# Patient Record
Sex: MALE | Race: WHITE | Employment: OTHER | URBAN - NONMETROPOLITAN AREA
[De-identification: names, ages, dates, MRNs, and addresses within clinical notes are randomized per-mention and may not be internally consistent; named-entity substitution may affect disease eponyms.]

---

## 2017-01-01 ENCOUNTER — CARE COORDINATION (OUTPATIENT)
Dept: CARE COORDINATION | Age: 82
End: 2017-01-01

## 2017-01-01 ENCOUNTER — HOSPITAL ENCOUNTER (OUTPATIENT)
Dept: INFUSION THERAPY | Age: 82
Discharge: HOME | End: 2017-09-14
Payer: MEDICARE

## 2017-01-01 ENCOUNTER — HOSPITAL ENCOUNTER (OUTPATIENT)
Dept: INFUSION THERAPY | Age: 82
Discharge: HOME | End: 2017-10-26
Payer: MEDICARE

## 2017-01-01 ENCOUNTER — CARE COORDINATOR VISIT (OUTPATIENT)
Dept: CARE COORDINATION | Age: 82
End: 2017-01-01

## 2017-01-01 ENCOUNTER — HOSPITAL ENCOUNTER (OUTPATIENT)
Dept: INFUSION THERAPY | Age: 82
Discharge: HOME | End: 2017-08-17
Payer: MEDICARE

## 2017-01-01 ENCOUNTER — OFFICE VISIT (OUTPATIENT)
Dept: ONCOLOGY | Age: 82
End: 2017-01-01
Payer: MEDICARE

## 2017-01-01 ENCOUNTER — HOSPITAL ENCOUNTER (OUTPATIENT)
Dept: PULMONOLOGY | Age: 82
Discharge: HOME | End: 2017-12-05
Payer: MEDICARE

## 2017-01-01 ENCOUNTER — OFFICE VISIT (OUTPATIENT)
Dept: UROLOGY | Age: 82
End: 2017-01-01
Payer: MEDICARE

## 2017-01-01 ENCOUNTER — HOSPITAL ENCOUNTER (OUTPATIENT)
Dept: INFUSION THERAPY | Age: 82
Discharge: HOME | End: 2017-10-05
Payer: MEDICARE

## 2017-01-01 ENCOUNTER — HOSPITAL ENCOUNTER (OUTPATIENT)
Dept: INFUSION THERAPY | Age: 82
Discharge: HOME | End: 2017-09-21
Payer: MEDICARE

## 2017-01-01 ENCOUNTER — HOSPITAL ENCOUNTER (OUTPATIENT)
Dept: INFUSION THERAPY | Age: 82
Discharge: HOME | End: 2017-09-28
Payer: MEDICARE

## 2017-01-01 ENCOUNTER — TELEPHONE (OUTPATIENT)
Dept: PULMONOLOGY | Age: 82
End: 2017-01-01

## 2017-01-01 ENCOUNTER — OFFICE VISIT (OUTPATIENT)
Dept: PULMONOLOGY | Age: 82
End: 2017-01-01
Payer: MEDICARE

## 2017-01-01 ENCOUNTER — HOSPITAL ENCOUNTER (OUTPATIENT)
Dept: INFUSION THERAPY | Age: 82
Discharge: HOME | End: 2017-11-02
Payer: MEDICARE

## 2017-01-01 ENCOUNTER — HOSPITAL ENCOUNTER (OUTPATIENT)
Dept: INFUSION THERAPY | Age: 82
Discharge: HOME | End: 2017-11-30
Payer: MEDICARE

## 2017-01-01 ENCOUNTER — HOSPITAL ENCOUNTER (OUTPATIENT)
Dept: INFUSION THERAPY | Age: 82
Discharge: HOME | End: 2017-08-31
Payer: MEDICARE

## 2017-01-01 ENCOUNTER — OFFICE VISIT (OUTPATIENT)
Dept: FAMILY MEDICINE CLINIC | Age: 82
End: 2017-01-01
Payer: MEDICARE

## 2017-01-01 ENCOUNTER — HOSPITAL ENCOUNTER (OUTPATIENT)
Dept: INFUSION THERAPY | Age: 82
Discharge: HOME | End: 2017-11-09
Payer: MEDICARE

## 2017-01-01 ENCOUNTER — HOSPITAL ENCOUNTER (OUTPATIENT)
Dept: INFUSION THERAPY | Age: 82
Discharge: HOME | End: 2017-12-07
Payer: MEDICARE

## 2017-01-01 ENCOUNTER — HOSPITAL ENCOUNTER (OUTPATIENT)
Dept: INFUSION THERAPY | Age: 82
Discharge: HOME | End: 2017-10-12
Payer: MEDICARE

## 2017-01-01 ENCOUNTER — HOSPITAL ENCOUNTER (OUTPATIENT)
Dept: INFUSION THERAPY | Age: 82
Discharge: HOME | End: 2017-11-24
Payer: MEDICARE

## 2017-01-01 ENCOUNTER — HOSPITAL ENCOUNTER (OUTPATIENT)
Dept: INFUSION THERAPY | Age: 82
Discharge: HOME | End: 2017-12-21
Payer: MEDICARE

## 2017-01-01 ENCOUNTER — HOSPITAL ENCOUNTER (OUTPATIENT)
Dept: INFUSION THERAPY | Age: 82
Discharge: HOME | End: 2017-10-19
Payer: MEDICARE

## 2017-01-01 ENCOUNTER — HOSPITAL ENCOUNTER (OUTPATIENT)
Dept: INFUSION THERAPY | Age: 82
Discharge: HOME | End: 2017-08-24
Payer: MEDICARE

## 2017-01-01 ENCOUNTER — HOSPITAL ENCOUNTER (OUTPATIENT)
Dept: INFUSION THERAPY | Age: 82
Discharge: HOME | End: 2017-09-07
Payer: MEDICARE

## 2017-01-01 ENCOUNTER — HOSPITAL ENCOUNTER (OUTPATIENT)
Dept: INFUSION THERAPY | Age: 82
Discharge: HOME | End: 2017-12-28
Payer: MEDICARE

## 2017-01-01 ENCOUNTER — HOSPITAL ENCOUNTER (OUTPATIENT)
Dept: INFUSION THERAPY | Age: 82
Discharge: HOME | End: 2017-12-14
Payer: MEDICARE

## 2017-01-01 ENCOUNTER — HOSPITAL ENCOUNTER (OUTPATIENT)
Dept: INFUSION THERAPY | Age: 82
Discharge: HOME | End: 2017-11-16
Payer: MEDICARE

## 2017-01-01 VITALS
SYSTOLIC BLOOD PRESSURE: 144 MMHG | OXYGEN SATURATION: 97 % | HEART RATE: 77 BPM | DIASTOLIC BLOOD PRESSURE: 69 MMHG | BODY MASS INDEX: 33.43 KG/M2 | TEMPERATURE: 97.7 F | HEIGHT: 67 IN | WEIGHT: 213 LBS | RESPIRATION RATE: 18 BRPM

## 2017-01-01 VITALS
DIASTOLIC BLOOD PRESSURE: 72 MMHG | WEIGHT: 208.6 LBS | OXYGEN SATURATION: 98 % | BODY MASS INDEX: 33.52 KG/M2 | HEIGHT: 66 IN | SYSTOLIC BLOOD PRESSURE: 129 MMHG | RESPIRATION RATE: 18 BRPM | TEMPERATURE: 98.4 F | HEART RATE: 79 BPM

## 2017-01-01 VITALS
DIASTOLIC BLOOD PRESSURE: 78 MMHG | TEMPERATURE: 97.7 F | SYSTOLIC BLOOD PRESSURE: 121 MMHG | BODY MASS INDEX: 32.49 KG/M2 | RESPIRATION RATE: 18 BRPM | HEIGHT: 67 IN | HEART RATE: 98 BPM | OXYGEN SATURATION: 97 % | WEIGHT: 207 LBS

## 2017-01-01 VITALS
RESPIRATION RATE: 16 BRPM | BODY MASS INDEX: 33.27 KG/M2 | OXYGEN SATURATION: 97 % | WEIGHT: 207 LBS | DIASTOLIC BLOOD PRESSURE: 67 MMHG | HEART RATE: 73 BPM | TEMPERATURE: 97.7 F | SYSTOLIC BLOOD PRESSURE: 139 MMHG | HEIGHT: 66 IN

## 2017-01-01 VITALS
SYSTOLIC BLOOD PRESSURE: 152 MMHG | BODY MASS INDEX: 33.34 KG/M2 | DIASTOLIC BLOOD PRESSURE: 72 MMHG | WEIGHT: 212.4 LBS | HEIGHT: 67 IN

## 2017-01-01 VITALS
HEART RATE: 77 BPM | SYSTOLIC BLOOD PRESSURE: 142 MMHG | TEMPERATURE: 97.8 F | WEIGHT: 207.8 LBS | RESPIRATION RATE: 18 BRPM | BODY MASS INDEX: 33.4 KG/M2 | HEIGHT: 66 IN | DIASTOLIC BLOOD PRESSURE: 66 MMHG | OXYGEN SATURATION: 96 %

## 2017-01-01 VITALS
SYSTOLIC BLOOD PRESSURE: 141 MMHG | OXYGEN SATURATION: 97 % | HEART RATE: 88 BPM | TEMPERATURE: 97.7 F | RESPIRATION RATE: 18 BRPM | DIASTOLIC BLOOD PRESSURE: 77 MMHG

## 2017-01-01 VITALS
TEMPERATURE: 97.9 F | HEIGHT: 67 IN | SYSTOLIC BLOOD PRESSURE: 148 MMHG | WEIGHT: 211 LBS | HEART RATE: 82 BPM | DIASTOLIC BLOOD PRESSURE: 73 MMHG | BODY MASS INDEX: 33.12 KG/M2 | OXYGEN SATURATION: 95 % | RESPIRATION RATE: 18 BRPM

## 2017-01-01 VITALS
RESPIRATION RATE: 16 BRPM | WEIGHT: 212 LBS | OXYGEN SATURATION: 98 % | TEMPERATURE: 98.1 F | DIASTOLIC BLOOD PRESSURE: 79 MMHG | SYSTOLIC BLOOD PRESSURE: 140 MMHG | HEIGHT: 67 IN | BODY MASS INDEX: 33.27 KG/M2 | HEART RATE: 80 BPM

## 2017-01-01 VITALS
HEART RATE: 75 BPM | TEMPERATURE: 97.6 F | SYSTOLIC BLOOD PRESSURE: 125 MMHG | BODY MASS INDEX: 33.41 KG/M2 | RESPIRATION RATE: 18 BRPM | WEIGHT: 207 LBS | DIASTOLIC BLOOD PRESSURE: 60 MMHG | OXYGEN SATURATION: 97 %

## 2017-01-01 VITALS
SYSTOLIC BLOOD PRESSURE: 131 MMHG | BODY MASS INDEX: 33.75 KG/M2 | TEMPERATURE: 97.8 F | RESPIRATION RATE: 18 BRPM | OXYGEN SATURATION: 98 % | DIASTOLIC BLOOD PRESSURE: 69 MMHG | HEART RATE: 77 BPM | HEIGHT: 66 IN | WEIGHT: 210 LBS

## 2017-01-01 VITALS
RESPIRATION RATE: 18 BRPM | BODY MASS INDEX: 33.27 KG/M2 | SYSTOLIC BLOOD PRESSURE: 128 MMHG | OXYGEN SATURATION: 96 % | TEMPERATURE: 97.9 F | WEIGHT: 212.4 LBS | DIASTOLIC BLOOD PRESSURE: 66 MMHG | HEART RATE: 78 BPM

## 2017-01-01 VITALS
HEIGHT: 66 IN | HEART RATE: 92 BPM | OXYGEN SATURATION: 97 % | DIASTOLIC BLOOD PRESSURE: 62 MMHG | SYSTOLIC BLOOD PRESSURE: 118 MMHG | TEMPERATURE: 97.9 F | BODY MASS INDEX: 34.27 KG/M2 | WEIGHT: 213.2 LBS | RESPIRATION RATE: 16 BRPM

## 2017-01-01 VITALS
SYSTOLIC BLOOD PRESSURE: 132 MMHG | BODY MASS INDEX: 33.4 KG/M2 | HEART RATE: 73 BPM | TEMPERATURE: 97.7 F | WEIGHT: 207.8 LBS | DIASTOLIC BLOOD PRESSURE: 69 MMHG | HEIGHT: 66 IN | OXYGEN SATURATION: 98 % | RESPIRATION RATE: 16 BRPM

## 2017-01-01 VITALS
TEMPERATURE: 97.6 F | DIASTOLIC BLOOD PRESSURE: 63 MMHG | BODY MASS INDEX: 33.12 KG/M2 | HEIGHT: 67 IN | SYSTOLIC BLOOD PRESSURE: 114 MMHG | TEMPERATURE: 98 F | BODY MASS INDEX: 33.27 KG/M2 | RESPIRATION RATE: 18 BRPM | HEART RATE: 81 BPM | DIASTOLIC BLOOD PRESSURE: 74 MMHG | HEART RATE: 66 BPM | OXYGEN SATURATION: 98 % | OXYGEN SATURATION: 98 % | SYSTOLIC BLOOD PRESSURE: 133 MMHG | HEART RATE: 82 BPM | SYSTOLIC BLOOD PRESSURE: 162 MMHG | DIASTOLIC BLOOD PRESSURE: 59 MMHG | TEMPERATURE: 97.8 F | RESPIRATION RATE: 18 BRPM | WEIGHT: 207 LBS | OXYGEN SATURATION: 98 % | WEIGHT: 211 LBS | HEIGHT: 66 IN | RESPIRATION RATE: 18 BRPM

## 2017-01-01 VITALS
WEIGHT: 206 LBS | HEIGHT: 66 IN | TEMPERATURE: 97.7 F | OXYGEN SATURATION: 96 % | HEART RATE: 75 BPM | SYSTOLIC BLOOD PRESSURE: 130 MMHG | DIASTOLIC BLOOD PRESSURE: 64 MMHG | RESPIRATION RATE: 18 BRPM | BODY MASS INDEX: 33.11 KG/M2

## 2017-01-01 VITALS
TEMPERATURE: 97.6 F | DIASTOLIC BLOOD PRESSURE: 70 MMHG | HEIGHT: 67 IN | OXYGEN SATURATION: 97 % | RESPIRATION RATE: 18 BRPM | HEART RATE: 74 BPM | SYSTOLIC BLOOD PRESSURE: 147 MMHG

## 2017-01-01 VITALS
BODY MASS INDEX: 32.52 KG/M2 | HEIGHT: 67 IN | HEART RATE: 101 BPM | DIASTOLIC BLOOD PRESSURE: 77 MMHG | OXYGEN SATURATION: 98 % | WEIGHT: 207.2 LBS | SYSTOLIC BLOOD PRESSURE: 135 MMHG | TEMPERATURE: 97 F | RESPIRATION RATE: 18 BRPM

## 2017-01-01 VITALS
HEIGHT: 66 IN | RESPIRATION RATE: 18 BRPM | OXYGEN SATURATION: 98 % | BODY MASS INDEX: 33.75 KG/M2 | TEMPERATURE: 97.2 F | SYSTOLIC BLOOD PRESSURE: 138 MMHG | WEIGHT: 210 LBS | HEART RATE: 83 BPM | DIASTOLIC BLOOD PRESSURE: 78 MMHG

## 2017-01-01 VITALS
HEART RATE: 84 BPM | SYSTOLIC BLOOD PRESSURE: 134 MMHG | BODY MASS INDEX: 33.09 KG/M2 | DIASTOLIC BLOOD PRESSURE: 75 MMHG | HEIGHT: 67 IN | OXYGEN SATURATION: 95 % | TEMPERATURE: 97.1 F | WEIGHT: 210.8 LBS

## 2017-01-01 VITALS
BODY MASS INDEX: 32.33 KG/M2 | DIASTOLIC BLOOD PRESSURE: 66 MMHG | WEIGHT: 206 LBS | RESPIRATION RATE: 20 BRPM | TEMPERATURE: 97.7 F | SYSTOLIC BLOOD PRESSURE: 114 MMHG | HEART RATE: 92 BPM | HEIGHT: 67 IN

## 2017-01-01 VITALS
HEIGHT: 66 IN | TEMPERATURE: 97.6 F | BODY MASS INDEX: 33.75 KG/M2 | DIASTOLIC BLOOD PRESSURE: 60 MMHG | SYSTOLIC BLOOD PRESSURE: 134 MMHG | OXYGEN SATURATION: 97 % | WEIGHT: 210 LBS | HEART RATE: 89 BPM | RESPIRATION RATE: 18 BRPM

## 2017-01-01 VITALS
BODY MASS INDEX: 34.49 KG/M2 | HEART RATE: 78 BPM | RESPIRATION RATE: 18 BRPM | WEIGHT: 214.6 LBS | OXYGEN SATURATION: 97 % | HEIGHT: 66 IN | TEMPERATURE: 97.7 F | SYSTOLIC BLOOD PRESSURE: 145 MMHG | DIASTOLIC BLOOD PRESSURE: 67 MMHG

## 2017-01-01 VITALS
SYSTOLIC BLOOD PRESSURE: 143 MMHG | DIASTOLIC BLOOD PRESSURE: 67 MMHG | WEIGHT: 207 LBS | HEIGHT: 67 IN | OXYGEN SATURATION: 94 % | BODY MASS INDEX: 32.49 KG/M2 | TEMPERATURE: 97.6 F | RESPIRATION RATE: 16 BRPM | HEART RATE: 81 BPM

## 2017-01-01 VITALS
BODY MASS INDEX: 33.18 KG/M2 | WEIGHT: 211.4 LBS | SYSTOLIC BLOOD PRESSURE: 155 MMHG | RESPIRATION RATE: 18 BRPM | TEMPERATURE: 97.7 F | OXYGEN SATURATION: 97 % | HEIGHT: 67 IN | HEART RATE: 70 BPM | DIASTOLIC BLOOD PRESSURE: 69 MMHG

## 2017-01-01 DIAGNOSIS — Z51.11 CHEMOTHERAPY MANAGEMENT, ENCOUNTER FOR: ICD-10-CM

## 2017-01-01 DIAGNOSIS — D47.2 SMOLDERING MYELOMA: ICD-10-CM

## 2017-01-01 DIAGNOSIS — I35.1 MODERATE AORTIC REGURGITATION: ICD-10-CM

## 2017-01-01 DIAGNOSIS — C90.00 MULTIPLE MYELOMA NOT HAVING ACHIEVED REMISSION (HCC): Primary | ICD-10-CM

## 2017-01-01 DIAGNOSIS — J44.9 MODERATE COPD (CHRONIC OBSTRUCTIVE PULMONARY DISEASE) (HCC): ICD-10-CM

## 2017-01-01 DIAGNOSIS — J44.1 COPD EXACERBATION (HCC): Primary | ICD-10-CM

## 2017-01-01 DIAGNOSIS — M79.604 RIGHT LEG PAIN: ICD-10-CM

## 2017-01-01 DIAGNOSIS — R33.8 ACUTE URINARY RETENTION: ICD-10-CM

## 2017-01-01 DIAGNOSIS — R97.20 ELEVATED PSA: ICD-10-CM

## 2017-01-01 DIAGNOSIS — Z51.11 ENCOUNTER FOR ANTINEOPLASTIC CHEMOTHERAPY: ICD-10-CM

## 2017-01-01 DIAGNOSIS — J44.9 MODERATE COPD (CHRONIC OBSTRUCTIVE PULMONARY DISEASE) (HCC): Primary | ICD-10-CM

## 2017-01-01 DIAGNOSIS — Z23 NEED FOR IMMUNIZATION AGAINST INFLUENZA: ICD-10-CM

## 2017-01-01 DIAGNOSIS — F17.200 TOBACCO DEPENDENCE: ICD-10-CM

## 2017-01-01 DIAGNOSIS — I10 ESSENTIAL HYPERTENSION: ICD-10-CM

## 2017-01-01 DIAGNOSIS — M25.562 LEFT KNEE PAIN, UNSPECIFIED CHRONICITY: ICD-10-CM

## 2017-01-01 DIAGNOSIS — R93.89 ABNORMAL CHEST CT: ICD-10-CM

## 2017-01-01 DIAGNOSIS — R39.12 BENIGN PROSTATIC HYPERPLASIA WITH WEAK URINARY STREAM: ICD-10-CM

## 2017-01-01 DIAGNOSIS — D84.9 IMMUNOCOMPROMISED STATE (HCC): ICD-10-CM

## 2017-01-01 DIAGNOSIS — R00.0 SINUS TACHYCARDIA: ICD-10-CM

## 2017-01-01 DIAGNOSIS — R60.0 BILATERAL EDEMA OF LOWER EXTREMITY: ICD-10-CM

## 2017-01-01 DIAGNOSIS — C61 PROSTATE CA (HCC): ICD-10-CM

## 2017-01-01 DIAGNOSIS — R06.02 SHORTNESS OF BREATH: ICD-10-CM

## 2017-01-01 DIAGNOSIS — N40.1 BENIGN PROSTATIC HYPERPLASIA WITH WEAK URINARY STREAM: ICD-10-CM

## 2017-01-01 DIAGNOSIS — C90.00 MULTIPLE MYELOMA NOT HAVING ACHIEVED REMISSION (HCC): ICD-10-CM

## 2017-01-01 DIAGNOSIS — L03.115 CELLULITIS OF RIGHT LOWER EXTREMITY: ICD-10-CM

## 2017-01-01 DIAGNOSIS — Z85.828 HX OF SKIN CANCER, BASAL CELL: ICD-10-CM

## 2017-01-01 DIAGNOSIS — E83.42 HYPOMAGNESEMIA: ICD-10-CM

## 2017-01-01 DIAGNOSIS — J43.9 PULMONARY EMPHYSEMA, UNSPECIFIED EMPHYSEMA TYPE (HCC): ICD-10-CM

## 2017-01-01 DIAGNOSIS — C61 PROSTATE CA (HCC): Primary | ICD-10-CM

## 2017-01-01 DIAGNOSIS — R35.1 NOCTURIA MORE THAN TWICE PER NIGHT: ICD-10-CM

## 2017-01-01 DIAGNOSIS — J44.1 COPD EXACERBATION (HCC): ICD-10-CM

## 2017-01-01 DIAGNOSIS — J30.1 NON-SEASONAL ALLERGIC RHINITIS DUE TO POLLEN, UNSPECIFIED CHRONICITY: ICD-10-CM

## 2017-01-01 DIAGNOSIS — A49.1 PNEUMOCOCCAL INFECTION: ICD-10-CM

## 2017-01-01 DIAGNOSIS — C79.51 BONE METASTASIS (HCC): ICD-10-CM

## 2017-01-01 DIAGNOSIS — Z85.46 H/O PROSTATE CANCER: ICD-10-CM

## 2017-01-01 DIAGNOSIS — R91.1 PULMONARY NODULE, LEFT: ICD-10-CM

## 2017-01-01 DIAGNOSIS — E11.9 CONTROLLED TYPE 2 DIABETES MELLITUS WITHOUT COMPLICATION, WITHOUT LONG-TERM CURRENT USE OF INSULIN (HCC): Primary | ICD-10-CM

## 2017-01-01 DIAGNOSIS — N40.0 BPH WITHOUT URINARY OBSTRUCTION: ICD-10-CM

## 2017-01-01 LAB
ALBUMIN SERPL-MCNC: 3.6 G/DL (ref 3.5–5.1)
ALP BLD-CCNC: 114 U/L (ref 38–126)
ALP BLD-CCNC: 117 U/L (ref 38–126)
ALP BLD-CCNC: 127 U/L (ref 38–126)
ALT SERPL-CCNC: 10 U/L (ref 11–66)
ALT SERPL-CCNC: 10 U/L (ref 11–66)
ALT SERPL-CCNC: 13 U/L (ref 11–66)
ANISOCYTOSIS: ABNORMAL
AST SERPL-CCNC: 11 U/L (ref 5–40)
AST SERPL-CCNC: 13 U/L (ref 5–40)
AST SERPL-CCNC: 14 U/L (ref 5–40)
BASINOPHIL, AUTOMATED: 0 % (ref 0–12)
BASINOPHIL, AUTOMATED: 0 % (ref 0–12)
BASOPHILS # BLD: 0.7 %
BASOPHILS ABSOLUTE: 0 THOU/MM3 (ref 0–0.1)
BILIRUB SERPL-MCNC: 0.2 MG/DL (ref 0.3–1.2)
BILIRUB SERPL-MCNC: 0.2 MG/DL (ref 0.3–1.2)
BILIRUB SERPL-MCNC: 0.3 MG/DL (ref 0.3–1.2)
BILIRUBIN DIRECT: < 0.2 MG/DL (ref 0–0.3)
BILIRUBIN URINE: NEGATIVE
BLOOD URINE, POC: ABNORMAL
BUN, WHOLE BLOOD: 13 MG/DL (ref 8–26)
BUN, WHOLE BLOOD: 15 MG/DL (ref 8–26)
BUN, WHOLE BLOOD: 16 MG/DL (ref 8–26)
CHARACTER, URINE: CLEAR
CHLORIDE, WHOLE BLOOD: 100 MEQ/L (ref 98–109)
COLOR, URINE: YELLOW
CREATININE, WHOLE BLOOD: 0.9 MG/DL (ref 0.5–1.2)
CREATININE, WHOLE BLOOD: 1 MG/DL (ref 0.5–1.2)
CREATININE, WHOLE BLOOD: 1.1 MG/DL (ref 0.5–1.2)
EOSINOPHIL # BLD: 2.4 %
EOSINOPHILS ABSOLUTE: 0 THOU/MM3 (ref 0–0.4)
EOSINOPHILS RELATIVE PERCENT: 2 % (ref 0–12)
EOSINOPHILS RELATIVE PERCENT: 3 % (ref 0–12)
GFR, ESTIMATED: 68 ML/MIN/1.73M2
GFR, ESTIMATED: 76 ML/MIN/1.73M2
GFR, ESTIMATED: 85 ML/MIN/1.73M2
GLUCOSE URINE: 100 MG/DL
GLUCOSE, WHOLE BLOOD: 107 MG/DL (ref 70–108)
GLUCOSE, WHOLE BLOOD: 129 MG/DL (ref 70–108)
GLUCOSE, WHOLE BLOOD: 142 MG/DL (ref 70–108)
HBA1C MFR BLD: 7.6 %
HCT VFR BLD CALC: 35 % (ref 42–52)
HCT VFR BLD CALC: 36.7 % (ref 42–52)
HCT VFR BLD CALC: 36.9 % (ref 42–52)
HEMOGLOBIN: 11.8 GM/DL (ref 14–18)
HEMOGLOBIN: 12.4 GM/DL (ref 14–18)
HEMOGLOBIN: 12.5 GM/DL (ref 14–18)
IMMUNOFIXATION WITH QUANT: NORMAL
IMMUNOFIXATION WITH QUANT: NORMAL
IONIZED CALCIUM, WHOLE BLOOD: 1.16 MMOL/L (ref 1.12–1.32)
IONIZED CALCIUM, WHOLE BLOOD: 1.17 MMOL/L (ref 1.12–1.32)
IONIZED CALCIUM, WHOLE BLOOD: 1.19 MMOL/L (ref 1.12–1.32)
KAPPA/LAMBDA FREE LIGHT CHAINS: NORMAL
KAPPA/LAMBDA FREE LIGHT CHAINS: NORMAL
KETONES, URINE: NEGATIVE
LEUK/LYMPH PHENOTYPING WB: NORMAL
LEUKOCYTE CLUMPS, URINE: NEGATIVE
LYMPHOCYTES # BLD: 15 % (ref 15–47)
LYMPHOCYTES # BLD: 26 % (ref 15–47)
LYMPHOCYTES # BLD: 28.5 %
LYMPHOCYTES ABSOLUTE: 0.5 THOU/MM3 (ref 1–4.8)
MCH RBC QN AUTO: 32 PG (ref 27–31)
MCH RBC QN AUTO: 32.1 PG (ref 27–31)
MCH RBC QN AUTO: 32.4 PG (ref 27–31)
MCHC RBC AUTO-ENTMCNC: 33.6 GM/DL (ref 33–37)
MCHC RBC AUTO-ENTMCNC: 33.7 GM/DL (ref 33–37)
MCHC RBC AUTO-ENTMCNC: 33.8 GM/DL (ref 33–37)
MCV RBC AUTO: 95 FL (ref 80–94)
MCV RBC AUTO: 95 FL (ref 80–94)
MCV RBC AUTO: 96 FL (ref 80–94)
MONOCYTES # BLD: 3.7 %
MONOCYTES ABSOLUTE: 0.1 THOU/MM3 (ref 0.4–1.3)
MONOCYTES: 2 % (ref 0–12)
MONOCYTES: 3 % (ref 0–12)
NITRITE, URINE: NEGATIVE
NUCLEATED RED BLOOD CELLS: 0 /100 WBC
PDW BLD-RTO: 12.6 % (ref 11.5–14.5)
PDW BLD-RTO: 13.1 % (ref 11.5–14.5)
PDW BLD-RTO: 13.1 % (ref 11.5–14.5)
PH, URINE: 6
PLATELET # BLD: 189 THOU/MM3 (ref 130–400)
PLATELET # BLD: 190 THOU/MM3 (ref 130–400)
PLATELET # BLD: 215 THOU/MM3 (ref 130–400)
PMV BLD AUTO: 7.2 MCM (ref 7.4–10.4)
PMV BLD AUTO: 7.3 MCM (ref 7.4–10.4)
PMV BLD AUTO: 7.4 MCM (ref 7.4–10.4)
POST VOID RESIDUAL (PVR): NORMAL ML
POTASSIUM, WHOLE BLOOD: 3.9 MEQ/L (ref 3.5–4.9)
POTASSIUM, WHOLE BLOOD: 4.4 MEQ/L (ref 3.5–4.9)
POTASSIUM, WHOLE BLOOD: 4.4 MEQ/L (ref 3.5–4.9)
PROSTATE SPECIFIC ANTIGEN: 0.9 NG/ML (ref 0–1)
PROTEIN, URINE: NEGATIVE MG/DL
RBC # BLD: 3.67 MILL/MM3 (ref 4.7–6.1)
RBC # BLD: 3.85 MILL/MM3 (ref 4.7–6.1)
RBC # BLD: 3.88 MILL/MM3 (ref 4.7–6.1)
SEG NEUTROPHILS: 64.7 %
SEG NEUTROPHILS: 70 % (ref 43–75)
SEG NEUTROPHILS: 78 % (ref 43–75)
SEGMENTED NEUTROPHILS ABSOLUTE COUNT: 1.2 THOU/MM3 (ref 1.8–7.7)
SODIUM, WHOLE BLOOD: 139 MEQ/L (ref 138–146)
SODIUM, WHOLE BLOOD: 140 MEQ/L (ref 138–146)
SODIUM, WHOLE BLOOD: 141 MEQ/L (ref 138–146)
SPECIFIC GRAVITY, URINE: 1.02 (ref 1–1.03)
TOTAL CO2, WHOLE BLOOD: 28 MEQ/L (ref 23–33)
TOTAL CO2, WHOLE BLOOD: 29 MEQ/L (ref 23–33)
TOTAL CO2, WHOLE BLOOD: 30 MEQ/L (ref 23–33)
TOTAL PROTEIN: 8.5 G/DL (ref 6.1–8)
TOTAL PROTEIN: 8.9 G/DL (ref 6.1–8)
TOTAL PROTEIN: 9.3 G/DL (ref 6.1–8)
UROBILINOGEN, URINE: 0.2 EU/DL
WBC # BLD: 1.9 THOU/MM3 (ref 4.8–10.8)
WBC # BLD: 2.2 THOU/MM3 (ref 4.8–10.8)
WBC # BLD: 2.4 THOU/MM3 (ref 4.8–10.8)

## 2017-01-01 PROCEDURE — 6360000002 HC RX W HCPCS: Performed by: INTERNAL MEDICINE

## 2017-01-01 PROCEDURE — 96374 THER/PROPH/DIAG INJ IV PUSH: CPT

## 2017-01-01 PROCEDURE — 1123F ACP DISCUSS/DSCN MKR DOCD: CPT | Performed by: INTERNAL MEDICINE

## 2017-01-01 PROCEDURE — 2580000003 HC RX 258: Performed by: INTERNAL MEDICINE

## 2017-01-01 PROCEDURE — 96401 CHEMO ANTI-NEOPL SQ/IM: CPT

## 2017-01-01 PROCEDURE — 86334 IMMUNOFIX E-PHORESIS SERUM: CPT

## 2017-01-01 PROCEDURE — 4040F PNEUMOC VAC/ADMIN/RCVD: CPT | Performed by: UROLOGY

## 2017-01-01 PROCEDURE — G8484 FLU IMMUNIZE NO ADMIN: HCPCS | Performed by: INTERNAL MEDICINE

## 2017-01-01 PROCEDURE — 84165 PROTEIN E-PHORESIS SERUM: CPT

## 2017-01-01 PROCEDURE — 80076 HEPATIC FUNCTION PANEL: CPT

## 2017-01-01 PROCEDURE — G0463 HOSPITAL OUTPT CLINIC VISIT: HCPCS

## 2017-01-01 PROCEDURE — 4040F PNEUMOC VAC/ADMIN/RCVD: CPT | Performed by: INTERNAL MEDICINE

## 2017-01-01 PROCEDURE — 1036F TOBACCO NON-USER: CPT | Performed by: UROLOGY

## 2017-01-01 PROCEDURE — 96365 THER/PROPH/DIAG IV INF INIT: CPT

## 2017-01-01 PROCEDURE — 99213 OFFICE O/P EST LOW 20 MIN: CPT | Performed by: FAMILY MEDICINE

## 2017-01-01 PROCEDURE — G8417 CALC BMI ABV UP PARAM F/U: HCPCS | Performed by: UROLOGY

## 2017-01-01 PROCEDURE — 36415 COLL VENOUS BLD VENIPUNCTURE: CPT

## 2017-01-01 PROCEDURE — 90688 IIV4 VACCINE SPLT 0.5 ML IM: CPT | Performed by: FAMILY MEDICINE

## 2017-01-01 PROCEDURE — 1036F TOBACCO NON-USER: CPT | Performed by: FAMILY MEDICINE

## 2017-01-01 PROCEDURE — G8926 SPIRO NO PERF OR DOC: HCPCS | Performed by: FAMILY MEDICINE

## 2017-01-01 PROCEDURE — 82784 ASSAY IGA/IGD/IGG/IGM EACH: CPT

## 2017-01-01 PROCEDURE — G8417 CALC BMI ABV UP PARAM F/U: HCPCS | Performed by: INTERNAL MEDICINE

## 2017-01-01 PROCEDURE — 36591 DRAW BLOOD OFF VENOUS DEVICE: CPT

## 2017-01-01 PROCEDURE — G8427 DOCREV CUR MEDS BY ELIG CLIN: HCPCS | Performed by: FAMILY MEDICINE

## 2017-01-01 PROCEDURE — 99213 OFFICE O/P EST LOW 20 MIN: CPT | Performed by: UROLOGY

## 2017-01-01 PROCEDURE — 88184 FLOWCYTOMETRY/ TC 1 MARKER: CPT

## 2017-01-01 PROCEDURE — 99215 OFFICE O/P EST HI 40 MIN: CPT | Performed by: INTERNAL MEDICINE

## 2017-01-01 PROCEDURE — 84160 ASSAY OF PROTEIN ANY SOURCE: CPT

## 2017-01-01 PROCEDURE — G8427 DOCREV CUR MEDS BY ELIG CLIN: HCPCS | Performed by: INTERNAL MEDICINE

## 2017-01-01 PROCEDURE — 1036F TOBACCO NON-USER: CPT | Performed by: INTERNAL MEDICINE

## 2017-01-01 PROCEDURE — 83883 ASSAY NEPHELOMETRY NOT SPEC: CPT

## 2017-01-01 PROCEDURE — 4040F PNEUMOC VAC/ADMIN/RCVD: CPT | Performed by: FAMILY MEDICINE

## 2017-01-01 PROCEDURE — 94060 EVALUATION OF WHEEZING: CPT

## 2017-01-01 PROCEDURE — 1123F ACP DISCUSS/DSCN MKR DOCD: CPT | Performed by: UROLOGY

## 2017-01-01 PROCEDURE — 80047 BASIC METABLC PNL IONIZED CA: CPT

## 2017-01-01 PROCEDURE — 3023F SPIROM DOC REV: CPT | Performed by: FAMILY MEDICINE

## 2017-01-01 PROCEDURE — G8484 FLU IMMUNIZE NO ADMIN: HCPCS | Performed by: FAMILY MEDICINE

## 2017-01-01 PROCEDURE — G8926 SPIRO NO PERF OR DOC: HCPCS | Performed by: INTERNAL MEDICINE

## 2017-01-01 PROCEDURE — G8484 FLU IMMUNIZE NO ADMIN: HCPCS | Performed by: UROLOGY

## 2017-01-01 PROCEDURE — 84153 ASSAY OF PSA TOTAL: CPT

## 2017-01-01 PROCEDURE — 85025 COMPLETE CBC W/AUTO DIFF WBC: CPT

## 2017-01-01 PROCEDURE — 1123F ACP DISCUSS/DSCN MKR DOCD: CPT | Performed by: FAMILY MEDICINE

## 2017-01-01 PROCEDURE — 3023F SPIROM DOC REV: CPT | Performed by: INTERNAL MEDICINE

## 2017-01-01 PROCEDURE — G8417 CALC BMI ABV UP PARAM F/U: HCPCS | Performed by: FAMILY MEDICINE

## 2017-01-01 PROCEDURE — 88185 FLOWCYTOMETRY/TC ADD-ON: CPT

## 2017-01-01 PROCEDURE — 83036 HEMOGLOBIN GLYCOSYLATED A1C: CPT | Performed by: FAMILY MEDICINE

## 2017-01-01 PROCEDURE — 99214 OFFICE O/P EST MOD 30 MIN: CPT | Performed by: FAMILY MEDICINE

## 2017-01-01 PROCEDURE — 51798 US URINE CAPACITY MEASURE: CPT | Performed by: UROLOGY

## 2017-01-01 PROCEDURE — G8427 DOCREV CUR MEDS BY ELIG CLIN: HCPCS | Performed by: UROLOGY

## 2017-01-01 PROCEDURE — 96367 TX/PROPH/DG ADDL SEQ IV INF: CPT

## 2017-01-01 PROCEDURE — 81003 URINALYSIS AUTO W/O SCOPE: CPT | Performed by: UROLOGY

## 2017-01-01 PROCEDURE — G0008 ADMIN INFLUENZA VIRUS VAC: HCPCS | Performed by: FAMILY MEDICINE

## 2017-01-01 RX ORDER — SODIUM CHLORIDE 9 MG/ML
INJECTION, SOLUTION INTRAVENOUS ONCE
Status: CANCELLED | OUTPATIENT
Start: 2017-01-01

## 2017-01-01 RX ORDER — SODIUM CHLORIDE 0.9 % (FLUSH) 0.9 %
5 SYRINGE (ML) INJECTION PRN
Status: CANCELLED | OUTPATIENT
Start: 2017-01-01

## 2017-01-01 RX ORDER — HEPARIN SODIUM (PORCINE) LOCK FLUSH IV SOLN 100 UNIT/ML 100 UNIT/ML
500 SOLUTION INTRAVENOUS PRN
Status: CANCELLED | OUTPATIENT
Start: 2017-01-01

## 2017-01-01 RX ORDER — SODIUM CHLORIDE 9 MG/ML
INJECTION, SOLUTION INTRAVENOUS ONCE
Status: COMPLETED | OUTPATIENT
Start: 2017-01-01 | End: 2017-01-01

## 2017-01-01 RX ORDER — METHYLPREDNISOLONE SODIUM SUCCINATE 125 MG/2ML
125 INJECTION, POWDER, LYOPHILIZED, FOR SOLUTION INTRAMUSCULAR; INTRAVENOUS PRN
Status: CANCELLED | OUTPATIENT
Start: 2017-01-01

## 2017-01-01 RX ORDER — SODIUM CHLORIDE 0.9 % (FLUSH) 0.9 %
10 SYRINGE (ML) INJECTION PRN
Status: CANCELLED | OUTPATIENT
Start: 2017-01-01

## 2017-01-01 RX ORDER — SODIUM CHLORIDE 0.9 % (FLUSH) 0.9 %
5 SYRINGE (ML) INJECTION PRN
Status: CANCELLED | OUTPATIENT
Start: 2018-01-01

## 2017-01-01 RX ORDER — SODIUM CHLORIDE 9 MG/ML
INJECTION, SOLUTION INTRAVENOUS ONCE
Status: CANCELLED | OUTPATIENT
Start: 2017-01-01 | End: 2017-01-01

## 2017-01-01 RX ORDER — LORAZEPAM 2 MG/ML
1 INJECTION INTRAMUSCULAR
Status: CANCELLED | OUTPATIENT
Start: 2017-01-01

## 2017-01-01 RX ORDER — DIPHENHYDRAMINE HYDROCHLORIDE 50 MG/ML
50 INJECTION INTRAMUSCULAR; INTRAVENOUS PRN
Status: CANCELLED | OUTPATIENT
Start: 2017-01-01

## 2017-01-01 RX ORDER — LORAZEPAM 2 MG/ML
1 INJECTION INTRAMUSCULAR
Status: CANCELLED | OUTPATIENT
Start: 2018-01-01

## 2017-01-01 RX ORDER — 0.9 % SODIUM CHLORIDE 0.9 %
250 VIAL (ML) INJECTION CONTINUOUS
Status: CANCELLED | OUTPATIENT
Start: 2017-01-01

## 2017-01-01 RX ORDER — DOXYCYCLINE HYCLATE 100 MG
100 TABLET ORAL 2 TIMES DAILY
Qty: 20 TABLET | Refills: 0 | Status: SHIPPED | OUTPATIENT
Start: 2017-01-01 | End: 2017-01-01

## 2017-01-01 RX ORDER — PREDNISONE 10 MG/1
30 TABLET ORAL DAILY
Qty: 15 TABLET | Refills: 0 | Status: SHIPPED | OUTPATIENT
Start: 2017-01-01 | End: 2017-01-01

## 2017-01-01 RX ORDER — PREDNISONE 20 MG/1
40 TABLET ORAL DAILY
Qty: 10 TABLET | Refills: 0 | Status: SHIPPED | OUTPATIENT
Start: 2017-01-01 | End: 2017-01-01

## 2017-01-01 RX ORDER — DIPHENHYDRAMINE HYDROCHLORIDE 50 MG/ML
50 INJECTION INTRAMUSCULAR; INTRAVENOUS ONCE
Status: CANCELLED | OUTPATIENT
Start: 2017-01-01 | End: 2017-01-01

## 2017-01-01 RX ORDER — PREDNISONE 10 MG/1
TABLET ORAL
Qty: 30 TABLET | Refills: 0 | Status: SHIPPED | OUTPATIENT
Start: 2017-01-01 | End: 2017-01-01 | Stop reason: ALTCHOICE

## 2017-01-01 RX ORDER — METHYLPREDNISOLONE SODIUM SUCCINATE 125 MG/2ML
125 INJECTION, POWDER, LYOPHILIZED, FOR SOLUTION INTRAMUSCULAR; INTRAVENOUS ONCE
Status: CANCELLED | OUTPATIENT
Start: 2017-01-01 | End: 2017-01-01

## 2017-01-01 RX ORDER — FLUTICASONE PROPIONATE 50 MCG
2 SPRAY, SUSPENSION (ML) NASAL DAILY
Qty: 1 BOTTLE | Refills: 7 | Status: SHIPPED | OUTPATIENT
Start: 2017-01-01 | End: 2017-01-01 | Stop reason: SDUPTHER

## 2017-01-01 RX ORDER — SODIUM CHLORIDE 9 MG/ML
INJECTION, SOLUTION INTRAVENOUS ONCE
Status: DISCONTINUED | OUTPATIENT
Start: 2017-01-01 | End: 2017-01-01 | Stop reason: HOSPADM

## 2017-01-01 RX ORDER — DIPHENHYDRAMINE HYDROCHLORIDE 50 MG/ML
50 INJECTION INTRAMUSCULAR; INTRAVENOUS PRN
Status: CANCELLED | OUTPATIENT
Start: 2018-01-01

## 2017-01-01 RX ORDER — HYDROCODONE BITARTRATE AND ACETAMINOPHEN 5; 325 MG/1; MG/1
1 TABLET ORAL EVERY 6 HOURS PRN
COMMUNITY
End: 2018-01-01 | Stop reason: SDUPTHER

## 2017-01-01 RX ORDER — SODIUM CHLORIDE 9 MG/ML
INJECTION, SOLUTION INTRAVENOUS ONCE
Status: CANCELLED | OUTPATIENT
Start: 2018-01-01

## 2017-01-01 RX ORDER — ALBUTEROL SULFATE 90 UG/1
2 AEROSOL, METERED RESPIRATORY (INHALATION) EVERY 6 HOURS PRN
Qty: 3 INHALER | Refills: 2 | Status: SHIPPED | OUTPATIENT
Start: 2017-01-01 | End: 2018-01-01 | Stop reason: SDUPTHER

## 2017-01-01 RX ORDER — 0.9 % SODIUM CHLORIDE 0.9 %
250 VIAL (ML) INJECTION CONTINUOUS
Status: CANCELLED | OUTPATIENT
Start: 2018-01-01

## 2017-01-01 RX ORDER — SODIUM CHLORIDE 0.9 % (FLUSH) 0.9 %
10 SYRINGE (ML) INJECTION PRN
Status: CANCELLED | OUTPATIENT
Start: 2018-01-01

## 2017-01-01 RX ORDER — HEPARIN SODIUM (PORCINE) LOCK FLUSH IV SOLN 100 UNIT/ML 100 UNIT/ML
500 SOLUTION INTRAVENOUS PRN
Status: CANCELLED | OUTPATIENT
Start: 2018-01-01

## 2017-01-01 RX ORDER — PREDNISONE 10 MG/1
30 TABLET ORAL DAILY
Qty: 15 TABLET | Refills: 0 | Status: SHIPPED | OUTPATIENT
Start: 2017-01-01 | End: 2017-01-01 | Stop reason: SDUPTHER

## 2017-01-01 RX ORDER — FLUTICASONE PROPIONATE 50 MCG
2 SPRAY, SUSPENSION (ML) NASAL DAILY
Qty: 1 BOTTLE | Refills: 7 | Status: ON HOLD | OUTPATIENT
Start: 2017-01-01 | End: 2018-01-01

## 2017-01-01 RX ORDER — AZITHROMYCIN 250 MG/1
TABLET, FILM COATED ORAL
Qty: 1 PACKET | Refills: 0 | Status: SHIPPED | OUTPATIENT
Start: 2017-01-01 | End: 2017-01-01 | Stop reason: ALTCHOICE

## 2017-01-01 RX ORDER — METHYLPREDNISOLONE SODIUM SUCCINATE 125 MG/2ML
125 INJECTION, POWDER, LYOPHILIZED, FOR SOLUTION INTRAMUSCULAR; INTRAVENOUS ONCE
Status: CANCELLED | OUTPATIENT
Start: 2018-01-01 | End: 2017-01-01

## 2017-01-01 RX ORDER — AZITHROMYCIN 250 MG/1
TABLET, FILM COATED ORAL
Qty: 1 PACKET | Refills: 0 | Status: SHIPPED | OUTPATIENT
Start: 2017-01-01 | End: 2017-01-01 | Stop reason: SDUPTHER

## 2017-01-01 RX ORDER — 0.9 % SODIUM CHLORIDE 0.9 %
10 VIAL (ML) INJECTION ONCE
Status: CANCELLED | OUTPATIENT
Start: 2017-01-01 | End: 2017-01-01

## 2017-01-01 RX ORDER — AZITHROMYCIN 250 MG/1
TABLET, FILM COATED ORAL
Qty: 1 PACKET | Refills: 0 | Status: SHIPPED | OUTPATIENT
Start: 2017-01-01 | End: 2017-01-01

## 2017-01-01 RX ORDER — METHYLPREDNISOLONE SODIUM SUCCINATE 125 MG/2ML
125 INJECTION, POWDER, LYOPHILIZED, FOR SOLUTION INTRAMUSCULAR; INTRAVENOUS PRN
Status: CANCELLED | OUTPATIENT
Start: 2018-01-01

## 2017-01-01 RX ORDER — 0.9 % SODIUM CHLORIDE 0.9 %
10 VIAL (ML) INJECTION ONCE
Status: CANCELLED | OUTPATIENT
Start: 2018-01-01 | End: 2017-01-01

## 2017-01-01 RX ORDER — DIPHENHYDRAMINE HYDROCHLORIDE 50 MG/ML
50 INJECTION INTRAMUSCULAR; INTRAVENOUS ONCE
Status: CANCELLED | OUTPATIENT
Start: 2018-01-01 | End: 2017-01-01

## 2017-01-01 RX ORDER — BUDESONIDE AND FORMOTEROL FUMARATE DIHYDRATE 160; 4.5 UG/1; UG/1
2 AEROSOL RESPIRATORY (INHALATION) 2 TIMES DAILY
Qty: 3 INHALER | Refills: 3 | Status: SHIPPED | OUTPATIENT
Start: 2017-01-01 | End: 2018-01-01 | Stop reason: SDUPTHER

## 2017-01-01 RX ORDER — BUDESONIDE AND FORMOTEROL FUMARATE DIHYDRATE 160; 4.5 UG/1; UG/1
2 AEROSOL RESPIRATORY (INHALATION) 2 TIMES DAILY
Qty: 3 INHALER | Refills: 3 | Status: SHIPPED | OUTPATIENT
Start: 2017-01-01 | End: 2017-01-01 | Stop reason: SDUPTHER

## 2017-01-01 RX ADMIN — DEXAMETHASONE SODIUM PHOSPHATE 12 MG: 4 INJECTION, SOLUTION INTRAMUSCULAR; INTRAVENOUS at 08:55

## 2017-01-01 RX ADMIN — DEXAMETHASONE SODIUM PHOSPHATE 12 MG: 4 INJECTION, SOLUTION INTRAMUSCULAR; INTRAVENOUS at 09:07

## 2017-01-01 RX ADMIN — DEXAMETHASONE SODIUM PHOSPHATE 12 MG: 4 INJECTION, SOLUTION INTRAMUSCULAR; INTRAVENOUS at 10:33

## 2017-01-01 RX ADMIN — SODIUM CHLORIDE 2.4 MG: 9 INJECTION INTRAMUSCULAR; INTRAVENOUS; SUBCUTANEOUS at 09:21

## 2017-01-01 RX ADMIN — SODIUM CHLORIDE: 900 INJECTION, SOLUTION INTRAVENOUS at 09:15

## 2017-01-01 RX ADMIN — SODIUM CHLORIDE: 9 INJECTION, SOLUTION INTRAVENOUS at 08:20

## 2017-01-01 RX ADMIN — DEXAMETHASONE SODIUM PHOSPHATE 12 MG: 4 INJECTION, SOLUTION INTRAMUSCULAR; INTRAVENOUS at 09:37

## 2017-01-01 RX ADMIN — BORTEZOMIB 2.4 MG: 3.5 INJECTION, POWDER, LYOPHILIZED, FOR SOLUTION INTRAVENOUS; SUBCUTANEOUS at 09:15

## 2017-01-01 RX ADMIN — SODIUM CHLORIDE 2.4 MG: 9 INJECTION INTRAMUSCULAR; INTRAVENOUS; SUBCUTANEOUS at 09:25

## 2017-01-01 RX ADMIN — DEXAMETHASONE SODIUM PHOSPHATE 12 MG: 4 INJECTION, SOLUTION INTRAMUSCULAR; INTRAVENOUS at 09:05

## 2017-01-01 RX ADMIN — SODIUM CHLORIDE: 9 INJECTION, SOLUTION INTRAVENOUS at 09:50

## 2017-01-01 RX ADMIN — SODIUM CHLORIDE: 9 INJECTION, SOLUTION INTRAVENOUS at 09:30

## 2017-01-01 RX ADMIN — SODIUM CHLORIDE 2.4 MG: 9 INJECTION INTRAMUSCULAR; INTRAVENOUS; SUBCUTANEOUS at 08:34

## 2017-01-01 RX ADMIN — SODIUM CHLORIDE: 9 INJECTION, SOLUTION INTRAVENOUS at 08:59

## 2017-01-01 RX ADMIN — SODIUM CHLORIDE: 9 INJECTION, SOLUTION INTRAVENOUS at 08:26

## 2017-01-01 RX ADMIN — SODIUM CHLORIDE: 9 INJECTION, SOLUTION INTRAVENOUS at 08:55

## 2017-01-01 RX ADMIN — SODIUM CHLORIDE: 9 INJECTION, SOLUTION INTRAVENOUS at 08:50

## 2017-01-01 RX ADMIN — DEXAMETHASONE SODIUM PHOSPHATE 12 MG: 4 INJECTION, SOLUTION INTRAMUSCULAR; INTRAVENOUS at 08:41

## 2017-01-01 RX ADMIN — DEXAMETHASONE SODIUM PHOSPHATE 12 MG: 4 INJECTION, SOLUTION INTRAMUSCULAR; INTRAVENOUS at 09:00

## 2017-01-01 RX ADMIN — SODIUM CHLORIDE: 9 INJECTION, SOLUTION INTRAVENOUS at 09:51

## 2017-01-01 RX ADMIN — BORTEZOMIB 2.4 MG: 3.5 INJECTION, POWDER, LYOPHILIZED, FOR SOLUTION INTRAVENOUS; SUBCUTANEOUS at 08:54

## 2017-01-01 RX ADMIN — SODIUM CHLORIDE: 9 INJECTION, SOLUTION INTRAVENOUS at 09:10

## 2017-01-01 RX ADMIN — SODIUM CHLORIDE: 9 INJECTION, SOLUTION INTRAVENOUS at 09:07

## 2017-01-01 RX ADMIN — SODIUM CHLORIDE 2.4 MG: 9 INJECTION INTRAMUSCULAR; INTRAVENOUS; SUBCUTANEOUS at 09:23

## 2017-01-01 RX ADMIN — SODIUM CHLORIDE 2.4 MG: 9 INJECTION INTRAMUSCULAR; INTRAVENOUS; SUBCUTANEOUS at 09:09

## 2017-01-01 RX ADMIN — SODIUM CHLORIDE 2.4 MG: 9 INJECTION INTRAMUSCULAR; INTRAVENOUS; SUBCUTANEOUS at 11:06

## 2017-01-01 RX ADMIN — SODIUM CHLORIDE: 9 INJECTION, SOLUTION INTRAVENOUS at 09:31

## 2017-01-01 RX ADMIN — SODIUM CHLORIDE 2.4 MG: 9 INJECTION INTRAMUSCULAR; INTRAVENOUS; SUBCUTANEOUS at 09:59

## 2017-01-01 RX ADMIN — BORTEZOMIB 2.4 MG: 3.5 INJECTION, POWDER, LYOPHILIZED, FOR SOLUTION INTRAVENOUS; SUBCUTANEOUS at 09:31

## 2017-01-01 RX ADMIN — DEXAMETHASONE SODIUM PHOSPHATE 12 MG: 4 INJECTION, SOLUTION INTRAMUSCULAR; INTRAVENOUS at 09:21

## 2017-01-01 RX ADMIN — SODIUM CHLORIDE 2.4 MG: 9 INJECTION INTRAMUSCULAR; INTRAVENOUS; SUBCUTANEOUS at 09:48

## 2017-01-01 RX ADMIN — DEXAMETHASONE SODIUM PHOSPHATE 12 MG: 4 INJECTION, SOLUTION INTRAMUSCULAR; INTRAVENOUS at 09:10

## 2017-01-01 RX ADMIN — SODIUM CHLORIDE 2.4 MG: 9 INJECTION INTRAMUSCULAR; INTRAVENOUS; SUBCUTANEOUS at 09:55

## 2017-01-01 RX ADMIN — SODIUM CHLORIDE 2.4 MG: 9 INJECTION INTRAMUSCULAR; INTRAVENOUS; SUBCUTANEOUS at 09:19

## 2017-01-01 RX ADMIN — SODIUM CHLORIDE: 9 INJECTION, SOLUTION INTRAVENOUS at 09:00

## 2017-01-01 RX ADMIN — SODIUM CHLORIDE: 900 INJECTION, SOLUTION INTRAVENOUS at 09:14

## 2017-01-01 RX ADMIN — SODIUM CHLORIDE 2.4 MG: 9 INJECTION INTRAMUSCULAR; INTRAVENOUS; SUBCUTANEOUS at 10:29

## 2017-01-01 RX ADMIN — SODIUM CHLORIDE: 9 INJECTION, SOLUTION INTRAVENOUS at 10:20

## 2017-01-01 RX ADMIN — BORTEZOMIB 2.4 MG: 3.5 INJECTION, POWDER, LYOPHILIZED, FOR SOLUTION INTRAVENOUS; SUBCUTANEOUS at 09:19

## 2017-01-01 RX ADMIN — DEXAMETHASONE SODIUM PHOSPHATE 12 MG: 4 INJECTION, SOLUTION INTRAMUSCULAR; INTRAVENOUS at 09:01

## 2017-01-01 RX ADMIN — BORTEZOMIB 2.4 MG: 3.5 INJECTION, POWDER, LYOPHILIZED, FOR SOLUTION INTRAVENOUS; SUBCUTANEOUS at 09:45

## 2017-01-01 RX ADMIN — DEXAMETHASONE SODIUM PHOSPHATE 12 MG: 4 INJECTION, SOLUTION INTRAMUSCULAR; INTRAVENOUS at 09:50

## 2017-01-01 RX ADMIN — DEXAMETHASONE SODIUM PHOSPHATE 12 MG: 4 INJECTION, SOLUTION INTRAMUSCULAR; INTRAVENOUS at 08:26

## 2017-01-01 RX ADMIN — SODIUM CHLORIDE: 900 INJECTION, SOLUTION INTRAVENOUS at 09:45

## 2017-01-01 RX ADMIN — BORTEZOMIB 2.4 MG: 3.5 INJECTION, POWDER, LYOPHILIZED, FOR SOLUTION INTRAVENOUS; SUBCUTANEOUS at 10:04

## 2017-01-01 RX ADMIN — BORTEZOMIB 2.4 MG: 3.5 INJECTION, POWDER, LYOPHILIZED, FOR SOLUTION INTRAVENOUS; SUBCUTANEOUS at 09:10

## 2017-01-01 RX ADMIN — DEXAMETHASONE SODIUM PHOSPHATE 12 MG: 4 INJECTION, SOLUTION INTRAMUSCULAR; INTRAVENOUS at 09:47

## 2017-01-01 RX ADMIN — SODIUM CHLORIDE: 9 INJECTION, SOLUTION INTRAVENOUS at 10:30

## 2017-01-01 RX ADMIN — DEXAMETHASONE SODIUM PHOSPHATE 12 MG: 4 INJECTION, SOLUTION INTRAMUSCULAR; INTRAVENOUS at 09:14

## 2017-01-01 RX ADMIN — DEXAMETHASONE SODIUM PHOSPHATE 12 MG: 4 INJECTION, SOLUTION INTRAMUSCULAR; INTRAVENOUS at 09:52

## 2017-01-01 RX ADMIN — SODIUM CHLORIDE 2.4 MG: 9 INJECTION INTRAMUSCULAR; INTRAVENOUS; SUBCUTANEOUS at 09:27

## 2017-01-01 RX ADMIN — DEXAMETHASONE SODIUM PHOSPHATE 12 MG: 4 INJECTION, SOLUTION INTRAMUSCULAR; INTRAVENOUS at 10:22

## 2017-01-01 ASSESSMENT — PAIN SCALES - GENERAL
PAINLEVEL_OUTOF10: 0
PAINLEVEL_OUTOF10: 5
PAINLEVEL_OUTOF10: 0
PAINLEVEL_OUTOF10: 0
PAINLEVEL_OUTOF10: 5
PAINLEVEL_OUTOF10: 0
PAINLEVEL_OUTOF10: 5
PAINLEVEL_OUTOF10: 0

## 2017-01-01 ASSESSMENT — ENCOUNTER SYMPTOMS
DYSPNEA ASSOCIATED WITH: EXERTION

## 2017-01-01 ASSESSMENT — PAIN DESCRIPTION - PAIN TYPE
TYPE: CHRONIC PAIN

## 2017-01-01 ASSESSMENT — PAIN DESCRIPTION - PROGRESSION
CLINICAL_PROGRESSION: NOT CHANGED
CLINICAL_PROGRESSION: NOT CHANGED

## 2017-01-01 ASSESSMENT — PAIN DESCRIPTION - FREQUENCY
FREQUENCY: CONTINUOUS

## 2017-01-01 ASSESSMENT — PAIN DESCRIPTION - DESCRIPTORS
DESCRIPTORS: ACHING

## 2017-01-01 ASSESSMENT — PAIN DESCRIPTION - ORIENTATION
ORIENTATION: RIGHT;LEFT

## 2017-01-01 ASSESSMENT — PAIN DESCRIPTION - LOCATION
LOCATION: KNEE

## 2017-01-03 ENCOUNTER — CARE COORDINATION (OUTPATIENT)
Dept: CARE COORDINATION | Age: 82
End: 2017-01-03

## 2017-01-05 ENCOUNTER — OFFICE VISIT (OUTPATIENT)
Dept: ONCOLOGY | Age: 82
End: 2017-01-05

## 2017-01-05 ENCOUNTER — CARE COORDINATOR VISIT (OUTPATIENT)
Dept: CARE COORDINATION | Age: 82
End: 2017-01-05

## 2017-01-05 VITALS
OXYGEN SATURATION: 100 % | SYSTOLIC BLOOD PRESSURE: 147 MMHG | TEMPERATURE: 97 F | WEIGHT: 198.8 LBS | HEART RATE: 93 BPM | HEIGHT: 67 IN | RESPIRATION RATE: 18 BRPM | BODY MASS INDEX: 31.2 KG/M2 | DIASTOLIC BLOOD PRESSURE: 82 MMHG

## 2017-01-05 DIAGNOSIS — D47.2 SMOLDERING MYELOMA: Primary | ICD-10-CM

## 2017-01-05 PROCEDURE — 99215 OFFICE O/P EST HI 40 MIN: CPT | Performed by: INTERNAL MEDICINE

## 2017-01-05 PROCEDURE — 1036F TOBACCO NON-USER: CPT | Performed by: INTERNAL MEDICINE

## 2017-01-05 PROCEDURE — G8419 CALC BMI OUT NRM PARAM NOF/U: HCPCS | Performed by: INTERNAL MEDICINE

## 2017-01-05 PROCEDURE — 1123F ACP DISCUSS/DSCN MKR DOCD: CPT | Performed by: INTERNAL MEDICINE

## 2017-01-05 PROCEDURE — 4040F PNEUMOC VAC/ADMIN/RCVD: CPT | Performed by: INTERNAL MEDICINE

## 2017-01-05 PROCEDURE — G8484 FLU IMMUNIZE NO ADMIN: HCPCS | Performed by: INTERNAL MEDICINE

## 2017-01-05 PROCEDURE — G8427 DOCREV CUR MEDS BY ELIG CLIN: HCPCS | Performed by: INTERNAL MEDICINE

## 2017-01-21 DIAGNOSIS — J44.1 ACUTE EXACERBATION OF CHRONIC OBSTRUCTIVE PULMONARY DISEASE (COPD) (HCC): ICD-10-CM

## 2017-01-21 DIAGNOSIS — J44.9 CHRONIC OBSTRUCTIVE PULMONARY DISEASE, UNSPECIFIED COPD TYPE (HCC): Primary | ICD-10-CM

## 2017-01-21 RX ORDER — AMOXICILLIN AND CLAVULANATE POTASSIUM 875; 125 MG/1; MG/1
1 TABLET, FILM COATED ORAL 2 TIMES DAILY
Qty: 14 TABLET | Refills: 0 | Status: SHIPPED | OUTPATIENT
Start: 2017-01-21 | End: 2017-01-28

## 2017-01-23 ENCOUNTER — CARE COORDINATION (OUTPATIENT)
Dept: CARE COORDINATION | Age: 82
End: 2017-01-23

## 2017-01-30 ENCOUNTER — CARE COORDINATION (OUTPATIENT)
Dept: CARE COORDINATION | Age: 82
End: 2017-01-30

## 2017-02-06 ENCOUNTER — CARE COORDINATION (OUTPATIENT)
Dept: CARE COORDINATION | Age: 82
End: 2017-02-06

## 2017-02-09 ENCOUNTER — CARE COORDINATOR VISIT (OUTPATIENT)
Dept: CARE COORDINATION | Age: 82
End: 2017-02-09

## 2017-02-09 ENCOUNTER — OFFICE VISIT (OUTPATIENT)
Dept: ONCOLOGY | Age: 82
End: 2017-02-09

## 2017-02-09 VITALS
BODY MASS INDEX: 30.37 KG/M2 | HEIGHT: 68 IN | SYSTOLIC BLOOD PRESSURE: 132 MMHG | RESPIRATION RATE: 18 BRPM | OXYGEN SATURATION: 93 % | TEMPERATURE: 96.8 F | HEART RATE: 89 BPM | DIASTOLIC BLOOD PRESSURE: 77 MMHG | WEIGHT: 200.4 LBS

## 2017-02-09 DIAGNOSIS — C90.00 MULTIPLE MYELOMA NOT HAVING ACHIEVED REMISSION (HCC): Primary | ICD-10-CM

## 2017-02-09 PROCEDURE — 1123F ACP DISCUSS/DSCN MKR DOCD: CPT | Performed by: INTERNAL MEDICINE

## 2017-02-09 PROCEDURE — G8427 DOCREV CUR MEDS BY ELIG CLIN: HCPCS | Performed by: INTERNAL MEDICINE

## 2017-02-09 PROCEDURE — 99215 OFFICE O/P EST HI 40 MIN: CPT | Performed by: INTERNAL MEDICINE

## 2017-02-09 PROCEDURE — 4040F PNEUMOC VAC/ADMIN/RCVD: CPT | Performed by: INTERNAL MEDICINE

## 2017-02-09 PROCEDURE — G8484 FLU IMMUNIZE NO ADMIN: HCPCS | Performed by: INTERNAL MEDICINE

## 2017-02-09 PROCEDURE — G8417 CALC BMI ABV UP PARAM F/U: HCPCS | Performed by: INTERNAL MEDICINE

## 2017-02-09 PROCEDURE — 1036F TOBACCO NON-USER: CPT | Performed by: INTERNAL MEDICINE

## 2017-02-09 RX ORDER — SODIUM CHLORIDE 9 MG/ML
INJECTION, SOLUTION INTRAVENOUS ONCE
Status: CANCELLED | OUTPATIENT
Start: 2017-03-16 | End: 2017-03-16

## 2017-02-09 RX ORDER — HEPARIN SODIUM (PORCINE) LOCK FLUSH IV SOLN 100 UNIT/ML 100 UNIT/ML
500 SOLUTION INTRAVENOUS PRN
Status: CANCELLED | OUTPATIENT
Start: 2017-03-30

## 2017-02-09 RX ORDER — DIPHENHYDRAMINE HYDROCHLORIDE 50 MG/ML
50 INJECTION INTRAMUSCULAR; INTRAVENOUS PRN
Status: CANCELLED | OUTPATIENT
Start: 2017-03-23

## 2017-02-09 RX ORDER — SODIUM CHLORIDE 0.9 % (FLUSH) 0.9 %
10 SYRINGE (ML) INJECTION PRN
Status: CANCELLED | OUTPATIENT
Start: 2017-03-09

## 2017-02-09 RX ORDER — HEPARIN SODIUM (PORCINE) LOCK FLUSH IV SOLN 100 UNIT/ML 100 UNIT/ML
500 SOLUTION INTRAVENOUS PRN
Status: CANCELLED | OUTPATIENT
Start: 2017-03-02

## 2017-02-09 RX ORDER — METHYLPREDNISOLONE SODIUM SUCCINATE 125 MG/2ML
125 INJECTION, POWDER, LYOPHILIZED, FOR SOLUTION INTRAMUSCULAR; INTRAVENOUS PRN
Status: CANCELLED | OUTPATIENT
Start: 2017-02-23

## 2017-02-09 RX ORDER — SODIUM CHLORIDE 9 MG/ML
INJECTION, SOLUTION INTRAVENOUS ONCE
Status: CANCELLED | OUTPATIENT
Start: 2017-03-02 | End: 2017-03-02

## 2017-02-09 RX ORDER — SODIUM CHLORIDE 0.9 % (FLUSH) 0.9 %
10 SYRINGE (ML) INJECTION PRN
Status: CANCELLED | OUTPATIENT
Start: 2017-02-16

## 2017-02-09 RX ORDER — SODIUM CHLORIDE 9 MG/ML
INJECTION, SOLUTION INTRAVENOUS ONCE
Status: CANCELLED | OUTPATIENT
Start: 2017-02-23 | End: 2017-02-23

## 2017-02-09 RX ORDER — HEPARIN SODIUM (PORCINE) LOCK FLUSH IV SOLN 100 UNIT/ML 100 UNIT/ML
500 SOLUTION INTRAVENOUS PRN
Status: CANCELLED | OUTPATIENT
Start: 2017-02-23

## 2017-02-09 RX ORDER — SODIUM CHLORIDE 0.9 % (FLUSH) 0.9 %
5 SYRINGE (ML) INJECTION PRN
Status: CANCELLED | OUTPATIENT
Start: 2017-03-30

## 2017-02-09 RX ORDER — METHYLPREDNISOLONE SODIUM SUCCINATE 125 MG/2ML
125 INJECTION, POWDER, LYOPHILIZED, FOR SOLUTION INTRAMUSCULAR; INTRAVENOUS PRN
Status: CANCELLED | OUTPATIENT
Start: 2017-02-16

## 2017-02-09 RX ORDER — HEPARIN SODIUM (PORCINE) LOCK FLUSH IV SOLN 100 UNIT/ML 100 UNIT/ML
500 SOLUTION INTRAVENOUS PRN
Status: CANCELLED | OUTPATIENT
Start: 2017-03-16

## 2017-02-09 RX ORDER — DIPHENHYDRAMINE HYDROCHLORIDE 50 MG/ML
50 INJECTION INTRAMUSCULAR; INTRAVENOUS PRN
Status: CANCELLED | OUTPATIENT
Start: 2017-03-16

## 2017-02-09 RX ORDER — SODIUM CHLORIDE 9 MG/ML
INJECTION, SOLUTION INTRAVENOUS CONTINUOUS
Status: CANCELLED | OUTPATIENT
Start: 2017-03-23

## 2017-02-09 RX ORDER — 0.9 % SODIUM CHLORIDE 0.9 %
10 VIAL (ML) INJECTION ONCE
Status: CANCELLED | OUTPATIENT
Start: 2017-03-09 | End: 2017-03-09

## 2017-02-09 RX ORDER — METHYLPREDNISOLONE SODIUM SUCCINATE 125 MG/2ML
125 INJECTION, POWDER, LYOPHILIZED, FOR SOLUTION INTRAMUSCULAR; INTRAVENOUS PRN
Status: CANCELLED | OUTPATIENT
Start: 2017-03-30

## 2017-02-09 RX ORDER — SODIUM CHLORIDE 0.9 % (FLUSH) 0.9 %
5 SYRINGE (ML) INJECTION PRN
Status: CANCELLED | OUTPATIENT
Start: 2017-04-06

## 2017-02-09 RX ORDER — HEPARIN SODIUM (PORCINE) LOCK FLUSH IV SOLN 100 UNIT/ML 100 UNIT/ML
500 SOLUTION INTRAVENOUS PRN
Status: CANCELLED | OUTPATIENT
Start: 2017-03-09

## 2017-02-09 RX ORDER — SODIUM CHLORIDE 0.9 % (FLUSH) 0.9 %
5 SYRINGE (ML) INJECTION PRN
Status: CANCELLED | OUTPATIENT
Start: 2017-03-09

## 2017-02-09 RX ORDER — LORAZEPAM 2 MG/ML
1 INJECTION INTRAMUSCULAR
Status: CANCELLED | OUTPATIENT
Start: 2017-02-23

## 2017-02-09 RX ORDER — SODIUM CHLORIDE 9 MG/ML
INJECTION, SOLUTION INTRAVENOUS ONCE
Status: CANCELLED | OUTPATIENT
Start: 2017-03-23 | End: 2017-03-23

## 2017-02-09 RX ORDER — SODIUM CHLORIDE 0.9 % (FLUSH) 0.9 %
5 SYRINGE (ML) INJECTION PRN
Status: CANCELLED | OUTPATIENT
Start: 2017-03-23

## 2017-02-09 RX ORDER — HEPARIN SODIUM (PORCINE) LOCK FLUSH IV SOLN 100 UNIT/ML 100 UNIT/ML
500 SOLUTION INTRAVENOUS PRN
Status: CANCELLED | OUTPATIENT
Start: 2017-03-23

## 2017-02-09 RX ORDER — SODIUM CHLORIDE 9 MG/ML
INJECTION, SOLUTION INTRAVENOUS ONCE
Status: CANCELLED | OUTPATIENT
Start: 2017-04-06 | End: 2017-04-06

## 2017-02-09 RX ORDER — LORAZEPAM 2 MG/ML
1 INJECTION INTRAMUSCULAR
Status: CANCELLED | OUTPATIENT
Start: 2017-03-30

## 2017-02-09 RX ORDER — SODIUM CHLORIDE 9 MG/ML
INJECTION, SOLUTION INTRAVENOUS ONCE
Status: CANCELLED | OUTPATIENT
Start: 2017-03-09 | End: 2017-03-09

## 2017-02-09 RX ORDER — SODIUM CHLORIDE 0.9 % (FLUSH) 0.9 %
10 SYRINGE (ML) INJECTION PRN
Status: CANCELLED | OUTPATIENT
Start: 2017-03-30

## 2017-02-09 RX ORDER — SODIUM CHLORIDE 0.9 % (FLUSH) 0.9 %
10 SYRINGE (ML) INJECTION PRN
Status: CANCELLED | OUTPATIENT
Start: 2017-03-23

## 2017-02-09 RX ORDER — DIPHENHYDRAMINE HYDROCHLORIDE 50 MG/ML
50 INJECTION INTRAMUSCULAR; INTRAVENOUS PRN
Status: CANCELLED | OUTPATIENT
Start: 2017-03-02

## 2017-02-09 RX ORDER — LORAZEPAM 2 MG/ML
1 INJECTION INTRAMUSCULAR
Status: CANCELLED | OUTPATIENT
Start: 2017-03-16

## 2017-02-09 RX ORDER — METHYLPREDNISOLONE SODIUM SUCCINATE 125 MG/2ML
125 INJECTION, POWDER, LYOPHILIZED, FOR SOLUTION INTRAMUSCULAR; INTRAVENOUS ONCE
Status: CANCELLED | OUTPATIENT
Start: 2017-04-06 | End: 2017-04-06

## 2017-02-09 RX ORDER — SODIUM CHLORIDE 0.9 % (FLUSH) 0.9 %
10 SYRINGE (ML) INJECTION PRN
Status: CANCELLED | OUTPATIENT
Start: 2017-04-06

## 2017-02-09 RX ORDER — SODIUM CHLORIDE 9 MG/ML
INJECTION, SOLUTION INTRAVENOUS CONTINUOUS
Status: CANCELLED | OUTPATIENT
Start: 2017-02-16

## 2017-02-09 RX ORDER — SODIUM CHLORIDE 9 MG/ML
INJECTION, SOLUTION INTRAVENOUS CONTINUOUS
Status: CANCELLED | OUTPATIENT
Start: 2017-03-16

## 2017-02-09 RX ORDER — HEPARIN SODIUM (PORCINE) LOCK FLUSH IV SOLN 100 UNIT/ML 100 UNIT/ML
500 SOLUTION INTRAVENOUS PRN
Status: CANCELLED | OUTPATIENT
Start: 2017-04-06

## 2017-02-09 RX ORDER — DIPHENHYDRAMINE HYDROCHLORIDE 50 MG/ML
50 INJECTION INTRAMUSCULAR; INTRAVENOUS ONCE
Status: CANCELLED | OUTPATIENT
Start: 2017-03-09 | End: 2017-03-09

## 2017-02-09 RX ORDER — HEPARIN SODIUM (PORCINE) LOCK FLUSH IV SOLN 100 UNIT/ML 100 UNIT/ML
500 SOLUTION INTRAVENOUS PRN
Status: CANCELLED | OUTPATIENT
Start: 2017-02-16

## 2017-02-09 RX ORDER — DIPHENHYDRAMINE HYDROCHLORIDE 50 MG/ML
50 INJECTION INTRAMUSCULAR; INTRAVENOUS PRN
Status: CANCELLED | OUTPATIENT
Start: 2017-02-16

## 2017-02-09 RX ORDER — DIPHENHYDRAMINE HYDROCHLORIDE 50 MG/ML
50 INJECTION INTRAMUSCULAR; INTRAVENOUS ONCE
Status: CANCELLED | OUTPATIENT
Start: 2017-04-06 | End: 2017-04-06

## 2017-02-09 RX ORDER — DIPHENHYDRAMINE HYDROCHLORIDE 50 MG/ML
50 INJECTION INTRAMUSCULAR; INTRAVENOUS PRN
Status: CANCELLED | OUTPATIENT
Start: 2017-03-30

## 2017-02-09 RX ORDER — SODIUM CHLORIDE 9 MG/ML
INJECTION, SOLUTION INTRAVENOUS CONTINUOUS
Status: CANCELLED | OUTPATIENT
Start: 2017-02-23

## 2017-02-09 RX ORDER — SODIUM CHLORIDE 0.9 % (FLUSH) 0.9 %
10 SYRINGE (ML) INJECTION PRN
Status: CANCELLED | OUTPATIENT
Start: 2017-03-16

## 2017-02-09 RX ORDER — LORAZEPAM 2 MG/ML
1 INJECTION INTRAMUSCULAR
Status: CANCELLED | OUTPATIENT
Start: 2017-03-02

## 2017-02-09 RX ORDER — SODIUM CHLORIDE 9 MG/ML
INJECTION, SOLUTION INTRAVENOUS CONTINUOUS
Status: CANCELLED | OUTPATIENT
Start: 2017-03-09

## 2017-02-09 RX ORDER — SODIUM CHLORIDE 9 MG/ML
INJECTION, SOLUTION INTRAVENOUS CONTINUOUS
Status: CANCELLED | OUTPATIENT
Start: 2017-03-30

## 2017-02-09 RX ORDER — SODIUM CHLORIDE 0.9 % (FLUSH) 0.9 %
10 SYRINGE (ML) INJECTION PRN
Status: CANCELLED | OUTPATIENT
Start: 2017-03-02

## 2017-02-09 RX ORDER — LORAZEPAM 2 MG/ML
1 INJECTION INTRAMUSCULAR
Status: CANCELLED | OUTPATIENT
Start: 2017-02-16

## 2017-02-09 RX ORDER — SODIUM CHLORIDE 0.9 % (FLUSH) 0.9 %
5 SYRINGE (ML) INJECTION PRN
Status: CANCELLED | OUTPATIENT
Start: 2017-03-16

## 2017-02-09 RX ORDER — SODIUM CHLORIDE 0.9 % (FLUSH) 0.9 %
5 SYRINGE (ML) INJECTION PRN
Status: CANCELLED | OUTPATIENT
Start: 2017-03-02

## 2017-02-09 RX ORDER — METHYLPREDNISOLONE SODIUM SUCCINATE 125 MG/2ML
125 INJECTION, POWDER, LYOPHILIZED, FOR SOLUTION INTRAMUSCULAR; INTRAVENOUS ONCE
Status: CANCELLED | OUTPATIENT
Start: 2017-03-09 | End: 2017-03-09

## 2017-02-09 RX ORDER — SODIUM CHLORIDE 0.9 % (FLUSH) 0.9 %
5 SYRINGE (ML) INJECTION PRN
Status: CANCELLED | OUTPATIENT
Start: 2017-02-16

## 2017-02-09 RX ORDER — SODIUM CHLORIDE 9 MG/ML
INJECTION, SOLUTION INTRAVENOUS ONCE
Status: CANCELLED | OUTPATIENT
Start: 2017-03-30 | End: 2017-03-30

## 2017-02-09 RX ORDER — SODIUM CHLORIDE 9 MG/ML
INJECTION, SOLUTION INTRAVENOUS ONCE
Status: CANCELLED | OUTPATIENT
Start: 2017-02-16 | End: 2017-02-16

## 2017-02-09 RX ORDER — 0.9 % SODIUM CHLORIDE 0.9 %
10 VIAL (ML) INJECTION ONCE
Status: CANCELLED | OUTPATIENT
Start: 2017-04-06 | End: 2017-04-06

## 2017-02-09 RX ORDER — METHYLPREDNISOLONE SODIUM SUCCINATE 125 MG/2ML
125 INJECTION, POWDER, LYOPHILIZED, FOR SOLUTION INTRAMUSCULAR; INTRAVENOUS PRN
Status: CANCELLED | OUTPATIENT
Start: 2017-03-23

## 2017-02-09 RX ORDER — LORAZEPAM 2 MG/ML
1 INJECTION INTRAMUSCULAR
Status: CANCELLED | OUTPATIENT
Start: 2017-03-23

## 2017-02-09 RX ORDER — SODIUM CHLORIDE 0.9 % (FLUSH) 0.9 %
10 SYRINGE (ML) INJECTION PRN
Status: CANCELLED | OUTPATIENT
Start: 2017-02-23

## 2017-02-09 RX ORDER — SODIUM CHLORIDE 0.9 % (FLUSH) 0.9 %
5 SYRINGE (ML) INJECTION PRN
Status: CANCELLED | OUTPATIENT
Start: 2017-02-23

## 2017-02-09 RX ORDER — SODIUM CHLORIDE 9 MG/ML
INJECTION, SOLUTION INTRAVENOUS CONTINUOUS
Status: CANCELLED | OUTPATIENT
Start: 2017-03-02

## 2017-02-09 RX ORDER — DIPHENHYDRAMINE HYDROCHLORIDE 50 MG/ML
50 INJECTION INTRAMUSCULAR; INTRAVENOUS PRN
Status: CANCELLED | OUTPATIENT
Start: 2017-02-23

## 2017-02-09 RX ORDER — METHYLPREDNISOLONE SODIUM SUCCINATE 125 MG/2ML
125 INJECTION, POWDER, LYOPHILIZED, FOR SOLUTION INTRAMUSCULAR; INTRAVENOUS PRN
Status: CANCELLED | OUTPATIENT
Start: 2017-03-02

## 2017-02-09 RX ORDER — METHYLPREDNISOLONE SODIUM SUCCINATE 125 MG/2ML
125 INJECTION, POWDER, LYOPHILIZED, FOR SOLUTION INTRAMUSCULAR; INTRAVENOUS PRN
Status: CANCELLED | OUTPATIENT
Start: 2017-03-16

## 2017-02-09 RX ORDER — SODIUM CHLORIDE 9 MG/ML
INJECTION, SOLUTION INTRAVENOUS CONTINUOUS
Status: CANCELLED | OUTPATIENT
Start: 2017-04-06

## 2017-02-22 ENCOUNTER — OFFICE VISIT (OUTPATIENT)
Dept: FAMILY MEDICINE CLINIC | Age: 82
End: 2017-02-22

## 2017-02-22 VITALS
SYSTOLIC BLOOD PRESSURE: 132 MMHG | DIASTOLIC BLOOD PRESSURE: 70 MMHG | TEMPERATURE: 97.6 F | WEIGHT: 199.8 LBS | RESPIRATION RATE: 18 BRPM | HEART RATE: 89 BPM | HEIGHT: 67 IN | BODY MASS INDEX: 31.36 KG/M2

## 2017-02-22 DIAGNOSIS — I10 ESSENTIAL HYPERTENSION: ICD-10-CM

## 2017-02-22 DIAGNOSIS — D69.6 THROMBOCYTOPENIA (HCC): ICD-10-CM

## 2017-02-22 DIAGNOSIS — F17.218 CIGARETTE NICOTINE DEPENDENCE WITH OTHER NICOTINE-INDUCED DISORDER: ICD-10-CM

## 2017-02-22 DIAGNOSIS — E11.9 CONTROLLED TYPE 2 DIABETES MELLITUS WITHOUT COMPLICATION, WITHOUT LONG-TERM CURRENT USE OF INSULIN (HCC): Primary | ICD-10-CM

## 2017-02-22 DIAGNOSIS — D84.9 IMMUNOCOMPROMISED STATE (HCC): Chronic | ICD-10-CM

## 2017-02-22 DIAGNOSIS — J43.9 PULMONARY EMPHYSEMA, UNSPECIFIED EMPHYSEMA TYPE (HCC): ICD-10-CM

## 2017-02-22 PROBLEM — F17.210 DEPENDENCE ON NICOTINE FROM CIGARETTES: Status: ACTIVE | Noted: 2017-02-22

## 2017-02-22 LAB — HBA1C MFR BLD: 6.7 %

## 2017-02-22 PROCEDURE — 3023F SPIROM DOC REV: CPT | Performed by: FAMILY MEDICINE

## 2017-02-22 PROCEDURE — G8926 SPIRO NO PERF OR DOC: HCPCS | Performed by: FAMILY MEDICINE

## 2017-02-22 PROCEDURE — G8484 FLU IMMUNIZE NO ADMIN: HCPCS | Performed by: FAMILY MEDICINE

## 2017-02-22 PROCEDURE — 83036 HEMOGLOBIN GLYCOSYLATED A1C: CPT | Performed by: FAMILY MEDICINE

## 2017-02-22 PROCEDURE — 1123F ACP DISCUSS/DSCN MKR DOCD: CPT | Performed by: FAMILY MEDICINE

## 2017-02-22 PROCEDURE — 4040F PNEUMOC VAC/ADMIN/RCVD: CPT | Performed by: FAMILY MEDICINE

## 2017-02-22 PROCEDURE — G8427 DOCREV CUR MEDS BY ELIG CLIN: HCPCS | Performed by: FAMILY MEDICINE

## 2017-02-22 PROCEDURE — G8417 CALC BMI ABV UP PARAM F/U: HCPCS | Performed by: FAMILY MEDICINE

## 2017-02-22 PROCEDURE — 4004F PT TOBACCO SCREEN RCVD TLK: CPT | Performed by: FAMILY MEDICINE

## 2017-02-22 PROCEDURE — 99214 OFFICE O/P EST MOD 30 MIN: CPT | Performed by: FAMILY MEDICINE

## 2017-03-06 ENCOUNTER — CARE COORDINATION (OUTPATIENT)
Dept: CARE COORDINATION | Age: 82
End: 2017-03-06

## 2017-04-06 ENCOUNTER — CARE COORDINATOR VISIT (OUTPATIENT)
Dept: CARE COORDINATION | Age: 82
End: 2017-04-06

## 2017-04-06 ENCOUNTER — OFFICE VISIT (OUTPATIENT)
Dept: ONCOLOGY | Age: 82
End: 2017-04-06

## 2017-04-06 VITALS
HEART RATE: 90 BPM | WEIGHT: 199.2 LBS | SYSTOLIC BLOOD PRESSURE: 140 MMHG | OXYGEN SATURATION: 99 % | BODY MASS INDEX: 30.19 KG/M2 | RESPIRATION RATE: 18 BRPM | HEIGHT: 68 IN | TEMPERATURE: 97.4 F | DIASTOLIC BLOOD PRESSURE: 69 MMHG

## 2017-04-06 DIAGNOSIS — C90.00 MULTIPLE MYELOMA NOT HAVING ACHIEVED REMISSION (HCC): Primary | ICD-10-CM

## 2017-04-06 PROCEDURE — 4040F PNEUMOC VAC/ADMIN/RCVD: CPT | Performed by: INTERNAL MEDICINE

## 2017-04-06 PROCEDURE — G8420 CALC BMI NORM PARAMETERS: HCPCS | Performed by: INTERNAL MEDICINE

## 2017-04-06 PROCEDURE — 4004F PT TOBACCO SCREEN RCVD TLK: CPT | Performed by: INTERNAL MEDICINE

## 2017-04-06 PROCEDURE — G8427 DOCREV CUR MEDS BY ELIG CLIN: HCPCS | Performed by: INTERNAL MEDICINE

## 2017-04-06 PROCEDURE — 99215 OFFICE O/P EST HI 40 MIN: CPT | Performed by: INTERNAL MEDICINE

## 2017-04-06 PROCEDURE — 1123F ACP DISCUSS/DSCN MKR DOCD: CPT | Performed by: INTERNAL MEDICINE

## 2017-04-10 ENCOUNTER — CARE COORDINATION (OUTPATIENT)
Dept: CARE COORDINATION | Age: 82
End: 2017-04-10

## 2017-04-12 ENCOUNTER — OFFICE VISIT (OUTPATIENT)
Dept: FAMILY MEDICINE CLINIC | Age: 82
End: 2017-04-12

## 2017-04-12 VITALS
BODY MASS INDEX: 31.23 KG/M2 | DIASTOLIC BLOOD PRESSURE: 74 MMHG | RESPIRATION RATE: 14 BRPM | SYSTOLIC BLOOD PRESSURE: 134 MMHG | TEMPERATURE: 98.5 F | HEIGHT: 67 IN | HEART RATE: 84 BPM | WEIGHT: 199 LBS

## 2017-04-12 DIAGNOSIS — M17.11 PRIMARY OSTEOARTHRITIS OF RIGHT KNEE: ICD-10-CM

## 2017-04-12 DIAGNOSIS — M17.12 PRIMARY OSTEOARTHRITIS OF LEFT KNEE: Primary | ICD-10-CM

## 2017-04-12 PROCEDURE — 20610 DRAIN/INJ JOINT/BURSA W/O US: CPT | Performed by: FAMILY MEDICINE

## 2017-04-12 PROCEDURE — G8417 CALC BMI ABV UP PARAM F/U: HCPCS | Performed by: FAMILY MEDICINE

## 2017-04-12 PROCEDURE — 1123F ACP DISCUSS/DSCN MKR DOCD: CPT | Performed by: FAMILY MEDICINE

## 2017-04-12 PROCEDURE — 99212 OFFICE O/P EST SF 10 MIN: CPT | Performed by: FAMILY MEDICINE

## 2017-04-12 PROCEDURE — 4004F PT TOBACCO SCREEN RCVD TLK: CPT | Performed by: FAMILY MEDICINE

## 2017-04-12 PROCEDURE — G8427 DOCREV CUR MEDS BY ELIG CLIN: HCPCS | Performed by: FAMILY MEDICINE

## 2017-04-12 PROCEDURE — 4040F PNEUMOC VAC/ADMIN/RCVD: CPT | Performed by: FAMILY MEDICINE

## 2017-04-28 ENCOUNTER — OFFICE VISIT (OUTPATIENT)
Dept: UROLOGY | Age: 82
End: 2017-04-28

## 2017-04-28 VITALS
SYSTOLIC BLOOD PRESSURE: 138 MMHG | WEIGHT: 201 LBS | BODY MASS INDEX: 31.55 KG/M2 | HEIGHT: 67 IN | DIASTOLIC BLOOD PRESSURE: 64 MMHG

## 2017-04-28 DIAGNOSIS — N40.0 BPH WITHOUT URINARY OBSTRUCTION: ICD-10-CM

## 2017-04-28 DIAGNOSIS — R97.20 ELEVATED PSA: Primary | ICD-10-CM

## 2017-04-28 DIAGNOSIS — C61 PROSTATE CA (HCC): ICD-10-CM

## 2017-04-28 DIAGNOSIS — R35.1 NOCTURIA: ICD-10-CM

## 2017-04-28 LAB
BILIRUBIN URINE: NEGATIVE
BLOOD URINE, POC: NORMAL
CHARACTER, URINE: CLEAR
COLOR, URINE: YELLOW
GLUCOSE URINE: NEGATIVE MG/DL
KETONES, URINE: NEGATIVE
LEUKOCYTE CLUMPS, URINE: NEGATIVE
NITRITE, URINE: NEGATIVE
PH, URINE: 6
POST VOID RESIDUAL (PVR): 66 ML
PROTEIN, URINE: NEGATIVE MG/DL
SPECIFIC GRAVITY, URINE: 1.02 (ref 1–1.03)
UROBILINOGEN, URINE: 0.2 EU/DL

## 2017-04-28 PROCEDURE — G8417 CALC BMI ABV UP PARAM F/U: HCPCS | Performed by: UROLOGY

## 2017-04-28 PROCEDURE — 51798 US URINE CAPACITY MEASURE: CPT | Performed by: UROLOGY

## 2017-04-28 PROCEDURE — 4040F PNEUMOC VAC/ADMIN/RCVD: CPT | Performed by: UROLOGY

## 2017-04-28 PROCEDURE — 96402 CHEMO HORMON ANTINEOPL SQ/IM: CPT | Performed by: UROLOGY

## 2017-04-28 PROCEDURE — 1123F ACP DISCUSS/DSCN MKR DOCD: CPT | Performed by: UROLOGY

## 2017-04-28 PROCEDURE — G8427 DOCREV CUR MEDS BY ELIG CLIN: HCPCS | Performed by: UROLOGY

## 2017-04-28 PROCEDURE — 4004F PT TOBACCO SCREEN RCVD TLK: CPT | Performed by: UROLOGY

## 2017-04-28 PROCEDURE — 99213 OFFICE O/P EST LOW 20 MIN: CPT | Performed by: UROLOGY

## 2017-04-28 PROCEDURE — 81003 URINALYSIS AUTO W/O SCOPE: CPT | Performed by: UROLOGY

## 2017-04-28 RX ORDER — TAMSULOSIN HYDROCHLORIDE 0.4 MG/1
0.4 CAPSULE ORAL NIGHTLY
Qty: 90 CAPSULE | Refills: 1 | Status: SHIPPED | OUTPATIENT
Start: 2017-04-28 | End: 2017-05-25

## 2017-05-08 RX ORDER — SODIUM CHLORIDE 9 MG/ML
INJECTION, SOLUTION INTRAVENOUS ONCE
Status: CANCELLED | OUTPATIENT
Start: 2017-05-11 | End: 2017-05-11

## 2017-05-08 RX ORDER — SODIUM CHLORIDE 0.9 % (FLUSH) 0.9 %
10 SYRINGE (ML) INJECTION PRN
Status: CANCELLED | OUTPATIENT
Start: 2017-05-11

## 2017-05-08 RX ORDER — HEPARIN SODIUM (PORCINE) LOCK FLUSH IV SOLN 100 UNIT/ML 100 UNIT/ML
500 SOLUTION INTRAVENOUS PRN
Status: CANCELLED | OUTPATIENT
Start: 2017-06-08

## 2017-05-08 RX ORDER — DIPHENHYDRAMINE HYDROCHLORIDE 50 MG/ML
50 INJECTION INTRAMUSCULAR; INTRAVENOUS PRN
Status: CANCELLED | OUTPATIENT
Start: 2017-05-25

## 2017-05-08 RX ORDER — SODIUM CHLORIDE 0.9 % (FLUSH) 0.9 %
5 SYRINGE (ML) INJECTION PRN
Status: CANCELLED | OUTPATIENT
Start: 2017-05-25

## 2017-05-08 RX ORDER — SODIUM CHLORIDE 9 MG/ML
INJECTION, SOLUTION INTRAVENOUS ONCE
Status: CANCELLED | OUTPATIENT
Start: 2017-05-25 | End: 2017-05-18

## 2017-05-08 RX ORDER — HEPARIN SODIUM (PORCINE) LOCK FLUSH IV SOLN 100 UNIT/ML 100 UNIT/ML
500 SOLUTION INTRAVENOUS PRN
Status: CANCELLED | OUTPATIENT
Start: 2017-05-25

## 2017-05-08 RX ORDER — SODIUM CHLORIDE 0.9 % (FLUSH) 0.9 %
5 SYRINGE (ML) INJECTION PRN
Status: CANCELLED | OUTPATIENT
Start: 2017-05-11

## 2017-05-08 RX ORDER — SODIUM CHLORIDE 9 MG/ML
INJECTION, SOLUTION INTRAVENOUS ONCE
Status: CANCELLED | OUTPATIENT
Start: 2017-06-08 | End: 2017-06-01

## 2017-05-08 RX ORDER — LORAZEPAM 2 MG/ML
1 INJECTION INTRAMUSCULAR
Status: CANCELLED | OUTPATIENT
Start: 2017-05-25

## 2017-05-08 RX ORDER — DIPHENHYDRAMINE HYDROCHLORIDE 50 MG/ML
50 INJECTION INTRAMUSCULAR; INTRAVENOUS PRN
Status: CANCELLED | OUTPATIENT
Start: 2017-05-11

## 2017-05-08 RX ORDER — METHYLPREDNISOLONE SODIUM SUCCINATE 125 MG/2ML
125 INJECTION, POWDER, LYOPHILIZED, FOR SOLUTION INTRAMUSCULAR; INTRAVENOUS PRN
Status: CANCELLED | OUTPATIENT
Start: 2017-05-11

## 2017-05-08 RX ORDER — SODIUM CHLORIDE 9 MG/ML
INJECTION, SOLUTION INTRAVENOUS ONCE
Status: CANCELLED | OUTPATIENT
Start: 2017-06-01 | End: 2017-05-25

## 2017-05-08 RX ORDER — DIPHENHYDRAMINE HYDROCHLORIDE 50 MG/ML
50 INJECTION INTRAMUSCULAR; INTRAVENOUS ONCE
Status: CANCELLED | OUTPATIENT
Start: 2017-06-08 | End: 2017-06-01

## 2017-05-08 RX ORDER — 0.9 % SODIUM CHLORIDE 0.9 %
250 VIAL (ML) INJECTION CONTINUOUS
Status: CANCELLED | OUTPATIENT
Start: 2017-06-01

## 2017-05-08 RX ORDER — SODIUM CHLORIDE 0.9 % (FLUSH) 0.9 %
10 SYRINGE (ML) INJECTION PRN
Status: CANCELLED | OUTPATIENT
Start: 2017-05-25

## 2017-05-08 RX ORDER — LORAZEPAM 2 MG/ML
1 INJECTION INTRAMUSCULAR
Status: CANCELLED | OUTPATIENT
Start: 2017-05-11

## 2017-05-08 RX ORDER — SODIUM CHLORIDE 0.9 % (FLUSH) 0.9 %
10 SYRINGE (ML) INJECTION PRN
Status: CANCELLED | OUTPATIENT
Start: 2017-06-01

## 2017-05-08 RX ORDER — 0.9 % SODIUM CHLORIDE 0.9 %
250 VIAL (ML) INJECTION CONTINUOUS
Status: CANCELLED | OUTPATIENT
Start: 2017-06-08

## 2017-05-08 RX ORDER — METHYLPREDNISOLONE SODIUM SUCCINATE 125 MG/2ML
125 INJECTION, POWDER, LYOPHILIZED, FOR SOLUTION INTRAMUSCULAR; INTRAVENOUS PRN
Status: CANCELLED | OUTPATIENT
Start: 2017-05-25

## 2017-05-08 RX ORDER — DIPHENHYDRAMINE HYDROCHLORIDE 50 MG/ML
50 INJECTION INTRAMUSCULAR; INTRAVENOUS PRN
Status: CANCELLED | OUTPATIENT
Start: 2017-06-01

## 2017-05-08 RX ORDER — LORAZEPAM 2 MG/ML
1 INJECTION INTRAMUSCULAR
Status: CANCELLED | OUTPATIENT
Start: 2017-06-01

## 2017-05-08 RX ORDER — SODIUM CHLORIDE 0.9 % (FLUSH) 0.9 %
10 SYRINGE (ML) INJECTION PRN
Status: CANCELLED | OUTPATIENT
Start: 2017-06-08

## 2017-05-08 RX ORDER — SODIUM CHLORIDE 0.9 % (FLUSH) 0.9 %
5 SYRINGE (ML) INJECTION PRN
Status: CANCELLED | OUTPATIENT
Start: 2017-06-08

## 2017-05-08 RX ORDER — 0.9 % SODIUM CHLORIDE 0.9 %
250 VIAL (ML) INJECTION CONTINUOUS
Status: CANCELLED | OUTPATIENT
Start: 2017-05-11

## 2017-05-08 RX ORDER — HEPARIN SODIUM (PORCINE) LOCK FLUSH IV SOLN 100 UNIT/ML 100 UNIT/ML
500 SOLUTION INTRAVENOUS PRN
Status: CANCELLED | OUTPATIENT
Start: 2017-06-01

## 2017-05-08 RX ORDER — METHYLPREDNISOLONE SODIUM SUCCINATE 125 MG/2ML
125 INJECTION, POWDER, LYOPHILIZED, FOR SOLUTION INTRAMUSCULAR; INTRAVENOUS PRN
Status: CANCELLED | OUTPATIENT
Start: 2017-06-01

## 2017-05-08 RX ORDER — SODIUM CHLORIDE 0.9 % (FLUSH) 0.9 %
5 SYRINGE (ML) INJECTION PRN
Status: CANCELLED | OUTPATIENT
Start: 2017-06-01

## 2017-05-08 RX ORDER — HEPARIN SODIUM (PORCINE) LOCK FLUSH IV SOLN 100 UNIT/ML 100 UNIT/ML
500 SOLUTION INTRAVENOUS PRN
Status: CANCELLED | OUTPATIENT
Start: 2017-05-11

## 2017-05-08 RX ORDER — 0.9 % SODIUM CHLORIDE 0.9 %
10 VIAL (ML) INJECTION ONCE
Status: CANCELLED | OUTPATIENT
Start: 2017-06-08 | End: 2017-06-01

## 2017-05-08 RX ORDER — 0.9 % SODIUM CHLORIDE 0.9 %
250 VIAL (ML) INJECTION CONTINUOUS
Status: CANCELLED | OUTPATIENT
Start: 2017-05-25

## 2017-05-08 RX ORDER — METHYLPREDNISOLONE SODIUM SUCCINATE 125 MG/2ML
125 INJECTION, POWDER, LYOPHILIZED, FOR SOLUTION INTRAMUSCULAR; INTRAVENOUS ONCE
Status: CANCELLED | OUTPATIENT
Start: 2017-06-08 | End: 2017-06-01

## 2017-05-10 ENCOUNTER — CARE COORDINATION (OUTPATIENT)
Dept: CARE COORDINATION | Age: 82
End: 2017-05-10

## 2017-05-18 ENCOUNTER — OFFICE VISIT (OUTPATIENT)
Dept: ONCOLOGY | Age: 82
End: 2017-05-18

## 2017-05-18 ENCOUNTER — OFFICE VISIT (OUTPATIENT)
Dept: FAMILY MEDICINE CLINIC | Age: 82
End: 2017-05-18

## 2017-05-18 VITALS
WEIGHT: 204 LBS | HEIGHT: 66 IN | DIASTOLIC BLOOD PRESSURE: 64 MMHG | TEMPERATURE: 97.8 F | BODY MASS INDEX: 32.78 KG/M2 | HEART RATE: 92 BPM | SYSTOLIC BLOOD PRESSURE: 122 MMHG | RESPIRATION RATE: 18 BRPM

## 2017-05-18 VITALS
DIASTOLIC BLOOD PRESSURE: 68 MMHG | HEIGHT: 67 IN | BODY MASS INDEX: 31.58 KG/M2 | HEART RATE: 80 BPM | WEIGHT: 201.2 LBS | TEMPERATURE: 97.3 F | SYSTOLIC BLOOD PRESSURE: 134 MMHG | OXYGEN SATURATION: 98 % | RESPIRATION RATE: 16 BRPM

## 2017-05-18 DIAGNOSIS — R97.20 ELEVATED PSA: ICD-10-CM

## 2017-05-18 DIAGNOSIS — C90.00 MULTIPLE MYELOMA NOT HAVING ACHIEVED REMISSION (HCC): Primary | ICD-10-CM

## 2017-05-18 DIAGNOSIS — Z51.11 CHEMOTHERAPY MANAGEMENT, ENCOUNTER FOR: ICD-10-CM

## 2017-05-18 DIAGNOSIS — H57.89 MASS OF EYE, LEFT: ICD-10-CM

## 2017-05-18 DIAGNOSIS — H57.89 EYE DISCHARGE: Primary | ICD-10-CM

## 2017-05-18 PROCEDURE — G8417 CALC BMI ABV UP PARAM F/U: HCPCS | Performed by: NURSE PRACTITIONER

## 2017-05-18 PROCEDURE — 4040F PNEUMOC VAC/ADMIN/RCVD: CPT | Performed by: NURSE PRACTITIONER

## 2017-05-18 PROCEDURE — 4040F PNEUMOC VAC/ADMIN/RCVD: CPT | Performed by: FAMILY MEDICINE

## 2017-05-18 PROCEDURE — 1123F ACP DISCUSS/DSCN MKR DOCD: CPT | Performed by: FAMILY MEDICINE

## 2017-05-18 PROCEDURE — 4004F PT TOBACCO SCREEN RCVD TLK: CPT | Performed by: FAMILY MEDICINE

## 2017-05-18 PROCEDURE — 99213 OFFICE O/P EST LOW 20 MIN: CPT | Performed by: FAMILY MEDICINE

## 2017-05-18 PROCEDURE — G8427 DOCREV CUR MEDS BY ELIG CLIN: HCPCS | Performed by: NURSE PRACTITIONER

## 2017-05-18 PROCEDURE — 1123F ACP DISCUSS/DSCN MKR DOCD: CPT | Performed by: NURSE PRACTITIONER

## 2017-05-18 PROCEDURE — G8428 CUR MEDS NOT DOCUMENT: HCPCS | Performed by: FAMILY MEDICINE

## 2017-05-18 PROCEDURE — G8417 CALC BMI ABV UP PARAM F/U: HCPCS | Performed by: FAMILY MEDICINE

## 2017-05-18 PROCEDURE — 99213 OFFICE O/P EST LOW 20 MIN: CPT | Performed by: NURSE PRACTITIONER

## 2017-05-18 PROCEDURE — 4004F PT TOBACCO SCREEN RCVD TLK: CPT | Performed by: NURSE PRACTITIONER

## 2017-05-18 ASSESSMENT — ENCOUNTER SYMPTOMS
VOMITING: 0
EYE DISCHARGE: 1
DIARRHEA: 0
ABDOMINAL PAIN: 0
SHORTNESS OF BREATH: 0
BLOOD IN STOOL: 0
ALLERGIC/IMMUNOLOGIC NEGATIVE: 1
CONSTIPATION: 0
NAUSEA: 0
COUGH: 0
EYE REDNESS: 1

## 2017-05-22 ENCOUNTER — CARE COORDINATION (OUTPATIENT)
Dept: CARE COORDINATION | Age: 82
End: 2017-05-22

## 2017-05-22 ASSESSMENT — ENCOUNTER SYMPTOMS: DYSPNEA ASSOCIATED WITH: EXERTION

## 2017-05-25 ENCOUNTER — OFFICE VISIT (OUTPATIENT)
Dept: ONCOLOGY | Age: 82
End: 2017-05-25

## 2017-05-25 VITALS
HEIGHT: 66 IN | OXYGEN SATURATION: 97 % | HEART RATE: 85 BPM | WEIGHT: 199.2 LBS | SYSTOLIC BLOOD PRESSURE: 148 MMHG | RESPIRATION RATE: 18 BRPM | DIASTOLIC BLOOD PRESSURE: 74 MMHG | TEMPERATURE: 96.9 F | BODY MASS INDEX: 32.02 KG/M2

## 2017-05-25 DIAGNOSIS — D47.2 SMOLDERING MYELOMA: Primary | ICD-10-CM

## 2017-05-25 PROCEDURE — 4040F PNEUMOC VAC/ADMIN/RCVD: CPT | Performed by: INTERNAL MEDICINE

## 2017-05-25 PROCEDURE — 1123F ACP DISCUSS/DSCN MKR DOCD: CPT | Performed by: INTERNAL MEDICINE

## 2017-05-25 PROCEDURE — 99215 OFFICE O/P EST HI 40 MIN: CPT | Performed by: INTERNAL MEDICINE

## 2017-05-25 PROCEDURE — G8427 DOCREV CUR MEDS BY ELIG CLIN: HCPCS | Performed by: INTERNAL MEDICINE

## 2017-05-25 PROCEDURE — G8417 CALC BMI ABV UP PARAM F/U: HCPCS | Performed by: INTERNAL MEDICINE

## 2017-05-25 PROCEDURE — 4004F PT TOBACCO SCREEN RCVD TLK: CPT | Performed by: INTERNAL MEDICINE

## 2017-05-25 RX ORDER — NEOMYCIN SULFATE, POLYMYXIN B SULFATE, AND DEXAMETHASONE 3.5; 10000; 1 MG/G; [USP'U]/G; MG/G
1 OINTMENT OPHTHALMIC DAILY
COMMUNITY
End: 2017-06-08 | Stop reason: ALTCHOICE

## 2017-05-31 RX ORDER — SODIUM CHLORIDE 0.9 % (FLUSH) 0.9 %
5 SYRINGE (ML) INJECTION PRN
Status: CANCELLED | OUTPATIENT
Start: 2017-07-06

## 2017-05-31 RX ORDER — METHYLPREDNISOLONE SODIUM SUCCINATE 125 MG/2ML
125 INJECTION, POWDER, LYOPHILIZED, FOR SOLUTION INTRAMUSCULAR; INTRAVENOUS ONCE
Status: CANCELLED | OUTPATIENT
Start: 2017-07-06 | End: 2017-07-06

## 2017-05-31 RX ORDER — SODIUM CHLORIDE 0.9 % (FLUSH) 0.9 %
10 SYRINGE (ML) INJECTION PRN
Status: CANCELLED | OUTPATIENT
Start: 2017-06-15

## 2017-05-31 RX ORDER — DIPHENHYDRAMINE HYDROCHLORIDE 50 MG/ML
50 INJECTION INTRAMUSCULAR; INTRAVENOUS PRN
Status: CANCELLED | OUTPATIENT
Start: 2017-06-22

## 2017-05-31 RX ORDER — SODIUM CHLORIDE 0.9 % (FLUSH) 0.9 %
5 SYRINGE (ML) INJECTION PRN
Status: CANCELLED | OUTPATIENT
Start: 2017-06-29

## 2017-05-31 RX ORDER — HEPARIN SODIUM (PORCINE) LOCK FLUSH IV SOLN 100 UNIT/ML 100 UNIT/ML
500 SOLUTION INTRAVENOUS PRN
Status: CANCELLED | OUTPATIENT
Start: 2017-06-15

## 2017-05-31 RX ORDER — LORAZEPAM 2 MG/ML
1 INJECTION INTRAMUSCULAR
Status: CANCELLED | OUTPATIENT
Start: 2017-06-29

## 2017-05-31 RX ORDER — SODIUM CHLORIDE 0.9 % (FLUSH) 0.9 %
10 SYRINGE (ML) INJECTION PRN
Status: CANCELLED | OUTPATIENT
Start: 2017-06-29

## 2017-05-31 RX ORDER — HEPARIN SODIUM (PORCINE) LOCK FLUSH IV SOLN 100 UNIT/ML 100 UNIT/ML
500 SOLUTION INTRAVENOUS PRN
Status: CANCELLED | OUTPATIENT
Start: 2017-06-22

## 2017-05-31 RX ORDER — SODIUM CHLORIDE 0.9 % (FLUSH) 0.9 %
10 SYRINGE (ML) INJECTION PRN
Status: CANCELLED | OUTPATIENT
Start: 2017-06-22

## 2017-05-31 RX ORDER — HEPARIN SODIUM (PORCINE) LOCK FLUSH IV SOLN 100 UNIT/ML 100 UNIT/ML
500 SOLUTION INTRAVENOUS PRN
Status: CANCELLED | OUTPATIENT
Start: 2017-06-29

## 2017-05-31 RX ORDER — SODIUM CHLORIDE 9 MG/ML
INJECTION, SOLUTION INTRAVENOUS ONCE
Status: CANCELLED | OUTPATIENT
Start: 2017-07-06 | End: 2017-07-06

## 2017-05-31 RX ORDER — SODIUM CHLORIDE 9 MG/ML
INJECTION, SOLUTION INTRAVENOUS ONCE
Status: CANCELLED | OUTPATIENT
Start: 2017-06-22 | End: 2017-06-22

## 2017-05-31 RX ORDER — METHYLPREDNISOLONE SODIUM SUCCINATE 125 MG/2ML
125 INJECTION, POWDER, LYOPHILIZED, FOR SOLUTION INTRAMUSCULAR; INTRAVENOUS PRN
Status: CANCELLED | OUTPATIENT
Start: 2017-06-15

## 2017-05-31 RX ORDER — SODIUM CHLORIDE 0.9 % (FLUSH) 0.9 %
5 SYRINGE (ML) INJECTION PRN
Status: CANCELLED | OUTPATIENT
Start: 2017-06-22

## 2017-05-31 RX ORDER — SODIUM CHLORIDE 9 MG/ML
INJECTION, SOLUTION INTRAVENOUS ONCE
Status: CANCELLED | OUTPATIENT
Start: 2017-06-15 | End: 2017-06-15

## 2017-05-31 RX ORDER — METHYLPREDNISOLONE SODIUM SUCCINATE 125 MG/2ML
125 INJECTION, POWDER, LYOPHILIZED, FOR SOLUTION INTRAMUSCULAR; INTRAVENOUS PRN
Status: CANCELLED | OUTPATIENT
Start: 2017-06-29

## 2017-05-31 RX ORDER — SODIUM CHLORIDE 0.9 % (FLUSH) 0.9 %
10 SYRINGE (ML) INJECTION PRN
Status: CANCELLED | OUTPATIENT
Start: 2017-07-06

## 2017-05-31 RX ORDER — 0.9 % SODIUM CHLORIDE 0.9 %
250 VIAL (ML) INJECTION CONTINUOUS
Status: CANCELLED | OUTPATIENT
Start: 2017-07-06

## 2017-05-31 RX ORDER — DIPHENHYDRAMINE HYDROCHLORIDE 50 MG/ML
50 INJECTION INTRAMUSCULAR; INTRAVENOUS PRN
Status: CANCELLED | OUTPATIENT
Start: 2017-06-29

## 2017-05-31 RX ORDER — 0.9 % SODIUM CHLORIDE 0.9 %
250 VIAL (ML) INJECTION CONTINUOUS
Status: CANCELLED | OUTPATIENT
Start: 2017-06-29

## 2017-05-31 RX ORDER — HEPARIN SODIUM (PORCINE) LOCK FLUSH IV SOLN 100 UNIT/ML 100 UNIT/ML
500 SOLUTION INTRAVENOUS PRN
Status: CANCELLED | OUTPATIENT
Start: 2017-07-06

## 2017-05-31 RX ORDER — 0.9 % SODIUM CHLORIDE 0.9 %
250 VIAL (ML) INJECTION CONTINUOUS
Status: CANCELLED | OUTPATIENT
Start: 2017-06-22

## 2017-05-31 RX ORDER — 0.9 % SODIUM CHLORIDE 0.9 %
10 VIAL (ML) INJECTION ONCE
Status: CANCELLED | OUTPATIENT
Start: 2017-07-06 | End: 2017-07-06

## 2017-05-31 RX ORDER — SODIUM CHLORIDE 0.9 % (FLUSH) 0.9 %
5 SYRINGE (ML) INJECTION PRN
Status: CANCELLED | OUTPATIENT
Start: 2017-06-15

## 2017-05-31 RX ORDER — DIPHENHYDRAMINE HYDROCHLORIDE 50 MG/ML
50 INJECTION INTRAMUSCULAR; INTRAVENOUS PRN
Status: CANCELLED | OUTPATIENT
Start: 2017-06-15

## 2017-05-31 RX ORDER — LORAZEPAM 2 MG/ML
1 INJECTION INTRAMUSCULAR
Status: CANCELLED | OUTPATIENT
Start: 2017-06-22

## 2017-05-31 RX ORDER — METHYLPREDNISOLONE SODIUM SUCCINATE 125 MG/2ML
125 INJECTION, POWDER, LYOPHILIZED, FOR SOLUTION INTRAMUSCULAR; INTRAVENOUS PRN
Status: CANCELLED | OUTPATIENT
Start: 2017-06-22

## 2017-05-31 RX ORDER — SODIUM CHLORIDE 9 MG/ML
INJECTION, SOLUTION INTRAVENOUS ONCE
Status: CANCELLED | OUTPATIENT
Start: 2017-06-29 | End: 2017-06-29

## 2017-05-31 RX ORDER — DIPHENHYDRAMINE HYDROCHLORIDE 50 MG/ML
50 INJECTION INTRAMUSCULAR; INTRAVENOUS ONCE
Status: CANCELLED | OUTPATIENT
Start: 2017-07-06 | End: 2017-07-06

## 2017-05-31 RX ORDER — 0.9 % SODIUM CHLORIDE 0.9 %
250 VIAL (ML) INJECTION CONTINUOUS
Status: CANCELLED | OUTPATIENT
Start: 2017-06-15

## 2017-05-31 RX ORDER — LORAZEPAM 2 MG/ML
1 INJECTION INTRAMUSCULAR
Status: CANCELLED | OUTPATIENT
Start: 2017-06-15

## 2017-06-01 ENCOUNTER — CARE COORDINATION (OUTPATIENT)
Dept: CARE COORDINATION | Age: 82
End: 2017-06-01

## 2017-06-26 ENCOUNTER — CARE COORDINATION (OUTPATIENT)
Dept: CARE COORDINATION | Age: 82
End: 2017-06-26

## 2017-06-29 ENCOUNTER — CARE COORDINATOR VISIT (OUTPATIENT)
Dept: CARE COORDINATION | Age: 82
End: 2017-06-29

## 2017-07-19 ENCOUNTER — APPOINTMENT (OUTPATIENT)
Dept: CT IMAGING | Age: 82
DRG: 190 | End: 2017-07-19
Payer: MEDICARE

## 2017-07-19 ENCOUNTER — HOSPITAL ENCOUNTER (INPATIENT)
Age: 82
LOS: 1 days | Discharge: HOME | DRG: 190 | End: 2017-07-20
Attending: EMERGENCY MEDICINE | Admitting: INTERNAL MEDICINE
Payer: MEDICARE

## 2017-07-19 ENCOUNTER — TELEPHONE (OUTPATIENT)
Dept: FAMILY MEDICINE CLINIC | Age: 82
End: 2017-07-19

## 2017-07-19 ENCOUNTER — APPOINTMENT (OUTPATIENT)
Dept: GENERAL RADIOLOGY | Age: 82
DRG: 190 | End: 2017-07-19
Payer: MEDICARE

## 2017-07-19 DIAGNOSIS — J18.9 PNEUMONIA OF BOTH LUNGS DUE TO INFECTIOUS ORGANISM, UNSPECIFIED PART OF LUNG: ICD-10-CM

## 2017-07-19 DIAGNOSIS — Z51.11 CHEMOTHERAPY MANAGEMENT, ENCOUNTER FOR: ICD-10-CM

## 2017-07-19 DIAGNOSIS — R00.0 TACHYCARDIA: ICD-10-CM

## 2017-07-19 DIAGNOSIS — D47.2 SMOLDERING MYELOMA: ICD-10-CM

## 2017-07-19 DIAGNOSIS — R65.10 SIRS (SYSTEMIC INFLAMMATORY RESPONSE SYNDROME) (HCC): Primary | ICD-10-CM

## 2017-07-19 DIAGNOSIS — D72.819 LEUKOPENIA, UNSPECIFIED TYPE: ICD-10-CM

## 2017-07-19 PROBLEM — J44.1 COPD WITH EXACERBATION (HCC): Status: ACTIVE | Noted: 2017-07-19

## 2017-07-19 LAB
ALBUMIN SERPL-MCNC: 3 G/DL (ref 3.5–5.1)
ALLEN TEST: ABNORMAL
ALP BLD-CCNC: 80 U/L (ref 38–126)
ALT SERPL-CCNC: 9 U/L (ref 11–66)
ANION GAP SERPL CALCULATED.3IONS-SCNC: 11 MEQ/L (ref 8–16)
ANISOCYTOSIS: ABNORMAL
AST SERPL-CCNC: 9 U/L (ref 5–40)
BANDED NEUTROPHILS ABSOLUTE COUNT: 0 THOU/MM3
BANDS PRESENT: 3 %
BASE EXCESS (CALCULATED): 2 MMOL/L (ref -2.5–2.5)
BASOPHILS # BLD: 0 %
BASOPHILS ABSOLUTE: 0 THOU/MM3 (ref 0–0.1)
BILIRUB SERPL-MCNC: 0.5 MG/DL (ref 0.3–1.2)
BUN BLDV-MCNC: 11 MG/DL (ref 7–22)
CALCIUM SERPL-MCNC: 8.9 MG/DL (ref 8.5–10.5)
CHLORIDE BLD-SCNC: 98 MEQ/L (ref 98–111)
CO2: 26 MEQ/L (ref 23–33)
COLLECTED BY:: ABNORMAL
CREAT SERPL-MCNC: 0.8 MG/DL (ref 0.4–1.2)
DEVICE: ABNORMAL
DIFFERENTIAL TYPE: ABNORMAL
DIFFERENTIAL, MANUAL: NORMAL
EOSINOPHIL # BLD: 0 %
EOSINOPHILS ABSOLUTE: 0 THOU/MM3 (ref 0–0.4)
FLU A ANTIGEN: NEGATIVE
FLU B ANTIGEN: NEGATIVE
GFR SERPL CREATININE-BSD FRML MDRD: > 90 ML/MIN/1.73M2
GLUCOSE BLD-MCNC: 116 MG/DL (ref 70–108)
HCO3: 26 MMOL/L (ref 23–28)
HCT VFR BLD CALC: 34.2 % (ref 42–52)
HEMOGLOBIN: 11.7 GM/DL (ref 14–18)
LACTIC ACID: 0.9 MMOL/L (ref 0.5–2.2)
LYMPHOCYTES # BLD: 23 %
LYMPHOCYTES ABSOLUTE: 0.3 THOU/MM3 (ref 1–4.8)
MCH RBC QN AUTO: 32.4 PG (ref 27–31)
MCHC RBC AUTO-ENTMCNC: 34.2 GM/DL (ref 33–37)
MCV RBC AUTO: 94.6 FL (ref 80–94)
MONOCYTES # BLD: 1 %
MONOCYTES ABSOLUTE: 0 THOU/MM3 (ref 0.4–1.3)
NUCLEATED RED BLOOD CELLS: 0 /100 WBC
O2 SATURATION: 96 %
OSMOLALITY CALCULATION: 270.5 MOSMOL/KG (ref 275–300)
PATHOLOGIST REVIEW: ABNORMAL
PCO2: 37 MMHG (ref 35–45)
PDW BLD-RTO: 15 % (ref 11.5–14.5)
PH BLOOD GAS: 7.46 (ref 7.35–7.45)
PLATELET # BLD: 147 THOU/MM3 (ref 130–400)
PLATELET ESTIMATE: ADEQUATE
PMV BLD AUTO: 7.9 MCM (ref 7.4–10.4)
PO2: 76 MMHG (ref 71–104)
POTASSIUM SERPL-SCNC: 3.9 MEQ/L (ref 3.5–5.2)
PRO-BNP: 697.2 PG/ML (ref 0–1800)
PROCALCITONIN: 0.24 NG/ML (ref 0.01–0.09)
RBC # BLD: 3.62 MILL/MM3 (ref 4.7–6.1)
RBC # BLD: ABNORMAL 10*6/UL
ROULEAUX: PRESENT
SEG NEUTROPHILS: 73 %
SEGMENTED NEUTROPHILS ABSOLUTE COUNT: 1.1 THOU/MM3 (ref 1.8–7.7)
SODIUM BLD-SCNC: 135 MEQ/L (ref 135–145)
SOURCE, BLOOD GAS: ABNORMAL
TOTAL PROTEIN: 7.9 G/DL (ref 6.1–8)
TROPONIN T: < 0.01 NG/ML
WBC # BLD: 1.5 THOU/MM3 (ref 4.8–10.8)

## 2017-07-19 PROCEDURE — 71275 CT ANGIOGRAPHY CHEST: CPT

## 2017-07-19 PROCEDURE — 96366 THER/PROPH/DIAG IV INF ADDON: CPT

## 2017-07-19 PROCEDURE — 87077 CULTURE AEROBIC IDENTIFY: CPT

## 2017-07-19 PROCEDURE — 6360000002 HC RX W HCPCS: Performed by: NURSE PRACTITIONER

## 2017-07-19 PROCEDURE — 84484 ASSAY OF TROPONIN QUANT: CPT

## 2017-07-19 PROCEDURE — 6370000000 HC RX 637 (ALT 250 FOR IP): Performed by: EMERGENCY MEDICINE

## 2017-07-19 PROCEDURE — B32T1ZZ COMPUTERIZED TOMOGRAPHY (CT SCAN) OF LEFT PULMONARY ARTERY USING LOW OSMOLAR CONTRAST: ICD-10-PCS

## 2017-07-19 PROCEDURE — 6370000000 HC RX 637 (ALT 250 FOR IP): Performed by: NURSE PRACTITIONER

## 2017-07-19 PROCEDURE — 87186 SC STD MICRODIL/AGAR DIL: CPT

## 2017-07-19 PROCEDURE — 1200000003 HC TELEMETRY R&B

## 2017-07-19 PROCEDURE — 84145 PROCALCITONIN (PCT): CPT

## 2017-07-19 PROCEDURE — 71020 XR CHEST STANDARD TWO VW: CPT

## 2017-07-19 PROCEDURE — 94640 AIRWAY INHALATION TREATMENT: CPT

## 2017-07-19 PROCEDURE — 80053 COMPREHEN METABOLIC PANEL: CPT

## 2017-07-19 PROCEDURE — 87147 CULTURE TYPE IMMUNOLOGIC: CPT

## 2017-07-19 PROCEDURE — 83605 ASSAY OF LACTIC ACID: CPT

## 2017-07-19 PROCEDURE — 87184 SC STD DISK METHOD PER PLATE: CPT

## 2017-07-19 PROCEDURE — 99285 EMERGENCY DEPT VISIT HI MDM: CPT

## 2017-07-19 PROCEDURE — 96365 THER/PROPH/DIAG IV INF INIT: CPT

## 2017-07-19 PROCEDURE — 83880 ASSAY OF NATRIURETIC PEPTIDE: CPT

## 2017-07-19 PROCEDURE — 6360000002 HC RX W HCPCS: Performed by: EMERGENCY MEDICINE

## 2017-07-19 PROCEDURE — 36415 COLL VENOUS BLD VENIPUNCTURE: CPT

## 2017-07-19 PROCEDURE — B32S1ZZ COMPUTERIZED TOMOGRAPHY (CT SCAN) OF RIGHT PULMONARY ARTERY USING LOW OSMOLAR CONTRAST: ICD-10-PCS

## 2017-07-19 PROCEDURE — 85025 COMPLETE CBC W/AUTO DIFF WBC: CPT

## 2017-07-19 PROCEDURE — 87804 INFLUENZA ASSAY W/OPTIC: CPT

## 2017-07-19 PROCEDURE — 6360000004 HC RX CONTRAST MEDICATION: Performed by: EMERGENCY MEDICINE

## 2017-07-19 PROCEDURE — 82803 BLOOD GASES ANY COMBINATION: CPT

## 2017-07-19 PROCEDURE — 96375 TX/PRO/DX INJ NEW DRUG ADDON: CPT

## 2017-07-19 PROCEDURE — 87040 BLOOD CULTURE FOR BACTERIA: CPT

## 2017-07-19 PROCEDURE — 99223 1ST HOSP IP/OBS HIGH 75: CPT | Performed by: NURSE PRACTITIONER

## 2017-07-19 PROCEDURE — 93005 ELECTROCARDIOGRAM TRACING: CPT

## 2017-07-19 PROCEDURE — 36600 WITHDRAWAL OF ARTERIAL BLOOD: CPT

## 2017-07-19 PROCEDURE — 2580000003 HC RX 258: Performed by: NURSE PRACTITIONER

## 2017-07-19 RX ORDER — ONDANSETRON 2 MG/ML
4 INJECTION INTRAMUSCULAR; INTRAVENOUS EVERY 6 HOURS PRN
Status: DISCONTINUED | OUTPATIENT
Start: 2017-07-19 | End: 2017-07-20 | Stop reason: HOSPADM

## 2017-07-19 RX ORDER — SODIUM CHLORIDE 0.9 % (FLUSH) 0.9 %
10 SYRINGE (ML) INJECTION PRN
Status: DISCONTINUED | OUTPATIENT
Start: 2017-07-19 | End: 2017-07-20 | Stop reason: HOSPADM

## 2017-07-19 RX ORDER — SODIUM CHLORIDE 0.9 % (FLUSH) 0.9 %
10 SYRINGE (ML) INJECTION EVERY 12 HOURS SCHEDULED
Status: DISCONTINUED | OUTPATIENT
Start: 2017-07-19 | End: 2017-07-20 | Stop reason: HOSPADM

## 2017-07-19 RX ORDER — FAMOTIDINE 20 MG/1
20 TABLET, FILM COATED ORAL 2 TIMES DAILY
Status: DISCONTINUED | OUTPATIENT
Start: 2017-07-19 | End: 2017-07-20 | Stop reason: HOSPADM

## 2017-07-19 RX ORDER — LEVOFLOXACIN 5 MG/ML
750 INJECTION, SOLUTION INTRAVENOUS ONCE
Status: COMPLETED | OUTPATIENT
Start: 2017-07-19 | End: 2017-07-19

## 2017-07-19 RX ORDER — ALBUTEROL SULFATE 2.5 MG/3ML
2.5 SOLUTION RESPIRATORY (INHALATION)
Status: DISCONTINUED | OUTPATIENT
Start: 2017-07-19 | End: 2017-07-20 | Stop reason: HOSPADM

## 2017-07-19 RX ORDER — NICOTINE 21 MG/24HR
1 PATCH, TRANSDERMAL 24 HOURS TRANSDERMAL DAILY
Status: DISCONTINUED | OUTPATIENT
Start: 2017-07-19 | End: 2017-07-20 | Stop reason: HOSPADM

## 2017-07-19 RX ORDER — DOXYCYCLINE HYCLATE 100 MG
100 TABLET ORAL EVERY 12 HOURS
Status: DISCONTINUED | OUTPATIENT
Start: 2017-07-19 | End: 2017-07-20 | Stop reason: HOSPADM

## 2017-07-19 RX ORDER — IPRATROPIUM BROMIDE AND ALBUTEROL SULFATE 2.5; .5 MG/3ML; MG/3ML
1 SOLUTION RESPIRATORY (INHALATION) ONCE
Status: COMPLETED | OUTPATIENT
Start: 2017-07-19 | End: 2017-07-19

## 2017-07-19 RX ORDER — SODIUM CHLORIDE 9 MG/ML
INJECTION, SOLUTION INTRAVENOUS CONTINUOUS
Status: DISCONTINUED | OUTPATIENT
Start: 2017-07-19 | End: 2017-07-20

## 2017-07-19 RX ORDER — ASPIRIN 81 MG/1
81 TABLET ORAL DAILY
Status: DISCONTINUED | OUTPATIENT
Start: 2017-07-19 | End: 2017-07-20 | Stop reason: HOSPADM

## 2017-07-19 RX ORDER — DOCUSATE SODIUM 100 MG/1
100 CAPSULE, LIQUID FILLED ORAL 2 TIMES DAILY
Status: DISCONTINUED | OUTPATIENT
Start: 2017-07-20 | End: 2017-07-20 | Stop reason: HOSPADM

## 2017-07-19 RX ORDER — METHYLPREDNISOLONE SODIUM SUCCINATE 40 MG/ML
40 INJECTION, POWDER, LYOPHILIZED, FOR SOLUTION INTRAMUSCULAR; INTRAVENOUS EVERY 12 HOURS
Status: DISCONTINUED | OUTPATIENT
Start: 2017-07-19 | End: 2017-07-20

## 2017-07-19 RX ORDER — BISACODYL 10 MG
10 SUPPOSITORY, RECTAL RECTAL PRN
Status: DISCONTINUED | OUTPATIENT
Start: 2017-07-19 | End: 2017-07-20 | Stop reason: HOSPADM

## 2017-07-19 RX ORDER — METHYLPREDNISOLONE SODIUM SUCCINATE 125 MG/2ML
80 INJECTION, POWDER, LYOPHILIZED, FOR SOLUTION INTRAMUSCULAR; INTRAVENOUS ONCE
Status: COMPLETED | OUTPATIENT
Start: 2017-07-19 | End: 2017-07-19

## 2017-07-19 RX ORDER — POLYETHYLENE GLYCOL 3350 17 G/17G
17 POWDER, FOR SOLUTION ORAL DAILY
Status: DISCONTINUED | OUTPATIENT
Start: 2017-07-20 | End: 2017-07-20 | Stop reason: HOSPADM

## 2017-07-19 RX ORDER — ACETAMINOPHEN 325 MG/1
650 TABLET ORAL EVERY 4 HOURS PRN
Status: DISCONTINUED | OUTPATIENT
Start: 2017-07-19 | End: 2017-07-20 | Stop reason: HOSPADM

## 2017-07-19 RX ORDER — PREDNISOLONE ACETATE 10 MG/ML
1 SUSPENSION/ DROPS OPHTHALMIC 4 TIMES DAILY
Status: DISCONTINUED | OUTPATIENT
Start: 2017-07-19 | End: 2017-07-20 | Stop reason: HOSPADM

## 2017-07-19 RX ADMIN — METHYLPREDNISOLONE SODIUM SUCCINATE 40 MG: 40 INJECTION, POWDER, FOR SOLUTION INTRAMUSCULAR; INTRAVENOUS at 17:26

## 2017-07-19 RX ADMIN — IOPAMIDOL 80 ML: 755 INJECTION, SOLUTION INTRAVENOUS at 12:53

## 2017-07-19 RX ADMIN — DOXYCYCLINE HYCLATE 100 MG: 100 TABLET, COATED ORAL at 17:26

## 2017-07-19 RX ADMIN — METHYLPREDNISOLONE SODIUM SUCCINATE 80 MG: 125 INJECTION, POWDER, FOR SOLUTION INTRAMUSCULAR; INTRAVENOUS at 11:01

## 2017-07-19 RX ADMIN — ENOXAPARIN SODIUM 40 MG: 40 INJECTION SUBCUTANEOUS at 17:27

## 2017-07-19 RX ADMIN — FAMOTIDINE 20 MG: 20 TABLET, FILM COATED ORAL at 20:39

## 2017-07-19 RX ADMIN — ALBUTEROL SULFATE 2.5 MG: 2.5 SOLUTION RESPIRATORY (INHALATION) at 20:13

## 2017-07-19 RX ADMIN — SODIUM CHLORIDE: 9 INJECTION, SOLUTION INTRAVENOUS at 17:25

## 2017-07-19 RX ADMIN — LEVOFLOXACIN 750 MG: 5 INJECTION, SOLUTION INTRAVENOUS at 12:07

## 2017-07-19 RX ADMIN — IPRATROPIUM BROMIDE AND ALBUTEROL SULFATE 1 AMPULE: .5; 3 SOLUTION RESPIRATORY (INHALATION) at 11:01

## 2017-07-19 ASSESSMENT — ENCOUNTER SYMPTOMS
NAUSEA: 0
EYE ITCHING: 0
ABDOMINAL PAIN: 0
DIARRHEA: 0
SHORTNESS OF BREATH: 1
VOMITING: 0
COUGH: 1
ABDOMINAL DISTENTION: 0
EYE DISCHARGE: 0
WHEEZING: 0
RHINORRHEA: 0

## 2017-07-19 ASSESSMENT — PAIN SCALES - GENERAL
PAINLEVEL_OUTOF10: 0
PAINLEVEL_OUTOF10: 0

## 2017-07-20 ENCOUNTER — HOSPITAL ENCOUNTER (OUTPATIENT)
Dept: INFUSION THERAPY | Age: 82
Discharge: HOME | End: 2017-07-20
Payer: MEDICARE

## 2017-07-20 VITALS
RESPIRATION RATE: 16 BRPM | HEIGHT: 68 IN | DIASTOLIC BLOOD PRESSURE: 56 MMHG | HEART RATE: 96 BPM | OXYGEN SATURATION: 96 % | TEMPERATURE: 98.3 F | WEIGHT: 204 LBS | BODY MASS INDEX: 30.92 KG/M2 | SYSTOLIC BLOOD PRESSURE: 123 MMHG

## 2017-07-20 DIAGNOSIS — Z51.11 ENCOUNTER FOR ANTINEOPLASTIC CHEMOTHERAPY: ICD-10-CM

## 2017-07-20 LAB
ANION GAP SERPL CALCULATED.3IONS-SCNC: 12 MEQ/L (ref 8–16)
ANISOCYTOSIS: ABNORMAL
BASOPHILS # BLD: 0 %
BASOPHILS ABSOLUTE: 0 THOU/MM3 (ref 0–0.1)
BUN BLDV-MCNC: 13 MG/DL (ref 7–22)
CALCIUM SERPL-MCNC: 8.8 MG/DL (ref 8.5–10.5)
CHLORIDE BLD-SCNC: 99 MEQ/L (ref 98–111)
CO2: 26 MEQ/L (ref 23–33)
CREAT SERPL-MCNC: 0.7 MG/DL (ref 0.4–1.2)
EKG ATRIAL RATE: 111 BPM
EKG P AXIS: 63 DEGREES
EKG P-R INTERVAL: 178 MS
EKG Q-T INTERVAL: 334 MS
EKG QRS DURATION: 90 MS
EKG QTC CALCULATION (BAZETT): 454 MS
EKG R AXIS: 90 DEGREES
EKG T AXIS: 73 DEGREES
EKG VENTRICULAR RATE: 111 BPM
EOSINOPHIL # BLD: 0 %
EOSINOPHILS ABSOLUTE: 0 THOU/MM3 (ref 0–0.4)
GFR SERPL CREATININE-BSD FRML MDRD: > 90 ML/MIN/1.73M2
GLUCOSE BLD-MCNC: 246 MG/DL (ref 70–108)
HCT VFR BLD CALC: 32.3 % (ref 42–52)
HEMOGLOBIN: 11.1 GM/DL (ref 14–18)
LYMPHOCYTES # BLD: 11.6 %
LYMPHOCYTES ABSOLUTE: 0.2 THOU/MM3 (ref 1–4.8)
MCH RBC QN AUTO: 32.8 PG (ref 27–31)
MCHC RBC AUTO-ENTMCNC: 34.3 GM/DL (ref 33–37)
MCV RBC AUTO: 95.8 FL (ref 80–94)
MONOCYTES # BLD: 2.7 %
MONOCYTES ABSOLUTE: 0.1 THOU/MM3 (ref 0.4–1.3)
NUCLEATED RED BLOOD CELLS: 0 /100 WBC
PDW BLD-RTO: 14.7 % (ref 11.5–14.5)
PLATELET # BLD: 132 THOU/MM3 (ref 130–400)
PMV BLD AUTO: 8.5 MCM (ref 7.4–10.4)
POTASSIUM SERPL-SCNC: 4 MEQ/L (ref 3.5–5.2)
RBC # BLD: 3.37 MILL/MM3 (ref 4.7–6.1)
RBC # BLD: NORMAL 10*6/UL
SEG NEUTROPHILS: 85.7 %
SEGMENTED NEUTROPHILS ABSOLUTE COUNT: 1.8 THOU/MM3 (ref 1.8–7.7)
SODIUM BLD-SCNC: 137 MEQ/L (ref 135–145)
WBC # BLD: 2.1 THOU/MM3 (ref 4.8–10.8)

## 2017-07-20 PROCEDURE — 36415 COLL VENOUS BLD VENIPUNCTURE: CPT

## 2017-07-20 PROCEDURE — G8978 MOBILITY CURRENT STATUS: HCPCS

## 2017-07-20 PROCEDURE — 97162 PT EVAL MOD COMPLEX 30 MIN: CPT

## 2017-07-20 PROCEDURE — 6370000000 HC RX 637 (ALT 250 FOR IP): Performed by: NURSE PRACTITIONER

## 2017-07-20 PROCEDURE — 6360000002 HC RX W HCPCS: Performed by: NURSE PRACTITIONER

## 2017-07-20 PROCEDURE — 96401 CHEMO ANTI-NEOPL SQ/IM: CPT

## 2017-07-20 PROCEDURE — 80048 BASIC METABOLIC PNL TOTAL CA: CPT

## 2017-07-20 PROCEDURE — 94640 AIRWAY INHALATION TREATMENT: CPT

## 2017-07-20 PROCEDURE — 97530 THERAPEUTIC ACTIVITIES: CPT

## 2017-07-20 PROCEDURE — 2580000003 HC RX 258: Performed by: INTERNAL MEDICINE

## 2017-07-20 PROCEDURE — G8987 SELF CARE CURRENT STATUS: HCPCS

## 2017-07-20 PROCEDURE — 6360000002 HC RX W HCPCS: Performed by: INTERNAL MEDICINE

## 2017-07-20 PROCEDURE — 99238 HOSP IP/OBS DSCHRG MGMT 30/<: CPT | Performed by: INTERNAL MEDICINE

## 2017-07-20 PROCEDURE — G8979 MOBILITY GOAL STATUS: HCPCS

## 2017-07-20 PROCEDURE — 97165 OT EVAL LOW COMPLEX 30 MIN: CPT

## 2017-07-20 PROCEDURE — G8988 SELF CARE GOAL STATUS: HCPCS

## 2017-07-20 PROCEDURE — 85025 COMPLETE CBC W/AUTO DIFF WBC: CPT

## 2017-07-20 PROCEDURE — 2580000003 HC RX 258: Performed by: NURSE PRACTITIONER

## 2017-07-20 PROCEDURE — G8989 SELF CARE D/C STATUS: HCPCS

## 2017-07-20 PROCEDURE — 2700000000 HC OXYGEN THERAPY PER DAY

## 2017-07-20 RX ORDER — NICOTINE 21 MG/24HR
1 PATCH, TRANSDERMAL 24 HOURS TRANSDERMAL DAILY
Qty: 30 PATCH | Refills: 3 | COMMUNITY
Start: 2017-07-20 | End: 2017-01-01 | Stop reason: ALTCHOICE

## 2017-07-20 RX ORDER — DOXYCYCLINE HYCLATE 100 MG
100 TABLET ORAL EVERY 12 HOURS
Qty: 10 TABLET | Refills: 0 | Status: SHIPPED | OUTPATIENT
Start: 2017-07-20 | End: 2017-07-25

## 2017-07-20 RX ORDER — DEXAMETHASONE 6 MG/1
6 TABLET ORAL
Qty: 3 TABLET | Refills: 0 | Status: SHIPPED | OUTPATIENT
Start: 2017-07-20 | End: 2017-07-23

## 2017-07-20 RX ADMIN — BORTEZOMIB 2.4 MG: 3.5 INJECTION, POWDER, LYOPHILIZED, FOR SOLUTION INTRAVENOUS; SUBCUTANEOUS at 17:34

## 2017-07-20 RX ADMIN — DOXYCYCLINE HYCLATE 100 MG: 100 TABLET, COATED ORAL at 17:08

## 2017-07-20 RX ADMIN — ASPIRIN 81 MG: 81 TABLET, COATED ORAL at 07:45

## 2017-07-20 RX ADMIN — SODIUM CHLORIDE: 9 INJECTION, SOLUTION INTRAVENOUS at 03:52

## 2017-07-20 RX ADMIN — ALBUTEROL SULFATE 2.5 MG: 2.5 SOLUTION RESPIRATORY (INHALATION) at 09:00

## 2017-07-20 RX ADMIN — FAMOTIDINE 20 MG: 20 TABLET, FILM COATED ORAL at 07:45

## 2017-07-20 RX ADMIN — DOXYCYCLINE HYCLATE 100 MG: 100 TABLET, COATED ORAL at 06:37

## 2017-07-20 RX ADMIN — DEXAMETHASONE SODIUM PHOSPHATE 12 MG: 4 INJECTION, SOLUTION INTRAMUSCULAR; INTRAVENOUS at 16:55

## 2017-07-20 RX ADMIN — METHYLPREDNISOLONE SODIUM SUCCINATE 40 MG: 40 INJECTION, POWDER, FOR SOLUTION INTRAMUSCULAR; INTRAVENOUS at 06:37

## 2017-07-20 RX ADMIN — ENOXAPARIN SODIUM 40 MG: 40 INJECTION SUBCUTANEOUS at 17:08

## 2017-07-20 RX ADMIN — TIOTROPIUM BROMIDE 18 MCG: 18 CAPSULE ORAL; RESPIRATORY (INHALATION) at 09:09

## 2017-07-20 RX ADMIN — DOCUSATE SODIUM 100 MG: 100 CAPSULE ORAL at 07:45

## 2017-07-20 RX ADMIN — ALBUTEROL SULFATE 2.5 MG: 2.5 SOLUTION RESPIRATORY (INHALATION) at 13:11

## 2017-07-20 RX ADMIN — POLYETHYLENE GLYCOL 3350 17 G: 17 POWDER, FOR SOLUTION ORAL at 07:46

## 2017-07-20 RX ADMIN — ALBUTEROL SULFATE 2.5 MG: 2.5 SOLUTION RESPIRATORY (INHALATION) at 16:48

## 2017-07-20 ASSESSMENT — PAIN SCALES - GENERAL
PAINLEVEL_OUTOF10: 0
PAINLEVEL_OUTOF10: 0

## 2017-07-21 ENCOUNTER — CARE COORDINATION (OUTPATIENT)
Dept: CASE MANAGEMENT | Age: 82
End: 2017-07-21

## 2017-07-21 ENCOUNTER — TELEPHONE (OUTPATIENT)
Dept: PHARMACY | Facility: CLINIC | Age: 82
End: 2017-07-21

## 2017-07-21 LAB
BLOOD CULTURE, ROUTINE: ABNORMAL
BLOOD CULTURE, ROUTINE: ABNORMAL
ORGANISM: ABNORMAL

## 2017-07-21 RX ORDER — ACETAMINOPHEN/DIPHENHYDRAMINE 500MG-25MG
2 TABLET ORAL NIGHTLY PRN
Status: ON HOLD | COMMUNITY
End: 2018-01-01

## 2017-07-22 LAB
BLOOD CULTURE, ROUTINE: ABNORMAL
ORGANISM: ABNORMAL

## 2017-07-24 ENCOUNTER — CARE COORDINATION (OUTPATIENT)
Dept: CASE MANAGEMENT | Age: 82
End: 2017-07-24

## 2017-07-26 ENCOUNTER — OFFICE VISIT (OUTPATIENT)
Dept: FAMILY MEDICINE CLINIC | Age: 82
End: 2017-07-26
Payer: MEDICARE

## 2017-07-26 VITALS
HEART RATE: 80 BPM | HEIGHT: 67 IN | RESPIRATION RATE: 16 BRPM | SYSTOLIC BLOOD PRESSURE: 132 MMHG | DIASTOLIC BLOOD PRESSURE: 64 MMHG | TEMPERATURE: 97.6 F | WEIGHT: 208 LBS | BODY MASS INDEX: 32.65 KG/M2

## 2017-07-26 DIAGNOSIS — J44.1 COPD EXACERBATION (HCC): Primary | ICD-10-CM

## 2017-07-26 DIAGNOSIS — E11.9 CONTROLLED TYPE 2 DIABETES MELLITUS WITHOUT COMPLICATION, WITHOUT LONG-TERM CURRENT USE OF INSULIN (HCC): ICD-10-CM

## 2017-07-26 LAB — HBA1C MFR BLD: 7.3 %

## 2017-07-26 PROCEDURE — 99495 TRANSJ CARE MGMT MOD F2F 14D: CPT | Performed by: FAMILY MEDICINE

## 2017-07-26 PROCEDURE — 83036 HEMOGLOBIN GLYCOSYLATED A1C: CPT | Performed by: FAMILY MEDICINE

## 2017-07-26 ASSESSMENT — PATIENT HEALTH QUESTIONNAIRE - PHQ9
1. LITTLE INTEREST OR PLEASURE IN DOING THINGS: 0
SUM OF ALL RESPONSES TO PHQ QUESTIONS 1-9: 1
SUM OF ALL RESPONSES TO PHQ9 QUESTIONS 1 & 2: 1
2. FEELING DOWN, DEPRESSED OR HOPELESS: 1

## 2017-07-27 ENCOUNTER — HOSPITAL ENCOUNTER (OUTPATIENT)
Dept: INFUSION THERAPY | Age: 82
Discharge: HOME | End: 2017-07-27
Payer: MEDICARE

## 2017-07-27 ENCOUNTER — CARE COORDINATION (OUTPATIENT)
Dept: CASE MANAGEMENT | Age: 82
End: 2017-07-27

## 2017-07-27 VITALS
WEIGHT: 208 LBS | DIASTOLIC BLOOD PRESSURE: 66 MMHG | SYSTOLIC BLOOD PRESSURE: 150 MMHG | RESPIRATION RATE: 18 BRPM | TEMPERATURE: 97.9 F | OXYGEN SATURATION: 98 % | HEART RATE: 88 BPM | BODY MASS INDEX: 32.65 KG/M2 | HEIGHT: 67 IN

## 2017-07-27 DIAGNOSIS — D47.2 SMOLDERING MYELOMA: ICD-10-CM

## 2017-07-27 DIAGNOSIS — Z51.11 CHEMOTHERAPY MANAGEMENT, ENCOUNTER FOR: ICD-10-CM

## 2017-07-27 PROCEDURE — 6360000002 HC RX W HCPCS: Performed by: INTERNAL MEDICINE

## 2017-07-27 PROCEDURE — 96365 THER/PROPH/DIAG IV INF INIT: CPT

## 2017-07-27 PROCEDURE — 96401 CHEMO ANTI-NEOPL SQ/IM: CPT

## 2017-07-27 PROCEDURE — 2580000003 HC RX 258: Performed by: INTERNAL MEDICINE

## 2017-07-27 RX ORDER — SODIUM CHLORIDE 9 MG/ML
INJECTION, SOLUTION INTRAVENOUS ONCE
Status: COMPLETED | OUTPATIENT
Start: 2017-07-27 | End: 2017-07-27

## 2017-07-27 RX ADMIN — BORTEZOMIB 2.4 MG: 3.5 INJECTION, POWDER, LYOPHILIZED, FOR SOLUTION INTRAVENOUS; SUBCUTANEOUS at 10:46

## 2017-07-27 RX ADMIN — SODIUM CHLORIDE: 9 INJECTION, SOLUTION INTRAVENOUS at 10:28

## 2017-07-27 RX ADMIN — DEXAMETHASONE SODIUM PHOSPHATE 12 MG: 4 INJECTION, SOLUTION INTRAMUSCULAR; INTRAVENOUS at 10:28

## 2017-07-27 NOTE — IP AVS SNAPSHOT
After Visit Summary    This summary was created for you. Thank you for entrusting your care to us. The following information includes details about your hospital/visit stay along with steps you should take to help with your recovery once you leave the hospital.  In this packet, you will find information about the topics listed below:    · Instructions about your medications including a list of your home medications  · A summary of your hospital visit  · Follow-up appointments once you have left the hospital  · Your care plan at home      You may receive a survey regarding the care you received during your stay. Your input is valuable to us. We encourage you to complete and return your survey in the envelope provided. We hope you will choose us in the future for your healthcare needs. Patient Information     Patient Name ROSIBEL Killian 1932      Care Provided at:     Name Address Phone       [address] [phone]            Your Visit    Here you will find information about your visit, including the reason for your visit. Please take this sheet with you when you visit your doctor or other health care provider in the future. It will help determine the best possible medical care for you at that time. If you have any questions once you leave the hospital, please call the department phone number listed below. Why you were here     Your primary diagnosis was:  Not on File      Visit Information     Date & Time Department Dept. Phone    2017 [facility] [phone]         Follow-up Appointments    Below is a list of your follow-up and future appointments. This may not be a complete list as you may have made appointments directly with providers that we are not aware of or your providers may have made some for you. Please call your providers to confirm appointments. It is important to keep your appointments. Please bring your current insurance card, photo ID, co-pay, and all medication bottles to your appointment. If self-pay, payment is expected at the time of service. To-Do List     Future Appointments Provider Department Dept Phone    8/3/2017 8:30 AM Yariel Chahal MD [facility]. [place] [phone]    Please arrive 15 minutes prior to appointment, bring photo ID and insurance card. Please arrive 15 minutes prior to appointment, bring photo ID and insurance card. 10/27/2017 8:15 AM Jose De Jesus Causey MD [address] [facility] Urology [phone]    Please arrive 15 minutes prior to appointment, bring photo ID and insurance card. Please arrive 15 minutes prior to appointment, bring photo ID and insurance card. 11/1/2017 8:40 AM Zo Renteria DO [facility] [phone]    Please arrive 15 minutes prior to appointment, bring photo ID and insurance card. 12/5/2017 12:30 PM [facility] PULMONARY FUNCTION ROOM 1 [facility] Pulm Function Test [phone]    12/19/2017 9:20 AM Peter Daniels. [facility] 60 [phone]    Please arrive 15 minutes prior to appointment, bring photo ID and insurance card. Care Plan Once You Return Home    This section includes instructions you will need to follow once you leave the hospital.  Your care team will discuss these with you, so you and those caring for you know how to best care for your health needs at home. This section may also include educational information about certain health topics that may be of help to you. Discharge Instructions       Please contact your Oncologist if you have any questions regarding the chemotherapy velcade that you received today. Patient instructed if experience any of the symptoms following today's chemotherapy / to notify MD immediately or go to emergency department.     * dizziness/lightheadedness *acute nausea/vomiting - not relieved with medication  *headache - not relieved from Tylenol/pain medication  *chest pain/pressure  *rash/itching  *shortness of breath        Drink fluids - 48oz fluids daily  Call if develop fever/ chills/ signs or symptoms of infection         Important Information    If your condition worsens or if you have any concerns, call your doctor or seek emergency medical services (dial [phone]) as needed. If you have any of the following symptoms/conditions, call your doctor. Call your primary care physician to obtain results of outstanding lab tests, cultures, x-rays, or other tests. The information on all pages of the After Visit Summary has been reviewed with me, the patient and/or responsible adult, by my health care provider(s). I had the opportunity to ask questions regarding this information. I understand I should dispose of my armband safely at home to protect my health information. A complete copy of the After Visit Summary has been given to me, the patient and/or responsible adult. Patient Signature/Responsible Adult:  __________________________________________________    Date/Time:  __________________________________________________                 Clinician Signature:  __________________________________________________    Date/Time:  __________________________________________________            After Visit Summary  (Discharge Instructions)    Medication List for Home    Based on the information you provided to us as well as any changes during this visit, the following is your updated medication list.  Compare this with your prescription bottles at home. If you have any questions or concerns, contact your primary care physician's office.              Daily Medication List (This medication list can be shared with any healthcare provider who is helping you manage your medications) ASK your doctor about these medications if you have questions        Last Dose    Next Dose Due AM NOON PM NIGHT    Acetaminophen 650 MG Tabs   Take 650 mg by mouth every 4 hours as needed                                         albuterol sulfate  (90 Base) MCG/ACT inhaler   Commonly known as:  VENTOLIN HFA   Inhale 2 puffs into the lungs every 6 hours as needed for Wheezing or Shortness of Breath                                         albuterol (2.5 MG/3ML) 0.083% nebulizer solution   Commonly known as:  PROVENTIL   Take 3 mLs by nebulization every 6 hours as needed for Wheezing or Shortness of Breath                                         aspirin 81 MG EC tablet   Take 81 mg by mouth daily. CHEMOTHERAPY                                         leuprolide 30 MG injection   Commonly known as:  LUPRON   Inject 30 mg into the muscle once Indications: every 3 months                                         nicotine 21 MG/24HR   Commonly known as:  NICODERM CQ   Place 1 patch onto the skin daily                                         tiotropium 18 MCG inhalation capsule   Commonly known as:  SPIRIVA HANDIHALER   Inhale 1 capsule into the lungs daily 1 capsule via inhalation daily.                                          TYLENOL PM EXTRA STRENGTH  MG tablet   Generic drug:  diphenhydrAMINE-APAP (sleep)   Take 2 tablets by mouth nightly as needed for Sleep                                                 Allergies as of 7/27/2017        Reactions    Shellfish-derived Products     Flomax [Tamsulosin Hcl] Other (See Comments)    Drops b/p      Immunizations as of 7/27/2017     Name Date Dose VIS Date Route    Influenza Virus Vaccine 10/22/2015 0.5 mL 8/7/2015 Intramuscular    Influenza, Quadrivalent, 3 Years and above,IM 10/11/2016 0.5 mL 8/7/2015 Intramuscular    Pneumococcal 13-valent Conjugate ([id number]) 1/19/2016 0.5 mL 11/5/2015 Intramuscular MyChart Signup     Our records indicate that you have an active [employer] account. You can view your After Visit Summary by going to [email or web address] and logging in with your [employer] username and password. If you don't have a [employer] username and password but a parent or guardian has access to your record, the parent or guardian should login with their own [employer] username and password and access your record to view the After Visit Summary. Additional Information  If you have questions, please contact the physician practice where you receive care. Remember, [employer] is NOT to be used for urgent needs. For medical emergencies, dial 911. For questions regarding your m[employer] account call [phone]. If you have a clinical question, please call your doctor's office. View your information online  ? Review your current list of  medications, immunization, and allergies. ? Review your future test results online . ? Review your discharge instructions provided by your caregivers at discharge    Certain functionality such as prescription refills, scheduling appointments or sending messages to your provider are not activated if your provider does not use [employer]look in his/her office    For questions regarding your [employer] account call [phone]. If you have a clinical question, please call your doctor's office.            Default Flowsheet Data (all recorded)      Follow Up / Scheduling     None

## 2017-07-27 NOTE — PROGRESS NOTES
Patient assessed for the following post chemotherapy:    Dizziness   No  Lightheadedness  No      Acute nausea/vomiting No  Headache   No  Chest pain/pressure  No  Rash/itching   No  Shortness of breath  No    Patient kept for 20 minutes observation post infusion chemotherapy. Patient tolerated chemotherapy treatment velcade without any complications. Last vital signs:   BP (!) 150/66  Pulse 88  Temp 97.9 °F (36.6 °C) (Oral)   Resp 18  Ht 5' 7\" (1.702 m)  Wt 208 lb (94.3 kg)  SpO2 98%  BMI 32.58 kg/m2      Patient instructed if experience any of the above symptoms following today's infusion,he/she is to notify MD immediately or go to the emergency department. Discharge instructions given to patient. Verbalizes understanding. Ambulated off unit per self with belongings.

## 2017-07-27 NOTE — PROGRESS NOTES
Chemotherapy Administration    Pre-assessment Data: Antineoplastic Agents  Other:   See toxicity flow sheet for assessment [x]     Physician Notification of Concerns Related to Chemotherapy Administration:   Physician Notified Vernida Aretha / Time of Notification      Interventions:   Lab work assessed  [x]   Height / Weight verified for dose [x]   Current MAR reviewed [x]   Emergency drugs available as appropriate [x]   Anaphylaxis assessment completed [x]   Pre-medications administered as ordered [x]   Chemotherapy Administered as SQ injection [x]   Monitor for signs / symptoms of hypersensitivity reaction    [x]   Chemotherapy orders (drug/dose/rate) verified by 2 Chemo certified   RNs    [x]          Document chemotherapy teaching on the Patient Education tab    [x]   Document patient verbalizes understanding of medications being administered    [x]   Other: []    []    []      []    []

## 2017-07-27 NOTE — PLAN OF CARE
Problem: Intellectual/Education/Knowledge Deficit  Intervention: Verbal/written education provided  Chemotherapy Teaching      What is Chemotherapy   Drug action [X]   Method of Administration [X]   Handouts given [ ]      Side Effects  Nausea/vomiting [X]   Diarrhea [X]   Fatigue [X]   Signs / Symptoms of infection [X]   Neutropenia [X]   Thrombocytopenia [X]   Alopecia [X]   neuropathy [X]   [place] diet &  the importance of fluids [X]         Micellaneous  Importance of nutrition [X]   Importance of oral hygiene [X]   When to call the MD [X]   Monitoring labs [X]   Use of supportive services [ ]      Explanation of Drug Regimen / Frequency  velcade      Comments  Verbalized understanding to drug,action,side effects and when to call MD         Goal: Teaching initiated upon admission  Outcome: Met This Shift  Patient verbalizes understanding to verbal information given on velcade,action and possible side effects. Aware to call MD if develop complications. Problem: Discharge Planning  Intervention: Interaction with patient/family and care team  Discuss discharge instructions, follow ups and when to call doctor. Goal: Knowledge of discharge instructions  Knowledge of discharge instructions  Outcome: Met This Shift  Verbalized understanding of discharge instructions, follow ups and when to call doctor    Comments:   Care plan reviewed with patient. Patient verbalized understanding of the plan of care and contribute to goal setting.

## 2017-07-28 ENCOUNTER — HOSPITAL ENCOUNTER (OUTPATIENT)
Dept: INFUSION THERAPY | Age: 82
Discharge: HOME | End: 2017-07-28

## 2017-07-31 ENCOUNTER — CARE COORDINATION (OUTPATIENT)
Dept: CASE MANAGEMENT | Age: 82
End: 2017-07-31

## 2017-08-03 ENCOUNTER — OFFICE VISIT (OUTPATIENT)
Dept: ONCOLOGY | Age: 82
End: 2017-08-03
Payer: MEDICARE

## 2017-08-03 ENCOUNTER — HOSPITAL ENCOUNTER (OUTPATIENT)
Dept: INFUSION THERAPY | Age: 82
Discharge: HOME | End: 2017-08-03
Payer: MEDICARE

## 2017-08-03 VITALS
WEIGHT: 204.6 LBS | HEART RATE: 75 BPM | BODY MASS INDEX: 32.11 KG/M2 | TEMPERATURE: 97 F | SYSTOLIC BLOOD PRESSURE: 138 MMHG | OXYGEN SATURATION: 97 % | HEIGHT: 67 IN | RESPIRATION RATE: 18 BRPM | DIASTOLIC BLOOD PRESSURE: 74 MMHG

## 2017-08-03 VITALS
HEIGHT: 66 IN | SYSTOLIC BLOOD PRESSURE: 146 MMHG | DIASTOLIC BLOOD PRESSURE: 65 MMHG | RESPIRATION RATE: 18 BRPM | TEMPERATURE: 97.5 F | HEART RATE: 69 BPM | WEIGHT: 204 LBS | BODY MASS INDEX: 32.78 KG/M2 | OXYGEN SATURATION: 98 %

## 2017-08-03 DIAGNOSIS — D47.2 SMOLDERING MYELOMA: ICD-10-CM

## 2017-08-03 DIAGNOSIS — C90.00 MULTIPLE MYELOMA NOT HAVING ACHIEVED REMISSION (HCC): Primary | ICD-10-CM

## 2017-08-03 DIAGNOSIS — Z51.11 CHEMOTHERAPY MANAGEMENT, ENCOUNTER FOR: ICD-10-CM

## 2017-08-03 LAB
ALBUMIN SERPL-MCNC: 3.6 G/DL (ref 3.5–5.1)
ALP BLD-CCNC: 144 U/L (ref 38–126)
ALT SERPL-CCNC: 25 U/L (ref 11–66)
AST SERPL-CCNC: 20 U/L (ref 5–40)
BASINOPHIL, AUTOMATED: 0 % (ref 0–12)
BILIRUB SERPL-MCNC: 0.3 MG/DL (ref 0.3–1.2)
BILIRUBIN DIRECT: < 0.2 MG/DL (ref 0–0.3)
BUN, WHOLE BLOOD: 14 MG/DL (ref 8–26)
CHLORIDE, WHOLE BLOOD: 97 MEQ/L (ref 98–109)
CREATININE, WHOLE BLOOD: 0.8 MG/DL (ref 0.5–1.2)
EOSINOPHILS RELATIVE PERCENT: 2 % (ref 0–12)
GFR, ESTIMATED: > 90 ML/MIN/1.73M2
GLUCOSE, WHOLE BLOOD: 123 MG/DL (ref 70–108)
HCT VFR BLD CALC: 38.7 % (ref 42–52)
HEMOGLOBIN: 13 GM/DL (ref 14–18)
IONIZED CALCIUM, WHOLE BLOOD: 1.17 MMOL/L (ref 1.12–1.32)
LYMPHOCYTES # BLD: 27 % (ref 15–47)
MCH RBC QN AUTO: 32.1 PG (ref 27–31)
MCHC RBC AUTO-ENTMCNC: 33.6 GM/DL (ref 33–37)
MCV RBC AUTO: 96 FL (ref 80–94)
MONOCYTES: 4 % (ref 0–12)
PDW BLD-RTO: 12.7 % (ref 11.5–14.5)
PLATELET # BLD: 215 THOU/MM3 (ref 130–400)
PMV BLD AUTO: 7.4 MCM (ref 7.4–10.4)
POTASSIUM, WHOLE BLOOD: 4.2 MEQ/L (ref 3.5–4.9)
RBC # BLD: 4.05 MILL/MM3 (ref 4.7–6.1)
SEG NEUTROPHILS: 67 % (ref 43–75)
SODIUM, WHOLE BLOOD: 140 MEQ/L (ref 138–146)
TOTAL CO2, WHOLE BLOOD: 29 MEQ/L (ref 23–33)
TOTAL PROTEIN: 8.4 G/DL (ref 6.1–8)
WBC # BLD: 2 THOU/MM3 (ref 4.8–10.8)

## 2017-08-03 PROCEDURE — 4040F PNEUMOC VAC/ADMIN/RCVD: CPT | Performed by: INTERNAL MEDICINE

## 2017-08-03 PROCEDURE — 96365 THER/PROPH/DIAG IV INF INIT: CPT

## 2017-08-03 PROCEDURE — 6360000002 HC RX W HCPCS: Performed by: INTERNAL MEDICINE

## 2017-08-03 PROCEDURE — 80076 HEPATIC FUNCTION PANEL: CPT

## 2017-08-03 PROCEDURE — G8417 CALC BMI ABV UP PARAM F/U: HCPCS | Performed by: INTERNAL MEDICINE

## 2017-08-03 PROCEDURE — 1123F ACP DISCUSS/DSCN MKR DOCD: CPT | Performed by: INTERNAL MEDICINE

## 2017-08-03 PROCEDURE — G0463 HOSPITAL OUTPT CLINIC VISIT: HCPCS

## 2017-08-03 PROCEDURE — 82784 ASSAY IGA/IGD/IGG/IGM EACH: CPT

## 2017-08-03 PROCEDURE — 2580000003 HC RX 258: Performed by: INTERNAL MEDICINE

## 2017-08-03 PROCEDURE — 83883 ASSAY NEPHELOMETRY NOT SPEC: CPT

## 2017-08-03 PROCEDURE — 86334 IMMUNOFIX E-PHORESIS SERUM: CPT

## 2017-08-03 PROCEDURE — 80047 BASIC METABLC PNL IONIZED CA: CPT

## 2017-08-03 PROCEDURE — 84165 PROTEIN E-PHORESIS SERUM: CPT

## 2017-08-03 PROCEDURE — 1036F TOBACCO NON-USER: CPT | Performed by: INTERNAL MEDICINE

## 2017-08-03 PROCEDURE — 1111F DSCHRG MED/CURRENT MED MERGE: CPT | Performed by: INTERNAL MEDICINE

## 2017-08-03 PROCEDURE — 96401 CHEMO ANTI-NEOPL SQ/IM: CPT

## 2017-08-03 PROCEDURE — 36415 COLL VENOUS BLD VENIPUNCTURE: CPT

## 2017-08-03 PROCEDURE — 84160 ASSAY OF PROTEIN ANY SOURCE: CPT

## 2017-08-03 PROCEDURE — 99215 OFFICE O/P EST HI 40 MIN: CPT | Performed by: INTERNAL MEDICINE

## 2017-08-03 PROCEDURE — 85025 COMPLETE CBC W/AUTO DIFF WBC: CPT

## 2017-08-03 PROCEDURE — G8427 DOCREV CUR MEDS BY ELIG CLIN: HCPCS | Performed by: INTERNAL MEDICINE

## 2017-08-03 RX ORDER — SODIUM CHLORIDE 9 MG/ML
INJECTION, SOLUTION INTRAVENOUS ONCE
Status: COMPLETED | OUTPATIENT
Start: 2017-08-03 | End: 2017-08-03

## 2017-08-03 RX ORDER — DEXAMETHASONE 2 MG/1
2 TABLET ORAL WEEKLY
COMMUNITY
Start: 2017-07-21 | End: 2018-01-01 | Stop reason: ALTCHOICE

## 2017-08-03 RX ADMIN — DEXAMETHASONE SODIUM PHOSPHATE 12 MG: 4 INJECTION, SOLUTION INTRAMUSCULAR; INTRAVENOUS at 10:00

## 2017-08-03 RX ADMIN — SODIUM CHLORIDE: 9 INJECTION, SOLUTION INTRAVENOUS at 09:55

## 2017-08-03 RX ADMIN — BORTEZOMIB 2.4 MG: 3.5 INJECTION, POWDER, LYOPHILIZED, FOR SOLUTION INTRAVENOUS; SUBCUTANEOUS at 10:30

## 2017-08-03 NOTE — ONCOLOGY
Chemotherapy Administration    Pre-assessment Data: Antineoplastic Agents  Other:   See toxicity flow sheet for assessment [x]     Physician Notification of Concerns Related to Chemotherapy Administration:   Physician Notified Gama Pinto / Time of Notification Dr Barrett Gray seen today.      Interventions:   Lab work assessed  [x]   Height / Weight verified for dose [x]   Current MAR reviewed [x]   Emergency drugs available as appropriate [x]   Anaphylaxis assessment completed [x]   Pre-medications administered as ordered [x]   Chemotherapy Administered as SQ injection [x]   Monitor for signs / symptoms of hypersensitivity reaction    [x]   Chemotherapy orders (drug/dose/rate) verified by 2 Chemo certified   RNs    [x]          Document chemotherapy teaching on the Patient Education tab    [x]   Document patient verbalizes understanding of medications being administered    [x]   Other: velcade [x]    []    []      []    []

## 2017-08-03 NOTE — IP AVS SNAPSHOT
Patient Information     Patient Name ROSIBEL Dejesus 1932         This is your updated medication list to keep with you all times      ASK your doctor about these medications     Acetaminophen 650 MG Tabs   Take 650 mg by mouth every 4 hours as needed       * albuterol sulfate  (90 Base) MCG/ACT inhaler   Commonly known as:  VENTOLIN HFA   Inhale 2 puffs into the lungs every 6 hours as needed for Wheezing or Shortness of Breath       * albuterol (2.5 MG/3ML) 0.083% nebulizer solution   Commonly known as:  PROVENTIL   Take 3 mLs by nebulization every 6 hours as needed for Wheezing or Shortness of Breath       aspirin 81 MG EC tablet       CHEMOTHERAPY       dexamethasone 2 MG tablet   Commonly known as:  DECADRON       leuprolide 30 MG injection   Commonly known as:  LUPRON       nicotine 21 MG/24HR   Commonly known as:  NICODERM CQ   Place 1 patch onto the skin daily       tiotropium 18 MCG inhalation capsule   Commonly known as:  SPIRIVA HANDIHALER   Inhale 1 capsule into the lungs daily 1 capsule via inhalation daily. TYLENOL PM EXTRA STRENGTH  MG tablet   Generic drug:  diphenhydrAMINE-APAP (sleep)       * Notice: This list has 2 medication(s) that are the same as other medications prescribed for you. Read the directions carefully, and ask your doctor or other care provider to review them with you.

## 2017-08-03 NOTE — PLAN OF CARE
Problem: Intellectual/Education/Knowledge Deficit  Intervention: Verbal/written education provided  Chemotherapy Teaching      What is Chemotherapy   Drug action [X]   Method of Administration [X]   Handouts given [ ]      Side Effects  Nausea/vomiting [X]   Diarrhea [X]   Fatigue [X]   Signs / Symptoms of infection [X]   Neutropenia [X]   Thrombocytopenia [X]   Alopecia [X]   neuropathy [X]   [place] diet &  the importance of fluids [X]         Micellaneous  Importance of nutrition [X]   Importance of oral hygiene [X]   When to call the MD [X]   Monitoring labs [X]   Use of supportive services [ ]      Explanation of Drug Regimen / Frequency  velcade subq injection      Comments  Verbalized understanding to drug,action,side effects and when to call MD         Goal: Teaching initiated upon admission  Outcome: Met This Shift  Patient verbalizes understanding to verbal information given on velcade,action and possible side effects. Aware to call MD if develop complications. Problem: Discharge Planning  Intervention: Interaction with patient/family and care team  Provide discharge instructions. Goal: Knowledge of discharge instructions  Knowledge of discharge instructions   Outcome: Met This Shift  Verbalized understanding of discharge instructions, follow-up appointments, and when to call the physician. Comments:   Care plan reviewed with patient and daughter. Patient and daughter verbalize understanding of the plan of care and contribute to goal setting.

## 2017-08-03 NOTE — PROGRESS NOTES
Patient assessed for the following post chemotherapy:    Dizziness   No  Lightheadedness  No     Acute nausea/vomiting No  Headache   No  Chest pain/pressure  No  Rash/itching   No  Shortness of breath  No    Patient kept for 20 minutes observation post injection chemotherapy. Patient tolerated chemotherapy treatment velcade subq injection without any complications. Last vital signs:   BP (!) 146/65  Pulse 69  Temp 97.5 °F (36.4 °C) (Oral)   Resp 18  Ht 5' 6\" (1.676 m)  Wt 204 lb (92.5 kg)  SpO2 98%  BMI 32.93 kg/m2    Patient instructed if experience any of the above symptoms following today's injection, he/she is to notify MD immediately or go to the emergency department. Discharge instructions given to patient. Verbalizes understanding. Ambulated off unit per self with daughter with belongings.

## 2017-08-03 NOTE — IP AVS SNAPSHOT
After Visit Summary  (Discharge Instructions)    Medication List for Home    Based on the information you provided to us as well as any changes during this visit, the following is your updated medication list.  Compare this with your prescription bottles at home. If you have any questions or concerns, contact your primary care physician's office. Daily Medication List (This medication list can be shared with any healthcare provider who is helping you manage your medications)      ASK your doctor about these medications if you have questions        Last Dose    Next Dose Due AM NOON PM NIGHT    Acetaminophen 650 MG Tabs   Take 650 mg by mouth every 4 hours as needed                                         albuterol sulfate  (90 Base) MCG/ACT inhaler   Commonly known as:  VENTOLIN HFA   Inhale 2 puffs into the lungs every 6 hours as needed for Wheezing or Shortness of Breath                                         albuterol (2.5 MG/3ML) 0.083% nebulizer solution   Commonly known as:  PROVENTIL   Take 3 mLs by nebulization every 6 hours as needed for Wheezing or Shortness of Breath                                         aspirin 81 MG EC tablet   Take 81 mg by mouth daily. CHEMOTHERAPY                                         dexamethasone 2 MG tablet   Commonly known as:  DECADRON   Take 2 mg by mouth daily (with breakfast)                                         leuprolide 30 MG injection   Commonly known as:  LUPRON   Inject 30 mg into the muscle once Indications: every 3 months                                         nicotine 21 MG/24HR   Commonly known as:  NICODERM CQ   Place 1 patch onto the skin daily                                         tiotropium 18 MCG inhalation capsule   Commonly known as:  SPIRIVA HANDIHALER   Inhale 1 capsule into the lungs daily 1 capsule via inhalation daily. TYLENOL PM EXTRA STRENGTH  MG tablet   Generic drug:  diphenhydrAMINE-APAP (sleep)   Take 2 tablets by mouth nightly as needed for Sleep                                                 Allergies as of 8/3/2017        Reactions    Shellfish-derived Products     Flomax [Tamsulosin Hcl] Other (See Comments)    Drops b/p      Immunizations as of 8/3/2017     Name Date Dose VIS Date Route    Influenza Virus Vaccine 10/22/2015 0.5 mL 2015 Intramuscular    Influenza, Quadrivalent, 3 Years and above,IM 10/11/2016 0.5 mL 2015 Intramuscular    Pneumococcal 13-valent Conjugate (Kiiwvot81) 2016 0.5 mL 2015 Intramuscular      Last Vitals          Most Recent Value    Temp  97.5 °F (36.4 °C)    Pulse  70    Resp  18    BP  (!)  147/68         After Visit Summary    This summary was created for you. Thank you for entrusting your care to us. The following information includes details about your hospital/visit stay along with steps you should take to help with your recovery once you leave the hospital.  In this packet, you will find information about the topics listed below:    · Instructions about your medications including a list of your home medications  · A summary of your hospital visit  · Follow-up appointments once you have left the hospital  · Your care plan at home      You may receive a survey regarding the care you received during your stay. Your input is valuable to us. We encourage you to complete and return your survey in the envelope provided. We hope you will choose us in the future for your healthcare needs.           Patient Information     Patient Name ROSIBEL Boggs 1932      Care Provided at:     Name Address Phone       [address] [phone]            Your Visit    Here you will find information about your visit, including the reason for your visit. Please take this sheet with you when you visit your doctor or other health care provider in the future. It will help determine the best possible medical care for you at that time. If you have any questions once you leave the hospital, please call the department phone number listed below. Why you were here     Your primary diagnosis was:  Not on File      Visit Information     Date & Time Department Dept. Phone    8/3/2017 [facility] [phone]       Follow-up Appointments    Below is a list of your follow-up and future appointments. This may not be a complete list as you may have made appointments directly with providers that we are not aware of or your providers may have made some for you. Please call your providers to confirm appointments. It is important to keep your appointments. Please bring your current insurance card, photo ID, co-pay, and all medication bottles to your appointment. If self-pay, payment is expected at the time of service.         Future Appointments     8/10/2017 8:30 AM     Appointment [place] [id number] [facility] RM 06 at [address] [place] [address] ([phone])   [address] [place] [address]       8/17/2017 8:30 AM     Appointment with STR OUT PT ONC INJ RM 03 at [address] [place] [address] ([phone])   [address] [place] [address]       8/24/2017 8:30 AM     Appointment with STR OUT PT [address] [facility] RM 01 at [address] [place] [address] ([phone])   [address] [place] [address]       8/31/2017 8:30 AM     Appointment with STR OUT PT ONC INJ RM 13 at [address] [place] [address] ([phone])   [address] [place] [address]       9/7/2017 8:30 AM     Appointment with STR OUT PT ONC INJ RM 11 at [address] [place] [address] ([phone])   [address] [place] [address]       9/14/2017 8:30 AM     Appointment with STR OUT PT ONC INJ RM 11 at [address] [place] [address] [phone])   [address] [place] [address]       9/21/2017 8:30 AM Appointment with STR OUT PT ONC INJ RM 10 at [address] [facility] [phone], [address]  [facility] 83       9/28/2017 8:30 AM     Appointment with STR OUT PT ONC INJ RM 01 at 100 [facility] ([phone])   [address] [facility], [address]  [facility] [address]       10/5/2017 8:30 AM     Appointment with Naima Conteh MD at [facility] ([phone])   Please arrive 15 minutes prior to appointment, bring photo ID and insurance card. Please arrive 15 minutes prior to appointment, bring photo ID and insurance card. [facility]       10/5/2017 9:30 AM     Appointment with STR OUT PT ONC INJ RM 05 at [address] [facility] ([phone])   [address] [facility], [address]  [place] [address]       10/27/2017 8:15 AM     Appointment with Lamberto George MD at [facility] ([phone])   Please arrive 15 minutes prior to appointment, bring photo ID and insurance card. Please arrive 15 minutes prior to appointment, bring photo ID and insurance card. [address] [facility]  [address]  [address] [address]       11/1/2017 8:40 AM     Appointment with Garth Stanley DO at [address] [place] [address] ([phone])   Please arrive 15 minutes prior to appointment, bring photo ID and insurance card. [facility] [phone]       12/5/2017 12:30 PM     Appointment with [facility] PULMONARY FUNCTION ROOM 1 at [address] [place] [address] ([phone])   [address] [address]       12/19/2017 9:20 AM     Appointment with Carlos Eduardo Feldman MD at [address] [facility] 76 ([phone])   Please arrive 15 minutes prior to appointment, bring photo ID and insurance card.    [address] [address]         Preventive Care        Date Due    Tetanus Combination Vaccine (1 - Tdap) 12/24/1951    Pneumococcal Vaccines (two) for age 72 years & over with: cerebrospinal fluid leaks, cochlear implants, hemoglobinopathies,  asplenia, immunodeficiencies, HIV infection, or chronic renal failure (2 of 2 - PPSV23) 3/15/2016    Yearly Flu Vaccine (1) 8/1/2017                 Care Plan Once You Return Home    This section includes instructions you will need to follow once you leave the hospital.  Your care team will discuss these with you, so you and those caring for you know how to best care for your health needs at home. This section may also include educational information about certain health topics that may be of help to you. Discharge Instructions       Please contact your Oncologist if you have any questions regarding the chemotherapy velcade subq injection that you received today. Patient instructed if experience any of the symptoms following today's chemotherapy / to notify MD immediately or go to emergency department. * dizziness/lightheadedness  *acute nausea/vomiting - not relieved with medication  *headache - not relieved from Tylenol/pain medication  *chest pain/pressure  *rash/itching  *shortness of breath        Drink fluids - 48oz fluids daily  Call if develop fever/ chills/ signs or symptoms of infection    Important information for a smoker       SMOKING: QUIT SMOKING. THIS IS THE MOST IMPORTANT ACTION YOU CAN TAKE TO IMPROVE YOUR CURRENT AND FUTURE HEALTH. Call the [address] [place] [address] at [place] NOW ([phone])    Smoking harms nonsmokers. When nonsmokers are around people who smoke, they absorb nicotine, carbon monoxide, and other ingredients of tobacco smoke. DO NOT SMOKE AROUND CHILDREN     Children exposed to secondhand smoke are at an increased risk of:  Sudden Infant Death Syndrome (SIDS), acute respiratory infections, inflammation of the middle ear, and severe asthma. Over a longer time, it causes heart disease and lung cancer. There is no safe level of exposure to secondhand smoke.                 [facility] Signup Our records indicate that you have an active [employer] account. You can view your After Visit Summary by going to [email or web address] and logging in with your [employer] username and password. If you don't have a [employer] username and password but a parent or guardian has access to your record, the parent or guardian should login with their own Tiger[employer] username and password and access your record to view the After Visit Summary. Additional Information  If you have questions, please contact the physician practice where you receive care. Remember, [employer] is NOT to be used for urgent needs. For medical emergencies, dial 911. For questions regarding your "[employer]"hart account call [phone]. If you have a clinical question, please call your doctor's office. View your information online  ? Review your current list of  medications, immunization, and allergies. ? Review your future test results online . ? Review your discharge instructions provided by your caregivers at discharge    Certain functionality such as prescription refills, scheduling appointments or sending messages to your provider are not activated if your provider does not use [employer] in his/her office    For questions regarding your "[employer]"hart account call [phone]. If you have a clinical question, please call your doctor's office. The information on all pages of the After Visit Summary has been reviewed with me, the patient and/or responsible adult, by my health care provider(s). I had the opportunity to ask questions regarding this information. I understand I should dispose of my armband safely at home to protect my health information. A complete copy of the After Visit Summary has been given to me, the patient and/or responsible adult.            Patient Signature/Responsible Adult:____________________    Clinician Signature:_____________________    Date:_____________________ Time:_____________________

## 2017-08-05 LAB
IMMUNOFIXATION WITH QUANT: NORMAL
KAPPA/LAMBDA FREE LIGHT CHAINS: NORMAL

## 2017-08-10 ENCOUNTER — HOSPITAL ENCOUNTER (OUTPATIENT)
Dept: INFUSION THERAPY | Age: 82
Discharge: HOME | End: 2017-08-10
Payer: MEDICARE

## 2017-08-10 VITALS
DIASTOLIC BLOOD PRESSURE: 68 MMHG | WEIGHT: 210 LBS | SYSTOLIC BLOOD PRESSURE: 149 MMHG | TEMPERATURE: 98.1 F | RESPIRATION RATE: 18 BRPM | HEART RATE: 65 BPM | OXYGEN SATURATION: 98 % | BODY MASS INDEX: 33.89 KG/M2

## 2017-08-10 DIAGNOSIS — D47.2 SMOLDERING MYELOMA: ICD-10-CM

## 2017-08-10 DIAGNOSIS — Z51.11 CHEMOTHERAPY MANAGEMENT, ENCOUNTER FOR: ICD-10-CM

## 2017-08-10 PROCEDURE — 96365 THER/PROPH/DIAG IV INF INIT: CPT

## 2017-08-10 PROCEDURE — 2580000003 HC RX 258: Performed by: INTERNAL MEDICINE

## 2017-08-10 PROCEDURE — 6360000002 HC RX W HCPCS: Performed by: INTERNAL MEDICINE

## 2017-08-10 PROCEDURE — 96401 CHEMO ANTI-NEOPL SQ/IM: CPT

## 2017-08-10 RX ORDER — SODIUM CHLORIDE 9 MG/ML
INJECTION, SOLUTION INTRAVENOUS ONCE
Status: COMPLETED | OUTPATIENT
Start: 2017-08-10 | End: 2017-08-10

## 2017-08-10 RX ADMIN — DEXAMETHASONE SODIUM PHOSPHATE 12 MG: 4 INJECTION, SOLUTION INTRAMUSCULAR; INTRAVENOUS at 08:50

## 2017-08-10 RX ADMIN — BORTEZOMIB 2.4 MG: 3.5 INJECTION, POWDER, LYOPHILIZED, FOR SOLUTION INTRAVENOUS; SUBCUTANEOUS at 08:53

## 2017-08-10 RX ADMIN — SODIUM CHLORIDE: 9 INJECTION, SOLUTION INTRAVENOUS at 08:50

## 2017-08-10 NOTE — IP AVS SNAPSHOT
After Visit Summary  (Discharge Instructions)    Medication List for Home    Based on the information you provided to us as well as any changes during this visit, the following is your updated medication list.  Compare this with your prescription bottles at home. If you have any questions or concerns, contact your primary care physician's office. Daily Medication List (This medication list can be shared with any healthcare provider who is helping you manage your medications)      ASK your doctor about these medications if you have questions        Last Dose    Next Dose Due AM NOON PM NIGHT    Acetaminophen 650 MG Tabs   Take 650 mg by mouth every 4 hours as needed                                         albuterol sulfate  (90 Base) MCG/ACT inhaler   Commonly known as:  VENTOLIN HFA   Inhale 2 puffs into the lungs every 6 hours as needed for Wheezing or Shortness of Breath                                         albuterol (2.5 MG/3ML) 0.083% nebulizer solution   Commonly known as:  PROVENTIL   Take 3 mLs by nebulization every 6 hours as needed for Wheezing or Shortness of Breath                                         aspirin 81 MG EC tablet   Take 81 mg by mouth daily. CHEMOTHERAPY                                         dexamethasone 2 MG tablet   Commonly known as:  DECADRON   Take 2 mg by mouth daily (with breakfast)                                         leuprolide 30 MG injection   Commonly known as:  LUPRON   Inject 30 mg into the muscle once Indications: every 3 months                                         nicotine 21 MG/24HR   Commonly known as:  NICODERM CQ   Place 1 patch onto the skin daily                                         tiotropium 18 MCG inhalation capsule   Commonly known as:  SPIRIVA HANDIHALER   Inhale 1 capsule into the lungs daily 1 capsule via inhalation daily. TYLENOL PM EXTRA STRENGTH  MG tablet   Generic drug:  diphenhydrAMINE-APAP (sleep)   Take 2 tablets by mouth nightly as needed for Sleep                                                 Allergies as of 8/10/2017        Reactions    Shellfish-derived Products     Flomax [Tamsulosin Hcl] Other (See Comments)    Drops b/p      Immunizations as of 8/10/2017     Name Date Dose VIS Date Route    Influenza Virus Vaccine 10/22/2015 0.5 mL 2015 Intramuscular    Influenza, Quadrivalent, 3 Years and above,IM 10/11/2016 0.5 mL 2015 Intramuscular    Pneumococcal 13-valent Conjugate ([id number]) 2016 0.5 mL 2015 Intramuscular      Last Vitals          Most Recent Value    Temp  97.6 °F (36.4 °C)    Pulse  76    Resp  18    BP  (!)  149/69         After Visit Summary    This summary was created for you. Thank you for entrusting your care to us. The following information includes details about your hospital/visit stay along with steps you should take to help with your recovery once you leave the hospital.  In this packet, you will find information about the topics listed below:    · Instructions about your medications including a list of your home medications  · A summary of your hospital visit  · Follow-up appointments once you have left the hospital  · Your care plan at home      You may receive a survey regarding the care you received during your stay. Your input is valuable to us. We encourage you to complete and return your survey in the envelope provided. We hope you will choose us in the future for your healthcare needs.           Patient Information     Patient Name ROSIBEL Garcia 1932      Care Provided at:     Name Address Phone       [address] [phone]            Your Visit    Here you will find information about your visit, including the reason for your visit. Please take this sheet with you when you visit your doctor or other health care provider in the future. It will help determine the best possible medical care for you at that time. If you have any questions once you leave the hospital, please call the department phone number listed below. Why you were here     Your primary diagnosis was:  Not on File      Visit Information     Date & Time Department Dept. Phone    8/10/2017 [facility] [phone]       Follow-up Appointments    Below is a list of your follow-up and future appointments. This may not be a complete list as you may have made appointments directly with providers that we are not aware of or your providers may have made some for you. Please call your providers to confirm appointments. It is important to keep your appointments. Please bring your current insurance card, photo ID, co-pay, and all medication bottles to your appointment. If self-pay, payment is expected at the time of service.         Future Appointments     8/17/2017  8:30 AM     Appointment with STR OUT PT ONC INJ RM 03 at [address] [place] [address] ([phone])   [address] [place] [address], [address]       8/24/2017  8:30 AM     Appointment with STR OUT PT [address] [facility] [address] RM 01 at [address] [place] [address] ([phone])   [address] [place] [address]       8/31/2017  8:30 AM     Appointment with STR OUT PT ONC INJ RM 13 at [address] [place] [address] ([phone])   [address] [place] [address]       9/7/2017 8:30 AM     Appointment with STR OUT PT ONC INJ RM 11 at [address] [place] [address] ([phone])   [address] [place] [address]       9/14/2017 8:30 AM     Appointment with STR OUT PT ONC INJ RM 11 at [address] [place] [address] ([phone])   [address] [place] [address], [address]       9/21/2017 8:30 AM     Appointment with STR OUT PT ONC INJ RM 10 at [address] [place] [address] ([phone])   [address] [place] [address], [address]       9/28/2017 8:30 AM Appointment with STR OUT PT [place] 01 at 100 [facility] ([phone])   [address] [facility], [address]  [address] [facility] [address]       10/5/2017 8:30 AM     Appointment with Pauly Kramer MD at [facility] [place] ([phone])   Please arrive 15 minutes prior to appointment, bring photo ID and insurance card. Please arrive 15 minutes prior to appointment, bring photo ID and insurance card. [address] [facility]       10/5/2017 9:30 AM     Appointment with STR OUT PT ONC INJ RM 05 at 100 [facility] ([phone])   [address] [facility], [address]  [facility] 83       10/27/2017 8:15 AM     Appointment with Richardson Headley MD at [facility] ([phone])   Please arrive 15 minutes prior to appointment, bring photo ID and insurance card. Please arrive 15 minutes prior to appointment, bring photo ID and insurance card. [address] [facility]  [address]  [facility] [address]       11/1/2017 8:40 AM     Appointment with Mark Lynn DO at [address] [place] [address] ([phone])   Please arrive 15 minutes prior to appointment, bring photo ID and insurance card. [facility] [id number]       12/5/2017 12:30 PM     Appointment with [facility] PULMONARY FUNCTION ROOM 1 at [address] [place] [address] ([phone])   [address] [address]       12/19/2017 9:20 AM     Appointment with Alana Gonzalez MD at [address] [place] [address] ([phone])   Please arrive 15 minutes prior to appointment, bring photo ID and insurance card.    [place] [address]  [address]  [facility] [address]         Preventive Care        Date Due    Tetanus Combination Vaccine (1 - Tdap) 12/24/1951    Pneumococcal Vaccines (two) for age 72 years & over with: cerebrospinal fluid leaks, cochlear implants, hemoglobinopathies,  asplenia, immunodeficiencies, HIV infection, or chronic renal failure (2 of 2 - PPSV23) 3/15/2016    Yearly Flu Vaccine (1) 8/1/2017 Care Plan Once You Return Home    This section includes instructions you will need to follow once you leave the hospital.  Your care team will discuss these with you, so you and those caring for you know how to best care for your health needs at home. This section may also include educational information about certain health topics that may be of help to you. Discharge Instructions       Please contact your Oncologist if you have any questions regarding the chemotherapy velcade that you received today. Patient instructed if experience any of the symptoms following today's chemotherapy / to notify MD immediately or go to emergency department. * dizziness/lightheadedness  *acute nausea/vomiting - not relieved with medication  *headache - not relieved from Tylenol/pain medication  *chest pain/pressure  *rash/itching  *shortness of breath        Drink fluids - 48oz fluids daily  Call if develop fever/ chills/ signs or symptoms of infection    Important information for a smoker       SMOKING: QUIT SMOKING. THIS IS THE MOST IMPORTANT ACTION YOU CAN TAKE TO IMPROVE YOUR CURRENT AND FUTURE HEALTH. Call the [address] [place] [address] at [facility] ([phone])    Smoking harms nonsmokers. When nonsmokers are around people who smoke, they absorb nicotine, carbon monoxide, and other ingredients of tobacco smoke. DO NOT SMOKE AROUND CHILDREN     Children exposed to secondhand smoke are at an increased risk of:  Sudden Infant Death Syndrome (SIDS), acute respiratory infections, inflammation of the middle ear, and severe asthma. Over a longer time, it causes heart disease and lung cancer. There is no safe level of exposure to secondhand smoke. [employer] Signup     Our records indicate that you have an active [employer] account. You can view your After Visit Summary by going to [email or web address] and logging in with your [employer] username and password. If you don't have a [employer] username and password but a parent or guardian has access to your record, the parent or guardian should login with their own [employer] username and password and access your record to view the After Visit Summary. Additional Information  If you have questions, please contact the physician practice where you receive care. Remember, [employer] is NOT to be used for urgent needs. For medical emergencies, dial 911. For questions regarding your [employer] account call [phone]. If you have a clinical question, please call your doctor's office. View your information online  ? Review your current list of  medications, immunization, and allergies. ? Review your future test results online . ? Review your discharge instructions provided by your caregivers at discharge    Certain functionality such as prescription refills, scheduling appointments or sending messages to your provider are not activated if your provider does not use [employer] in his/her office    For questions regarding your [employer] account call [phone]. If you have a clinical question, please call your doctor's office. The information on all pages of the After Visit Summary has been reviewed with me, the patient and/or responsible adult, by my health care provider(s). I had the opportunity to ask questions regarding this information. I understand I should dispose of my armband safely at home to protect my health information. A complete copy of the After Visit Summary has been given to me, the patient and/or responsible adult.            Patient Signature/Responsible Adult:____________________    Clinician Signature:_____________________    Date:_____________________    Time:_____________________

## 2017-08-10 NOTE — PROGRESS NOTES
Patient assessed for the following post chemotherapy:    Dizziness   No  Lightheadedness  No      Acute nausea/vomiting No  Headache   No  Chest pain/pressure  No  Rash/itching   No  Shortness of breath  No    Patient kept for 20 minutes observation post infusion chemotherapy. Patient tolerated chemotherapy treatment velcade without any complications. Last vital signs:   BP (!) 149/68  Pulse 65  Temp 98.1 °F (36.7 °C) (Oral)   Resp 18  Wt 210 lb (95.3 kg)  SpO2 98%  BMI 33.89 kg/m2        Patient instructed if experience any of the above symptoms following today's infusion,he/she is to notify MD immediately or go to the emergency department. Discharge instructions given to patient. Verbalizes understanding. Ambulated off unit per self with belongings.

## 2017-08-10 NOTE — IP AVS SNAPSHOT
Patient Information     Patient Name ROSIBEL Mancilla 1932         This is your updated medication list to keep with you all times      ASK your doctor about these medications     Acetaminophen 650 MG Tabs   Take 650 mg by mouth every 4 hours as needed       * albuterol sulfate  (90 Base) MCG/ACT inhaler   Commonly known as:  VENTOLIN HFA   Inhale 2 puffs into the lungs every 6 hours as needed for Wheezing or Shortness of Breath       * albuterol (2.5 MG/3ML) 0.083% nebulizer solution   Commonly known as:  PROVENTIL   Take 3 mLs by nebulization every 6 hours as needed for Wheezing or Shortness of Breath       aspirin 81 MG EC tablet       CHEMOTHERAPY       dexamethasone 2 MG tablet   Commonly known as:  DECADRON       leuprolide 30 MG injection   Commonly known as:  LUPRON       nicotine 21 MG/24HR   Commonly known as:  NICODERM CQ   Place 1 patch onto the skin daily       tiotropium 18 MCG inhalation capsule   Commonly known as:  SPIRIVA HANDIHALER   Inhale 1 capsule into the lungs daily 1 capsule via inhalation daily. TYLENOL PM EXTRA STRENGTH  MG tablet   Generic drug:  diphenhydrAMINE-APAP (sleep)       * Notice: This list has 2 medication(s) that are the same as other medications prescribed for you. Read the directions carefully, and ask your doctor or other care provider to review them with you.

## 2017-08-10 NOTE — PLAN OF CARE
Problem: Intellectual/Education/Knowledge Deficit  Intervention: Verbal/written education provided  Chemotherapy Teaching      What is Chemotherapy   Drug action [X]   Method of Administration [X]   Handouts given [ ]      Side Effects  Nausea/vomiting [X]   Diarrhea [X]   Fatigue [X]   Signs / Symptoms of infection [X]   Neutropenia [X]   Thrombocytopenia [X]   Alopecia [X]   neuropathy [X]   [place] diet &  the importance of fluids [X]         Micellaneous  Importance of nutrition [X]   Importance of oral hygiene [X]   When to call the MD [X]   Monitoring labs [X]   Use of supportive services [ ]      Explanation of Drug Regimen / Frequency  velcade      Comments  Verbalized understanding to drug,action,side effects and when to call MD         Goal: Teaching initiated upon admission  Outcome: Met This Shift  Patient verbalizes understanding to verbal information given on ve;brennan,action and possible side effects. Aware to call MD if develop complications. Problem: Discharge Planning  Intervention: Interaction with patient/family and care team  Discuss discharge instructions, follow ups and when to call doctor. Goal: Knowledge of discharge instructions  Knowledge of discharge instructions  Outcome: Met This Shift  Verbalized understanding of discharge instructions, follow ups and when to call doctor    Comments:   Care plan reviewed with patient. Patient verbalized understanding of the plan of care and contribute to goal setting.

## 2017-08-10 NOTE — PROGRESS NOTES
Chemotherapy Administration    Pre-assessment Data: Antineoplastic Agents  Other:   See toxicity flow sheet for assessment [x]     Physician Notification of Concerns Related to Chemotherapy Administration:   Physician Notified Vernida Aretha / Time of Notification      Interventions:   Lab work assessed  [x]   Height / Weight verified for dose [x]   Current MAR reviewed [x]   Emergency drugs available as appropriate [x]   Anaphylaxis assessment completed [x]   Pre-medications administered as ordered [x]   Blood return noted upon initiation of chemotherapy [x]   Blood return noted each 2-3ml of a vesicant medication if given IV push []   Blood return noted each 3-5ml of a non-vesicant medication if given IV push []   Monitor for signs / symptoms of hypersensitivity reaction [x]   Chemotherapy orders (drug/dose/rate) verified by 2 Chemo certified RNs [x]   Monitor IV site and blood return throughout the infusion of the medication [x]   Document IV site checks on the IV assessment form [x]   Document chemotherapy teaching on the Patient Education tab [x]   Document patient verbalizes understanding of medications being administered [x]   If IV infiltration, see ONS Guidelines []   Other:      []

## 2017-08-17 NOTE — PROGRESS NOTES
Patient assessed for the following post chemotherapy:    Dizziness   No  Lightheadedness  No      Acute nausea/vomiting No  Headache   No  Chest pain/pressure  No  Rash/itching   No  Shortness of breath  No    Patient kept for 20 minutes observation post infusion chemotherapy. Patient tolerated chemotherapy treatment velcade without any complications. Last vital signs:   BP (!) 114/59  Pulse 66  Temp 97.8 °F (36.6 °C) (Oral)   Resp 18  Ht 5' 6\" (1.676 m)  Wt 207 lb (93.9 kg)  SpO2 98%  BMI 33.41 kg/m2        Patient instructed if experience any of the above symptoms following today's infusion,he/she is to notify MD immediately or go to the emergency department. Discharge instructions given to patient. Verbalizes understanding. Ambulated off unit per self with belongings.

## 2017-08-17 NOTE — IP AVS SNAPSHOT
After Visit Summary  (Discharge Instructions)    Medication List for Home    Based on the information you provided to us as well as any changes during this visit, the following is your updated medication list.  Compare this with your prescription bottles at home. If you have any questions or concerns, contact your primary care physician's office. Daily Medication List (This medication list can be shared with any healthcare provider who is helping you manage your medications)      ASK your doctor about these medications if you have questions        Last Dose    Next Dose Due AM NOON PM NIGHT    Acetaminophen 650 MG Tabs   Take 650 mg by mouth every 4 hours as needed                                         albuterol sulfate  (90 Base) MCG/ACT inhaler   Commonly known as:  VENTOLIN HFA   Inhale 2 puffs into the lungs every 6 hours as needed for Wheezing or Shortness of Breath                                         albuterol (2.5 MG/3ML) 0.083% nebulizer solution   Commonly known as:  PROVENTIL   Take 3 mLs by nebulization every 6 hours as needed for Wheezing or Shortness of Breath                                         aspirin 81 MG EC tablet   Take 81 mg by mouth daily. CHEMOTHERAPY                                         dexamethasone 2 MG tablet   Commonly known as:  DECADRON   Take 2 mg by mouth daily (with breakfast)                                         leuprolide 30 MG injection   Commonly known as:  LUPRON   Inject 30 mg into the muscle once Indications: every 3 months                                         nicotine 21 MG/24HR   Commonly known as:  NICODERM CQ   Place 1 patch onto the skin daily                                         tiotropium 18 MCG inhalation capsule   Commonly known as:  SPIRIVA HANDIHALER   Inhale 1 capsule into the lungs daily 1 capsule via inhalation daily. TYLENOL PM EXTRA STRENGTH  MG tablet   Generic drug:  diphenhydrAMINE-APAP (sleep)   Take 2 tablets by mouth nightly as needed for Sleep                                                 Allergies as of 2017        Reactions    Shellfish-derived Products     Flomax [Tamsulosin Hcl] Other (See Comments)    Drops b/p      Immunizations as of 2017     Name Date Dose VIS Date Route    Influenza Virus Vaccine 10/22/2015 0.5 mL 2015 Intramuscular    Influenza, Quadrivalent, 3 Years and above,IM 10/11/2016 0.5 mL 2015 Intramuscular    Pneumococcal 13-valent Conjugate (Onytcax60) 2016 0.5 mL 2015 Intramuscular      Last Vitals          Most Recent Value    Temp  97.8 °F (36.6 °C)    Pulse  69    Resp  18    BP  131/62         After Visit Summary    This summary was created for you. Thank you for entrusting your care to us. The following information includes details about your hospital/visit stay along with steps you should take to help with your recovery once you leave the hospital.  In this packet, you will find information about the topics listed below:    · Instructions about your medications including a list of your home medications  · A summary of your hospital visit  · Follow-up appointments once you have left the hospital  · Your care plan at home      You may receive a survey regarding the care you received during your stay. Your input is valuable to us. We encourage you to complete and return your survey in the envelope provided. We hope you will choose us in the future for your healthcare needs.           Patient Information     Patient Name ROSIBEL Farnsworth 1932      Care Provided at:     Name Address Phone       [address] [phone]            Your Visit    Here you will find information about your visit, including the reason for your visit. Please take this sheet with you when you visit your doctor or other health care provider in the future. It will help determine the best possible medical care for you at that time. If you have any questions once you leave the hospital, please call the department phone number listed below. Why you were here     Your primary diagnosis was:  Not on File      Visit Information     Date & Time Department Dept. Phone    8/17/2017 [facility] [phone]       Follow-up Appointments    Below is a list of your follow-up and future appointments. This may not be a complete list as you may have made appointments directly with providers that we are not aware of or your providers may have made some for you. Please call your providers to confirm appointments. It is important to keep your appointments. Please bring your current insurance card, photo ID, co-pay, and all medication bottles to your appointment. If self-pay, payment is expected at the time of service.         Future Appointments     8/24/2017  8:30 AM     Appointment with [facility] OUT PT [place] 01 at [address] [place] [address] ([phone])   [address] [place] [address]       8/31/2017  8:30 AM     Appointment with STR OUT PT [facility]J RM 13 at [address] [place] [address] ([phone])   [address] [place] [address]       9/7/2017 8:30 AM     Appointment with [facility] OUT PT [facility] INJ RM 11 at [address] [place] [address] ([phone])   [address] [place] [address]       9/14/2017 8:30 AM     Appointment with [facility] OUT PT [facility] INJ RM 11 at [address] [place] [address] ([phone])   [address] [place] [address]       9/21/2017 8:30 AM     Appointment with STR OUT PT [facility]C INJ RM 10 at [address] [place] [address] ([phone])   [address] [place] [address]       9/28/2017 8:30 AM     Appointment with [facility] OUT PT [facility]C [facility]J [facility] 01 at [address] [place] [address] ([phone])   [address] [place] [address]       10/5/2017 8:30 AM Appointment with Stephani Stock MD at [facility] ([phone])   Please arrive 15 minutes prior to appointment, bring photo ID and insurance card. Please arrive 15 minutes prior to appointment, bring photo ID and insurance card. [facility]       10/5/2017 9:30 AM     Appointment with STR OUT PT ONC INJ RM 05 at [address] [facility] ([phone])   [address] [facility], [address]  [facility]       10/27/2017 8:15 AM     Appointment with Gab Samayoa MD at [address] [facility] ([phone])   Please arrive 15 minutes prior to appointment, bring photo ID and insurance card. Please arrive 15 minutes prior to appointment, bring photo ID and insurance card. [address] [facility]  [address]  [facility] [address]       11/1/2017 8:40 AM     Appointment with Chelsey Mclaughlin DO at [address] [place] [address] ([phone])   Please arrive 15 minutes prior to appointment, bring photo ID and insurance card. [facility] [phone]       12/5/2017 12:30 PM     Appointment with [facility] 1 at [address] [place] [address] ([phone])   [address] [address]       12/19/2017 9:20 AM     Appointment with Gagan Rodas MD at [address] [address] ([phone])   Please arrive 15 minutes prior to appointment, bring photo ID and insurance card.    [facility]  [address]  [facility] [address]         Preventive Care        Date Due    Tetanus Combination Vaccine (1 - Tdap) 12/24/1951    Pneumococcal Vaccines (two) for age 72 years & over with: cerebrospinal fluid leaks, cochlear implants, hemoglobinopathies,  asplenia, immunodeficiencies, HIV infection, or chronic renal failure (2 of 2 - PPSV23) 3/15/2016    Yearly Flu Vaccine (1) 8/1/2017                 Care Plan Once You Return Home    This section includes instructions you will need to follow once you leave the hospital.  Your care team will discuss these with you, so you and those caring for you know how to best care for your health needs at home. This section may also include educational information about certain health topics that may be of help to you. Discharge Instructions       Please contact your Oncologist if you have any questions regarding the chemotherapy velcade that you received today. Patient instructed if experience any of the symptoms following today's chemotherapy / to notify MD immediately or go to emergency department. * dizziness/lightheadedness  *acute nausea/vomiting - not relieved with medication  *headache - not relieved from Tylenol/pain medication  *chest pain/pressure  *rash/itching  *shortness of breath        Drink fluids - 48oz fluids daily  Call if develop fever/ chills/ signs or symptoms of infection    Important information for a smoker       SMOKING: QUIT SMOKING. THIS IS THE MOST IMPORTANT ACTION YOU CAN TAKE TO IMPROVE YOUR CURRENT AND FUTURE HEALTH. Call the [address] [place] [address] at [facility] NOW ([phone])    Smoking harms nonsmokers. When nonsmokers are around people who smoke, they absorb nicotine, carbon monoxide, and other ingredients of tobacco smoke. DO NOT SMOKE AROUND CHILDREN     Children exposed to secondhand smoke are at an increased risk of:  Sudden Infant Death Syndrome (SIDS), acute respiratory infections, inflammation of the middle ear, and severe asthma. Over a longer time, it causes heart disease and lung cancer. There is no safe level of exposure to secondhand smoke. [employer] Signup     Our records indicate that you have an active [employer] account. You can view your After Visit Summary by going to [email or web address] and logging in with your [employer] username and password.       If you don't have a [employer] username and password but a parent or guardian has access to your record, the parent or guardian should login with their own [employer] username and password and access your record to view the After Visit Summary. Additional Information  If you have questions, please contact the physician practice where you receive care. Remember, [employer] is NOT to be used for urgent needs. For medical emergencies, dial 911. For questions regarding your MyChart account call [phone]. If you have a clinical question, please call your doctor's office. View your information online  ? Review your current list of  medications, immunization, and allergies. ? Review your future test results online . ? Review your discharge instructions provided by your caregivers at discharge    Certain functionality such as prescription refills, scheduling appointments or sending messages to your provider are not activated if your provider does not use [employer] in his/her office    For questions regarding your [employer] account call [phone]. If you have a clinical question, please call your doctor's office. The information on all pages of the After Visit Summary has been reviewed with me, the patient and/or responsible adult, by my health care provider(s). I had the opportunity to ask questions regarding this information. I understand I should dispose of my armband safely at home to protect my health information. A complete copy of the After Visit Summary has been given to me, the patient and/or responsible adult.            Patient Signature/Responsible Adult:____________________    Clinician Signature:_____________________    Date:_____________________    Time:_____________________

## 2017-08-17 NOTE — IP AVS SNAPSHOT
Patient Information     Patient Name ROSIBEL Pantoja 1932         This is your updated medication list to keep with you all times      ASK your doctor about these medications     Acetaminophen 650 MG Tabs   Take 650 mg by mouth every 4 hours as needed       * albuterol sulfate  (90 Base) MCG/ACT inhaler   Commonly known as:  VENTOLIN HFA   Inhale 2 puffs into the lungs every 6 hours as needed for Wheezing or Shortness of Breath       * albuterol (2.5 MG/3ML) 0.083% nebulizer solution   Commonly known as:  PROVENTIL   Take 3 mLs by nebulization every 6 hours as needed for Wheezing or Shortness of Breath       aspirin 81 MG EC tablet       CHEMOTHERAPY       dexamethasone 2 MG tablet   Commonly known as:  DECADRON       leuprolide 30 MG injection   Commonly known as:  LUPRON       nicotine 21 MG/24HR   Commonly known as:  NICODERM CQ   Place 1 patch onto the skin daily       tiotropium 18 MCG inhalation capsule   Commonly known as:  SPIRIVA HANDIHALER   Inhale 1 capsule into the lungs daily 1 capsule via inhalation daily. TYLENOL PM EXTRA STRENGTH  MG tablet   Generic drug:  diphenhydrAMINE-APAP (sleep)       * Notice: This list has 2 medication(s) that are the same as other medications prescribed for you. Read the directions carefully, and ask your doctor or other care provider to review them with you.

## 2017-08-17 NOTE — PLAN OF CARE
Problem: Intellectual/Education/Knowledge Deficit  Intervention: Verbal/written education provided  Chemotherapy Teaching      What is Chemotherapy   Drug action [X]   Method of Administration [X]   Handouts given [ ]      Side Effects  Nausea/vomiting [X]   Diarrhea [X]   Fatigue [X]   Signs / Symptoms of infection [X]   Neutropenia [X]   Thrombocytopenia [X]   Alopecia [X]   neuropathy [X]   [place] diet &  the importance of fluids [X]         Micellaneous  Importance of nutrition [X]   Importance of oral hygiene [X]   When to call the MD [X]   Monitoring labs [X]   Use of supportive services [ ]      Explanation of Drug Regimen / Frequency  velcade      Comments  Verbalized understanding to drug,action,side effects and when to call MD         Goal: Teaching initiated upon admission  Outcome: Met This Shift  Patient verbalizes understanding to verbal information given on velcade,action and possible side effects. Aware to call MD if develop complications. Problem: Discharge Planning  Intervention: Interaction with patient/family and care team  Discuss discharge instructions, follow ups and when to call doctor. Goal: Knowledge of discharge instructions  Knowledge of discharge instructions  Outcome: Met This Shift  Verbalized understanding of discharge instructions, follow ups and when to call doctor    Comments:   Care plan reviewed with patient and daughter. Patient and daughter verbalized understanding of the plan of care and contribute to goal setting.

## 2017-08-24 NOTE — ONCOLOGY
Chemotherapy Administration    Pre-assessment Data: Antineoplastic Agents  Other:   See toxicity flow sheet for assessment [x]     Physician Notification of Concerns Related to Chemotherapy Administration:   Physician Notified Ayla Pinon / Time of Notification      Interventions:   Lab work assessed  [x]   Height / Weight verified for dose [x]   Current MAR reviewed [x]   Emergency drugs available as appropriate [x]   Anaphylaxis assessment completed [x]   Pre-medications administered as ordered [x]   Chemotherapy Administered as SQ injection [x]   Monitor for signs / symptoms of hypersensitivity reaction    [x]   Chemotherapy orders (drug/dose/rate) verified by 2 Chemo certified   RNs    [x]          Document chemotherapy teaching on the Patient Education tab    [x]   Document patient verbalizes understanding of medications being administered    [x]   Other: velcade [x]    []    []      []    []

## 2017-08-24 NOTE — IP AVS SNAPSHOT
After Visit Summary  (Discharge Instructions)    Medication List for Home    Based on the information you provided to us as well as any changes during this visit, the following is your updated medication list.  Compare this with your prescription bottles at home. If you have any questions or concerns, contact your primary care physician's office. Daily Medication List (This medication list can be shared with any healthcare provider who is helping you manage your medications)      ASK your doctor about these medications if you have questions        Last Dose    Next Dose Due AM NOON PM NIGHT    Acetaminophen 650 MG Tabs   Take 650 mg by mouth every 4 hours as needed                                         albuterol sulfate  (90 Base) MCG/ACT inhaler   Commonly known as:  VENTOLIN HFA   Inhale 2 puffs into the lungs every 6 hours as needed for Wheezing or Shortness of Breath                                         albuterol (2.5 MG/3ML) 0.083% nebulizer solution   Commonly known as:  PROVENTIL   Take 3 mLs by nebulization every 6 hours as needed for Wheezing or Shortness of Breath                                         aspirin 81 MG EC tablet   Take 81 mg by mouth daily. CHEMOTHERAPY                                         dexamethasone 2 MG tablet   Commonly known as:  DECADRON   Take 2 mg by mouth daily (with breakfast)                                         leuprolide 30 MG injection   Commonly known as:  LUPRON   Inject 30 mg into the muscle once Indications: every 3 months                                         nicotine 21 MG/24HR   Commonly known as:  NICODERM CQ   Place 1 patch onto the skin daily                                         tiotropium 18 MCG inhalation capsule   Commonly known as:  SPIRIVA HANDIHALER   Inhale 1 capsule into the lungs daily 1 capsule via inhalation daily. TYLENOL PM EXTRA STRENGTH  MG tablet   Generic drug:  diphenhydrAMINE-APAP (sleep)   Take 2 tablets by mouth nightly as needed for Sleep                                                 Allergies as of 2017        Reactions    Shellfish-derived Products     Flomax [Tamsulosin Hcl] Other (See Comments)    Drops b/p      Immunizations as of 2017     Name Date Dose VIS Date Route    Influenza Virus Vaccine 10/22/2015 0.5 mL 2015 Intramuscular    Influenza, Quadrivalent, 3 Years and above,IM 10/11/2016 0.5 mL 2015 Intramuscular    Pneumococcal 13-valent Conjugate (Unfnbfy02) 2016 0.5 mL 2015 Intramuscular      Last Vitals          Most Recent Value    Temp  97.9 °F (36.6 °C)    Pulse  86    Resp  18    BP  (!)  115/57         After Visit Summary    This summary was created for you. Thank you for entrusting your care to us. The following information includes details about your hospital/visit stay along with steps you should take to help with your recovery once you leave the hospital.  In this packet, you will find information about the topics listed below:    · Instructions about your medications including a list of your home medications  · A summary of your hospital visit  · Follow-up appointments once you have left the hospital  · Your care plan at home      You may receive a survey regarding the care you received during your stay. Your input is valuable to us. We encourage you to complete and return your survey in the envelope provided. We hope you will choose us in the future for your healthcare needs.           Patient Information     Patient Name ROSIBEL Garcia 1932      Care Provided at:     Name Address Phone       [address] [phone]            Your Visit    Here you will find information about your visit, including the reason for your visit. Please take this sheet with you when you visit your doctor or other health care provider in the future. It will help determine the best possible medical care for you at that time. If you have any questions once you leave the hospital, please call the department phone number listed below. Why you were here     Your primary diagnosis was:  Not on File      Visit Information     Date & Time Department Dept. Phone    8/24/2017 [facility]  ONCOL[facility]Y [phone]       Follow-up Appointments    Below is a list of your follow-up and future appointments. This may not be a complete list as you may have made appointments directly with providers that we are not aware of or your providers may have made some for you. Please call your providers to confirm appointments. It is important to keep your appointments. Please bring your current insurance card, photo ID, co-pay, and all medication bottles to your appointment. If self-pay, payment is expected at the time of service.         Future Appointments     8/31/2017  8:30 AM     Appointment with STR OUT PT ONC INJ RM 13 at [address] [place] [address] ([phone])   [place] [address], [address]       9/7/2017 8:30 AM     Appointment with STR OUT PT ONC INJ RM 11 at [address] [place] [address] ([phone])   [place] [address], [address]       9/14/2017 8:30 AM     Appointment with STR OUT PT ONC INJ RM 11 at [address] [place] [address] ([phone])   [place] [address], [address]       9/21/2017 8:30 AM     Appointment with STR OUT PT ONC INJ RM 10 at [address] [place] [address] ([phone])   [place] [address], [address]       9/28/2017 8:30 AM     Appointment with STR OUT PT ONC INJ RM 01 at [address] [place] [address] ([phone])   [place] [address], [facility] [address]  [address] [address]       10/5/2017 8:30 AM     Appointment with Byron Cortez MD at [facility] ([phone]) Please arrive 15 minutes prior to appointment, bring photo ID and insurance card. Please arrive 15 minutes prior to appointment, bring photo ID and insurance card. [address] [facility]       10/5/2017 9:30 AM     Appointment with STR OUT PT ONC INJ RM 05 at 100 [facility] ([phone])   [address] [facility], [address]  [place] [address]       10/27/2017 8:15 AM     Appointment with Gerald Guillory MD at [facility] ([phone])   Please arrive 15 minutes prior to appointment, bring photo ID and insurance card. Please arrive 15 minutes prior to appointment, bring photo ID and insurance card. [address] [facility]  [address]  [facility] [address]       11/1/2017 8:40 AM     Appointment with Chioma Sahu DO at [address] [place] [address] ([phone])   Please arrive 15 minutes prior to appointment, bring photo ID and insurance card. [facility] [id number]       12/5/2017 12:30 PM     Appointment with [facility] PULMONARY FUNCTION ROOM 1 at [address] [place] [address] ([phone])   [address] [address]       12/19/2017 9:20 AM     Appointment with Quentin Noonan MD at [address] [place] [address] ([phone])   Please arrive 15 minutes prior to appointment, bring photo ID and insurance card. [place] [address]  [address]  [facility] [address]         Preventive Care        Date Due    Tetanus Combination Vaccine (1 - Tdap) 12/24/1951    Pneumococcal Vaccines (two) for age 72 years & over with: cerebrospinal fluid leaks, cochlear implants, hemoglobinopathies,  asplenia, immunodeficiencies, HIV infection, or chronic renal failure (2 of 2 - PPSV23) 3/15/2016    Yearly Flu Vaccine (1) 8/1/2017                 Care Plan Once You Return Home    This section includes instructions you will need to follow once you leave the hospital.  Your care team will discuss these with you, so you and those caring for you know how to best care for your health needs at home. This section may also include educational information about certain health topics that may be of help to you. Discharge Instructions       Please contact your Oncologist if you have any questions regarding the chemotherapy velade injection that you received today. Patient instructed if experience any of the symptoms following today's chemotherapy / to notify MD immediately or go to emergency department. * dizziness/lightheadedness  *acute nausea/vomiting - not relieved with medication  *headache - not relieved from Tylenol/pain medication  *chest pain/pressure  *rash/itching  *shortness of breath        Drink fluids - 48oz fluids daily  Call if develop fever/ chills/ signs or symptoms of infection    Important information for a smoker       SMOKING: QUIT SMOKING. THIS IS THE MOST IMPORTANT ACTION YOU CAN TAKE TO IMPROVE YOUR CURRENT AND FUTURE HEALTH. Call the [facility] [employer] at [place] NOW ([phone])    Smoking harms nonsmokers. When nonsmokers are around people who smoke, they absorb nicotine, carbon monoxide, and other ingredients of tobacco smoke. DO NOT SMOKE AROUND CHILDREN     Children exposed to secondhand smoke are at an increased risk of:  Sudden Infant Death Syndrome (SIDS), acute respiratory infections, inflammation of the middle ear, and severe asthma. Over a longer time, it causes heart disease and lung cancer. There is no safe level of exposure to secondhand smoke. [employer] Signup     Our records indicate that you have an active [employer] account. You can view your After Visit Summary by going to [email or web address] and logging in with your [employer] username and password.       If you don't have a [employer] username and password but a parent or guardian has access to your record, the parent or guardian should login with their own [address] and password and access your record to view the After Visit Summary. Additional Information  If you have questions, please contact the physician practice where you receive care. Remember, MyChart is NOT to be used for urgent needs. For medical emergencies, dial 911. For questions regarding your MyChart account call [phone]. If you have a clinical question, please call your doctor's office. View your information online  ? Review your current list of  medications, immunization, and allergies. ? Review your future test results online . ? Review your discharge instructions provided by your caregivers at discharge    Certain functionality such as prescription refills, scheduling appointments or sending messages to your provider are not activated if your provider does not use [employer] in his/her office    For questions regarding your MyChart account call [phone]. If you have a clinical question, please call your doctor's office. The information on all pages of the After Visit Summary has been reviewed with me, the patient and/or responsible adult, by my health care provider(s). I had the opportunity to ask questions regarding this information. I understand I should dispose of my armband safely at home to protect my health information. A complete copy of the After Visit Summary has been given to me, the patient and/or responsible adult.            Patient Signature/Responsible Adult:____________________    Clinician Signature:_____________________    Date:_____________________    Time:_____________________

## 2017-08-24 NOTE — PLAN OF CARE
Problem: Intellectual/Education/Knowledge Deficit  Intervention: Verbal/written education provided  Chemotherapy Teaching      What is Chemotherapy   Drug action [X]   Method of Administration [X]   Handouts given [ ]      Side Effects  Nausea/vomiting [X]   Diarrhea [X]   Fatigue [X]   Signs / Symptoms of infection [X]   Neutropenia [X]   Thrombocytopenia [X]   Alopecia [X]   neuropathy [X]   [place] diet &  the importance of fluids [X]         Micellaneous  Importance of nutrition [X]   Importance of oral hygiene [X]   When to call the MD [X]   Monitoring labs [X]   Use of supportive services [ ]      Explanation of Drug Regimen / Frequency  velcade injection      Comments  Verbalized understanding to drug,action,side effects and when to call MD         Goal: Teaching initiated upon admission  Outcome: Met This Shift  Patient verbalizes understanding to verbal information given on velcade injection,action and possible side effects. Aware to call MD if develop complications. Problem: Discharge Planning  Intervention: Interaction with patient/family and care team  Provide discharge instructions. Goal: Knowledge of discharge instructions  Knowledge of discharge instructions   Outcome: Met This Shift  Verbalized understanding of discharge instructions, follow-up appointments, and when to call the physician. Comments:   Care plan reviewed with patient and daughter. Patient and daughter verbalize understanding of the plan of care and contribute to goal setting.

## 2017-08-24 NOTE — PROGRESS NOTES
Patient assessed for the following post chemotherapy:    Dizziness   No  Lightheadedness  No     Acute nausea/vomiting No  Headache   No  Chest pain/pressure  No  Rash/itching   No  Shortness of breath  No    Patient kept for 20 minutes observation post infusion chemotherapy. Patient tolerated chemotherapy treatment velcade subq injection without any complications. Last vital signs:   /64  Pulse 75  Temp 97.7 °F (36.5 °C) (Oral)   Resp 18  Ht 5' 6\" (1.676 m)  Wt 206 lb (93.4 kg)  SpO2 96%  BMI 33.25 kg/m2    Patient instructed if experience any of the above symptoms following today's injection, he/she is to notify MD immediately or go to the emergency department. Discharge instructions given to patient. Verbalizes understanding. Ambulated off unit per self with daughter with belongings.

## 2017-08-31 NOTE — PLAN OF CARE
Problem: Discharge Planning  Intervention: Interaction with patient/family and care team  Discuss understanding of discharge instructions,follow-up appointments, and when to call the physician. Goal: Knowledge of discharge instructions  Knowledge of discharge instructions   Outcome: Met This Shift  Verbalized understanding of discharge instructions, follow-up appointments, and when to call the physician. Problem: Intellectual/Education/Knowledge Deficit  Intervention: Verbal/written education provided  Chemotherapy Teaching      What is Chemotherapy   Drug action [X]   Method of Administration [X]   Handouts given [ ]      Side Effects  Nausea/vomiting [X]   Diarrhea [X]   Fatigue [X]   Signs / Symptoms of infection [X]   Neutropenia [X]   Thrombocytopenia [X]   Alopecia [X]   neuropathy [X]   [place] diet &  the importance of fluids [X]         Micellaneous  Importance of nutrition [X]   Importance of oral hygiene [X]   When to call the MD [X]   Monitoring labs [X]   Use of supportive services [ ]      Explanation of Drug Regimen / Frequency  velcade sq weekly      Comments  Verbalized understanding to drug,action,side effects and when to call MD         Goal: Teaching initiated upon admission  Outcome: Met This Shift  Patient verbalizes understanding to verbal information given on velcade sq,action and possible side effects. Aware to call MD if develop complications. Comments:   Care plan reviewed with patient and family. Patient and family verbalize understanding of the plan of care and contribute to goal setting.

## 2017-08-31 NOTE — IP AVS SNAPSHOT
After Visit Summary  (Discharge Instructions)    Medication List for Home    Based on the information you provided to us as well as any changes during this visit, the following is your updated medication list.  Compare this with your prescription bottles at home. If you have any questions or concerns, contact your primary care physician's office. Daily Medication List (This medication list can be shared with any healthcare provider who is helping you manage your medications)      ASK your doctor about these medications if you have questions        Last Dose    Next Dose Due AM NOON PM NIGHT    Acetaminophen 650 MG Tabs   Take 650 mg by mouth every 4 hours as needed                                         albuterol sulfate  (90 Base) MCG/ACT inhaler   Commonly known as:  VENTOLIN HFA   Inhale 2 puffs into the lungs every 6 hours as needed for Wheezing or Shortness of Breath                                         albuterol (2.5 MG/3ML) 0.083% nebulizer solution   Commonly known as:  PROVENTIL   Take 3 mLs by nebulization every 6 hours as needed for Wheezing or Shortness of Breath                                         aspirin 81 MG EC tablet   Take 81 mg by mouth daily. CHEMOTHERAPY                                         dexamethasone 2 MG tablet   Commonly known as:  DECADRON   Take 2 mg by mouth daily (with breakfast)                                         leuprolide 30 MG injection   Commonly known as:  LUPRON   Inject 30 mg into the muscle once Indications: every 3 months                                         nicotine 21 MG/24HR   Commonly known as:  NICODERM CQ   Place 1 patch onto the skin daily                                         tiotropium 18 MCG inhalation capsule   Commonly known as:  SPIRIVA HANDIHALER   Inhale 1 capsule into the lungs daily 1 capsule via inhalation daily. TYLENOL PM EXTRA STRENGTH  MG tablet   Generic drug:  diphenhydrAMINE-APAP (sleep)   Take 2 tablets by mouth nightly as needed for Sleep                                                 Allergies as of 2017        Reactions    Shellfish-derived Products     Flomax [Tamsulosin Hcl] Other (See Comments)    Drops b/p      Immunizations as of 2017     Name Date Dose VIS Date Route    Influenza Virus Vaccine 10/22/2015 0.5 mL 2015 Intramuscular    Influenza, Quadrivalent, 3 Years and above,IM 10/11/2016 0.5 mL 2015 Intramuscular    Pneumococcal 13-valent Conjugate (Mpnluru07) 2016 0.5 mL 2015 Intramuscular      Last Vitals          Most Recent Value    Temp      Pulse  82    Resp  16    BP  (!)  127/59         After Visit Summary    This summary was created for you. Thank you for entrusting your care to us. The following information includes details about your hospital/visit stay along with steps you should take to help with your recovery once you leave the hospital.  In this packet, you will find information about the topics listed below:    · Instructions about your medications including a list of your home medications  · A summary of your hospital visit  · Follow-up appointments once you have left the hospital  · Your care plan at home      You may receive a survey regarding the care you received during your stay. Your input is valuable to us. We encourage you to complete and return your survey in the envelope provided. We hope you will choose us in the future for your healthcare needs.           Patient Information     Patient Name ROSIBEL Cramer 1932      Care Provided at:     Name Address Phone       [address] [phone]            Your Visit    Here you will find information about your visit, including the reason for [facility] [address]       10/5/2017 9:30 AM     Appointment with STR OUT PT ONC INJ RM 05 at [address] [facility] ([phone])   [address]  [facility]       10/27/2017 8:15 AM     Appointment with Jeromy Patel MD at [facility] ([phone])   Please arrive 15 minutes prior to appointment, bring photo ID and insurance card. Please arrive 15 minutes prior to appointment, bring photo ID and insurance card. [address] [facility]  [address]  [facility] [address]       11/1/2017 8:40 AM     Appointment with Taya Vazquez DO at [address] [place] [address] ([phone])   Please arrive 15 minutes prior to appointment, bring photo ID and insurance card. [facility] [id number]       12/5/2017 12:30 PM     Appointment with [facility] PULMONARY FUNCTION ROOM 1 at [address] [place] [address] ([phone])   [address] [address]       12/19/2017 9:20 AM     Appointment with Shamika Morrissey MD at [address] [place] [address] ([phone])   Please arrive 15 minutes prior to appointment, bring photo ID and insurance card. [place] [address]  [address]  [facility] [address]         Preventive Care        Date Due    Tetanus Combination Vaccine (1 - Tdap) 12/24/1951    Pneumococcal Vaccines (two) for age 72 years & over with: cerebrospinal fluid leaks, cochlear implants, hemoglobinopathies,  asplenia, immunodeficiencies, HIV infection, or chronic renal failure (2 of 2 - PPSV23) 3/15/2016    Yearly Flu Vaccine (1) 8/1/2017                 Care Plan Once You Return Home    This section includes instructions you will need to follow once you leave the hospital.  Your care team will discuss these with you, so you and those caring for you know how to best care for your health needs at home. This section may also include educational information about certain health topics that may be of help to you.           Discharge Instructions       Please contact your Oncologist if you have any questions regarding the For questions regarding your [employer] account call [phone]. If you have a clinical question, please call your doctor's office. View your information online  ? Review your current list of  medications, immunization, and allergies. ? Review your future test results online . ? Review your discharge instructions provided by your caregivers at discharge    Certain functionality such as prescription refills, scheduling appointments or sending messages to your provider are not activated if your provider does not use [employer] in his/her office    For questions regarding your [employer] account call [phone]. If you have a clinical question, please call your doctor's office. The information on all pages of the After Visit Summary has been reviewed with me, the patient and/or responsible adult, by my health care provider(s). I had the opportunity to ask questions regarding this information. I understand I should dispose of my armband safely at home to protect my health information. A complete copy of the After Visit Summary has been given to me, the patient and/or responsible adult.            Patient Signature/Responsible Adult:____________________    Clinician Signature:_____________________    Date:_____________________    Time:_____________________

## 2017-08-31 NOTE — IP AVS SNAPSHOT
Patient Information     Patient Name ROSIBEL Asher 1932         This is your updated medication list to keep with you all times      ASK your doctor about these medications     Acetaminophen 650 MG Tabs   Take 650 mg by mouth every 4 hours as needed       * albuterol sulfate  (90 Base) MCG/ACT inhaler   Commonly known as:  VENTOLIN HFA   Inhale 2 puffs into the lungs every 6 hours as needed for Wheezing or Shortness of Breath       * albuterol (2.5 MG/3ML) 0.083% nebulizer solution   Commonly known as:  PROVENTIL   Take 3 mLs by nebulization every 6 hours as needed for Wheezing or Shortness of Breath       aspirin 81 MG EC tablet       CHEMOTHERAPY       dexamethasone 2 MG tablet   Commonly known as:  DECADRON       leuprolide 30 MG injection   Commonly known as:  LUPRON       nicotine 21 MG/24HR   Commonly known as:  NICODERM CQ   Place 1 patch onto the skin daily       tiotropium 18 MCG inhalation capsule   Commonly known as:  SPIRIVA HANDIHALER   Inhale 1 capsule into the lungs daily 1 capsule via inhalation daily. TYLENOL PM EXTRA STRENGTH  MG tablet   Generic drug:  diphenhydrAMINE-APAP (sleep)       * Notice: This list has 2 medication(s) that are the same as other medications prescribed for you. Read the directions carefully, and ask your doctor or other care provider to review them with you.

## 2017-08-31 NOTE — ONCOLOGY
Chemotherapy Administration    Pre-assessment Data: Antineoplastic Agents  Other:   See toxicity flow sheet for assessment [x]     Physician Notification of Concerns Related to Chemotherapy Administration:   Physician Notified Natalia Lewis / Time of Notification      Interventions:   Lab work assessed  [x]   Height / Weight verified for dose [x]   Current MAR reviewed [x]   Emergency drugs available as appropriate [x]   Anaphylaxis assessment completed [x]   Pre-medications administered as ordered [x]   Chemotherapy Administered as SQ injection [x]   Monitor for signs / symptoms of hypersensitivity reaction    [x]   Chemotherapy orders (drug/dose/rate) verified by 2 Chemo certified   RNs    [x]          Document chemotherapy teaching on the Patient Education tab    [x]   Document patient verbalizes understanding of medications being administered    [x]   Other: []    []    []      []    []

## 2017-09-07 NOTE — ONCOLOGY
Chemotherapy Administration    Pre-assessment Data: Antineoplastic Agents  Other:   See toxicity flow sheet for assessment [x]     Physician Notification of Concerns Related to Chemotherapy Administration:   Physician Notified Paul Miles / Time of Notification      Interventions:   Lab work assessed  []   Height / Weight verified for dose [x]   Current MAR reviewed [x]   Emergency drugs available as appropriate [x]   Anaphylaxis assessment completed [x]   Pre-medications administered as ordered [x]   Chemotherapy Administered as SQ injection [x]   Monitor for signs / symptoms of hypersensitivity reaction    [x]   Chemotherapy orders (drug/dose/rate) verified by 2 Chemo certified   RNs    [x]          Document chemotherapy teaching on the Patient Education tab    [x]   Document patient verbalizes understanding of medications being administered    [x]   Other: []    []    []      []    []

## 2017-09-07 NOTE — IP AVS SNAPSHOT
After Visit Summary  (Discharge Instructions)    Medication List for Home    Based on the information you provided to us as well as any changes during this visit, the following is your updated medication list.  Compare this with your prescription bottles at home. If you have any questions or concerns, contact your primary care physician's office. Daily Medication List (This medication list can be shared with any healthcare provider who is helping you manage your medications)      ASK your doctor about these medications if you have questions        Last Dose    Next Dose Due AM NOON PM NIGHT    Acetaminophen 650 MG Tabs   Take 650 mg by mouth every 4 hours as needed                                         albuterol sulfate  (90 Base) MCG/ACT inhaler   Commonly known as:  VENTOLIN HFA   Inhale 2 puffs into the lungs every 6 hours as needed for Wheezing or Shortness of Breath                                         albuterol (2.5 MG/3ML) 0.083% nebulizer solution   Commonly known as:  PROVENTIL   Take 3 mLs by nebulization every 6 hours as needed for Wheezing or Shortness of Breath                                         aspirin 81 MG EC tablet   Take 81 mg by mouth daily. CHEMOTHERAPY                                         dexamethasone 2 MG tablet   Commonly known as:  DECADRON   Take 2 mg by mouth daily (with breakfast)                                         leuprolide 30 MG injection   Commonly known as:  LUPRON   Inject 30 mg into the muscle once Indications: every 3 months                                         nicotine 21 MG/24HR   Commonly known as:  NICODERM CQ   Place 1 patch onto the skin daily                                         tiotropium 18 MCG inhalation capsule   Commonly known as:  SPIRIVA HANDIHALER   Inhale 1 capsule into the lungs daily 1 capsule via inhalation daily. TYLENOL PM EXTRA STRENGTH  MG tablet   Generic drug:  diphenhydrAMINE-APAP (sleep)   Take 2 tablets by mouth nightly as needed for Sleep                                                 Allergies as of 2017        Reactions    Shellfish-derived Products     Flomax [Tamsulosin Hcl] Other (See Comments)    Drops b/p      Immunizations as of 2017     Name Date Dose VIS Date Route    Influenza Virus Vaccine 10/22/2015 0.5 mL 2015 Intramuscular    Influenza, Quadrivalent, 3 Years and above,IM 10/11/2016 0.5 mL 2015 Intramuscular    Pneumococcal 13-valent Conjugate ([id number]) 2016 0.5 mL 2015 Intramuscular      Last Vitals          Most Recent Value    Temp  97.7 °F (36.5 °C)    Pulse  73    Resp  16    BP  132/69         After Visit Summary    This summary was created for you. Thank you for entrusting your care to us. The following information includes details about your hospital/visit stay along with steps you should take to help with your recovery once you leave the hospital.  In this packet, you will find information about the topics listed below:    · Instructions about your medications including a list of your home medications  · A summary of your hospital visit  · Follow-up appointments once you have left the hospital  · Your care plan at home      You may receive a survey regarding the care you received during your stay. Your input is valuable to us. We encourage you to complete and return your survey in the envelope provided. We hope you will choose us in the future for your healthcare needs.           Patient Information     Patient Name ROSIBEL Ying 1932      Care Provided at:     Name Address Phone       [address] [phone]            Your Visit    Here you will find information about your visit, including the reason for your visit. Please take this sheet with you when you visit your doctor or other health care provider in the future. It will help determine the best possible medical care for you at that time. If you have any questions once you leave the hospital, please call the department phone number listed below. Why you were here     Your primary diagnosis was:  Not on File      Visit Information     Date & Time Department Dept. Phone    9/7/2017 [facility] [phone]       Follow-up Appointments    Below is a list of your follow-up and future appointments. This may not be a complete list as you may have made appointments directly with providers that we are not aware of or your providers may have made some for you. Please call your providers to confirm appointments. It is important to keep your appointments. Please bring your current insurance card, photo ID, co-pay, and all medication bottles to your appointment. If self-pay, payment is expected at the time of service. Future Appointments     9/14/2017  8:30 AM     Appointment with [facility] RM 11 at [address] [place] [address] ([phone])   [address] [place] [address]       9/21/2017  8:30 AM     Appointment with [facility] INJ RM 10 at [address] [place] [address] ([phone])   [address] [place] [address]       9/28/2017  8:30 AM     Appointment with [facility] INJ RM 01 at [address] [place] [address] ([phone])   [address] [place] [address]       10/5/2017 8:30 AM     Appointment with Van Gates MD at [facility] ([phone])   Please arrive 15 minutes prior to appointment, bring photo ID and insurance card. Please arrive 15 minutes prior to appointment, bring photo ID and insurance card.    [facility]       10/5/2017 9:30 AM     Appointment with [facility] PT ONC INJ RM 05 at [address] [place] [address] ([phone])   [address] [place] [address] [address] [facility] [address]       10/27/2017 8:15 AM     Appointment with Bradly Chase MD at [address] [facility] ([phone])   Please arrive 15 minutes prior to appointment, bring photo ID and insurance card. Please arrive 15 minutes prior to appointment, bring photo ID and insurance card. [address] [facility]  [address]  [facility] [address]       11/1/2017 8:40 AM     Appointment with Stevan Warner DO at [address] [place] [address] ([phone])   Please arrive 15 minutes prior to appointment, bring photo ID and insurance card. [facility] [address]       12/5/2017 12:30 PM     Appointment with [facility] PULMONARY FUNCTION ROOM 1 at [address] [place] [address] ([phone])   [address] [address]       12/19/2017 9:20 AM     Appointment with Angel Fried MD at [address] ([phone])   Please arrive 15 minutes prior to appointment, bring photo ID and insurance card. [place] [address]  [address]  [facility] [address]         Preventive Care        Date Due    Tetanus Combination Vaccine (1 - Tdap) 12/24/1951    Pneumococcal Vaccines (two) for age 72 years & over with: cerebrospinal fluid leaks, cochlear implants, hemoglobinopathies,  asplenia, immunodeficiencies, HIV infection, or chronic renal failure (2 of 2 - PPSV23) 3/15/2016    Yearly Flu Vaccine (1) 9/1/2017                 Care Plan Once You Return Home    This section includes instructions you will need to follow once you leave the hospital.  Your care team will discuss these with you, so you and those caring for you know how to best care for your health needs at home. This section may also include educational information about certain health topics that may be of help to you. Discharge Instructions       Please contact your Oncologist if you have any questions regarding the chemotherapy Velcade sq that you received today.       Patient instructed if experience any of the symptoms following today's have a clinical question, please call your doctor's office. View your information online  ? Review your current list of  medications, immunization, and allergies. ? Review your future test results online . ? Review your discharge instructions provided by your caregivers at discharge    Certain functionality such as prescription refills, scheduling appointments or sending messages to your provider are not activated if your provider does not use [employer] in his/her office    For questions regarding your MyChart account call [phone]. If you have a clinical question, please call your doctor's office. The information on all pages of the After Visit Summary has been reviewed with me, the patient and/or responsible adult, by my health care provider(s). I had the opportunity to ask questions regarding this information. I understand I should dispose of my armband safely at home to protect my health information. A complete copy of the After Visit Summary has been given to me, the patient and/or responsible adult.            Patient Signature/Responsible Adult:____________________    Clinician Signature:_____________________    Date:_____________________    Time:_____________________

## 2017-09-07 NOTE — PLAN OF CARE
Problem: Intellectual/Education/Knowledge Deficit  Intervention: Verbal/written education provided  Chemotherapy Teaching      What is Chemotherapy   Drug action [X]   Method of Administration [X]   Handouts given [ ]      Side Effects  Nausea/vomiting [X]   Diarrhea [X]   Fatigue [X]   Signs / Symptoms of infection [X]   Neutropenia [X]   Thrombocytopenia [X]   Alopecia [X]   neuropathy [X]   [place] diet &  the importance of fluids [X]         Micellaneous  Importance of nutrition [X]   Importance of oral hygiene [X]   When to call the MD [X]   Monitoring labs [X]   Use of supportive services [ ]      Explanation of Drug Regimen / Frequency  velcade weekly sq injections      Comments  Verbalized understanding to drug,action,side effects and when to call MD         Goal: Teaching initiated upon admission  Outcome: Met This Shift  Patient verbalizes understanding to verbal information given on velcade sq,action and possible side effects. Aware to call MD if develop complications. Problem: Discharge Planning  Intervention: Interaction with patient/family and care team  Discuss understanding of discharge instructions,follow-up appointments, and when to call the physician. Goal: Knowledge of discharge instructions  Knowledge of discharge instructions   Outcome: Met This Shift  Verbalized understanding of discharge instructions, follow-up appointments, and when to call the physician. Comments:   Care plan reviewed with patient and family. Patient and family verbalize understanding of the plan of care and contribute to goal setting.

## 2017-09-07 NOTE — IP AVS SNAPSHOT
Patient Information     Patient Name ROSIBEL Mike 1932         This is your updated medication list to keep with you all times      ASK your doctor about these medications     Acetaminophen 650 MG Tabs   Take 650 mg by mouth every 4 hours as needed       * albuterol sulfate  (90 Base) MCG/ACT inhaler   Commonly known as:  VENTOLIN HFA   Inhale 2 puffs into the lungs every 6 hours as needed for Wheezing or Shortness of Breath       * albuterol (2.5 MG/3ML) 0.083% nebulizer solution   Commonly known as:  PROVENTIL   Take 3 mLs by nebulization every 6 hours as needed for Wheezing or Shortness of Breath       aspirin 81 MG EC tablet       CHEMOTHERAPY       dexamethasone 2 MG tablet   Commonly known as:  DECADRON       leuprolide 30 MG injection   Commonly known as:  LUPRON       nicotine 21 MG/24HR   Commonly known as:  NICODERM CQ   Place 1 patch onto the skin daily       tiotropium 18 MCG inhalation capsule   Commonly known as:  SPIRIVA HANDIHALER   Inhale 1 capsule into the lungs daily 1 capsule via inhalation daily. TYLENOL PM EXTRA STRENGTH  MG tablet   Generic drug:  diphenhydrAMINE-APAP (sleep)       * Notice: This list has 2 medication(s) that are the same as other medications prescribed for you. Read the directions carefully, and ask your doctor or other care provider to review them with you.

## 2017-09-07 NOTE — PROGRESS NOTES
Patient assessed for the following post chemotherapy:    Dizziness   No  Lightheadedness  No      Acute nausea/vomiting No  Headache   No  Chest pain/pressure  No  Rash/itching   No  Shortness of breath  No    Patient kept for 20 minutes observation post injection chemotherapy. Patient tolerated chemotherapy treatment Velcade sq without any complications. Last vital signs:   /69  Pulse 73  Temp 97.7 °F (36.5 °C) (Oral)   Resp 16  Ht 5' 6\" (1.676 m)  Wt 207 lb 12.8 oz (94.3 kg)  SpO2 98%  BMI 33.54 kg/m2        Patient instructed if experience any of the above symptoms following today's infusion,he/she is to notify MD immediately or go to the emergency department. Discharge instructions given to patient. Verbalizes understanding. Ambulated off unit per self with belongings accompanied  By family.

## 2017-09-14 NOTE — PLAN OF CARE
Problem: Intellectual/Education/Knowledge Deficit  Intervention: Verbal/written education provided  Chemotherapy Teaching      What is Chemotherapy   Drug action [X]   Method of Administration [X]   Handouts given [ ]      Side Effects  Nausea/vomiting [X]   Diarrhea [X]   Fatigue [X]   Signs / Symptoms of infection [X]   Neutropenia [X]   Thrombocytopenia [X]   Alopecia [X]   neuropathy [X]   [place] diet &  the importance of fluids [X]         Micellaneous  Importance of nutrition [X]   Importance of oral hygiene [X]   When to call the MD [X]   Monitoring labs [X]   Use of supportive services [ ]      Explanation of Drug Regimen / Frequency  velcade subq injection      Comments  Verbalized understanding to drug,action,side effects and when to call MD         Goal: Teaching initiated upon admission  Outcome: Met This Shift  Patient verbalizes understanding to verbal information given on velcade,action and possible side effects. Aware to call MD if develop complications. Problem: Discharge Planning  Intervention: Interaction with patient/family and care team  Provide discharge instructions. Goal: Knowledge of discharge instructions  Knowledge of discharge instructions   Outcome: Met This Shift  Verbalized understanding of discharge instructions, follow-up appointments, and when to call the physician. Comments:   Care plan reviewed with patient and daughter. Patient and daughter verbalize understanding of the plan of care and contribute to goal setting.

## 2017-09-14 NOTE — PROGRESS NOTES
Patient assessed for the following post chemotherapy:    Dizziness   No  Lightheadedness  No     Acute nausea/vomiting No  Headache   No  Chest pain/pressure  No  Rash/itching   No  Shortness of breath  No    Patient kept for 20 minutes observation post injection chemotherapy. Patient tolerated chemotherapy treatment velcade subq injection without any complications. Last vital signs:   BP (!) 142/66  Pulse 77  Temp 97.8 °F (36.6 °C) (Oral)   Resp 18  Ht 5' 6\" (1.676 m)  Wt 207 lb 12.8 oz (94.3 kg)  SpO2 96%  BMI 33.54 kg/m2    Patient instructed if experience any of the above symptoms following today's injection,he/she is to notify MD immediately or go to the emergency department. Discharge instructions given to patient. Verbalizes understanding. Ambulated off unit per self with daughter with belongings.

## 2017-09-14 NOTE — ONCOLOGY
Chemotherapy Administration    Pre-assessment Data: Antineoplastic Agents  Other:   See toxicity flow sheet for assessment [x]     Physician Notification of Concerns Related to Chemotherapy Administration:   Physician Notified Ankur Zarate / Time of Notification      Interventions:   Lab work assessed  [x]   Height / Weight verified for dose [x]   Current MAR reviewed [x]   Emergency drugs available as appropriate [x]   Anaphylaxis assessment completed [x]   Pre-medications administered as ordered [x]   Chemotherapy Administered as SQ injection [x]   Monitor for signs / symptoms of hypersensitivity reaction    [x]   Chemotherapy orders (drug/dose/rate) verified by 2 Chemo certified   RNs    [x]          Document chemotherapy teaching on the Patient Education tab    [x]   Document patient verbalizes understanding of medications being administered    [x]   Other: velcade [x]    []    []      []    []

## 2017-09-14 NOTE — IP AVS SNAPSHOT
Patient Information     Patient Name ROSIBEL Honeycutt [facility] 1932         This is your updated medication list to keep with you all times      ASK your doctor about these medications     Acetaminophen 650 MG Tabs   Take 650 mg by mouth every 4 hours as needed       * albuterol sulfate  (90 Base) MCG/ACT inhaler   Commonly known as:  VENTOLIN HFA   Inhale 2 puffs into the lungs every 6 hours as needed for Wheezing or Shortness of Breath       * albuterol (2.5 MG/3ML) 0.083% nebulizer solution   Commonly known as:  PROVENTIL   Take 3 mLs by nebulization every 6 hours as needed for Wheezing or Shortness of Breath       aspirin 81 MG EC tablet       CHEMOTHERAPY       dexamethasone 2 MG tablet   Commonly known as:  DECADRON       leuprolide 30 MG injection   Commonly known as:  LUPRON       nicotine 21 MG/24HR   Commonly known as:  NICODERM CQ   Place 1 patch onto the skin daily       tiotropium 18 MCG inhalation capsule   Commonly known as:  SPIRIVA HANDIHALER   Inhale 1 capsule into the lungs daily 1 capsule via inhalation daily. TYLENOL PM EXTRA STRENGTH  MG tablet   Generic drug:  diphenhydrAMINE-APAP (sleep)       * Notice: This list has 2 medication(s) that are the same as other medications prescribed for you. Read the directions carefully, and ask your doctor or other care provider to review them with you.

## 2017-09-14 NOTE — IP AVS SNAPSHOT
After Visit Summary  (Discharge Instructions)    Medication List for Home    Based on the information you provided to us as well as any changes during this visit, the following is your updated medication list.  Compare this with your prescription bottles at home. If you have any questions or concerns, contact your primary care physician's office. Daily Medication List (This medication list can be shared with any healthcare provider who is helping you manage your medications)      ASK your doctor about these medications if you have questions        Last Dose    Next Dose Due AM NOON PM NIGHT    Acetaminophen 650 MG Tabs   Take 650 mg by mouth every 4 hours as needed                                         albuterol sulfate  (90 Base) MCG/ACT inhaler   Commonly known as:  VENTOLIN HFA   Inhale 2 puffs into the lungs every 6 hours as needed for Wheezing or Shortness of Breath                                         albuterol (2.5 MG/3ML) 0.083% nebulizer solution   Commonly known as:  PROVENTIL   Take 3 mLs by nebulization every 6 hours as needed for Wheezing or Shortness of Breath                                         aspirin 81 MG EC tablet   Take 81 mg by mouth daily. CHEMOTHERAPY                                         dexamethasone 2 MG tablet   Commonly known as:  DECADRON   Take 2 mg by mouth daily (with breakfast)                                         leuprolide 30 MG injection   Commonly known as:  LUPRON   Inject 30 mg into the muscle once Indications: every 3 months                                         nicotine 21 MG/24HR   Commonly known as:  NICODERM CQ   Place 1 patch onto the skin daily                                         tiotropium 18 MCG inhalation capsule   Commonly known as:  SPIRIVA HANDIHALER   Inhale 1 capsule into the lungs daily 1 capsule via inhalation daily. TYLENOL PM EXTRA STRENGTH  MG tablet   Generic drug:  diphenhydrAMINE-APAP (sleep)   Take 2 tablets by mouth nightly as needed for Sleep                                                 Allergies as of 2017        Reactions    Shellfish-derived Products     Flomax [Tamsulosin Hcl] Other (See Comments)    Drops b/p      Immunizations as of 2017     Name Date Dose VIS Date Route    Influenza Virus Vaccine 10/22/2015 0.5 mL 2015 Intramuscular    Influenza, Quadrivalent, 3 Years and above,IM 10/11/2016 0.5 mL 2015 Intramuscular    Pneumococcal 13-valent Conjugate ([id number]) 2016 0.5 mL 2015 Intramuscular      Last Vitals          Most Recent Value    Temp  98 °F (36.7 °C)    Pulse  85    Resp  18 [lungs clear]    BP  139/63         After Visit Summary    This summary was created for you. Thank you for entrusting your care to us. The following information includes details about your hospital/visit stay along with steps you should take to help with your recovery once you leave the hospital.  In this packet, you will find information about the topics listed below:    · Instructions about your medications including a list of your home medications  · A summary of your hospital visit  · Follow-up appointments once you have left the hospital  · Your care plan at home      You may receive a survey regarding the care you received during your stay. Your input is valuable to us. We encourage you to complete and return your survey in the envelope provided. We hope you will choose us in the future for your healthcare needs.           Patient Information     Patient Name ROSIBEL Mathias Read 1932      Care Provided at:     Name Address Phone       [address] [phone]            Your Visit    Here you will find information about your visit, including the reason for Please arrive 15 minutes prior to appointment, bring photo ID and insurance card. Please arrive 15 minutes prior to appointment, bring photo ID and insurance card. [address] [facility]  [address]  [facility] [address]       11/1/2017 8:40 AM     Appointment with Hien Armijo DO at [address] [facility] ([phone])   Please arrive 15 minutes prior to appointment, bring photo ID and insurance card. [facility] [id number]       12/5/2017 12:30 PM     Appointment with [facility] PULMONARY FUNCTION ROOM 1 at [address] [place] [address] ([phone])   [address] [address]       12/19/2017 9:20 AM     Appointment with John Pfeiffer MD at [address] [address] ([phone])   Please arrive 15 minutes prior to appointment, bring photo ID and insurance card. [address]   [address]  [facility] [address]         Preventive Care        Date Due    Tetanus Combination Vaccine (1 - Tdap) 12/24/1951    Pneumococcal Vaccines (two) for age 72 years & over with: cerebrospinal fluid leaks, cochlear implants, hemoglobinopathies,  asplenia, immunodeficiencies, HIV infection, or chronic renal failure (2 of 2 - PPSV23) 3/15/2016    Yearly Flu Vaccine (1) 9/1/2017                 Care Plan Once You Return Home    This section includes instructions you will need to follow once you leave the hospital.  Your care team will discuss these with you, so you and those caring for you know how to best care for your health needs at home. This section may also include educational information about certain health topics that may be of help to you. Discharge Instructions       Please contact your Oncologist if you have any questions regarding the chemotherapy velcade injection that you received today. Patient instructed if experience any of the symptoms following today's chemotherapy / to notify MD immediately or go to emergency department.     * dizziness/lightheadedness *acute nausea/vomiting - not relieved with medication  *headache - not relieved from Tylenol/pain medication  *chest pain/pressure  *rash/itching  *shortness of breath        Drink fluids - 48oz fluids daily  Call if develop fever/ chills/ signs or symptoms of infection    Important information for a smoker       SMOKING: QUIT SMOKING. THIS IS THE MOST IMPORTANT ACTION YOU CAN TAKE TO IMPROVE YOUR CURRENT AND FUTURE HEALTH. Call the [address] [place] [address] at [facility] ([phone])    Smoking harms nonsmokers. When nonsmokers are around people who smoke, they absorb nicotine, carbon monoxide, and other ingredients of tobacco smoke. DO NOT SMOKE AROUND CHILDREN     Children exposed to secondhand smoke are at an increased risk of:  Sudden Infant Death Syndrome (SIDS), acute respiratory infections, inflammation of the middle ear, and severe asthma. Over a longer time, it causes heart disease and lung cancer. There is no safe level of exposure to secondhand smoke. [employer] Signup     Our records indicate that you have an active [employer] account. You can view your After Visit Summary by going to [email or web address] and logging in with your [employer] username and password. If you don't have a [employer] username and password but a parent or guardian has access to your record, the parent or guardian should login with their own [employer] username and password and access your record to view the After Visit Summary. Additional Information  If you have questions, please contact the physician practice where you receive care. Remember, [employer] is NOT to be used for urgent needs. For medical emergencies, dial 911. For questions regarding your [employer] account call [phone]. If you have a clinical question, please call your doctor's office.          View your information online ? Review your current list of  medications, immunization, and allergies. ? Review your future test results online . ? Review your discharge instructions provided by your caregivers at discharge    Certain functionality such as prescription refills, scheduling appointments or sending messages to your provider are not activated if your provider does not use [facility] in his/her office    For questions regarding your MyChart account call [phone]. If you have a clinical question, please call your doctor's office. The information on all pages of the After Visit Summary has been reviewed with me, the patient and/or responsible adult, by my health care provider(s). I had the opportunity to ask questions regarding this information. I understand I should dispose of my armband safely at home to protect my health information. A complete copy of the After Visit Summary has been given to me, the patient and/or responsible adult.            Patient Signature/Responsible Adult:____________________    Clinician Signature:_____________________    Date:_____________________    Time:_____________________

## 2017-09-21 NOTE — ONCOLOGY
Chemotherapy Administration    Pre-assessment Data: Antineoplastic Agents  Other:   See toxicity flow sheet for assessment [x]     Physician Notification of Concerns Related to Chemotherapy Administration:   Physician Notified Terri Swain / Time of Notification      Interventions:   Lab work assessed  [x]   Height / Weight verified for dose [x]   Current MAR reviewed [x]   Emergency drugs available as appropriate [x]   Anaphylaxis assessment completed [x]   Pre-medications administered as ordered [x]   Chemotherapy Administered as SQ injection [x]   Monitor for signs / symptoms of hypersensitivity reaction    [x]   Chemotherapy orders (drug/dose/rate) verified by 2 Chemo certified   RNs    [x]          Document chemotherapy teaching on the Patient Education tab    [x]   Document patient verbalizes understanding of medications being administered    [x]   Other: velcade [x]    []    []      []    []

## 2017-09-21 NOTE — PLAN OF CARE
Problem: Intellectual/Education/Knowledge Deficit  Intervention: Verbal/written education provided  Chemotherapy Teaching      What is Chemotherapy   Drug action [X]   Method of Administration [X]   Handouts given [ ]      Side Effects  Nausea/vomiting [X]   Diarrhea [X]   Fatigue [X]   Signs / Symptoms of infection [X]   Neutropenia [X]   Thrombocytopenia [X]   Alopecia [X]   neuropathy [X]   Harding diet &  the importance of fluids [X]         Micellaneous  Importance of nutrition [X]   Importance of oral hygiene [X]   When to call the MD [X]   Monitoring labs [X]   Use of supportive services [ ]      Explanation of Drug Regimen / Frequency  velcade      Comments  Verbalized understanding to drug,action,side effects and when to call MD         Goal: Teaching initiated upon admission  Outcome: Met This Shift  Patient verbalizes understanding to verbal information given on velcade,action and possible side effects. Aware to call MD if develop complications. Problem: Discharge Planning  Intervention: Interaction with patient/family and care team  Provide discharge instructions. Goal: Knowledge of discharge instructions  Knowledge of discharge instructions   Outcome: Met This Shift  Verbalized understanding of discharge instructions, follow-up appointments, and when to call the physician. Comments:   Care plan reviewed with patient and daughter. Patient and daughter verbalize understanding of the plan of care and contribute to goal setting.

## 2017-09-21 NOTE — IP AVS SNAPSHOT
Patient Information     Patient Name ROSIBEL Villanueva 1932         This is your updated medication list to keep with you all times      ASK your doctor about these medications     Acetaminophen 650 MG Tabs   Take 650 mg by mouth every 4 hours as needed       * albuterol sulfate  (90 Base) MCG/ACT inhaler   Commonly known as:  VENTOLIN HFA   Inhale 2 puffs into the lungs every 6 hours as needed for Wheezing or Shortness of Breath       * albuterol (2.5 MG/3ML) 0.083% nebulizer solution   Commonly known as:  PROVENTIL   Take 3 mLs by nebulization every 6 hours as needed for Wheezing or Shortness of Breath       aspirin 81 MG EC tablet       CHEMOTHERAPY       dexamethasone 2 MG tablet   Commonly known as:  DECADRON       leuprolide 30 MG injection   Commonly known as:  LUPRON       nicotine 21 MG/24HR   Commonly known as:  NICODERM CQ   Place 1 patch onto the skin daily       tiotropium 18 MCG inhalation capsule   Commonly known as:  SPIRIVA HANDIHALER   Inhale 1 capsule into the lungs daily 1 capsule via inhalation daily. TYLENOL PM EXTRA STRENGTH  MG tablet   Generic drug:  diphenhydrAMINE-APAP (sleep)       * Notice: This list has 2 medication(s) that are the same as other medications prescribed for you. Read the directions carefully, and ask your doctor or other care provider to review them with you.

## 2017-09-21 NOTE — IP AVS SNAPSHOT
After Visit Summary  (Discharge Instructions)    Medication List for Home    Based on the information you provided to us as well as any changes during this visit, the following is your updated medication list.  Compare this with your prescription bottles at home. If you have any questions or concerns, contact your primary care physician's office. Daily Medication List (This medication list can be shared with any healthcare provider who is helping you manage your medications)      ASK your doctor about these medications if you have questions        Last Dose    Next Dose Due AM NOON PM NIGHT    Acetaminophen 650 MG Tabs   Take 650 mg by mouth every 4 hours as needed                                         albuterol sulfate  (90 Base) MCG/ACT inhaler   Commonly known as:  VENTOLIN HFA   Inhale 2 puffs into the lungs every 6 hours as needed for Wheezing or Shortness of Breath                                         albuterol (2.5 MG/3ML) 0.083% nebulizer solution   Commonly known as:  PROVENTIL   Take 3 mLs by nebulization every 6 hours as needed for Wheezing or Shortness of Breath                                         aspirin 81 MG EC tablet   Take 81 mg by mouth daily. CHEMOTHERAPY                                         dexamethasone 2 MG tablet   Commonly known as:  DECADRON   Take 2 mg by mouth daily (with breakfast)                                         leuprolide 30 MG injection   Commonly known as:  LUPRON   Inject 30 mg into the muscle once Indications: every 3 months                                         nicotine 21 MG/24HR   Commonly known as:  NICODERM CQ   Place 1 patch onto the skin daily                                         tiotropium 18 MCG inhalation capsule   Commonly known as:  SPIRIVA HANDIHALER   Inhale 1 capsule into the lungs daily 1 capsule via inhalation daily. TYLENOL PM EXTRA STRENGTH  MG tablet   Generic drug:  diphenhydrAMINE-APAP (sleep)   Take 2 tablets by mouth nightly as needed for Sleep                                                 Allergies as of 2017        Reactions    Shellfish-derived Products     Flomax [Tamsulosin Hcl] Other (See Comments)    Drops b/p      Immunizations as of 2017     Name Date Dose VIS Date Route    Influenza Virus Vaccine 10/22/2015 0.5 mL 2015 Intramuscular    Influenza, Quadrivalent, 3 Years and above, IM 10/11/2016 0.5 mL 2015 Intramuscular    Pneumococcal 13-valent Conjugate ([id number]) 2016 0.5 mL 2015 Intramuscular      Last Vitals          Most Recent Value    Temp  97.6 °F (36.4 °C)    Pulse  75    Resp  18    BP  (!)  144/70         After Visit Summary    This summary was created for you. Thank you for entrusting your care to us. The following information includes details about your hospital/visit stay along with steps you should take to help with your recovery once you leave the hospital.  In this packet, you will find information about the topics listed below:    · Instructions about your medications including a list of your home medications  · A summary of your hospital visit  · Follow-up appointments once you have left the hospital  · Your care plan at home      You may receive a survey regarding the care you received during your stay. Your input is valuable to us. We encourage you to complete and return your survey in the envelope provided. We hope you will choose us in the future for your healthcare needs.           Patient Information     Patient Name ROSIBEL Scanlon Deaelisa 1932      Care Provided at:     Name Address Phone       [address] [phone]            Your Visit    Here you will find information about your visit, including the reason for your visit. Please take this sheet with you when you visit your doctor or other health care provider in the future. It will help determine the best possible medical care for you at that time. If you have any questions once you leave the hospital, please call the department phone number listed below. Why you were here     Your primary diagnosis was:  Not on File      Visit Information     Date & Time Department Dept. Phone    9/21/2017 [facility] [phone]       Follow-up Appointments    Below is a list of your follow-up and future appointments. This may not be a complete list as you may have made appointments directly with providers that we are not aware of or your providers may have made some for you. Please call your providers to confirm appointments. It is important to keep your appointments. Please bring your current insurance card, photo ID, co-pay, and all medication bottles to your appointment. If self-pay, payment is expected at the time of service. Future Appointments     9/28/2017  8:30 AM     Appointment with STR OUT PT [place] [address] at [address] [facility] ([phone])   [address] [place] [address] [address]  [address] [place] [address]       10/5/2017 8:30 AM     Appointment with Mike Carmona MD at [facility] ([phone])   Please arrive 15 minutes prior to appointment, bring photo ID and insurance card. Please arrive 15 minutes prior to appointment, bring photo ID and insurance card. [address] [facility]       10/5/2017 9:30 AM     Appointment with STR OUT PT ONC INJ RM 05 at [address] [facility] ([phone])   [address] [place] [address], [address]  [facility] [address]       10/27/2017 8:15 AM     Appointment with Hakeem Brandon MD at [address] [facility] ([phone])   Please arrive 15 minutes prior to appointment, bring photo ID and insurance card. Please arrive 15 minutes prior to appointment, bring photo ID and insurance card. [address] [facility] [address]  [facility] [address]       11/1/2017 8:40 AM     Appointment with Zo Renteria DO at [address] [facility] ([phone])   Please arrive 15 minutes prior to appointment, bring photo ID and insurance card. [place] [phone]       12/5/2017 12:30 PM     Appointment with [facility] PULMONARY FUNCTION ROOM 1 at [address] [place] [address] ([phone])   [address] [address]       12/19/2017 9:20 AM     Appointment with Coty Miller MD at [address] [facility] ([phone])   Please arrive 15 minutes prior to appointment, bring photo ID and insurance card. [facility]  [address]  [facility] [address]         Preventive Care        Date Due    Tetanus Combination Vaccine (1 - Tdap) 12/24/1951    Pneumococcal Vaccines (two) for age 72 years & over with: cerebrospinal fluid leaks, cochlear implants, hemoglobinopathies,  asplenia, immunodeficiencies, HIV infection, or chronic renal failure (2 of 2 - PPSV23) 3/15/2016    Yearly Flu Vaccine (1) 9/1/2017                 Care Plan Once You Return Home    This section includes instructions you will need to follow once you leave the hospital.  Your care team will discuss these with you, so you and those caring for you know how to best care for your health needs at home. This section may also include educational information about certain health topics that may be of help to you. Discharge Instructions       Please contact your Oncologist if you have any questions regarding the chemotherapy velcade injection that you received today. Patient instructed if experience any of the symptoms following today's chemotherapy / to notify MD immediately or go to emergency department.     * dizziness/lightheadedness  *acute nausea/vomiting - not relieved with medication  *headache - not relieved from Tylenol/pain medication  *chest pain/pressure  *rash/itching  *shortness of breath        Drink fluids - 48oz fluids daily appointments or sending messages to your provider are not activated if your provider does not use [facility] in his/her office    For questions regarding your MyChart account call [phone]. If you have a clinical question, please call your doctor's office. The information on all pages of the After Visit Summary has been reviewed with me, the patient and/or responsible adult, by my health care provider(s). I had the opportunity to ask questions regarding this information. I understand I should dispose of my armband safely at home to protect my health information. A complete copy of the After Visit Summary has been given to me, the patient and/or responsible adult.            Patient Signature/Responsible Adult:____________________    Clinician Signature:_____________________    Date:_____________________    Time:_____________________

## 2017-09-28 NOTE — IP AVS SNAPSHOT
Patient Information     Patient Name ROSIBEL Fox 1932         This is your updated medication list to keep with you all times      ASK your doctor about these medications     Acetaminophen 650 MG Tabs   Take 650 mg by mouth every 4 hours as needed       * albuterol sulfate  (90 Base) MCG/ACT inhaler   Commonly known as:  VENTOLIN HFA   Inhale 2 puffs into the lungs every 6 hours as needed for Wheezing or Shortness of Breath       * albuterol (2.5 MG/3ML) 0.083% nebulizer solution   Commonly known as:  PROVENTIL   Take 3 mLs by nebulization every 6 hours as needed for Wheezing or Shortness of Breath       aspirin 81 MG EC tablet       CHEMOTHERAPY       dexamethasone 2 MG tablet   Commonly known as:  DECADRON       leuprolide 30 MG injection   Commonly known as:  LUPRON       nicotine 21 MG/24HR   Commonly known as:  NICODERM CQ   Place 1 patch onto the skin daily       tiotropium 18 MCG inhalation capsule   Commonly known as:  SPIRIVA HANDIHALER   Inhale 1 capsule into the lungs daily 1 capsule via inhalation daily. TYLENOL PM EXTRA STRENGTH  MG tablet   Generic drug:  diphenhydrAMINE-APAP (sleep)       * Notice: This list has 2 medication(s) that are the same as other medications prescribed for you. Read the directions carefully, and ask your doctor or other care provider to review them with you.

## 2017-09-28 NOTE — IP AVS SNAPSHOT
After Visit Summary  (Discharge Instructions)    Medication List for Home    Based on the information you provided to us as well as any changes during this visit, the following is your updated medication list.  Compare this with your prescription bottles at home. If you have any questions or concerns, contact your primary care physician's office. Daily Medication List (This medication list can be shared with any healthcare provider who is helping you manage your medications)      ASK your doctor about these medications if you have questions        Last Dose    Next Dose Due AM NOON PM NIGHT    Acetaminophen 650 MG Tabs   Take 650 mg by mouth every 4 hours as needed                                         albuterol sulfate  (90 Base) MCG/ACT inhaler   Commonly known as:  VENTOLIN HFA   Inhale 2 puffs into the lungs every 6 hours as needed for Wheezing or Shortness of Breath                                         albuterol (2.5 MG/3ML) 0.083% nebulizer solution   Commonly known as:  PROVENTIL   Take 3 mLs by nebulization every 6 hours as needed for Wheezing or Shortness of Breath                                         aspirin 81 MG EC tablet   Take 81 mg by mouth daily. CHEMOTHERAPY                                         dexamethasone 2 MG tablet   Commonly known as:  DECADRON   Take 2 mg by mouth daily (with breakfast)                                         leuprolide 30 MG injection   Commonly known as:  LUPRON   Inject 30 mg into the muscle once Indications: every 3 months                                         nicotine 21 MG/24HR   Commonly known as:  NICODERM CQ   Place 1 patch onto the skin daily                                         tiotropium 18 MCG inhalation capsule   Commonly known as:  SPIRIVA HANDIHALER   Inhale 1 capsule into the lungs daily 1 capsule via inhalation daily. TYLENOL PM EXTRA STRENGTH  MG tablet   Generic drug:  diphenhydrAMINE-APAP (sleep)   Take 2 tablets by mouth nightly as needed for Sleep                                                 Allergies as of 2017        Reactions    Shellfish-derived Products     Flomax [Tamsulosin Hcl] Other (See Comments)    Drops b/p      Immunizations as of 2017     Name Date Dose VIS Date Route    Influenza Virus Vaccine 10/22/2015 0.5 mL 2015 Intramuscular    Influenza, Quadv, 3 Years and older, IM 10/11/2016 0.5 mL 2015 Intramuscular    Pneumococcal 13-valent Conjugate ([id number]) 2016 0.5 mL 2015 Intramuscular      Last Vitals          Most Recent Value    Temp  98.8 °F (37.1 °C)    Pulse  79    Resp  18    BP  131/69         After Visit Summary    This summary was created for you. Thank you for entrusting your care to us. The following information includes details about your hospital/visit stay along with steps you should take to help with your recovery once you leave the hospital.  In this packet, you will find information about the topics listed below:    · Instructions about your medications including a list of your home medications  · A summary of your hospital visit  · Follow-up appointments once you have left the hospital  · Your care plan at home      You may receive a survey regarding the care you received during your stay. Your input is valuable to us. We encourage you to complete and return your survey in the envelope provided. We hope you will choose us in the future for your healthcare needs.           Patient Information     Patient Name ROSIBEL Cramer 1932      Care Provided at:     Name Address Phone       [address] [phone]            Your Visit    Here you will find information about your visit, including the reason for your visit. Please take this sheet with you when you visit your doctor or other health care provider in the future. It will help determine the best possible medical care for you at that time. If you have any questions once you leave the hospital, please call the department phone number listed below. Why you were here     Your primary diagnosis was:  Not on File      Visit Information     Date & Time Department Dept. Phone    9/28/2017 [facility] [phone]       Follow-up Appointments    Below is a list of your follow-up and future appointments. This may not be a complete list as you may have made appointments directly with providers that we are not aware of or your providers may have made some for you. Please call your providers to confirm appointments. It is important to keep your appointments. Please bring your current insurance card, photo ID, co-pay, and all medication bottles to your appointment. If self-pay, payment is expected at the time of service. Future Appointments     10/5/2017 8:30 AM     Appointment with Rufina Rebolledo MD at [facility] ([phone])   Please arrive 15 minutes prior to appointment, bring photo ID and insurance card. Please arrive 15 minutes prior to appointment, bring photo ID and insurance card. [address] [facility]       10/5/2017  9:30 AM     Appointment with STR OUT PT ONC INJ RM 05 at [address] [place] [address] ([phone])   [address] [facility], [address]  [place] [address]       10/27/2017 8:15 AM     Appointment with Anibal Briones MD at [facility] ([phone])   Please arrive 15 minutes prior to appointment, bring photo ID and insurance card. Please arrive 15 minutes prior to appointment, bring photo ID and insurance card. [address] [facility]  [address]  [facility] [address]       11/1/2017 8:40 AM     Appointment with Marin Calzada DO at [address] [facility] ([phone]) Please arrive 15 minutes prior to appointment, bring photo ID and insurance card. [place] [id number]       12/5/2017 12:30 PM     Appointment with [facility] PULMONARY FUNCTION ROOM 1 at [address] [place] [address] ([phone])   [address] [address]       12/19/2017 9:20 AM     Appointment with Kennedi Bedolla MD at [address] ([phone])   Please arrive 15 minutes prior to appointment, bring photo ID and insurance card. [address]  [address]  [place] [address]         Preventive Care        Date Due    Tetanus Combination Vaccine (1 - Tdap) 12/24/1951    Pneumococcal Vaccines (two) for age 72 years & over with: cerebrospinal fluid leaks, cochlear implants, hemoglobinopathies,  asplenia, immunodeficiencies, HIV infection, or chronic renal failure (2 of 2 - PPSV23) 3/15/2016    Yearly Flu Vaccine (1) 9/1/2017                 Care Plan Once You Return Home    This section includes instructions you will need to follow once you leave the hospital.  Your care team will discuss these with you, so you and those caring for you know how to best care for your health needs at home. This section may also include educational information about certain health topics that may be of help to you. Discharge Instructions       Please contact your Oncologist if you have any questions regarding the chemotherapy velcade that you received today. Patient instructed if experience any of the symptoms following today's chemotherapy / to notify MD immediately or go to emergency department.     * dizziness/lightheadedness  *acute nausea/vomiting - not relieved with medication  *headache - not relieved from Tylenol/pain medication  *chest pain/pressure  *rash/itching  *shortness of breath        Drink fluids - 48oz fluids daily  Call if develop fever/ chills/ signs or symptoms of infection    Important information for a smoker SMOKING: QUIT SMOKING. THIS IS THE MOST IMPORTANT ACTION YOU CAN TAKE TO IMPROVE YOUR CURRENT AND FUTURE HEALTH. Call the [facility] at [place] NOW ([phone])    Smoking harms nonsmokers. When nonsmokers are around people who smoke, they absorb nicotine, carbon monoxide, and other ingredients of tobacco smoke. DO NOT SMOKE AROUND CHILDREN     Children exposed to secondhand smoke are at an increased risk of:  Sudden Infant Death Syndrome (SIDS), acute respiratory infections, inflammation of the middle ear, and severe asthma. Over a longer time, it causes heart disease and lung cancer. There is no safe level of exposure to secondhand smoke. [employer] Signup     Our records indicate that you have an active [employer] account. You can view your After Visit Summary by going to [email or web address] and logging in with your [employer] username and password. If you don't have a [employer]ort username and password but a parent or guardian has access to your record, the parent or guardian should login with their own [employer] username and password and access your record to view the After Visit Summary. Additional Information  If you have questions, please contact the physician practice where you receive care. Remember, [employer] is NOT to be used for urgent needs. For medical emergencies, dial 911. For questions regarding your [employer] account call [phone]. If you have a clinical question, please call your doctor's office. View your information online  ? Review your current list of  medications, immunization, and allergies. ? Review your future test results online . ?  Review your discharge instructions provided by your caregivers at discharge    Certain functionality such as prescription refills, scheduling appointments or sending messages to your provider are not activated if your provider does not use [employer] in his/her office    For questions regarding your MyChart account call [phone]. If you have a clinical question, please call your doctor's office. The information on all pages of the After Visit Summary has been reviewed with me, the patient and/or responsible adult, by my health care provider(s). I had the opportunity to ask questions regarding this information. I understand I should dispose of my armband safely at home to protect my health information. A complete copy of the After Visit Summary has been given to me, the patient and/or responsible adult.            Patient Signature/Responsible Adult:____________________    Clinician Signature:_____________________    Date:_____________________    Time:_____________________

## 2017-09-28 NOTE — PLAN OF CARE
Problem: Intellectual/Education/Knowledge Deficit  Intervention: Verbal/written education provided  Chemotherapy Teaching      What is Chemotherapy   Drug action [X]   Method of Administration [X]   Handouts given [ ]      Side Effects  Nausea/vomiting [X]   Diarrhea [X]   Fatigue [X]   Signs / Symptoms of infection [X]   Neutropenia [X]   Thrombocytopenia [X]   Alopecia [X]   neuropathy [X]   Broome diet &  the importance of fluids [X]         Micellaneous  Importance of nutrition [X]   Importance of oral hygiene [X]   When to call the MD [X]   Monitoring labs [X]   Use of supportive services [ ]      Explanation of Drug Regimen / Frequency  velcade      Comments  Verbalized understanding to drug,action,side effects and when to call MD         Goal: Teaching initiated upon admission  Outcome: Met This Shift  Patient verbalizes understanding to verbal information given on velcade,action and possible side effects. Aware to call MD if develop complications. Problem: Discharge Planning  Intervention: Interaction with patient/family and care team  Discuss discharge instructions, follow ups and when to call doctor. Goal: Knowledge of discharge instructions  Knowledge of discharge instructions  Outcome: Met This Shift  Verbalized understanding of discharge instructions, follow ups and when to call doctor    Comments:   Care plan reviewed with patient. Patient verbalized understanding of the plan of care and contribute to goal setting.

## 2017-10-04 NOTE — CARE COORDINATION
Ambulatory Care Coordination Note  10/4/2017  CM Risk Score: 14  Shelia Mortality Risk Score: 54.05    ACC: Lacy Parker RN    Summary Note: spoke with Ben Resendiz today. Discussed that he has been staying inside during these weather changes. States he can feel the air is heavy but it has not affected his breathing. Smoking cessation continues and he is more determined than ever to stop smoking. States he understands that those cigarettes are not doing him any good. Discussed and encouraged him to use [employer] sheets and he states they are on his fridge. Continues to receive chemo weekly with lab work. Continues to take Boost and has cut back to one daily versus two as he was gaining some weight. Ben Resendiz is very high risk for hospital admissions and ED visits but he has done a great job managing his chronic conditions. COPD Assessment    Does the patient understand envrionmental exposure?:  Yes   Is the patient able to verbalize Rescue vs. Long Acting medications?:  No   Does the patient have a nebulizer?:  Yes   Does the patient use a space with inhaled medications?:  No               Symptoms:   None:  Yes         Symptom course:  stable   Breathlessness:  exertion   Increase use of rapid acting/rescue inhaled medications?:  No   Change in chronic cough?:  No/At Baseline   Change in sputum?:  No/At Baseline   Have you had a recent diagnosis of pneumonia either by PCP or at a hospital?:  No             Diabetes Assessment    Medic Alert ID:  No   Meal Planning:  None   How often do you test your blood sugar?:  No Testing   Do you have barriers with adherence to non-pharmacologic self-management interventions?  (Nutrition/Exercise/Self-Monitoring):  Yes   Have you ever had to go to the ED for symptoms of low blood sugar?:  No      Do you have hyperglycemia symptoms?:  No   Do you have hypoglycemia symptoms?:  No   Blood Sugar Monitoring Regimen:  Not Testing                   Care Coordination Interventions

## 2017-10-05 NOTE — IP AVS SNAPSHOT
Patient Information     Patient Name ROSIBEL Cotton 1932         This is your updated medication list to keep with you all times      ASK your doctor about these medications     Acetaminophen 650 MG Tabs   Take 650 mg by mouth every 4 hours as needed       * albuterol sulfate  (90 Base) MCG/ACT inhaler   Commonly known as:  VENTOLIN HFA   Inhale 2 puffs into the lungs every 6 hours as needed for Wheezing or Shortness of Breath       * albuterol (2.5 MG/3ML) 0.083% nebulizer solution   Commonly known as:  PROVENTIL   Take 3 mLs by nebulization every 6 hours as needed for Wheezing or Shortness of Breath       aspirin 81 MG EC tablet       CHEMOTHERAPY       dexamethasone 2 MG tablet   Commonly known as:  DECADRON       leuprolide 30 MG injection   Commonly known as:  LUPRON       nicotine 21 MG/24HR   Commonly known as:  NICODERM CQ   Place 1 patch onto the skin daily       tiotropium 18 MCG inhalation capsule   Commonly known as:  SPIRIVA HANDIHALER   Inhale 1 capsule into the lungs daily 1 capsule via inhalation daily. TYLENOL PM EXTRA STRENGTH  MG tablet   Generic drug:  diphenhydrAMINE-APAP (sleep)       * Notice: This list has 2 medication(s) that are the same as other medications prescribed for you. Read the directions carefully, and ask your doctor or other care provider to review them with you.

## 2017-10-05 NOTE — ONCOLOGY
Chemotherapy Administration    Pre-assessment Data: Antineoplastic Agents  Other:   See toxicity flow sheet for assessment [x]     Physician Notification of Concerns Related to Chemotherapy Administration:   Physician Notified [facility] / Time of Notification      Interventions:   Lab work assessed  [x]   Height / Weight verified for dose [x]   Current MAR reviewed [x]   Emergency drugs available as appropriate [x]   Anaphylaxis assessment completed [x]   Pre-medications administered as ordered [x]   Chemotherapy Administered as SQ injection [x]   Monitor for signs / symptoms of hypersensitivity reaction    [x]   Chemotherapy orders (drug/dose/rate) verified by 2 Chemo certified   RNs    [x]          Document chemotherapy teaching on the Patient Education tab    [x]   Document patient verbalizes understanding of medications being administered    [x]   Other: []    []    []      []    []

## 2017-10-05 NOTE — MR AVS SNAPSHOT
[place]/Lambda Free Lt Chains, Serum Quant [[id number] Custom]  11/2/2017 10/5/2018    POC PANEL BMP W/IOCA [LRV4834 Custom]  11/2/2017 10/5/2018    Follow-Up    Return in about 4 weeks (around 11/2/2017). Information from Your Visit        Department     Name Address Phone Fax    [facility] [address] [place] [address] [address]  [address] [facility] [phone]      You Were Seen for:         Comments    Multiple myeloma not having achieved remission [facility])   [[id number]]         Vital Signs     Blood Pressure Pulse Temperature Respirations Height Weight    138/78 (Site: Right Arm, Position: Sitting, Cuff Size: Medium Adult) 83 97.2 °F (36.2 °C) (Oral) 18 5' 6.14\" (1.68 m) 210 lb (95.3 kg)    Oxygen Saturation Body Mass Index Smoking Status             98% 33.75 kg/m2 Former Smoker         Additional Information about your Body Mass Index (BMI)           Your BMI as listed above is considered obese (30 or more). BMI is an estimate of body fat, calculated from your height and weight. The higher your BMI, the greater your risk of heart disease, high blood pressure, type 2 diabetes, stroke, gallstones, arthritis, sleep apnea, and certain cancers. BMI is not perfect. It may overestimate body fat in athletes and people who are more muscular. Even a small weight loss (between 5 and 10 percent of your current weight) by decreasing your calorie intake and becoming more physically active will help lower your risk of developing or worsening diseases associated with obesity. Learn more at: [email or web address]co.uk          Instructions    1. Chemotherapy today with Velcade  2. Labs on RTC  3. Weekly chemotherapy.               Medications and Orders      Your Current Medications Are              dexamethasone (DECADRON) 2 MG tablet Take 2 mg by mouth daily (with breakfast)     diphenhydrAMINE-APAP, sleep, (TYLENOL PM EXTRA STRENGTH)  MG tablet Take 2 tablets by mouth nightly as needed for Sleep    nicotine (NICODERM CQ) 21 MG/24HR Place 1 patch onto the skin daily    leuprolide (LUPRON) 30 MG injection Inject 30 mg into the muscle once Indications: every 3 months    CHEMOTHERAPY     albuterol sulfate HFA (VENTOLIN HFA) 108 (90 BASE) MCG/ACT inhaler Inhale 2 puffs into the lungs every 6 hours as needed for Wheezing or Shortness of Breath    albuterol (PROVENTIL) (2.5 MG/3ML) 0.083% nebulizer solution Take 3 mLs by nebulization every 6 hours as needed for Wheezing or Shortness of Breath    acetaminophen 650 MG TABS Take 650 mg by mouth every 4 hours as needed    aspirin 81 MG EC tablet Take 81 mg by mouth daily. tiotropium (SPIRIVA HANDIHALER) 18 MCG inhalation capsule Inhale 1 capsule into the lungs daily 1 capsule via inhalation daily.       Allergies              Shellfish-derived Products     Flomax [Tamsulosin Hcl] Other (See Comments)    Drops b/p         Additional Information        Basic Information     Date Of Birth Sex Race Ethnicity Preferred Language Preferred Written Language    12/24/1932 Male White Non-/Non  English English      Problem List as of 10/5/2017  Date Reviewed: 5/18/2017                Bilateral edema of lower extremity  due to VElcade  and steroids    Smoldering myeloma    SIRS (systemic inflammatory response syndrome) (HCC)    Dependence on nicotine from cigarettes    Vesicular rash    Irregular heart beat    Elevated lipase    Multiple myeloma (HCC)    Chronic anemia    Tobacco abuse    Fever    Pneumonia of right middle lobe due to infectious organism    Encounter for antineoplastic chemotherapy    Hypomagnesemia    Sinus tachycardia    Shortness of breath    Bone metastasis (HCC)    Immunocompromised state (HCC) (Chronic)    Chronic leukopenia (Chronic)    Thrombocytopenia (HCC) (Chronic)    Anemia in neoplastic disease    Hyperlipidemia    Chemotherapy management, encounter for

## 2017-10-05 NOTE — PROGRESS NOTES
Patient assessed for the following post chemotherapy:    Dizziness   No  Lightheadedness  No      Acute nausea/vomiting No  Headache   No  Chest pain/pressure  No  Rash/itching   No  Shortness of breath  No    Patient kept for 20 minutes observation post chemotherapy. Patient tolerated chemotherapy treatment Velcade without any complications. Last vital signs:   /60  Pulse 89  Temp 97.6 °F (36.4 °C) (Oral)   Resp 18  Ht 5' 6.14\" (1.68 m)  Wt 210 lb (95.3 kg)  SpO2 97%  BMI 33.75 kg/m2    Patient instructed if he experiences any of the above symptoms following today's injection, he is to notify MD immediately or go to the emergency department. Discharge instructions given to patient. Verbalizes understanding. Ambulated off unit with daughter and belongings.

## 2017-10-05 NOTE — IP AVS SNAPSHOT
After Visit Summary  (Discharge Instructions)    Medication List for Home    Based on the information you provided to us as well as any changes during this visit, the following is your updated medication list.  Compare this with your prescription bottles at home. If you have any questions or concerns, contact your primary care physician's office. Daily Medication List (This medication list can be shared with any healthcare provider who is helping you manage your medications)      ASK your doctor about these medications if you have questions        Last Dose    Next Dose Due AM NOON PM NIGHT    Acetaminophen 650 MG Tabs   Take 650 mg by mouth every 4 hours as needed                                         albuterol sulfate  (90 Base) MCG/ACT inhaler   Commonly known as:  VENTOLIN HFA   Inhale 2 puffs into the lungs every 6 hours as needed for Wheezing or Shortness of Breath                                         albuterol (2.5 MG/3ML) 0.083% nebulizer solution   Commonly known as:  PROVENTIL   Take 3 mLs by nebulization every 6 hours as needed for Wheezing or Shortness of Breath                                         aspirin 81 MG EC tablet   Take 81 mg by mouth daily. CHEMOTHERAPY                                         dexamethasone 2 MG tablet   Commonly known as:  DECADRON   Take 2 mg by mouth daily (with breakfast)                                         leuprolide 30 MG injection   Commonly known as:  LUPRON   Inject 30 mg into the muscle once Indications: every 3 months                                         nicotine 21 MG/24HR   Commonly known as:  NICODERM CQ   Place 1 patch onto the skin daily                                         tiotropium 18 MCG inhalation capsule   Commonly known as:  SPIRIVA HANDIHALER   Inhale 1 capsule into the lungs daily 1 capsule via inhalation daily. TYLENOL PM EXTRA STRENGTH  MG tablet   Generic drug:  diphenhydrAMINE-APAP (sleep)   Take 2 tablets by mouth nightly as needed for Sleep                                                 Allergies as of 10/5/2017        Reactions    Shellfish-derived Products     Flomax [Tamsulosin Hcl] Other (See Comments)    Drops b/p      Immunizations as of 10/5/2017     Name Date Dose VIS Date Route    Influenza Virus Vaccine 10/22/2015 0.5 mL 2015 Intramuscular    Influenza, Quadv, 3 Years and older, IM 10/11/2016 0.5 mL 2015 Intramuscular    Pneumococcal 13-valent Conjugate (Pesanno39) 2016 0.5 mL 2015 Intramuscular      Last Vitals          Most Recent Value    Temp  97.6 °F (36.4 °C)    Pulse  89    Resp  18    BP  134/60         After Visit Summary    This summary was created for you. Thank you for entrusting your care to us. The following information includes details about your hospital/visit stay along with steps you should take to help with your recovery once you leave the hospital.  In this packet, you will find information about the topics listed below:    · Instructions about your medications including a list of your home medications  · A summary of your hospital visit  · Follow-up appointments once you have left the hospital  · Your care plan at home      You may receive a survey regarding the care you received during your stay. Your input is valuable to us. We encourage you to complete and return your survey in the envelope provided. We hope you will choose us in the future for your healthcare needs.           Patient Information     Patient Name ROSIBEL Honeycutt [facility] 1932      Care Provided at:     Name Address Phone       [address] [phone]            Your Visit    Here you will find information about your visit, including the reason for your visit. Please take this sheet with you when you visit your doctor or other health care provider in the future. It will help determine the best possible medical care for you at that time. If you have any questions once you leave the hospital, please call the department phone number listed below. Why you were here     Your primary diagnosis was:  Not on File      Visit Information     Date & Time Department Dept. Phone    10/5/2017 STR OP ONCOLOGY [phone]       Follow-up Appointments    Below is a list of your follow-up and future appointments. This may not be a complete list as you may have made appointments directly with providers that we are not aware of or your providers may have made some for you. Please call your providers to confirm appointments. It is important to keep your appointments. Please bring your current insurance card, photo ID, co-pay, and all medication bottles to your appointment. If self-pay, payment is expected at the time of service. Future Appointments     10/12/2017 8:30 AM     Appointment with STR OUT PT ONC INJ RM 16 at [address] [place] [address] ([phone])   [place] [address], [facility] [address]  [facility]       10/19/2017 8:30 AM     Appointment with STR OUT PT ONC INJ RM 14 at [address] [place] [address] ([phone])   [place] [address], [facility]  [facility]       10/26/2017 8:30 AM     Appointment with STR OUT PT ONC INJ RM 12 at [address] [place] [address] ([phone])   [place] [address], [facility] [address]  [facility]       10/27/2017 8:15 AM     Appointment with Judy Craft MD at [facility] ([phone])   Please arrive 15 minutes prior to appointment, bring photo ID and insurance card. Please arrive 15 minutes prior to appointment, bring photo ID and insurance card. [address] [address]       11/1/2017 8:40 AM     Appointment with Ting Martins DO at [address] [place] [address] ([phone]) Please arrive 15 minutes prior to appointment, bring photo ID and insurance card. [place] [phone]       11/2/2017 8:45 AM     Appointment with Brennan Mae MD at [facility] ([phone])   Please arrive 15 minutes prior to appointment, bring photo ID and insurance card. Please arrive 15 minutes prior to appointment, bring photo ID and insurance card. [address] [facility]       11/2/2017 9:45 AM     Appointment with STR OUT PT ONC INJ RM 10 at [address] [facility] ([phone])   [address] [place] [address], [address]  [place] [address]       12/5/2017 12:30 PM     Appointment with STR PULMONARY FUNCTION ROOM 1 at [address] [place] [address] ([phone])   [address] [place] [address]       12/19/2017 9:20 AM     Appointment with Brittany Ritter MD at [address] [facility] [address] ([phone])   Please arrive 15 minutes prior to appointment, bring photo ID and insurance card. [place] [address]  [address]  [facility] [address]         Preventive Care        Date Due    Tetanus Combination Vaccine (1 - Tdap) 12/24/1951    Pneumococcal Vaccines (two) for age 72 years & over with: cerebrospinal fluid leaks, cochlear implants, hemoglobinopathies,  asplenia, immunodeficiencies, HIV infection, or chronic renal failure (2 of 2 - PPSV23) 3/15/2016    Yearly Flu Vaccine (1) 9/1/2017                 Care Plan Once You Return Home    This section includes instructions you will need to follow once you leave the hospital.  Your care team will discuss these with you, so you and those caring for you know how to best care for your health needs at home. This section may also include educational information about certain health topics that may be of help to you. Discharge Instructions       Please contact your Oncologist if you have any questions regarding the chemotherapy Velcade that you received today. Patient instructed if experience any of the symptoms following today's chemotherapy / to notify MD immediately or go to emergency department. * dizziness/lightheadedness  *acute nausea/vomiting - not relieved with medication  *headache - not relieved from Tylenol/pain medication  *chest pain/pressure  *rash/itching  *shortness of breath        Drink fluids - 48oz fluids daily  Call if develop fever/ chills/ signs or symptoms of infection    Important information for a smoker       SMOKING: QUIT SMOKING. THIS IS THE MOST IMPORTANT ACTION YOU CAN TAKE TO IMPROVE YOUR CURRENT AND FUTURE HEALTH. Call the [address] [place] [address] at [employer] ([phone])    Smoking harms nonsmokers. When nonsmokers are around people who smoke, they absorb nicotine, carbon monoxide, and other ingredients of tobacco smoke. DO NOT SMOKE AROUND CHILDREN     Children exposed to secondhand smoke are at an increased risk of:  Sudden Infant Death Syndrome (SIDS), acute respiratory infections, inflammation of the middle ear, and severe asthma. Over a longer time, it causes heart disease and lung cancer. There is no safe level of exposure to secondhand smoke. [employer] Signup     Our records indicate that you have an active [employer] account. You can view your After Visit Summary by going to [email or web address] and logging in with your [employer] username and password. If you don't have a [employer] username and password but a parent or guardian has access to your record, the parent or guardian should login with their own [employer] username and password and access your record to view the After Visit Summary. Additional Information  If you have questions, please contact the physician practice where you receive care. Remember, [employer] is NOT to be used for urgent needs. For medical emergencies, dial 911. For questions regarding your [employer] account call [phone]. If you have a clinical question, please call your doctor's office. View your information online  ? Review your current list of  medications, immunization, and allergies. ? Review your future test results online . ? Review your discharge instructions provided by your caregivers at discharge    Certain functionality such as prescription refills, scheduling appointments or sending messages to your provider are not activated if your provider does not use [employer] in his/her office    For questions regarding your [employer] account call [phone]. If you have a clinical question, please call your doctor's office. The information on all pages of the After Visit Summary has been reviewed with me, the patient and/or responsible adult, by my health care provider(s). I had the opportunity to ask questions regarding this information. I understand I should dispose of my armband safely at home to protect my health information. A complete copy of the After Visit Summary has been given to me, the patient and/or responsible adult.            Patient Signature/Responsible Adult:____________________    Clinician Signature:_____________________    Date:_____________________    Time:_____________________

## 2017-10-12 NOTE — PROGRESS NOTES
Patient assessed for the following post chemotherapy:    Dizziness   No  Lightheadedness  No      Acute nausea/vomiting           No  Headache   No  Chest pain/pressure  No  Rash/itching   No  Shortness of breath             No    Patient kept for 20 minutes observation post injection chemotherapy. Patient tolerated chemotherapy treatment velcade subq injection without any complications. Last vital signs:   BP (!) 145/67   Pulse 78   Temp 97.7 °F (36.5 °C) (Oral)   Resp 18   Ht 5' 6\" (1.676 m)   Wt 214 lb 9.6 oz (97.3 kg)   SpO2 97%   BMI 34.64 kg/m²     Patient instructed if experience any of the above symptoms following today's injection, he/she is to notify MD immediately or go to the emergency department. Discharge instructions given to patient. Verbalizes understanding. Ambulated off unit per self with family with belongings.

## 2017-10-12 NOTE — ONCOLOGY
Chemotherapy Administration    Pre-assessment Data: Antineoplastic Agents  Other:   See toxicity flow sheet for assessment [x]     Physician Notification of Concerns Related to Chemotherapy Administration:   Physician Notified Roma Sherman / Time of Notification      Interventions:   Lab work assessed  [x]   Height / Weight verified for dose [x]   Current MAR reviewed [x]   Emergency drugs available as appropriate [x]   Anaphylaxis assessment completed [x]   Pre-medications administered as ordered [x]   Chemotherapy Administered as SQ injection [x]   Monitor for signs / symptoms of hypersensitivity reaction    [x]   Chemotherapy orders (drug/dose/rate) verified by 2 Chemo certified   RNs    [x]          Document chemotherapy teaching on the Patient Education tab    [x]   Document patient verbalizes understanding of medications being administered    [x]   Other: velcade [x]    []    []      []    []

## 2017-10-12 NOTE — PLAN OF CARE
Problem: Intellectual/Education/Knowledge Deficit  Intervention: Verbal/written education provided  Chemotherapy Teaching     What is Chemotherapy   Drug action [x]   Method of Administration [x]   Handouts given []     Side Effects  Nausea/vomiting [x]   Diarrhea [x]   Fatigue [x]   Signs / Symptoms of infection [x]   Neutropenia [x]   Thrombocytopenia [x]   Alopecia [x]   neuropathy [x]   [place] diet &  the importance of fluids [x]       Micellaneous  Importance of nutrition [x]   Importance of oral hygiene [x]   When to call the MD [x]   Monitoring labs [x]   Use of supportive services []     Explanation of Drug Regimen / Frequency  velcade subq injection     Comments  Verbalized understanding to drug,action,side effects and when to call MD       Goal: Teaching initiated upon admission  Outcome: Met This Shift  Patient verbalizes understanding to verbal information given on velcade injection,action and possible side effects. Aware to call MD if develop complications. Problem: Discharge Planning  Intervention: Interaction with patient/family and care team  Provide discharge instructions. Goal: Knowledge of discharge instructions  Knowledge of discharge instructions     Outcome: Met This Shift  Verbalized understanding of discharge instructions, follow-up appointments, and when to call the physician. Comments: Care plan reviewed with patient and family. Patient and family verbalize understanding of the plan of care and contribute to goal setting.

## 2017-10-16 NOTE — CARE COORDINATION
Melina Hew called and states he is not feeling well at all. States over the weekend he developed increased mucus production with green tinge to it. States he has been coughing more. Denies fever. Is afraid he is going to get sick quickly if he doesn't get some treatment. Has been actively using his breathing treatments and inhaler. States he would like PCP to call something in to pharmacy versus going to office because he is worried he will catch other \"bugs\" in the office with his low immune system. However, he states that if PCP wants him to come in to office, he will do that. Pharmacy is [facility] on [address]. Please advise. Thank you.

## 2017-10-16 NOTE — PROGRESS NOTES
Visit Information    Have you changed or started any medications since your last visit including any over-the-counter medicines, vitamins, or herbal medicines? no   Are you having any side effects from any of your medications? -  no  Have you stopped taking any of your medications? Is so, why? -  no    Have you seen any other physician or provider since your last visit? Yes - Records Obtained  Have you had any other diagnostic tests since your last visit? No  Have you been seen in the emergency room and/or had an admission to a hospital since we last saw you? No  Have you had your routine dental cleaning in the past 6 months? no    Have you activated your [employer] account? If not, what are your barriers?  Yes     Patient Care Team:  Naye Davis DO as PCP - Serge Gardner MD (Internal Medicine)  Chris Kuo MD as Consulting Physician (Pulmonology)  Pham Mcghee RN as Care Coordinator    Medical History Review  Past Medical, Family, and Social History reviewed and does not contribute to the patient presenting condition    Health Maintenance   Topic Date Due    DTaP/Tdap/Td vaccine (1 - Tdap) 12/24/1951    Pneumococcal high/highest risk (2 of 2 - PPSV23) 03/15/2016    Flu vaccine (1) 09/01/2017    Zostavax vaccine  Addressed

## 2017-10-19 NOTE — PROGRESS NOTES
Patient assessed for the following post chemotherapy:    Dizziness   No  Lightheadedness  No      Acute nausea/vomiting No  Headache   No  Chest pain/pressure  No  Rash/itching              No  Shortness of breath             No    Patient kept for 20 minutes observation post injection chemotherapy. Patient tolerated chemotherapy treatment velcade subq injection without any complications. Last vital signs:   BP (!) 162/74   Pulse 82   Temp 97.6 °F (36.4 °C) (Oral)   Resp 18   SpO2 98%     Patient instructed if experience any of the above symptoms following today's injection, he/she is to notify MD immediately or go to the emergency department. Discharge instructions given to patient. Verbalizes understanding. Ambulated off unit per self with daughter with belongings.

## 2017-10-19 NOTE — PLAN OF CARE
Problem: Intellectual/Education/Knowledge Deficit  Intervention: Verbal/written education provided  Chemotherapy Teaching     What is Chemotherapy   Drug action [x]   Method of Administration [x]   Handouts given []     Side Effects  Nausea/vomiting [x]   Diarrhea [x]   Fatigue [x]   Signs / Symptoms of infection [x]   Neutropenia [x]   Thrombocytopenia [x]   Alopecia [x]   neuropathy [x]   [place] diet &  the importance of fluids [x]       Micellaneous  Importance of nutrition [x]   Importance of oral hygiene [x]   When to call the MD [x]   Monitoring labs [x]   Use of supportive services []     Explanation of Drug Regimen / Frequency  velcade injection     Comments  Verbalized understanding to drug,action,side effects and when to call MD       Goal: Teaching initiated upon admission  Outcome: Met This Shift  Patient verbalizes understanding to verbal information given on velcade ,action and possible side effects. Aware to call MD if develop complications. Problem: Discharge Planning  Intervention: Interaction with patient/family and care team  Provide discharge instructions. Goal: Knowledge of discharge instructions  Knowledge of discharge instructions     Outcome: Met This Shift  Verbalized understanding of discharge instructions, follow-up appointments, and when to call the physician. Comments: Care plan reviewed with patient and daughter. Patient and daughter verbalize understanding of the plan of care and contribute to goal setting.

## 2017-10-19 NOTE — ONCOLOGY
Chemotherapy Administration    Pre-assessment Data: Antineoplastic Agents  Other:   See toxicity flow sheet for assessment [x]     Physician Notification of Concerns Related to Chemotherapy Administration:   Physician Notified Valere Arrow / Time of Notification      Interventions:   Lab work assessed  [x]   Height / Weight verified for dose [x]   Current MAR reviewed [x]   Emergency drugs available as appropriate [x]   Anaphylaxis assessment completed [x]   Pre-medications administered as ordered [x]   Chemotherapy Administered as SQ injection [x]   Monitor for signs / symptoms of hypersensitivity reaction    [x]   Chemotherapy orders (drug/dose/rate) verified by 2 Chemo certified   RNs    [x]          Document chemotherapy teaching on the Patient Education tab    [x]   Document patient verbalizes understanding of medications being administered    [x]   Other: velcade [x]    []    []      []    []

## 2017-10-25 NOTE — TELEPHONE ENCOUNTER
He is currently on     Symbicort 160/4.5mcg spray MDI, 2 puffs via inhalation BID. Spiriva 18mcg/Cap DPI. 1Cap via inhalation daily. Albuterol HFA 90mcg/Spray MDI, 2puffs  Q6Hprn. I never prescribed him  pulmicort or budsonide 0.5mg via neb bid. If he need any refills on above meds, please let me know. He can not take pulmicort or budsonide 0.5mg via neb bid and Symbicort together. If he really want to be on pulmicort or budsonide 0.5mg via neb bid, he need to contact his physician who prescribed or come for follow up .

## 2017-10-26 NOTE — PLAN OF CARE
Problem: Intellectual/Education/Knowledge Deficit  Intervention: Verbal/written education provided  Chemotherapy Teaching     What is Chemotherapy   Drug action [x]   Method of Administration [x]   Handouts given []     Side Effects  Nausea/vomiting [x]   Diarrhea [x]   Fatigue [x]   Signs / Symptoms of infection [x]   Neutropenia [x]   Thrombocytopenia [x]   Alopecia [x]   neuropathy [x]   [place] diet &  the importance of fluids [x]       Micellaneous  Importance of nutrition [x]   Importance of oral hygiene [x]   When to call the MD [x]   Monitoring labs [x]   Use of supportive services []     Explanation of Drug Regimen / Frequency  velcade     Comments  Verbalized understanding to drug,action,side effects and when to call MD       Goal: Teaching initiated upon admission  Outcome: Met This Shift  Patient verbalizes understanding to verbal information given on velcade,action and possible side effects. Aware to call MD if develop complications. Problem: Discharge Planning  Intervention: Discharge to appropriate level of care  Discuss discharge instructions, follow ups and when to call doctor. Goal: Knowledge of discharge instructions  Knowledge of discharge instructions    Outcome: Met This Shift  Verbalized understanding of discharge instructions, follow ups and when to call doctor    Comments: Care plan reviewed with patient. Patient verbalized understanding of the plan of care and contribute to goal setting.

## 2017-10-26 NOTE — PROGRESS NOTES
Patient assessed for the following post chemotherapy:    Dizziness   No  Lightheadedness  No      Acute nausea/vomiting No  Headache   No  Chest pain/pressure  No  Rash/itching   No  Shortness of breath  No    Patient kept for 20 minutes observation post infusion chemotherapy. Patient tolerated chemotherapy treatment with velcade without any complications. Last vital signs:   /72   Pulse 79   Temp 98.4 °F (36.9 °C) (Oral)   Resp 18   Ht 5' 5.98\" (1.676 m)   Wt 208 lb 9.6 oz (94.6 kg)   SpO2 98%   BMI 33.69 kg/m²       Patient instructed if experience any of the above symptoms following today's infusion,he/she is to notify MD immediately or go to the emergency department. Discharge instructions given to patient. Verbalizes understanding. Ambulated off unit with daughter with belongings.

## 2017-10-26 NOTE — PROGRESS NOTES
Chemotherapy Administration    Pre-assessment Data: Antineoplastic Agents  Other:   See toxicity flow sheet for assessment [x]     Physician Notification of Concerns Related to Chemotherapy Administration:   Physician Notified Adolfo [place] / Time of Notification      Interventions:   Lab work assessed  [x]   Height / Weight verified for dose [x]   Current MAR reviewed [x]   Emergency drugs available as appropriate [x]   Anaphylaxis assessment completed [x]   Pre-medications administered as ordered [x]   Chemotherapy Administered as SQ injection [x]   Monitor for signs / symptoms of hypersensitivity reaction    [x]   Chemotherapy orders (drug/dose/rate) verified by 2 Chemo certified   RNs    [x]          Document chemotherapy teaching on the Patient Education tab    [x]   Document patient verbalizes understanding of medications being administered    [x]   Other: []    []    []      []    []

## 2017-10-27 NOTE — PROGRESS NOTES
Visit date: 10/27/2017    Anne Vargas is a 80 y.o. male    Chief Complaint   Patient presents with    Follow-up     Prostate cancer       Reason for Follow-up: Prostate cancer    HPI: Mr. Antonio Hilton is being seen for follow up of Prostate cancer. He was diagnosed with caleb 4+3 prostate cancer in 2/17/2014. He is on intermittent androgen blockade. Primary treatment modality- none. He has severe LUTS with AUASI 21 today( intermittent stream, weak stream, nocturia, frequency, urgency). He denies hematuria. He was started on flomax at last visit. He couldn't continue flomax due to dizziness. He persists to have nocturia. X 5. He is on chemotherapy for multiple myeloma. Review of Systems  No problems with ears, nose or throat. No problems with eyes. No chest pain, shortness of breath, abdominal pain, extremity pain or weakness, and no neurological deficits. No rashes. No swollen glands or lymph nodes.  symptoms per HPI. The remainder of the review of symptoms is negative. Exam  General: alert and oriented. Cooperative. HENT: Normocephalic, Atraumatic. Eyes: No scleral icterus, mucous membranes moist.  Respiratory: Effort normal.   Skin: No rashes or obvious lesions.     BP (!) 152/72   Ht 5' 7\" (1.702 m)   Wt 212 lb 6.4 oz (96.3 kg)   BMI 33.27 kg/m²     Labs  Lab Results   Component Value Date    CREATININE 0.9 10/05/2017     Lab Results   Component Value Date    WBC 2.4 (L) 10/05/2017    HGB 12.5 (L) 10/05/2017    HCT 36.9 (L) 10/05/2017    MCV 96 (H) 10/05/2017     10/05/2017     Results for POC orders placed in visit on 10/27/17   POCT Urinalysis No Micro (Auto)   Result Value Ref Range    Glucose, Ur 100 (A) NEGATIVE mg/dl    Bilirubin Urine Negative     Ketones, Urine Negative NEGATIVE    Specific Gravity, Urine 1.025 1.002 - 1.03    Blood, UA POC Trace-intact NEGATIVE    pH, Urine 6.00 5.0 - 9.0    Protein, Urine Negative NEGATIVE mg/dl    Urobilinogen, Urine 0.20 0.0 - 1.0 eu/dl

## 2017-10-30 NOTE — CARE COORDINATION
I will send azithro and a prednisone taper to the pharmacy, but if his symptoms get any worse, he MUST go to the ER.

## 2017-11-01 NOTE — PROGRESS NOTES
After obtaining consent, and per orders of Dr. Viridiana Jauregui, injection of flu shot 0.5 ml given IM in left deltoid by Karie Christine. Patient instructed to report any adverse reaction to me immediately. Most recent Vaccine Information Sheet dated 8/7/15 given to pt.
or thyroid nodules, no cervical lymphadenopathy  Pulmonary/Chest: clear to auscultation bilaterally- no wheezes, rales or rhonchi, normal air movement, no respiratory distress or retractions  Cardiovascular: normal rate, regular rhythm, normal S1 and S2, no murmurs, rubs, clicks, or gallops, distal pulses intact  Extremities: no cyanosis, clubbing or edema of the lower extremities  Psych:  Normal affect without evidence of depression or anxiety, insight and judgement are appropriate, memory appears intact  Skin: warm and dry, no rash or erythema      ASSESSMENT & PLAN  Sigrid Moreno was seen today for 3 month follow-up and flu vaccine. Diagnoses and all orders for this visit:    Controlled type 2 diabetes mellitus without complication, without long-term current use of insulin (HCC)  -     POCT glycosylated hemoglobin (Hb A1C)    Pulmonary emphysema, unspecified emphysema type (HCC)    COPD exacerbation (HCC)    Need for immunization against influenza  -     INFLUENZA, QUADV, 3 YRS AND OLDER, IM, MDV, 0.5ML (FLUZONE QUADV)    Smoldering myeloma    -Given age and comorbidities, a1c goal of <8. He wants to avoid taking more medication if possible. Will continue to monitor for now. -COPD with exacerbation but slowly improving. Continue prednisone and azithro.    -Patient advised to call immediately or go to ER if any worsening of symptoms      Return in about 3 months (around 2/1/2018), or if symptoms worsen or fail to improve. Sigrid Moreno received counseling on the following healthy behaviors: medication adherence  Reviewed prior labs and health maintenance. Continue current medications, diet and exercise. Discussed use, benefit, and side effects of prescribed medications. Barriers to medication compliance addressed. Patient given educational materials - see patient instructions. All patient questions answered. Patient voiced understanding.

## 2017-11-02 NOTE — PROGRESS NOTES
Patient assessed for the following post chemotherapy:    Dizziness   No  Lightheadedness  No      Acute nausea/vomiting No  Headache   No  Chest pain/pressure  No  Rash/itching              No  Shortness of breath  No    Patient kept for 20 minutes observation post injection chemotherapy. Patient tolerated chemotherapy treatment velcade subq injection without any complications. Last vital signs:   BP (!) 143/67   Pulse 81   Temp 97.6 °F (36.4 °C) (Oral)   Resp 16   Ht 5' 7\" (1.702 m)   Wt 207 lb (93.9 kg)   SpO2 94%   BMI 32.42 kg/m²       Patient instructed if experience any of the above symptoms following today's injection, he/she is to notify MD immediately or go to the emergency department. Discharge instructions given to patient. Verbalizes understanding. Ambulated off unit per self with daughter with belongings.

## 2017-11-02 NOTE — PLAN OF CARE
Problem: Intellectual/Education/Knowledge Deficit  Intervention: Verbal/written education provided  Chemotherapy Teaching     What is Chemotherapy   Drug action [x]   Method of Administration [x]   Handouts given []     Side Effects  Nausea/vomiting [x]   Diarrhea [x]   Fatigue [x]   Signs / Symptoms of infection [x]   Neutropenia [x]   Thrombocytopenia [x]   Alopecia [x]   neuropathy [x]   [place] diet &  the importance of fluids [x]       Micellaneous  Importance of nutrition [x]   Importance of oral hygiene [x]   When to call the MD [x]   Monitoring labs [x]   Use of supportive services []     Explanation of Drug Regimen / Frequency  velcade subq injection     Comments  Verbalized understanding to drug,action,side effects and when to call MD       Goal: Teaching initiated upon admission  Outcome: Met This Shift  Patient verbalizes understanding to verbal information given on velcade injection,action and possible side effects. Aware to call MD if develop complications. Problem: Discharge Planning  Intervention: Discharge to appropriate level of care  Provide discharge instructions. Goal: Knowledge of discharge instructions  Knowledge of discharge instructions     Outcome: Met This Shift  Verbalized understanding of discharge instructions, follow-up appointments, and when to call the physician. Problem: Pain:  Intervention: Promote participation in pain management plan  Patient encouraged to take pain med as prescribed and to call the physician if pain is uncontrolled. Goal: Control of chronic pain  Control of chronic pain   Outcome: Met This Shift  Patient rates chronic right and left knee pain @ \"5\" upon admission and discharge. Comments: Care plan reviewed with patient and daughter. Patient and daughter verbalize understanding of the plan of care and contribute to goal setting.

## 2017-11-02 NOTE — ONCOLOGY
Chemotherapy Administration    Pre-assessment Data: Antineoplastic Agents  Other:   See toxicity flow sheet for assessment [x]     Physician Notification of Concerns Related to Chemotherapy Administration:   Physician Notified Kevon Joliet / Time of Notification      Interventions:   Lab work assessed  [x]   Height / Weight verified for dose [x]   Current MAR reviewed [x]   Emergency drugs available as appropriate [x]   Anaphylaxis assessment completed [x]   Pre-medications administered as ordered [x]   Chemotherapy Administered as SQ injection [x]   Monitor for signs / symptoms of hypersensitivity reaction    [x]   Chemotherapy orders (drug/dose/rate) verified by 2 Chemo certified   RNs    [x]          Document chemotherapy teaching on the Patient Education tab    [x]   Document patient verbalizes understanding of medications being administered    [x]   Other: velcade [x]    []    []      []    []

## 2017-11-06 NOTE — CARE COORDINATION
Received a call from Benedict george this morning. States he coughed up a small amount of blood this morning. States that the first time he coughed this morning there was a small amount of bright red blood but the second time he coughed there was hardly nothing there. Denies shortness of breath. Discussed that this might be an irritation of his lungs since he has been coughing for the past few weeks while being treated for exacerbation of his COPD. I called and spoke with his daughter Akash Baltazar, she states she is aware. Discussed that she is going to call his pulmonologist today and also discussed that if he has any more blood, she may take him to ED. States she is going to hold his Baby ASA for a few days. Encouraged both Benedict george and his daughter Akash Baltazar to call me if anything changes. Verbalized understanding.

## 2017-11-08 NOTE — CARE COORDINATION
Spoke with Judi Merrill. States he is starting to feel better but continues to have some thick mucus he is trying to cough up. Discussed that he has not been using his breathing tx's that past day. Educated him to use his breathing tx's as ordered as well as using Mucinex to thin up the secretions. Denies blood in his sputum. Continues to have 3 days left of antibiotic treatment. Educated him to call myself or PCP office if sx's worsened or he did not see an improvement in the next day or two. States he is feeling better, just needs to get the rest of this mucus up and out.

## 2017-11-09 NOTE — ONCOLOGY
Chemotherapy Administration    Pre-assessment Data: Antineoplastic Agents  Other:   See toxicity flow sheet for assessment [x]     Physician Notification of Concerns Related to Chemotherapy Administration:   Physician Notified Danya Wallace / Time of Notification      Interventions:   Lab work assessed  [x]   Height / Weight verified for dose [x]   Current MAR reviewed [x]   Emergency drugs available as appropriate [x]   Anaphylaxis assessment completed [x]   Pre-medications administered as ordered [x]   Chemotherapy Administered as SQ injection [x]   Monitor for signs / symptoms of hypersensitivity reaction    [x]   Chemotherapy orders (drug/dose/rate) verified by 2 Chemo certified   RNs    [x]          Document chemotherapy teaching on the Patient Education tab    [x]   Document patient verbalizes understanding of medications being administered    [x]   Other: velcade [x]    []    []      []    []

## 2017-11-09 NOTE — PROGRESS NOTES
[facility] PROFESSIONAL SERVICES  [facility]  [address] [facility] [address]  [address] [address]  Dept: [phone]  Dept Fax: [phone]  Loc: [phone]    Subjective:      Chief Complaint: Pallavi Elizondo is a 80 y.o. male with a history of myeloma and prostate cancer. The patient was initially diagnosed in May, 2012 He has associated leukopenia and anemia. Marita Serrato was noted to have an elevated total protein with an elevated monoclonal protein, IgA subtype. HPI:  The patient is here today for follow up evaluation. Since being seen here last he has been treated with a antibiotic for an upper respiratory tract infection. He had a cough with some sinus drainage. His symptoms have been improving with the use of antibiotic therapy. The patient continues to tolerate chemotherapy treatment well. He does not have neuropathy from the Velcade therapy. His monoclonal protein has remained stable. There is been no evidence of disease progression. His chronic pain is well controlled. He states that his pain is improved at this time. The patient has not had any recent fever. He does have mild chronic fatigue. His bowel and bladder habits have remained stable. ECOG performance status is level 0-1. The patient does have chronic shortness of breath related to pulmonary disease. The patient also has a history of prostate cancer. This is been stable and has not had any evidence progression of this malignancy. PMH, SH, and FH:  I reviewed the PMH, SH and FH as noted on the electronic medical record. There have been no changes as noted in the previous documentation. Review of Systems   Constitutional: Fatigue. HENT: Hearing loss. Eyes: Negative. Respiratory: Chronic shortness of breath. Cardiovascular: Negative. Gastrointestinal: Negative. Genitourinary: Negative. Musculoskeletal: Chronic pain. Skin: Negative. Neurological: Negative. Hematological: Negative. [facility]

## 2017-11-09 NOTE — PLAN OF CARE
Problem: Intellectual/Education/Knowledge Deficit  Intervention: Verbal/written education provided  Chemotherapy Teaching     What is Chemotherapy   Drug action [x]   Method of Administration [x]   Handouts given []     Side Effects  Nausea/vomiting [x]   Diarrhea [x]   Fatigue [x]   Signs / Symptoms of infection [x]   Neutropenia [x]   Thrombocytopenia [x]   Alopecia [x]   neuropathy [x]   [place] diet &  the importance of fluids [x]       Micellaneous  Importance of nutrition [x]   Importance of oral hygiene [x]   When to call the MD [x]   Monitoring labs [x]   Use of supportive services []     Explanation of Drug Regimen / Frequency  velcade subq injection     Comments  Verbalized understanding to drug,action,side effects and when to call MD       Goal: Teaching initiated upon admission  Outcome: Met This Shift  Patient verbalizes understanding to verbal information given on velcade injection,action and possible side effects. Aware to call MD if develop complications. Problem: Discharge Planning  Intervention: Discharge to appropriate level of care  Provide discharge instructions. Goal: Knowledge of discharge instructions  Knowledge of discharge instructions     Outcome: Met This Shift  Verbalized understanding of discharge instructions, follow-up appointments, and when to call the physician. Comments: Care plan reviewed with patient and daughter. Patient and daughter verbalize understanding of the plan of care and contribute to goal setting.

## 2017-11-09 NOTE — PROGRESS NOTES
Patient assessed for the following post chemotherapy:    Dizziness   No  Lightheadedness  No      Acute nausea/vomiting No  Headache   No  Chest pain/pressure  No  Rash/itching   No  Shortness of breath  No    Patient kept for 20 minutes observation post injection chemotherapy. Patient tolerated chemotherapy treatment velcade subq injection without any complications. Last vital signs:   BP (!) 147/70   Pulse 74   Temp 97.6 °F (36.4 °C) (Oral)   Resp 18   Ht 5' 7\" (1.702 m)   SpO2 97%     Patient instructed if experience any of the above symptoms following today's injection, he/she is to notify MD immediately or go to the emergency department. Discharge instructions given to patient. Verbalizes understanding. Ambulated off unit per self with family with belongings.

## 2017-11-15 NOTE — CARE COORDINATION
Rc Lozano called and states that Vianey Roberts seen Dr. Twan Nunez yesterday and he would like him started on a multivitamin for his eyes. Would like PCP opinion on what vitamin to start and would like PCP office to order it. Rc Lozano states he is going to undergo genetic testing for his eyes as they are getting worse. His pharmacy is [facility] [address]. Please advise. Thank you.

## 2017-11-16 NOTE — PLAN OF CARE
Problem: Musculor/Skeletal Functional Status  Intervention: Fall precautions  Patient aware of fall precautions for here and at home -call light in reach while here    Goal: Absence of falls  Outcome: Met This Shift  No falls this admission    Problem: Intellectual/Education/Knowledge Deficit  Intervention: Verbal/written education provided  Discharge instruction sheets    Goal: Teaching initiated upon admission  Outcome: Met This Shift  Chemotherapy Administration    Pre-assessment Data: Antineoplastic Agents  Other:   See toxicity flow sheet for assessment [x]     Physician Notification of Concerns Related to Chemotherapy Administration:   Physician Notified Hayde Iverson / Time of Notification      Interventions:   Lab work assessed  [x]   Height / Weight verified for dose [x]   Current MAR reviewed [x]   Emergency drugs available as appropriate [x]   Anaphylaxis assessment completed [x]   Pre-medications administered as ordered [x]   Chemotherapy Administered as SQ injection [x]   Monitor for signs / symptoms of hypersensitivity reaction    [x]   Chemotherapy orders (drug/dose/rate) verified by 2 Chemo certified   RNs    [x]          Document chemotherapy teaching on the Patient Education tab    [x]   Document patient verbalizes understanding of medications being administered    [x]   Other: []    []    []      []    []       Goal: Written Disposition Instruction form completed  Outcome: Met This Shift  Discharge instructions given and reviewed with patient. All questions answered.  Patient verbalized understanding    Problem: Discharge Planning  Intervention: Interaction with patient/family and care team  Patient currently denies any needs or cocnerns  Intervention: Discharge to appropriate level of care  Discharge home with daughter providing transportation    Goal: Knowledge of discharge instructions  Knowledge of discharge instructions    Outcome: Met This Shift  Patient and family member able to teach back follow up appointments and when to call the doctor. Patient offers no questions at this time    Comments: Care plan reviewed with patient and daughter . Patient and daughter  verbalize understanding of the plan of care and contribute to goal setting.

## 2017-11-17 NOTE — PROGRESS NOTES
Patient assessed for the following post chemotherapy:    Dizziness   No  Lightheadedness  No      Acute nausea/vomiting No  Headache   No  Chest pain/pressure  No  Rash/itching   No  Shortness of breath  No    Patient kept for 20 minutes observation post infusion chemotherapy. Patient tolerated chemotherapy treatment with velcade subcutanous without any complications. Last vital signs:   BP (!) 144/69   Pulse 77   Temp 97.7 °F (36.5 °C) (Oral)   Resp 18   Ht 5' 7.01\" (1.702 m)   Wt 213 lb (96.6 kg)   SpO2 97%   BMI 33.35 kg/m²       Patient instructed if experience any of the above symptoms following today's infusion,he is to notify MD immediately or go to the emergency department. Discharge instructions given to patient. Verbalizes understanding. Ambulated off unit with daughter with belongings.

## 2017-11-17 NOTE — PROGRESS NOTES
Chemotherapy Administration    Pre-assessment Data: Antineoplastic Agents  Other:   See toxicity flow sheet for assessment [x]     Physician Notification of Concerns Related to Chemotherapy Administration:   Physician Notified Ankur Zarate / Time of Notification      Interventions:   Lab work assessed  [x]   Height / Weight verified for dose [x]   Current MAR reviewed [x]   Emergency drugs available as appropriate [x]   Anaphylaxis assessment completed [x]   Pre-medications administered as ordered [x]   Chemotherapy Administered as SQ injection [x]   Monitor for signs / symptoms of hypersensitivity reaction    [x]   Chemotherapy orders (drug/dose/rate) verified by 2 Chemo certified   RNs    [x]          Document chemotherapy teaching on the Patient Education tab    [x]   Document patient verbalizes understanding of medications being administered    [x]   Other: []    []    []      []    []

## 2017-11-24 NOTE — PLAN OF CARE
Problem: Musculor/Skeletal Functional Status  Intervention: Fall precautions  Discussed fall precautions, fall risks, and instructed patient to ask for assistance with ambulation. Call light within reach. Goal: Absence of falls  Outcome: Met This Shift  Free from falls during infusion. Problem: Intellectual/Education/Knowledge Deficit  Intervention: Verbal/written education provided  Chemotherapy Teaching     What is Chemotherapy   Drug action [x]   Method of Administration [x]   Handouts given []     Side Effects  Nausea/vomiting [x]   Diarrhea [x]   Fatigue [x]   Signs / Symptoms of infection [x]   Neutropenia [x]   Thrombocytopenia [x]   Alopecia [x]   neuropathy [x]   [place] diet &  the importance of fluids [x]       Micellaneous  Importance of nutrition [x]   Importance of oral hygiene [x]   When to call the MD [x]   Monitoring labs [x]   Use of supportive services []     Explanation of Drug Regimen / Frequency  velcade subq injection     Comments  Verbalized understanding to drug,action,side effects and when to call MD       Goal: Teaching initiated upon admission  Outcome: Met This Shift  Patient verbalizes understanding to verbal information given on velcade injection,action and possible side effects. Aware to call MD if develop complications. Problem: Discharge Planning  Intervention: Interaction with patient/family and care team  Provide discharge instructions. Goal: Knowledge of discharge instructions  Knowledge of discharge instructions     Outcome: Met This Shift  Verbalized understanding of discharge instructions, follow-up appointments, and when to call the physician. Comments: Care plan reviewed with patient. Patient  verbalize understanding of the plan of care and contribute to goal setting.

## 2017-11-27 NOTE — CARE COORDINATION
diagnosis of pneumonia either by PCP or at a hospital?:  No               Care Coordination Interventions    Program Enrollment:  Complex Care  Referral from Primary Care Provider:  Yes  Suggested Interventions and Community Resources  Other Services:  Completed (Comment: [facility])  [facility] Management Tools:  Completed  Other Services or Interventions:  [facility]         Goals Addressed     None          Prior to Admission medications    Medication Sig Start Date End Date Taking? Authorizing Provider   HYDROcodone-acetaminophen (NORCO) 5-325 MG per tablet Take 1 tablet by mouth every 6 hours as needed for Pain . Historical Provider, MD   tiotropium (SPIRIVA HANDIHALER) 18 MCG inhalation capsule Inhale 1 capsule into the lungs daily 1 capsule via inhalation daily. 10/26/17 10/26/18  Eloy Torres MD   budesonide-formoterol (SYMBICORT) 160-4.5 MCG/ACT AERO Inhale 2 puffs into the lungs 2 times daily Rinse mouth after its use.  10/26/17 10/26/18  Eloy Torres MD   dexamethasone (DECADRON) 2 MG tablet Take 2 mg by mouth once a week  7/21/17   Historical Provider, MD   diphenhydrAMINE-APAP, sleep, (TYLENOL PM EXTRA STRENGTH)  MG tablet Take 2 tablets by mouth nightly as needed for Sleep    Historical Provider, MD   nicotine (NICODERM CQ) 21 MG/24HR Place 1 patch onto the skin daily 7/20/17   Patricia Kruger MD   leuprolide (LUPRON) 30 MG injection Inject 30 mg into the muscle once Indications: every 3 months    Historical Provider, MD   CHEMOTHERAPY     Historical Provider, MD   albuterol sulfate HFA (VENTOLIN HFA) 108 (90 BASE) MCG/ACT inhaler Inhale 2 puffs into the lungs every 6 hours as needed for Wheezing or Shortness of Breath 6/28/16   Eloy Torres MD   albuterol (PROVENTIL) (2.5 MG/3ML) 0.083% nebulizer solution Take 3 mLs by nebulization every 6 hours as needed for Wheezing or Shortness of Breath 6/28/16   Eloy Torres MD   acetaminophen 650 MG TABS Take 650

## 2017-11-28 NOTE — CARE COORDINATION
Dasha Kowalski called today and states he is feeling much better. He sounded better as well. States his breathing has improved. Educated him to continue to monitor his sx's and to call myself or PCP office with any worsening or not getting any better. He is very high risk for hospital admission and ED visit but I informed him that PCP can address these issues if caught early enough. He verbalized understanding and states he will call me.

## 2017-11-30 NOTE — PROGRESS NOTES
Patient assessed for the following post chemotherapy:    Dizziness   No  Lightheadedness  No      Acute nausea/vomiting No  Headache   No  Chest pain/pressure  No  Rash/itching   No  Shortness of breath  No    Patient kept for 20 minutes observation post infusion chemotherapy. Patient tolerated chemotherapy treatment with velcade without any complications. Last vital signs:   /66   Pulse 78   Temp 97.9 °F (36.6 °C) (Oral)   Resp 18   Wt 212 lb 6.4 oz (96.3 kg)   SpO2 96%   BMI 33.27 kg/m²       Patient instructed if experience any of the above symptoms following today's infusion,he is to notify MD immediately or go to the emergency department. Discharge instructions given to patient. Verbalizes understanding. Ambulated off unit with daughter and  with belongings.

## 2017-11-30 NOTE — PLAN OF CARE
Problem: Musculor/Skeletal Functional Status  Intervention: Fall precautions  Patient and daughter verbalized understanding of fall precautions for here and at home -call light in reach while here    Goal: Absence of falls  Outcome: Met This Shift  No falls this admission    Problem: Intellectual/Education/Knowledge Deficit  Intervention: Verbal/written education provided  Discharge instruction sheets    Goal: Teaching initiated upon admission  Outcome: Met This Shift  Chemotherapy Teaching     What is Chemotherapy   Drug action [x]   Method of Administration [x]   Handouts given []     Side Effects  Nausea/vomiting [x]   Diarrhea [x]   Fatigue [x]   Signs / Symptoms of infection [x]   Neutropenia [x]   Thrombocytopenia [x]   Alopecia [x]   neuropathy [x]   [place] diet &  the importance of fluids [x]       Micellaneous  Importance of nutrition [x]   Importance of oral hygiene [x]   When to call the MD [x]   Monitoring labs [x]   Use of supportive services []     Explanation of Drug   velcade subcutanous     Comments  Verbalized understanding to drug,action,side effects and when to call MD     Goal: Written Disposition Instruction form completed  Outcome: Met This Shift  Discharge instructions given and reviewed with patient. All questions answered. Patient verbalized understanding    Problem: Discharge Planning  Intervention: Interaction with patient/family and care team  Patient currently denies any needs or concerns  Intervention: Discharge to appropriate level of care  Discharge home    Goal: Knowledge of discharge instructions  Knowledge of discharge instructions    Outcome: Met This Shift  Patient and family member able to teach back follow up appointments and when to call the doctor. Patient offers no questions at this time    Comments: Care plan reviewed with patient and daughter . Patient and daughter verbalize understanding of the plan of care and contribute to goal setting.

## 2017-11-30 NOTE — PROGRESS NOTES
Chemotherapy Administration    Pre-assessment Data: Antineoplastic Agents  Other:   See toxicity flow sheet for assessment [x]     Physician Notification of Concerns Related to Chemotherapy Administration:   Physician Notified Parag Al / Time of Notification      Interventions:   Lab work assessed  [x]   Height / Weight verified for dose [x]   Current MAR reviewed [x]   Emergency drugs available as appropriate [x]   Anaphylaxis assessment completed [x]   Pre-medications administered as ordered [x]   Chemotherapy Administered as SQ injection [x]   Monitor for signs / symptoms of hypersensitivity reaction    [x]   Chemotherapy orders (drug/dose/rate) verified by 2 Chemo certified   RNs    [x]          Document chemotherapy teaching on the Patient Education tab    [x]   Document patient verbalizes understanding of medications being administered    [x]   Other: []    []    []      []    []

## 2017-12-07 NOTE — PROGRESS NOTES
Patient assessed for the following post chemotherapy:    Dizziness   No  Lightheadedness  No      Acute nausea/vomiting No  Headache   No  Chest pain/pressure  No  Rash/itching   No  Shortness of breath  No    Patient kept for 20 minutes observation post infusion chemotherapy. Patient tolerated chemotherapy treatment velcade subq injection without any complications. Last vital signs:   BP (!) 141/77   Pulse 88   Temp 97.7 °F (36.5 °C) (Oral)   Resp 18   SpO2 97%     Patient instructed if experience any of the above symptoms following today's injection, he/she is to notify MD immediately or go to the emergency department. Discharge instructions given to patient. Verbalizes understanding. Ambulated off unit per self with daughter with belongings.

## 2017-12-07 NOTE — ONCOLOGY
Chemotherapy Administration    Pre-assessment Data: Antineoplastic Agents  Other:   See toxicity flow sheet for assessment [x]     Physician Notification of Concerns Related to Chemotherapy Administration:   Physician Notified Terri Swain / Time of Notification Dr Evangelina Villarreal seen today.      Interventions:   Lab work assessed  [x]   Height / Weight verified for dose [x]   Current MAR reviewed [x]   Emergency drugs available as appropriate [x]   Anaphylaxis assessment completed [x]   Pre-medications administered as ordered [x]   Chemotherapy Administered as SQ injection [x]   Monitor for signs / symptoms of hypersensitivity reaction    [x]   Chemotherapy orders (drug/dose/rate) verified by 2 Chemo certified   RNs    [x]          Document chemotherapy teaching on the Patient Education tab    [x]   Document patient verbalizes understanding of medications being administered    [x]   Other: velcade [x]    []    []      []    []

## 2017-12-07 NOTE — PLAN OF CARE
Problem: Musculor/Skeletal Functional Status  Intervention: Fall precautions  Discussed fall precautions, fall risks, and instructed to ask for assistance with ambulation. Call light within reach. Goal: Absence of falls  Outcome: Met This Shift  Free from falls during infusion. Problem: Intellectual/Education/Knowledge Deficit  Intervention: Verbal/written education provided  Chemotherapy Teaching     What is Chemotherapy   Drug action [x]   Method of Administration [x]   Handouts given []     Side Effects  Nausea/vomiting [x]   Diarrhea [x]   Fatigue [x]   Signs / Symptoms of infection [x]   Neutropenia [x]   Thrombocytopenia [x]   Alopecia [x]   neuropathy [x]   [place] diet &  the importance of fluids [x]       Micellaneous  Importance of nutrition [x]   Importance of oral hygiene [x]   When to call the MD [x]   Monitoring labs [x]   Use of supportive services []     Explanation of Drug Regimen / Frequency  velcade subq injection     Comments  Verbalized understanding to drug,action,side effects and when to call MD       Goal: Teaching initiated upon admission  Outcome: Met This Shift  Patient verbalizes understanding to verbal information given on velcade injection,action and possible side effects. Aware to call MD if develop complications. Problem: Discharge Planning  Intervention: Interaction with patient/family and care team  Provide discharge instructions. Goal: Knowledge of discharge instructions  Knowledge of discharge instructions     Outcome: Met This Shift  Verbalized understanding of discharge instructions, follow-up appointments, and when to call the physician. Comments: Care plan reviewed with patient and daughter . Patient and daughter verbalize understanding of the plan of care and contribute to goal setting.

## 2017-12-14 NOTE — PROGRESS NOTES
Patient assessed for the following post chemotherapy:    Dizziness   No  Lightheadedness  No      Acute nausea/vomiting No  Headache   No  Chest pain/pressure  No  Rash/itching   No  Shortness of breath  No    Patient kept for 20 minutes observation post infusion chemotherapy. Patient tolerated chemotherapy treatment velcade subq injection without any complications. Last vital signs:   BP (!) 148/73   Pulse 82   Temp 97.9 °F (36.6 °C) (Oral)   Resp 18   Ht 5' 7.01\" (1.702 m)   Wt 211 lb (95.7 kg)   SpO2 95%   BMI 33.04 kg/m²     Patient instructed if experience any of the above symptoms following today's injection,he/she is to notify MD immediately or go to the emergency department. Discharge instructions given to patient. Verbalizes understanding. Ambulated off unit per self with daughter with belongings.

## 2017-12-14 NOTE — PLAN OF CARE
Problem: Musculor/Skeletal Functional Status  Intervention: Fall precautions  Discussed fall precautions, fall risks, and instructed patient to ask for assistance with ambulation. Call light within reach. Goal: Absence of falls  Outcome: Met This Shift  Free from falls during infusion. Problem: Intellectual/Education/Knowledge Deficit  Intervention: Verbal/written education provided  Chemotherapy Teaching     What is Chemotherapy   Drug action [x]   Method of Administration [x]   Handouts given []     Side Effects  Nausea/vomiting [x]   Diarrhea [x]   Fatigue [x]   Signs / Symptoms of infection [x]   Neutropenia [x]   Thrombocytopenia [x]   Alopecia [x]   neuropathy [x]   [place] diet &  the importance of fluids [x]       Micellaneous  Importance of nutrition [x]   Importance of oral hygiene [x]   When to call the MD [x]   Monitoring labs [x]   Use of supportive services []     Explanation of Drug Regimen / Frequency  velcade subq injection     Comments  Verbalized understanding to drug,action,side effects and when to call MD       Goal: Teaching initiated upon admission  Outcome: Met This Shift  Patient verbalizes understanding to verbal information given on velcade injection,action and possible side effects. Aware to call MD if develop complications. Problem: Discharge Planning  Intervention: Interaction with patient/family and care team  Provide discharge instructions. Goal: Knowledge of discharge instructions  Knowledge of discharge instructions     Outcome: Met This Shift  Verbalized understanding of discharge instructions, follow-up appointments, and when to call the physician. Comments: Care plan reviewed with patient and daughter. Patient and daughter verbalize understanding of the plan of care and contribute to goal setting.

## 2017-12-19 NOTE — PROGRESS NOTES
[facility]                                                 Pulmonary medicine clinic follow up note:      Neck Circumference -   15.25\"  Mallampati - II    Lung Nodule Screening     [] Qualifies    [x] Does not qualify   [] Declined    [] Completed    Juana Redd is a 80 y. o.oldmale who came for follow up regarding his moderate COPD. He is currently on treatment with Symbicort 160/4.5mcg spray MDI, 2 puffs via inhalation BID, Spiriva 18mcg/Cap DPI. 1Cap via inhalation daily along with  Albuterol HFA 90mcg/Spray MDI, 2puffs  Q6Hprn/ Albuterol 2.5mg nebs Q6h prn (the nebulizer) at one time as rescue medication. He uses his inhalers with good compliance. He states that he uses his rescue inhaler rarely. No recent ER visits or hospitalizations. He is having increased cough and light yellow mucus production for the last 2 weeks. He takes mucinex liquid during night time. He gets his inhalers from [facility] in [address] [place] [address]. He also follows with [facility] in [address] [place] [address]. He quit taking Flovent HFA 110mcg/INH, 2INH BID. He quit smoking cigarettes for the last 6months. He denies any history of Glucoma or urinary retention in the past.      REVIEW OF SYSTEMS:   GENERAL: He gained 13lbs of weight from last visit with normal appetite. No fever or chills. HEENT:  Post nasal drip. Denies glaucoma. LUNGs; Frequent cough with light yellow sputum production. No hemoptysis. HEART: Denies chest pain/angina or edema  ABDOMEN: good appetite, denies weight loss  CNS:denies headache dizziness,   EXTREMITIES:Denies edema  PSYCHIATRIC:denies depression  MUSCULOSKELETAL: Negative. SKIN: Denies rash.         Past Medical History:   Diagnosis Date    Acute urinary retention     Arthritis     BPH without urinary obstruction     Cancer (HCC)     multiple myeloma & bone    Chronic kidney disease     COPD (chronic obstructive pulmonary disease) (HCC)     Disease of blood and blood forming organ     History of blood transfusion     Hyperlipidemia     Hypertension     Insomnia     Pneumonia     Prostate cancer (HCC)     Shingles     Atypical    Type II or unspecified type diabetes mellitus without mention of complication, not stated as uncontrolled        Past Surgical History:   Procedure Laterality Date    COLONOSCOPY      EYE SURGERY      bilateral cataracts    LEG SURGERY Right     amanda from hip to knee    SKIN BIOPSY         Social History   Substance Use Topics    Smoking status: Former Smoker     Packs/day: 1.00     Years: 65.00     Quit date: 7/19/2017    Smokeless tobacco: Never Used      Comment: ,    Alcohol use No      Comment: rarely       Allergies   Allergen Reactions    Shellfish-Derived Products     Flomax [Tamsulosin Hcl] Other (See Comments)     Drops b/p         Current Outpatient Prescriptions   Medication Sig Dispense Refill    HYDROcodone-acetaminophen (NORCO) 5-325 MG per tablet Take 1 tablet by mouth every 6 hours as needed for Pain .  tiotropium (SPIRIVA HANDIHALER) 18 MCG inhalation capsule Inhale 1 capsule into the lungs daily 1 capsule via inhalation daily. 90 capsule 3    budesonide-formoterol (SYMBICORT) 160-4.5 MCG/ACT AERO Inhale 2 puffs into the lungs 2 times daily Rinse mouth after its use.  3 Inhaler 3    dexamethasone (DECADRON) 2 MG tablet Take 2 mg by mouth once a week       diphenhydrAMINE-APAP, sleep, (TYLENOL PM EXTRA STRENGTH)  MG tablet Take 2 tablets by mouth nightly as needed for Sleep      nicotine (NICODERM CQ) 21 MG/24HR Place 1 patch onto the skin daily 30 patch 3    leuprolide (LUPRON) 30 MG injection Inject 30 mg into the muscle once Indications: every 3 months      CHEMOTHERAPY       albuterol sulfate HFA (VENTOLIN HFA) 108 (90 BASE) MCG/ACT inhaler Inhale 2 puffs into the lungs every 6 hours as needed for Wheezing or Shortness of Breath 3 Inhaler 2    albuterol (PROVENTIL) (2.5 MG/3ML) 0.083%

## 2017-12-21 NOTE — PROGRESS NOTES
Chemotherapy Administration    Pre-assessment Data: Antineoplastic Agents  Other:   See toxicity flow sheet for assessment [x]     Physician Notification of Concerns Related to Chemotherapy Administration:   Physician Notified Gama Snare / Time of Notification      Interventions:   Lab work assessed  [x]   Height / Weight verified for dose [x]   Current MAR reviewed [x]   Emergency drugs available as appropriate [x]   Anaphylaxis assessment completed [x]   Pre-medications administered as ordered [x]   Chemotherapy Administered as SQ injection [x]   Monitor for signs / symptoms of hypersensitivity reaction    [x]   Chemotherapy orders (drug/dose/rate) verified by 2 Chemo certified   RNs    [x]          Document chemotherapy teaching on the Patient Education tab    [x]   Document patient verbalizes understanding of medications being administered    [x]   Other: []    []    []      []    []

## 2017-12-21 NOTE — PROGRESS NOTES
Patient assessed for the following post chemotherapy:    Dizziness   No  Lightheadedness  No      Acute nausea/vomiting No  Headache   No  Chest pain/pressure  No  Rash/itching   No  Shortness of breath  No    Patient kept for 20 minutes observation post infusion chemotherapy. Patient tolerated chemotherapy treatment with velcade subq without any complications. Last vital signs:   BP (!) 140/79   Pulse 80   Temp 98.1 °F (36.7 °C) (Oral)   Resp 16   Ht 5' 7.01\" (1.702 m)   Wt 212 lb (96.2 kg)   SpO2 98%   BMI 33.20 kg/m²     Patient instructed if experience any of the above symptoms following today's infusion,he is to notify MD immediately or go to the emergency department. Discharge instructions given to patient. Verbalizes understanding. Ambulated off unit with daughter and with belongings.

## 2017-12-21 NOTE — PLAN OF CARE
Problem: Musculor/Skeletal Functional Status  Intervention: Fall precautions  Patient aware of fall precautions for here and at home -call light in reach while here    Goal: Absence of falls  Outcome: Met This Shift  No falls this admission     Problem: Intellectual/Education/Knowledge Deficit  Intervention: Verbal/written education provided  Discharge instruction sheets    Goal: Teaching initiated upon admission  Outcome: Met This Shift  Chemotherapy Teaching     What is Chemotherapy   Drug action [x]   Method of Administration [x]   Handouts given []     Side Effects  Nausea/vomiting [x]   Diarrhea [x]   Fatigue [x]   Signs / Symptoms of infection [x]   Neutropenia [x]   Thrombocytopenia [x]   Alopecia [x]   neuropathy [x]   [place] diet &  the importance of fluids [x]       Micellaneous  Importance of nutrition [x]   Importance of oral hygiene [x]   When to call the MD [x]   Monitoring labs [x]   Use of supportive services []     Explanation of Drug   velcade     Comments  Verbalized understanding to drug,action,side effects and when to call MD   tanya  Goal: Written Disposition Instruction form completed  Outcome: Met This Shift  Discharge instructions given and reviewed with patient. All questions answered. Patient verbalized understanding    Problem: Discharge Planning  Intervention: Interaction with patient/family and care team  Patient currently denies any needs or concerns  Intervention: Discharge to appropriate level of care  Discharge home    Goal: Knowledge of discharge instructions  Knowledge of discharge instructions    Outcome: Met This Shift  Patient and family member able to teach back follow up appointments and when to call the doctor. Patient offers no questions at this time    Comments: Care plan reviewed with patient and daughter . Patient and daughter verbalize understanding of the plan of care and contribute to goal setting.

## 2017-12-26 NOTE — PROGRESS NOTES
[facility] PROFESSIONAL SERVICES  [facility]  [address] [facility] [address]  [address]  Dept: [phone]  Dept Fax: [phone]  Loc: [phone]    Subjective:      Chief Complaint: Sasha Bernstein is a 80 y.o. male with a history of myeloma and prostate cancer. The patient was initially diagnosed in May, 2012 He has associated leukopenia and anemia. Shanthi Gee was noted to have an elevated total protein with an elevated monoclonal protein, IgA subtype. HPI:  The patient is here today for follow up evaluation. He is here today for follow up regarding his history of a myeloma. 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 He tolerates Velcade therapy without significant complications. The patient also has a history of prostate cancer has not had any evidence of progression of his prostate cancer recently. On today's evaluation he states that his general sense of well-being has been good. He has not had recent fever or other signs of infection. The patient has mild skeletal pain primarily in the right lower extremity but this has remained stable. His bowel and bladder habits have been normal.  He has not had neuropathy related to Velcade therapy. ECOG performance status is level 0-1. PMH, SH, and FH:  I reviewed the PMH, SH and FH as noted on the electronic medical record. There have been no changes as noted in the previous documentation. Review of Systems   Constitutional: Mild fatigue. HENT: Vision changes. Eyes: Negative. Respiratory: Negative. Cardiovascular: Negative. Gastrointestinal: Negative. Genitourinary: Negative. Musculoskeletal: Chronic pain. Skin: Negative. Neurological: Negative. Hematological: Negative. Psychiatric/Behavioral: Negative.         Objective:   Physical Exam   Vitals:    12/07/17 0842   BP: 135/77   Pulse: 101   Resp: 18

## 2017-12-28 NOTE — PROGRESS NOTES
Patient assessed for the following post chemotherapy:    Dizziness   No  Lightheadedness  No      Acute nausea/vomiting No  Headache   No  Chest pain/pressure  No  Rash/itching   No  Shortness of breath  No      Patient tolerated chemotherapy treatment velcade without any complications. Last vital signs:   /63   Pulse 81   Temp 98 °F (36.7 °C) (Oral)   Resp 18   Ht 5' 7.01\" (1.702 m)   Wt 211 lb (95.7 kg)   SpO2 98%   BMI 33.04 kg/m²       Patient instructed if experience any of the above symptoms following today's infusion,he/she is to notify MD immediately or go to the emergency department. Discharge instructions given to patient. Verbalizes understanding. Ambulated off unit per self with belongings.

## 2017-12-28 NOTE — PLAN OF CARE
Problem: Intellectual/Education/Knowledge Deficit  Intervention: Verbal/written education provided  Chemotherapy Teaching     What is Chemotherapy   Drug action [x]   Method of Administration [x]   Handouts given []     Side Effects  Nausea/vomiting [x]   Diarrhea [x]   Fatigue [x]   Signs / Symptoms of infection [x]   Neutropenia [x]   Thrombocytopenia [x]   Alopecia [x]   neuropathy [x]   [place] diet &  the importance of fluids [x]       Micellaneous  Importance of nutrition [x]   Importance of oral hygiene [x]   When to call the MD [x]   Monitoring labs [x]   Use of supportive services []     Explanation of Drug Regimen / Frequency  velcade     Comments  Verbalized understanding to drug,action,side effects and when to call MD       Goal: Teaching initiated upon admission  Outcome: Met This Shift  Patient verbalizes understanding to verbal information given on velcade,action and possible side effects. Aware to call MD if develop complications. Problem: Discharge Planning  Intervention: Discharge to appropriate level of care  Discuss discharge instructions, follow ups and when to call doctor. Goal: Knowledge of discharge instructions  Knowledge of discharge instructions    Outcome: Met This Shift  Verbalized understanding of discharge instructions, follow ups and when to call doctor    Problem: Falls - Risk of: Intervention: Assess medication that may increase risk of fall  Assess for fall risk, instruct to ask for assistance with ambulation    Goal: Will remain free from falls  Will remain free from falls  Outcome: Met This Shift  Free from falls while in infusion center. Verbalized understanding of fall prevention and to ask for assistance with ambulation    Comments: Care plan reviewed with patient. Patient verbalized understanding of the plan of care and contribute to goal setting.

## 2017-12-28 NOTE — PROGRESS NOTES
Chemotherapy Administration    Pre-assessment Data: Antineoplastic Agents  Other:   See toxicity flow sheet for assessment [x]     Physician Notification of Concerns Related to Chemotherapy Administration:   Physician Notified Skeet Peed / Time of Notification      Interventions:   Lab work assessed  [x]   Height / Weight verified for dose [x]   Current MAR reviewed [x]   Emergency drugs available as appropriate [x]   Anaphylaxis assessment completed [x]   Pre-medications administered as ordered [x]   Blood return noted upon initiation of chemotherapy [x]   Blood return noted each 1-2ml of a vesicant medication if given IV push []   Blood return noted each 2-3ml of a non-vesicant medication if given IV push []   Monitor for signs / symptoms of hypersensitivity reaction [x]   Chemotherapy orders (drug/dose/rate) verified by 2 Chemo certified RNs [x]   Monitor IV site and blood return throughout the infusion of the medication [x]   Document IV site checks on the IV assessment form [x]   Document chemotherapy teaching on the Patient Education tab [x]   Document patient verbalizes understanding of medications being administered [x]   If IV infiltration, see ONS Guidelines []   Other:      []

## 2018-01-01 ENCOUNTER — TELEPHONE (OUTPATIENT)
Dept: FAMILY MEDICINE CLINIC | Age: 83
End: 2018-01-01

## 2018-01-01 ENCOUNTER — HOSPITAL ENCOUNTER (OUTPATIENT)
Dept: INFUSION THERAPY | Age: 83
Discharge: HOME | End: 2018-01-11
Payer: MEDICARE

## 2018-01-01 ENCOUNTER — HOSPITAL ENCOUNTER (OUTPATIENT)
Dept: INFUSION THERAPY | Age: 83
Discharge: HOME | End: 2018-07-19
Payer: MEDICARE

## 2018-01-01 ENCOUNTER — HOSPITAL ENCOUNTER (OUTPATIENT)
Dept: INFUSION THERAPY | Age: 83
Discharge: HOME | End: 2018-02-08
Payer: MEDICARE

## 2018-01-01 ENCOUNTER — CARE COORDINATION (OUTPATIENT)
Dept: CARE COORDINATION | Age: 83
End: 2018-01-01

## 2018-01-01 ENCOUNTER — OFFICE VISIT (OUTPATIENT)
Dept: FAMILY MEDICINE CLINIC | Age: 83
End: 2018-01-01
Payer: MEDICARE

## 2018-01-01 ENCOUNTER — HOSPITAL ENCOUNTER (OUTPATIENT)
Dept: INFUSION THERAPY | Age: 83
Discharge: HOME | End: 2018-06-07
Payer: MEDICARE

## 2018-01-01 ENCOUNTER — HOSPITAL ENCOUNTER (OUTPATIENT)
Age: 83
Discharge: HOME | End: 2018-01-22
Payer: MEDICARE

## 2018-01-01 ENCOUNTER — HOSPITAL ENCOUNTER (OUTPATIENT)
Dept: INFUSION THERAPY | Age: 83
Discharge: HOME | End: 2018-02-22
Payer: MEDICARE

## 2018-01-01 ENCOUNTER — HOSPITAL ENCOUNTER (OUTPATIENT)
Dept: INFUSION THERAPY | Age: 83
Discharge: HOME | End: 2018-05-03
Payer: MEDICARE

## 2018-01-01 ENCOUNTER — HOSPITAL ENCOUNTER (OUTPATIENT)
Age: 83
Discharge: HOME | End: 2018-04-17
Payer: MEDICARE

## 2018-01-01 ENCOUNTER — HOSPITAL ENCOUNTER (OUTPATIENT)
Dept: INFUSION THERAPY | Age: 83
Discharge: HOME | End: 2018-04-19
Payer: MEDICARE

## 2018-01-01 ENCOUNTER — HOSPITAL ENCOUNTER (OUTPATIENT)
Age: 83
Discharge: HOME | End: 2018-05-31
Payer: MEDICARE

## 2018-01-01 ENCOUNTER — HOSPITAL ENCOUNTER (OUTPATIENT)
Dept: GENERAL RADIOLOGY | Age: 83
Discharge: HOME | End: 2018-05-31
Payer: MEDICARE

## 2018-01-01 ENCOUNTER — HOSPITAL ENCOUNTER (OUTPATIENT)
Dept: INFUSION THERAPY | Age: 83
Discharge: HOME | End: 2018-05-31
Payer: MEDICARE

## 2018-01-01 ENCOUNTER — HOSPITAL ENCOUNTER (OUTPATIENT)
Dept: INFUSION THERAPY | Age: 83
Discharge: HOME | End: 2018-05-24
Payer: MEDICARE

## 2018-01-01 ENCOUNTER — OFFICE VISIT (OUTPATIENT)
Dept: ONCOLOGY | Age: 83
End: 2018-01-01
Payer: MEDICARE

## 2018-01-01 ENCOUNTER — HOSPITAL ENCOUNTER (OUTPATIENT)
Dept: GENERAL RADIOLOGY | Age: 83
Discharge: HOME | End: 2018-04-17
Payer: MEDICARE

## 2018-01-01 ENCOUNTER — HOSPITAL ENCOUNTER (INPATIENT)
Age: 83
LOS: 2 days | Discharge: HOME HEALTH | DRG: 190 | End: 2018-07-10
Attending: EMERGENCY MEDICINE | Admitting: INTERNAL MEDICINE
Payer: MEDICARE

## 2018-01-01 ENCOUNTER — HOSPITAL ENCOUNTER (OUTPATIENT)
Dept: INFUSION THERAPY | Age: 83
Discharge: HOME | End: 2018-01-18
Payer: MEDICARE

## 2018-01-01 ENCOUNTER — HOSPITAL ENCOUNTER (INPATIENT)
Age: 83
LOS: 2 days | Discharge: HOME | DRG: 310 | End: 2018-08-08
Attending: FAMILY MEDICINE | Admitting: FAMILY MEDICINE
Payer: MEDICARE

## 2018-01-01 ENCOUNTER — TELEPHONE (OUTPATIENT)
Dept: ONCOLOGY | Age: 83
End: 2018-01-01

## 2018-01-01 ENCOUNTER — HOSPITAL ENCOUNTER (OUTPATIENT)
Dept: INFUSION THERAPY | Age: 83
Discharge: HOME | End: 2018-04-12
Payer: MEDICARE

## 2018-01-01 ENCOUNTER — HOSPITAL ENCOUNTER (OUTPATIENT)
Dept: INFUSION THERAPY | Age: 83
Discharge: HOME | End: 2018-03-01
Payer: MEDICARE

## 2018-01-01 ENCOUNTER — HOSPITAL ENCOUNTER (OUTPATIENT)
Dept: INFUSION THERAPY | Age: 83
Discharge: HOME | End: 2018-06-14
Payer: MEDICARE

## 2018-01-01 ENCOUNTER — HOSPITAL ENCOUNTER (OUTPATIENT)
Dept: MRI IMAGING | Age: 83
Discharge: HOME | End: 2018-01-25
Payer: MEDICARE

## 2018-01-01 ENCOUNTER — HOSPITAL ENCOUNTER (INPATIENT)
Age: 83
LOS: 7 days | Discharge: HOME | DRG: 477 | End: 2018-06-28
Attending: INTERNAL MEDICINE | Admitting: INTERNAL MEDICINE
Payer: MEDICARE

## 2018-01-01 ENCOUNTER — HOSPITAL ENCOUNTER (OUTPATIENT)
Dept: INFUSION THERAPY | Age: 83
Discharge: HOME | End: 2018-07-05
Payer: MEDICARE

## 2018-01-01 ENCOUNTER — HOSPITAL ENCOUNTER (INPATIENT)
Age: 83
LOS: 1 days | Discharge: HOSPICE | DRG: 189 | End: 2018-08-10
Attending: INTERNAL MEDICINE | Admitting: INTERNAL MEDICINE
Payer: MEDICARE

## 2018-01-01 ENCOUNTER — TELEPHONE (OUTPATIENT)
Dept: PHARMACY | Facility: CLINIC | Age: 83
End: 2018-01-01

## 2018-01-01 ENCOUNTER — HOSPITAL ENCOUNTER (OUTPATIENT)
Dept: INFUSION THERAPY | Age: 83
Discharge: HOME | End: 2018-03-29
Payer: MEDICARE

## 2018-01-01 ENCOUNTER — HOSPITAL ENCOUNTER (OUTPATIENT)
Dept: INFUSION THERAPY | Age: 83
Discharge: HOME | End: 2018-06-21
Payer: MEDICARE

## 2018-01-01 ENCOUNTER — OFFICE VISIT (OUTPATIENT)
Dept: UROLOGY | Age: 83
End: 2018-01-01
Payer: MEDICARE

## 2018-01-01 ENCOUNTER — HOSPITAL ENCOUNTER (OUTPATIENT)
Dept: INFUSION THERAPY | Age: 83
Discharge: HOME | End: 2018-07-12

## 2018-01-01 ENCOUNTER — HOSPITAL ENCOUNTER (OUTPATIENT)
Dept: INFUSION THERAPY | Age: 83
Discharge: HOME | End: 2018-01-04
Payer: MEDICARE

## 2018-01-01 ENCOUNTER — HOSPITAL ENCOUNTER (OUTPATIENT)
Dept: INFUSION THERAPY | Age: 83
Discharge: HOME | End: 2018-03-22
Payer: MEDICARE

## 2018-01-01 ENCOUNTER — APPOINTMENT (OUTPATIENT)
Dept: MRI IMAGING | Age: 83
DRG: 477 | End: 2018-01-01
Attending: INTERNAL MEDICINE
Payer: MEDICARE

## 2018-01-01 ENCOUNTER — HOSPITAL ENCOUNTER (OUTPATIENT)
Dept: INFUSION THERAPY | Age: 83
Discharge: HOME | End: 2018-06-21

## 2018-01-01 ENCOUNTER — CARE COORDINATION (OUTPATIENT)
Dept: CASE MANAGEMENT | Age: 83
End: 2018-01-01

## 2018-01-01 ENCOUNTER — APPOINTMENT (OUTPATIENT)
Dept: INTERVENTIONAL RADIOLOGY/VASCULAR | Age: 83
DRG: 477 | End: 2018-01-01
Attending: INTERNAL MEDICINE
Payer: MEDICARE

## 2018-01-01 ENCOUNTER — HOSPITAL ENCOUNTER (OUTPATIENT)
Dept: INFUSION THERAPY | Age: 83
Discharge: HOME | End: 2018-02-01
Payer: MEDICARE

## 2018-01-01 ENCOUNTER — HOSPITAL ENCOUNTER (OUTPATIENT)
Dept: INFUSION THERAPY | Age: 83
Discharge: HOME | End: 2018-04-26
Payer: MEDICARE

## 2018-01-01 ENCOUNTER — HOSPITAL ENCOUNTER (OUTPATIENT)
Dept: INFUSION THERAPY | Age: 83
Discharge: HOME | End: 2018-03-08
Payer: MEDICARE

## 2018-01-01 ENCOUNTER — APPOINTMENT (OUTPATIENT)
Dept: GENERAL RADIOLOGY | Age: 83
DRG: 308 | End: 2018-01-01
Payer: MEDICARE

## 2018-01-01 ENCOUNTER — HOSPITAL ENCOUNTER (OUTPATIENT)
Dept: INFUSION THERAPY | Age: 83
Discharge: HOME | End: 2018-01-25
Payer: MEDICARE

## 2018-01-01 ENCOUNTER — APPOINTMENT (OUTPATIENT)
Dept: GENERAL RADIOLOGY | Age: 83
DRG: 310 | End: 2018-01-01
Attending: FAMILY MEDICINE
Payer: MEDICARE

## 2018-01-01 ENCOUNTER — HOSPITAL ENCOUNTER (OUTPATIENT)
Dept: INFUSION THERAPY | Age: 83
Discharge: HOME | End: 2018-03-15
Payer: MEDICARE

## 2018-01-01 ENCOUNTER — HOSPITAL ENCOUNTER (OUTPATIENT)
Dept: INFUSION THERAPY | Age: 83
Discharge: HOME | End: 2018-05-10
Payer: MEDICARE

## 2018-01-01 ENCOUNTER — OFFICE VISIT (OUTPATIENT)
Dept: PULMONOLOGY | Age: 83
End: 2018-01-01
Payer: MEDICARE

## 2018-01-01 ENCOUNTER — HOSPITAL ENCOUNTER (OUTPATIENT)
Dept: INFUSION THERAPY | Age: 83
Discharge: HOME | End: 2018-05-17
Payer: MEDICARE

## 2018-01-01 ENCOUNTER — HOSPITAL ENCOUNTER (OUTPATIENT)
Dept: INFUSION THERAPY | Age: 83
Discharge: HOME | End: 2018-02-15
Payer: MEDICARE

## 2018-01-01 ENCOUNTER — HOSPITAL ENCOUNTER (OUTPATIENT)
Dept: INFUSION THERAPY | Age: 83
Discharge: HOME | End: 2018-04-05
Payer: MEDICARE

## 2018-01-01 ENCOUNTER — APPOINTMENT (OUTPATIENT)
Dept: GENERAL RADIOLOGY | Age: 83
DRG: 190 | End: 2018-01-01
Payer: MEDICARE

## 2018-01-01 ENCOUNTER — HOSPITAL ENCOUNTER (OUTPATIENT)
Dept: GENERAL RADIOLOGY | Age: 83
Discharge: HOME | End: 2018-01-22
Payer: MEDICARE

## 2018-01-01 ENCOUNTER — HOSPITAL ENCOUNTER (INPATIENT)
Age: 83
LOS: 5 days | Discharge: OTHER INSTITUTION | DRG: 308 | End: 2018-08-06
Attending: INTERNAL MEDICINE | Admitting: INTERNAL MEDICINE
Payer: MEDICARE

## 2018-01-01 VITALS
BODY MASS INDEX: 33.21 KG/M2 | TEMPERATURE: 97.7 F | HEART RATE: 86 BPM | WEIGHT: 211.6 LBS | SYSTOLIC BLOOD PRESSURE: 138 MMHG | OXYGEN SATURATION: 98 % | HEIGHT: 67 IN | RESPIRATION RATE: 18 BRPM | DIASTOLIC BLOOD PRESSURE: 71 MMHG

## 2018-01-01 VITALS
TEMPERATURE: 98.1 F | BODY MASS INDEX: 32.8 KG/M2 | SYSTOLIC BLOOD PRESSURE: 136 MMHG | DIASTOLIC BLOOD PRESSURE: 82 MMHG | WEIGHT: 209 LBS | RESPIRATION RATE: 16 BRPM | HEART RATE: 84 BPM | HEIGHT: 67 IN

## 2018-01-01 VITALS
TEMPERATURE: 97.2 F | SYSTOLIC BLOOD PRESSURE: 128 MMHG | WEIGHT: 212 LBS | RESPIRATION RATE: 18 BRPM | BODY MASS INDEX: 33.27 KG/M2 | HEART RATE: 68 BPM | HEIGHT: 67 IN | DIASTOLIC BLOOD PRESSURE: 66 MMHG

## 2018-01-01 VITALS
RESPIRATION RATE: 18 BRPM | BODY MASS INDEX: 32.74 KG/M2 | DIASTOLIC BLOOD PRESSURE: 74 MMHG | SYSTOLIC BLOOD PRESSURE: 128 MMHG | WEIGHT: 208.6 LBS | HEART RATE: 92 BPM | HEIGHT: 67 IN | TEMPERATURE: 98.4 F | OXYGEN SATURATION: 97 %

## 2018-01-01 VITALS
RESPIRATION RATE: 18 BRPM | DIASTOLIC BLOOD PRESSURE: 71 MMHG | WEIGHT: 209 LBS | TEMPERATURE: 97.5 F | BODY MASS INDEX: 32.8 KG/M2 | SYSTOLIC BLOOD PRESSURE: 155 MMHG | HEIGHT: 67 IN | HEART RATE: 80 BPM | OXYGEN SATURATION: 95 %

## 2018-01-01 VITALS
DIASTOLIC BLOOD PRESSURE: 68 MMHG | SYSTOLIC BLOOD PRESSURE: 151 MMHG | HEIGHT: 67 IN | WEIGHT: 209 LBS | TEMPERATURE: 97.8 F | BODY MASS INDEX: 32.8 KG/M2 | OXYGEN SATURATION: 96 % | HEART RATE: 78 BPM | RESPIRATION RATE: 18 BRPM

## 2018-01-01 VITALS
DIASTOLIC BLOOD PRESSURE: 63 MMHG | RESPIRATION RATE: 18 BRPM | HEIGHT: 68 IN | OXYGEN SATURATION: 97 % | BODY MASS INDEX: 29.86 KG/M2 | TEMPERATURE: 97.9 F | WEIGHT: 197 LBS | HEART RATE: 74 BPM | SYSTOLIC BLOOD PRESSURE: 119 MMHG

## 2018-01-01 VITALS
TEMPERATURE: 97.9 F | HEART RATE: 88 BPM | WEIGHT: 199 LBS | SYSTOLIC BLOOD PRESSURE: 118 MMHG | BODY MASS INDEX: 30.26 KG/M2 | DIASTOLIC BLOOD PRESSURE: 62 MMHG | RESPIRATION RATE: 20 BRPM

## 2018-01-01 VITALS
HEIGHT: 67 IN | RESPIRATION RATE: 25 BRPM | BODY MASS INDEX: 27.78 KG/M2 | OXYGEN SATURATION: 95 % | HEART RATE: 103 BPM | TEMPERATURE: 98.2 F | WEIGHT: 177 LBS | SYSTOLIC BLOOD PRESSURE: 118 MMHG | DIASTOLIC BLOOD PRESSURE: 58 MMHG

## 2018-01-01 VITALS
BODY MASS INDEX: 28.49 KG/M2 | RESPIRATION RATE: 16 BRPM | OXYGEN SATURATION: 94 % | WEIGHT: 188 LBS | DIASTOLIC BLOOD PRESSURE: 67 MMHG | TEMPERATURE: 97.7 F | HEART RATE: 90 BPM | HEIGHT: 68 IN | SYSTOLIC BLOOD PRESSURE: 143 MMHG

## 2018-01-01 VITALS
TEMPERATURE: 97.7 F | HEART RATE: 74 BPM | OXYGEN SATURATION: 97 % | DIASTOLIC BLOOD PRESSURE: 66 MMHG | SYSTOLIC BLOOD PRESSURE: 154 MMHG | RESPIRATION RATE: 18 BRPM

## 2018-01-01 VITALS
RESPIRATION RATE: 18 BRPM | SYSTOLIC BLOOD PRESSURE: 127 MMHG | HEART RATE: 75 BPM | OXYGEN SATURATION: 97 % | DIASTOLIC BLOOD PRESSURE: 61 MMHG | TEMPERATURE: 97.6 F

## 2018-01-01 VITALS
HEIGHT: 66 IN | TEMPERATURE: 97.2 F | RESPIRATION RATE: 18 BRPM | OXYGEN SATURATION: 96 % | WEIGHT: 209.8 LBS | DIASTOLIC BLOOD PRESSURE: 78 MMHG | HEART RATE: 82 BPM | BODY MASS INDEX: 33.72 KG/M2 | SYSTOLIC BLOOD PRESSURE: 125 MMHG

## 2018-01-01 VITALS
OXYGEN SATURATION: 99 % | DIASTOLIC BLOOD PRESSURE: 65 MMHG | HEART RATE: 89 BPM | SYSTOLIC BLOOD PRESSURE: 137 MMHG | WEIGHT: 208.8 LBS | TEMPERATURE: 97.9 F | BODY MASS INDEX: 32.77 KG/M2 | HEIGHT: 67 IN

## 2018-01-01 VITALS
TEMPERATURE: 98 F | HEIGHT: 68 IN | BODY MASS INDEX: 31.51 KG/M2 | OXYGEN SATURATION: 98 % | SYSTOLIC BLOOD PRESSURE: 151 MMHG | RESPIRATION RATE: 18 BRPM | HEART RATE: 78 BPM | DIASTOLIC BLOOD PRESSURE: 65 MMHG | WEIGHT: 209.8 LBS | BODY MASS INDEX: 32.93 KG/M2 | SYSTOLIC BLOOD PRESSURE: 122 MMHG | DIASTOLIC BLOOD PRESSURE: 64 MMHG | HEIGHT: 67 IN | WEIGHT: 207.9 LBS

## 2018-01-01 VITALS
WEIGHT: 206 LBS | OXYGEN SATURATION: 97 % | HEART RATE: 75 BPM | SYSTOLIC BLOOD PRESSURE: 146 MMHG | DIASTOLIC BLOOD PRESSURE: 63 MMHG | RESPIRATION RATE: 18 BRPM | TEMPERATURE: 97.8 F | BODY MASS INDEX: 31.22 KG/M2 | HEIGHT: 68 IN

## 2018-01-01 VITALS
SYSTOLIC BLOOD PRESSURE: 144 MMHG | TEMPERATURE: 97.5 F | RESPIRATION RATE: 18 BRPM | OXYGEN SATURATION: 95 % | HEART RATE: 77 BPM | BODY MASS INDEX: 33.58 KG/M2 | WEIGHT: 211.2 LBS | DIASTOLIC BLOOD PRESSURE: 63 MMHG

## 2018-01-01 VITALS
HEIGHT: 67 IN | WEIGHT: 209.8 LBS | SYSTOLIC BLOOD PRESSURE: 138 MMHG | BODY MASS INDEX: 32.93 KG/M2 | OXYGEN SATURATION: 98 % | TEMPERATURE: 97.5 F | DIASTOLIC BLOOD PRESSURE: 66 MMHG | HEART RATE: 76 BPM | RESPIRATION RATE: 18 BRPM

## 2018-01-01 VITALS
DIASTOLIC BLOOD PRESSURE: 84 MMHG | TEMPERATURE: 98 F | OXYGEN SATURATION: 96 % | HEART RATE: 74 BPM | SYSTOLIC BLOOD PRESSURE: 125 MMHG | RESPIRATION RATE: 18 BRPM

## 2018-01-01 VITALS
HEART RATE: 90 BPM | BODY MASS INDEX: 33.12 KG/M2 | WEIGHT: 211 LBS | TEMPERATURE: 98.3 F | DIASTOLIC BLOOD PRESSURE: 73 MMHG | HEIGHT: 67 IN | SYSTOLIC BLOOD PRESSURE: 146 MMHG | OXYGEN SATURATION: 100 % | RESPIRATION RATE: 18 BRPM

## 2018-01-01 VITALS
HEIGHT: 68 IN | RESPIRATION RATE: 18 BRPM | OXYGEN SATURATION: 96 % | HEART RATE: 76 BPM | SYSTOLIC BLOOD PRESSURE: 141 MMHG | TEMPERATURE: 97.8 F | WEIGHT: 188.2 LBS | BODY MASS INDEX: 28.52 KG/M2 | DIASTOLIC BLOOD PRESSURE: 65 MMHG

## 2018-01-01 VITALS
DIASTOLIC BLOOD PRESSURE: 67 MMHG | BODY MASS INDEX: 30.14 KG/M2 | SYSTOLIC BLOOD PRESSURE: 157 MMHG | OXYGEN SATURATION: 99 % | RESPIRATION RATE: 18 BRPM | TEMPERATURE: 97.7 F | WEIGHT: 198.2 LBS | HEART RATE: 75 BPM

## 2018-01-01 VITALS
RESPIRATION RATE: 20 BRPM | DIASTOLIC BLOOD PRESSURE: 63 MMHG | SYSTOLIC BLOOD PRESSURE: 153 MMHG | HEART RATE: 89 BPM | WEIGHT: 186.6 LBS | BODY MASS INDEX: 29.29 KG/M2 | TEMPERATURE: 97.6 F | HEIGHT: 67 IN | OXYGEN SATURATION: 97 %

## 2018-01-01 VITALS
DIASTOLIC BLOOD PRESSURE: 69 MMHG | BODY MASS INDEX: 33.1 KG/M2 | TEMPERATURE: 97.5 F | RESPIRATION RATE: 20 BRPM | HEART RATE: 79 BPM | WEIGHT: 211.4 LBS | OXYGEN SATURATION: 98 % | SYSTOLIC BLOOD PRESSURE: 132 MMHG

## 2018-01-01 VITALS
WEIGHT: 190.6 LBS | TEMPERATURE: 98.2 F | DIASTOLIC BLOOD PRESSURE: 66 MMHG | BODY MASS INDEX: 29.91 KG/M2 | OXYGEN SATURATION: 94 % | RESPIRATION RATE: 18 BRPM | HEIGHT: 67 IN | SYSTOLIC BLOOD PRESSURE: 151 MMHG | HEART RATE: 80 BPM

## 2018-01-01 VITALS
BODY MASS INDEX: 33.11 KG/M2 | WEIGHT: 206 LBS | RESPIRATION RATE: 18 BRPM | OXYGEN SATURATION: 97 % | HEIGHT: 66 IN | DIASTOLIC BLOOD PRESSURE: 71 MMHG | TEMPERATURE: 97.3 F | SYSTOLIC BLOOD PRESSURE: 127 MMHG | HEART RATE: 87 BPM

## 2018-01-01 VITALS
HEART RATE: 72 BPM | WEIGHT: 208.8 LBS | OXYGEN SATURATION: 95 % | SYSTOLIC BLOOD PRESSURE: 138 MMHG | WEIGHT: 191.6 LBS | DIASTOLIC BLOOD PRESSURE: 64 MMHG | BODY MASS INDEX: 30.07 KG/M2 | TEMPERATURE: 97.7 F | TEMPERATURE: 97.7 F | BODY MASS INDEX: 32.77 KG/M2 | HEART RATE: 101 BPM | DIASTOLIC BLOOD PRESSURE: 67 MMHG | RESPIRATION RATE: 18 BRPM | RESPIRATION RATE: 12 BRPM | HEIGHT: 67 IN | SYSTOLIC BLOOD PRESSURE: 142 MMHG | HEIGHT: 67 IN

## 2018-01-01 VITALS
RESPIRATION RATE: 16 BRPM | OXYGEN SATURATION: 98 % | SYSTOLIC BLOOD PRESSURE: 130 MMHG | TEMPERATURE: 97.8 F | WEIGHT: 205 LBS | HEART RATE: 74 BPM | BODY MASS INDEX: 32.59 KG/M2 | DIASTOLIC BLOOD PRESSURE: 62 MMHG

## 2018-01-01 VITALS
WEIGHT: 178.6 LBS | HEART RATE: 95 BPM | RESPIRATION RATE: 18 BRPM | OXYGEN SATURATION: 94 % | BODY MASS INDEX: 28.03 KG/M2 | DIASTOLIC BLOOD PRESSURE: 58 MMHG | TEMPERATURE: 97.6 F | HEIGHT: 67 IN | SYSTOLIC BLOOD PRESSURE: 123 MMHG

## 2018-01-01 VITALS
DIASTOLIC BLOOD PRESSURE: 64 MMHG | SYSTOLIC BLOOD PRESSURE: 134 MMHG | RESPIRATION RATE: 18 BRPM | HEART RATE: 75 BPM | OXYGEN SATURATION: 97 % | BODY MASS INDEX: 32.49 KG/M2 | WEIGHT: 207 LBS | TEMPERATURE: 97.9 F | HEIGHT: 67 IN

## 2018-01-01 VITALS
SYSTOLIC BLOOD PRESSURE: 136 MMHG | RESPIRATION RATE: 20 BRPM | HEART RATE: 88 BPM | BODY MASS INDEX: 33.24 KG/M2 | TEMPERATURE: 97.8 F | DIASTOLIC BLOOD PRESSURE: 62 MMHG | HEIGHT: 67 IN | WEIGHT: 211.8 LBS

## 2018-01-01 VITALS
SYSTOLIC BLOOD PRESSURE: 150 MMHG | RESPIRATION RATE: 22 BRPM | HEART RATE: 80 BPM | TEMPERATURE: 97.9 F | OXYGEN SATURATION: 98 % | DIASTOLIC BLOOD PRESSURE: 66 MMHG | HEIGHT: 68 IN

## 2018-01-01 VITALS
SYSTOLIC BLOOD PRESSURE: 144 MMHG | RESPIRATION RATE: 18 BRPM | HEIGHT: 67 IN | BODY MASS INDEX: 32.33 KG/M2 | WEIGHT: 206 LBS | DIASTOLIC BLOOD PRESSURE: 67 MMHG | TEMPERATURE: 97.7 F | OXYGEN SATURATION: 96 % | HEART RATE: 77 BPM

## 2018-01-01 VITALS
RESPIRATION RATE: 18 BRPM | DIASTOLIC BLOOD PRESSURE: 60 MMHG | SYSTOLIC BLOOD PRESSURE: 135 MMHG | TEMPERATURE: 97.4 F | OXYGEN SATURATION: 97 % | HEART RATE: 75 BPM

## 2018-01-01 VITALS
DIASTOLIC BLOOD PRESSURE: 63 MMHG | SYSTOLIC BLOOD PRESSURE: 132 MMHG | HEART RATE: 81 BPM | TEMPERATURE: 97.7 F | OXYGEN SATURATION: 94 % | RESPIRATION RATE: 18 BRPM

## 2018-01-01 VITALS
TEMPERATURE: 97.7 F | OXYGEN SATURATION: 96 % | WEIGHT: 209.4 LBS | RESPIRATION RATE: 16 BRPM | SYSTOLIC BLOOD PRESSURE: 116 MMHG | BODY MASS INDEX: 32.8 KG/M2 | DIASTOLIC BLOOD PRESSURE: 66 MMHG | HEART RATE: 76 BPM

## 2018-01-01 VITALS
HEART RATE: 92 BPM | TEMPERATURE: 98.3 F | DIASTOLIC BLOOD PRESSURE: 70 MMHG | RESPIRATION RATE: 18 BRPM | OXYGEN SATURATION: 92 % | SYSTOLIC BLOOD PRESSURE: 133 MMHG

## 2018-01-01 VITALS
TEMPERATURE: 97.6 F | RESPIRATION RATE: 18 BRPM | DIASTOLIC BLOOD PRESSURE: 71 MMHG | SYSTOLIC BLOOD PRESSURE: 152 MMHG | OXYGEN SATURATION: 95 % | HEART RATE: 78 BPM

## 2018-01-01 VITALS
HEIGHT: 67 IN | TEMPERATURE: 97.6 F | SYSTOLIC BLOOD PRESSURE: 141 MMHG | OXYGEN SATURATION: 93 % | HEART RATE: 94 BPM | DIASTOLIC BLOOD PRESSURE: 61 MMHG | RESPIRATION RATE: 16 BRPM

## 2018-01-01 VITALS
DIASTOLIC BLOOD PRESSURE: 68 MMHG | WEIGHT: 211 LBS | RESPIRATION RATE: 16 BRPM | BODY MASS INDEX: 33.12 KG/M2 | HEIGHT: 67 IN | HEART RATE: 100 BPM | SYSTOLIC BLOOD PRESSURE: 122 MMHG | TEMPERATURE: 98 F

## 2018-01-01 DIAGNOSIS — C61 PROSTATE CA (HCC): ICD-10-CM

## 2018-01-01 DIAGNOSIS — E11.9 CONTROLLED TYPE 2 DIABETES MELLITUS WITHOUT COMPLICATION, WITHOUT LONG-TERM CURRENT USE OF INSULIN (HCC): ICD-10-CM

## 2018-01-01 DIAGNOSIS — D47.2 SMOLDERING MYELOMA: ICD-10-CM

## 2018-01-01 DIAGNOSIS — C90.00 MULTIPLE MYELOMA NOT HAVING ACHIEVED REMISSION (HCC): Primary | ICD-10-CM

## 2018-01-01 DIAGNOSIS — M81.0 OSTEOPOROSIS, UNSPECIFIED OSTEOPOROSIS TYPE, UNSPECIFIED PATHOLOGICAL FRACTURE PRESENCE: ICD-10-CM

## 2018-01-01 DIAGNOSIS — D84.9 IMMUNOCOMPROMISED STATE (HCC): ICD-10-CM

## 2018-01-01 DIAGNOSIS — J44.1 COPD EXACERBATION (HCC): ICD-10-CM

## 2018-01-01 DIAGNOSIS — Z51.11 CHEMOTHERAPY MANAGEMENT, ENCOUNTER FOR: ICD-10-CM

## 2018-01-01 DIAGNOSIS — M54.50 ACUTE RIGHT-SIDED LOW BACK PAIN WITHOUT SCIATICA: ICD-10-CM

## 2018-01-01 DIAGNOSIS — J44.1 COPD EXACERBATION (HCC): Primary | ICD-10-CM

## 2018-01-01 DIAGNOSIS — D69.6 THROMBOCYTOPENIA (HCC): Chronic | ICD-10-CM

## 2018-01-01 DIAGNOSIS — E43 SEVERE MALNUTRITION (HCC): ICD-10-CM

## 2018-01-01 DIAGNOSIS — D72.819 LEUKOPENIA, UNSPECIFIED TYPE: ICD-10-CM

## 2018-01-01 DIAGNOSIS — B07.8 OTHER VIRAL WARTS: ICD-10-CM

## 2018-01-01 DIAGNOSIS — R41.3 MEMORY CHANGES: ICD-10-CM

## 2018-01-01 DIAGNOSIS — J96.01 ACUTE RESPIRATORY FAILURE WITH HYPOXIA (HCC): ICD-10-CM

## 2018-01-01 DIAGNOSIS — R50.9 FEVER, UNSPECIFIED FEVER CAUSE: ICD-10-CM

## 2018-01-01 DIAGNOSIS — M54.50 ACUTE RIGHT-SIDED LOW BACK PAIN WITHOUT SCIATICA: Primary | ICD-10-CM

## 2018-01-01 DIAGNOSIS — R93.89 ABNORMAL CHEST CT: ICD-10-CM

## 2018-01-01 DIAGNOSIS — C90.00 MULTIPLE MYELOMA NOT HAVING ACHIEVED REMISSION (HCC): ICD-10-CM

## 2018-01-01 DIAGNOSIS — R41.0 DELIRIUM: ICD-10-CM

## 2018-01-01 DIAGNOSIS — E11.9 CONTROLLED TYPE 2 DIABETES MELLITUS WITHOUT COMPLICATION, WITHOUT LONG-TERM CURRENT USE OF INSULIN (HCC): Primary | ICD-10-CM

## 2018-01-01 DIAGNOSIS — J44.9 MODERATE COPD (CHRONIC OBSTRUCTIVE PULMONARY DISEASE) (HCC): ICD-10-CM

## 2018-01-01 DIAGNOSIS — R35.1 NOCTURIA: ICD-10-CM

## 2018-01-01 DIAGNOSIS — R41.3 MEMORY DIFFICULTIES: Primary | ICD-10-CM

## 2018-01-01 DIAGNOSIS — D69.6 ACQUIRED THROMBOCYTOPENIA (HCC): ICD-10-CM

## 2018-01-01 DIAGNOSIS — R41.3 MEMORY IMPAIRMENT: Primary | ICD-10-CM

## 2018-01-01 DIAGNOSIS — M48.56XA PATHOLOGICAL COMPRESSION FRACTURE OF LUMBAR VERTEBRA, INITIAL ENCOUNTER (HCC): Primary | ICD-10-CM

## 2018-01-01 DIAGNOSIS — K59.09 OTHER CONSTIPATION: ICD-10-CM

## 2018-01-01 DIAGNOSIS — J30.1 NON-SEASONAL ALLERGIC RHINITIS DUE TO POLLEN, UNSPECIFIED CHRONICITY: ICD-10-CM

## 2018-01-01 DIAGNOSIS — J44.1 COPD WITH EXACERBATION (HCC): Primary | ICD-10-CM

## 2018-01-01 DIAGNOSIS — R41.0 DELIRIUM: Primary | ICD-10-CM

## 2018-01-01 DIAGNOSIS — R09.02 HYPOXIA: ICD-10-CM

## 2018-01-01 DIAGNOSIS — G89.3 CANCER RELATED PAIN: ICD-10-CM

## 2018-01-01 DIAGNOSIS — Z72.0 TOBACCO ABUSE: ICD-10-CM

## 2018-01-01 DIAGNOSIS — C61 PROSTATE CA (HCC): Primary | ICD-10-CM

## 2018-01-01 DIAGNOSIS — D47.2 SMOLDERING MYELOMA: Primary | ICD-10-CM

## 2018-01-01 DIAGNOSIS — S32.020D CLOSED COMPRESSION FRACTURE OF L2 LUMBAR VERTEBRA WITH ROUTINE HEALING, SUBSEQUENT ENCOUNTER: ICD-10-CM

## 2018-01-01 DIAGNOSIS — M54.50 ACUTE BILATERAL LOW BACK PAIN WITHOUT SCIATICA: Primary | ICD-10-CM

## 2018-01-01 DIAGNOSIS — R05.9 COUGH: ICD-10-CM

## 2018-01-01 DIAGNOSIS — J44.9 CHRONIC OBSTRUCTIVE PULMONARY DISEASE, UNSPECIFIED COPD TYPE (HCC): ICD-10-CM

## 2018-01-01 DIAGNOSIS — I48.91 ATRIAL FIBRILLATION WITH RVR (HCC): Primary | ICD-10-CM

## 2018-01-01 DIAGNOSIS — D72.819 CHRONIC LEUKOPENIA: Chronic | ICD-10-CM

## 2018-01-01 DIAGNOSIS — F17.200 TOBACCO DEPENDENCE: ICD-10-CM

## 2018-01-01 DIAGNOSIS — C90.00 MULTIPLE MYELOMA, REMISSION STATUS UNSPECIFIED (HCC): ICD-10-CM

## 2018-01-01 LAB
ALBUMIN SERPL-MCNC: 2.7 G/DL (ref 3.5–5.1)
ALBUMIN SERPL-MCNC: 3.1 G/DL (ref 3.5–5.1)
ALBUMIN SERPL-MCNC: 3.2 G/DL (ref 3.5–5.1)
ALBUMIN SERPL-MCNC: 3.3 G/DL (ref 3.5–5.1)
ALBUMIN SERPL-MCNC: 3.4 G/DL (ref 3.5–5.1)
ALBUMIN SERPL-MCNC: 3.4 G/DL (ref 3.5–5.1)
ALBUMIN SERPL-MCNC: 3.6 G/DL (ref 3.5–5.1)
ALBUMIN SERPL-MCNC: 3.6 G/DL (ref 3.5–5.1)
ALBUMIN SERPL-MCNC: 3.7 G/DL (ref 3.5–5.1)
ALBUMIN SERPL-MCNC: 3.7 G/DL (ref 3.5–5.1)
ALBUMIN SERPL-MCNC: 3.9 G/DL (ref 3.5–5.1)
ALLEN TEST: POSITIVE
ALLEN TEST: POSITIVE
ALP BLD-CCNC: 104 U/L (ref 38–126)
ALP BLD-CCNC: 104 U/L (ref 38–126)
ALP BLD-CCNC: 105 U/L (ref 38–126)
ALP BLD-CCNC: 111 U/L (ref 38–126)
ALP BLD-CCNC: 85 U/L (ref 38–126)
ALP BLD-CCNC: 86 U/L (ref 38–126)
ALP BLD-CCNC: 92 U/L (ref 38–126)
ALP BLD-CCNC: 94 U/L (ref 38–126)
ALP BLD-CCNC: 94 U/L (ref 38–126)
ALP BLD-CCNC: 97 U/L (ref 38–126)
ALP BLD-CCNC: 98 U/L (ref 38–126)
ALT SERPL-CCNC: 10 U/L (ref 11–66)
ALT SERPL-CCNC: 17 U/L (ref 11–66)
ALT SERPL-CCNC: 7 U/L (ref 11–66)
ALT SERPL-CCNC: 7 U/L (ref 11–66)
ALT SERPL-CCNC: 8 U/L (ref 11–66)
ALT SERPL-CCNC: 8 U/L (ref 11–66)
ALT SERPL-CCNC: 9 U/L (ref 11–66)
ANION GAP SERPL CALCULATED.3IONS-SCNC: 12 MEQ/L (ref 8–16)
ANION GAP SERPL CALCULATED.3IONS-SCNC: 13 MEQ/L (ref 8–16)
ANION GAP SERPL CALCULATED.3IONS-SCNC: 16 MEQ/L (ref 8–16)
ANISOCYTOSIS: ABNORMAL
APTT: 43.1 SECONDS (ref 22–38)
APTT: 44.4 SECONDS (ref 22–38)
APTT: 46.4 SECONDS (ref 22–38)
APTT: 60.4 SECONDS (ref 22–38)
APTT: 60.8 SECONDS (ref 22–38)
APTT: 63.6 SECONDS (ref 22–38)
APTT: 66.9 SECONDS (ref 22–38)
APTT: 68.6 SECONDS (ref 22–38)
APTT: > 200 SECONDS (ref 22–38)
AST SERPL-CCNC: 10 U/L (ref 5–40)
AST SERPL-CCNC: 11 U/L (ref 5–40)
AST SERPL-CCNC: 12 U/L (ref 5–40)
AST SERPL-CCNC: 13 U/L (ref 5–40)
AST SERPL-CCNC: 14 U/L (ref 5–40)
AST SERPL-CCNC: 22 U/L (ref 5–40)
BACTERIA: ABNORMAL
BACTERIA: ABNORMAL /HPF
BACTERIA: NORMAL
BASE EXCESS (CALCULATED): 4.3 MMOL/L (ref -2.5–2.5)
BASE EXCESS (CALCULATED): 6 MMOL/L (ref -2.5–2.5)
BASINOPHIL, AUTOMATED: 0 % (ref 0–12)
BASINOPHIL, AUTOMATED: 0 % (ref 0–3)
BASOPHILIA: SLIGHT
BASOPHILS # BLD: 0 %
BASOPHILS # BLD: 0 %
BASOPHILS # BLD: 0.3 %
BASOPHILS # BLD: 0.4 %
BASOPHILS # BLD: 0.5 %
BASOPHILS # BLD: 0.5 %
BASOPHILS # BLD: 0.6 %
BASOPHILS # BLD: 0.7 %
BASOPHILS ABSOLUTE: 0 THOU/MM3 (ref 0–0.1)
BILIRUB SERPL-MCNC: 0.2 MG/DL (ref 0.3–1.2)
BILIRUB SERPL-MCNC: 0.3 MG/DL (ref 0.3–1.2)
BILIRUB SERPL-MCNC: 0.4 MG/DL (ref 0.3–1.2)
BILIRUBIN DIRECT: < 0.2 MG/DL (ref 0–0.3)
BILIRUBIN URINE: ABNORMAL
BILIRUBIN URINE: NEGATIVE
BLOOD CULTURE, ROUTINE: NORMAL
BLOOD URINE, POC: NEGATIVE
BLOOD, URINE: NEGATIVE
BUN BLDV-MCNC: 10 MG/DL (ref 7–22)
BUN BLDV-MCNC: 10 MG/DL (ref 7–22)
BUN BLDV-MCNC: 11 MG/DL (ref 7–22)
BUN BLDV-MCNC: 13 MG/DL (ref 7–22)
BUN BLDV-MCNC: 14 MG/DL (ref 7–22)
BUN BLDV-MCNC: 17 MG/DL (ref 7–22)
BUN BLDV-MCNC: 22 MG/DL (ref 7–22)
BUN, WHOLE BLOOD: 10 MG/DL (ref 8–26)
BUN, WHOLE BLOOD: 11 MG/DL (ref 8–26)
BUN, WHOLE BLOOD: 12 MG/DL (ref 8–26)
BUN, WHOLE BLOOD: 12 MG/DL (ref 8–26)
BUN, WHOLE BLOOD: 13 MG/DL (ref 8–26)
BUN, WHOLE BLOOD: 8 MG/DL (ref 8–26)
CALCIUM SERPL-MCNC: 10.5 MG/DL (ref 8.5–10.5)
CALCIUM SERPL-MCNC: 9.1 MG/DL (ref 8.5–10.5)
CALCIUM SERPL-MCNC: 9.2 MG/DL (ref 8.5–10.5)
CALCIUM SERPL-MCNC: 9.4 MG/DL (ref 8.5–10.5)
CALCIUM SERPL-MCNC: 9.7 MG/DL (ref 8.5–10.5)
CALCIUM SERPL-MCNC: 9.8 MG/DL (ref 8.5–10.5)
CALCIUM SERPL-MCNC: 9.8 MG/DL (ref 8.5–10.5)
CASTS 2: ABNORMAL /LPF
CASTS UA: ABNORMAL /LPF
CASTS: ABNORMAL /LPF
CASTS: ABNORMAL /LPF
CASTS: NORMAL /LPF
CASTS: NORMAL /LPF
CHARACTER, URINE: ABNORMAL
CHARACTER, URINE: CLEAR
CHLORIDE BLD-SCNC: 92 MEQ/L (ref 98–111)
CHLORIDE BLD-SCNC: 94 MEQ/L (ref 98–111)
CHLORIDE BLD-SCNC: 94 MEQ/L (ref 98–111)
CHLORIDE BLD-SCNC: 95 MEQ/L (ref 98–111)
CHLORIDE BLD-SCNC: 97 MEQ/L (ref 98–111)
CHLORIDE BLD-SCNC: 98 MEQ/L (ref 98–111)
CHLORIDE BLD-SCNC: 98 MEQ/L (ref 98–111)
CHLORIDE, WHOLE BLOOD: 100 MEQ/L (ref 98–109)
CHLORIDE, WHOLE BLOOD: 101 MEQ/L (ref 98–109)
CHLORIDE, WHOLE BLOOD: 102 MEQ/L (ref 98–109)
CHLORIDE, WHOLE BLOOD: 94 MEQ/L (ref 98–109)
CHLORIDE, WHOLE BLOOD: 99 MEQ/L (ref 98–109)
CHLORIDE, WHOLE BLOOD: 99 MEQ/L (ref 98–109)
CHOLESTEROL, TOTAL: 115 MG/DL (ref 100–199)
CO2: 25 MEQ/L (ref 23–33)
CO2: 27 MEQ/L (ref 23–33)
CO2: 27 MEQ/L (ref 23–33)
CO2: 28 MEQ/L (ref 23–33)
CO2: 29 MEQ/L (ref 23–33)
COLLECTED BY:: ABNORMAL
COLLECTED BY:: ABNORMAL
COLOR, URINE: YELLOW
COLOR: YELLOW
CREAT SERPL-MCNC: 0.8 MG/DL (ref 0.4–1.2)
CREAT SERPL-MCNC: 0.8 MG/DL (ref 0.4–1.2)
CREAT SERPL-MCNC: 0.9 MG/DL (ref 0.4–1.2)
CREAT SERPL-MCNC: 1 MG/DL (ref 0.4–1.2)
CREATININE, WHOLE BLOOD: 0.9 MG/DL (ref 0.5–1.2)
CREATININE, WHOLE BLOOD: 0.9 MG/DL (ref 0.5–1.2)
CREATININE, WHOLE BLOOD: 1 MG/DL (ref 0.5–1.2)
CRYSTALS, UA: ABNORMAL
CRYSTALS: ABNORMAL
CRYSTALS: NORMAL
DEVICE: ABNORMAL
DEVICE: ABNORMAL
DIFFERENTIAL TYPE: ABNORMAL
EKG ATRIAL RATE: 104 BPM
EKG ATRIAL RATE: 104 BPM
EKG ATRIAL RATE: 119 BPM
EKG ATRIAL RATE: 234 BPM
EKG ATRIAL RATE: 74 BPM
EKG ATRIAL RATE: 86 BPM
EKG P AXIS: 65 DEGREES
EKG P AXIS: 76 DEGREES
EKG P AXIS: 77 DEGREES
EKG P-R INTERVAL: 172 MS
EKG P-R INTERVAL: 174 MS
EKG P-R INTERVAL: 188 MS
EKG Q-T INTERVAL: 306 MS
EKG Q-T INTERVAL: 314 MS
EKG Q-T INTERVAL: 314 MS
EKG Q-T INTERVAL: 342 MS
EKG Q-T INTERVAL: 358 MS
EKG Q-T INTERVAL: 390 MS
EKG QRS DURATION: 90 MS
EKG QRS DURATION: 92 MS
EKG QRS DURATION: 94 MS
EKG QRS DURATION: 98 MS
EKG QTC CALCULATION (BAZETT): 430 MS
EKG QTC CALCULATION (BAZETT): 432 MS
EKG QTC CALCULATION (BAZETT): 441 MS
EKG QTC CALCULATION (BAZETT): 445 MS
EKG QTC CALCULATION (BAZETT): 449 MS
EKG QTC CALCULATION (BAZETT): 468 MS
EKG R AXIS: 61 DEGREES
EKG R AXIS: 72 DEGREES
EKG R AXIS: 73 DEGREES
EKG R AXIS: 81 DEGREES
EKG R AXIS: 83 DEGREES
EKG R AXIS: 88 DEGREES
EKG T AXIS: 44 DEGREES
EKG T AXIS: 55 DEGREES
EKG T AXIS: 66 DEGREES
EKG T AXIS: 70 DEGREES
EKG T AXIS: 72 DEGREES
EKG T AXIS: 8 DEGREES
EKG VENTRICULAR RATE: 104 BPM
EKG VENTRICULAR RATE: 113 BPM
EKG VENTRICULAR RATE: 119 BPM
EKG VENTRICULAR RATE: 141 BPM
EKG VENTRICULAR RATE: 74 BPM
EKG VENTRICULAR RATE: 93 BPM
EOSINOPHIL # BLD: 0 %
EOSINOPHIL # BLD: 0.8 %
EOSINOPHIL # BLD: 0.9 %
EOSINOPHIL # BLD: 1.7 %
EOSINOPHIL # BLD: 1.8 %
EOSINOPHIL # BLD: 1.8 %
EOSINOPHIL # BLD: 1.9 %
EOSINOPHIL # BLD: 2.1 %
EOSINOPHILS ABSOLUTE: 0 THOU/MM3 (ref 0–0.4)
EOSINOPHILS RELATIVE PERCENT: 2 % (ref 0–12)
EOSINOPHILS RELATIVE PERCENT: 2 % (ref 0–4)
EPITHELIAL CELLS, UA: ABNORMAL /HPF
EPITHELIAL CELLS, UA: ABNORMAL /HPF
EPITHELIAL CELLS, UA: NORMAL /HPF
ERYTHROCYTE [DISTWIDTH] IN BLOOD BY AUTOMATED COUNT: 14.2 % (ref 11.5–14.5)
ERYTHROCYTE [DISTWIDTH] IN BLOOD BY AUTOMATED COUNT: 14.4 % (ref 11.5–14.5)
ERYTHROCYTE [DISTWIDTH] IN BLOOD BY AUTOMATED COUNT: 14.4 % (ref 11.5–14.5)
ERYTHROCYTE [DISTWIDTH] IN BLOOD BY AUTOMATED COUNT: 14.5 % (ref 11.5–14.5)
ERYTHROCYTE [DISTWIDTH] IN BLOOD BY AUTOMATED COUNT: 14.5 % (ref 11.5–14.5)
ERYTHROCYTE [DISTWIDTH] IN BLOOD BY AUTOMATED COUNT: 14.8 % (ref 11.5–14.5)
ERYTHROCYTE [DISTWIDTH] IN BLOOD BY AUTOMATED COUNT: 15 % (ref 11.5–14.5)
ERYTHROCYTE [DISTWIDTH] IN BLOOD BY AUTOMATED COUNT: 49.7 FL (ref 35–45)
ERYTHROCYTE [DISTWIDTH] IN BLOOD BY AUTOMATED COUNT: 51.2 FL (ref 35–45)
ERYTHROCYTE [DISTWIDTH] IN BLOOD BY AUTOMATED COUNT: 51.3 FL (ref 35–45)
ERYTHROCYTE [DISTWIDTH] IN BLOOD BY AUTOMATED COUNT: 51.5 FL (ref 35–45)
ERYTHROCYTE [DISTWIDTH] IN BLOOD BY AUTOMATED COUNT: 52.2 FL (ref 35–45)
ERYTHROCYTE [DISTWIDTH] IN BLOOD BY AUTOMATED COUNT: 54.5 FL (ref 35–45)
ERYTHROCYTE [DISTWIDTH] IN BLOOD BY AUTOMATED COUNT: 54.5 FL (ref 35–45)
FOLATE: > 20 NG/ML (ref 4.8–24.2)
GFR SERPL CREATININE-BSD FRML MDRD: 71 ML/MIN/1.73M2
GFR SERPL CREATININE-BSD FRML MDRD: 80 ML/MIN/1.73M2
GFR SERPL CREATININE-BSD FRML MDRD: > 90 ML/MIN/1.73M2
GFR SERPL CREATININE-BSD FRML MDRD: > 90 ML/MIN/1.73M2
GFR, ESTIMATED: 75 ML/MIN/1.73M2
GFR, ESTIMATED: 85 ML/MIN/1.73M2
GFR, ESTIMATED: 85 ML/MIN/1.73M2
GLUCOSE BLD-MCNC: 123 MG/DL (ref 70–108)
GLUCOSE BLD-MCNC: 130 MG/DL (ref 70–108)
GLUCOSE BLD-MCNC: 146 MG/DL (ref 70–108)
GLUCOSE BLD-MCNC: 147 MG/DL (ref 70–108)
GLUCOSE BLD-MCNC: 150 MG/DL (ref 70–108)
GLUCOSE BLD-MCNC: 172 MG/DL (ref 70–108)
GLUCOSE BLD-MCNC: 172 MG/DL (ref 70–108)
GLUCOSE BLD-MCNC: 183 MG/DL (ref 70–108)
GLUCOSE BLD-MCNC: 207 MG/DL (ref 70–108)
GLUCOSE BLD-MCNC: 210 MG/DL (ref 70–108)
GLUCOSE BLD-MCNC: 217 MG/DL (ref 70–108)
GLUCOSE BLD-MCNC: 221 MG/DL (ref 70–108)
GLUCOSE BLD-MCNC: 223 MG/DL (ref 70–108)
GLUCOSE BLD-MCNC: 257 MG/DL (ref 70–108)
GLUCOSE BLD-MCNC: 294 MG/DL (ref 70–108)
GLUCOSE URINE: NEGATIVE MG/DL
GLUCOSE, URINE: >= 1000 MG/DL
GLUCOSE, URINE: NEGATIVE MG/DL
GLUCOSE, WHOLE BLOOD: 133 MG/DL (ref 70–108)
GLUCOSE, WHOLE BLOOD: 134 MG/DL (ref 70–108)
GLUCOSE, WHOLE BLOOD: 146 MG/DL (ref 70–108)
GLUCOSE, WHOLE BLOOD: 152 MG/DL (ref 70–108)
GLUCOSE, WHOLE BLOOD: 158 MG/DL (ref 70–108)
GLUCOSE, WHOLE BLOOD: 161 MG/DL (ref 70–108)
HBA1C MFR BLD: 7.2 %
HCO3: 28 MMOL/L (ref 23–28)
HCO3: 29 MMOL/L (ref 23–28)
HCT VFR BLD CALC: 27.4 % (ref 42–52)
HCT VFR BLD CALC: 28.4 % (ref 42–52)
HCT VFR BLD CALC: 30.8 % (ref 42–52)
HCT VFR BLD CALC: 30.9 % (ref 42–52)
HCT VFR BLD CALC: 30.9 % (ref 42–52)
HCT VFR BLD CALC: 31.2 % (ref 42–52)
HCT VFR BLD CALC: 31.5 % (ref 42–52)
HCT VFR BLD CALC: 32 % (ref 42–52)
HCT VFR BLD CALC: 32.1 % (ref 42–52)
HCT VFR BLD CALC: 32.1 % (ref 42–52)
HCT VFR BLD CALC: 32.3 % (ref 42–52)
HCT VFR BLD CALC: 32.4 % (ref 42–52)
HCT VFR BLD CALC: 33.2 % (ref 42–52)
HCT VFR BLD CALC: 34.2 % (ref 42–52)
HCT VFR BLD CALC: 35.3 % (ref 42–52)
HCT VFR BLD CALC: 36.8 % (ref 42–52)
HDLC SERPL-MCNC: 41 MG/DL
HEMOGLOBIN: 10.1 GM/DL (ref 14–18)
HEMOGLOBIN: 10.2 GM/DL (ref 14–18)
HEMOGLOBIN: 10.3 GM/DL (ref 14–18)
HEMOGLOBIN: 10.4 GM/DL (ref 14–18)
HEMOGLOBIN: 10.5 GM/DL (ref 14–18)
HEMOGLOBIN: 10.5 GM/DL (ref 14–18)
HEMOGLOBIN: 10.6 GM/DL (ref 14–18)
HEMOGLOBIN: 10.8 GM/DL (ref 14–18)
HEMOGLOBIN: 10.9 GM/DL (ref 14–18)
HEMOGLOBIN: 11.2 GM/DL (ref 14–18)
HEMOGLOBIN: 11.3 GM/DL (ref 14–18)
HEMOGLOBIN: 11.7 GM/DL (ref 14–18)
HEMOGLOBIN: 11.9 GM/DL (ref 14–18)
HEMOGLOBIN: 12.1 GM/DL (ref 14–18)
HEMOGLOBIN: 8.9 GM/DL (ref 14–18)
HEMOGLOBIN: 9.6 GM/DL (ref 14–18)
IFIO2: 2
IFIO2: 3
IGG: 447 MG/DL (ref 700–1600)
IMMATURE GRANS (ABS): 0 THOU/MM3 (ref 0–0.07)
IMMATURE GRANS (ABS): 0 THOU/MM3 (ref 0–0.07)
IMMATURE GRANS (ABS): 0.01 THOU/MM3 (ref 0–0.07)
IMMATURE GRANULOCYTES: 0 %
IMMATURE GRANULOCYTES: 0 %
IMMATURE GRANULOCYTES: 0.4 %
IMMUNOFIXATION WITH QUANT: NORMAL
INR BLD: 1.15 (ref 0.85–1.13)
IONIZED CALCIUM, WHOLE BLOOD: 1.12 MMOL/L (ref 1.12–1.32)
IONIZED CALCIUM, WHOLE BLOOD: 1.12 MMOL/L (ref 1.12–1.32)
IONIZED CALCIUM, WHOLE BLOOD: 1.14 MMOL/L (ref 1.12–1.32)
IONIZED CALCIUM, WHOLE BLOOD: 1.19 MMOL/L (ref 1.12–1.32)
IONIZED CALCIUM, WHOLE BLOOD: 1.19 MMOL/L (ref 1.12–1.32)
IONIZED CALCIUM, WHOLE BLOOD: 1.2 MMOL/L (ref 1.12–1.32)
KAPPA/LAMBDA FREE LIGHT CHAINS: NORMAL
KETONES, URINE: 15
KETONES, URINE: 15
KETONES, URINE: NEGATIVE
LACTIC ACID, SEPSIS: 1.2 MMOL/L (ref 0.5–1.9)
LDL CHOLESTEROL CALCULATED: 61 MG/DL
LEUKOCYTE CLUMPS, URINE: NEGATIVE
LEUKOCYTE EST, POC: NEGATIVE
LEUKOCYTE EST, POC: NEGATIVE
LEUKOCYTE ESTERASE, URINE: NEGATIVE
LV EF: 50 %
LVEF MODALITY: NORMAL
LYMPHOCYTES # BLD: 15.5 %
LYMPHOCYTES # BLD: 18.1 %
LYMPHOCYTES # BLD: 19.6 %
LYMPHOCYTES # BLD: 21 % (ref 15–47)
LYMPHOCYTES # BLD: 30.7 %
LYMPHOCYTES # BLD: 33 % (ref 15–47)
LYMPHOCYTES # BLD: 34.3 %
LYMPHOCYTES # BLD: 36.7 %
LYMPHOCYTES # BLD: 38.6 %
LYMPHOCYTES # BLD: 39.2 %
LYMPHOCYTES ABSOLUTE: 0.3 THOU/MM3 (ref 1–4.8)
LYMPHOCYTES ABSOLUTE: 0.5 THOU/MM3 (ref 1–4.8)
LYMPHOCYTES ABSOLUTE: 0.6 THOU/MM3 (ref 1–4.8)
LYMPHOCYTES ABSOLUTE: 0.7 THOU/MM3 (ref 1–4.8)
LYMPHOCYTES ABSOLUTE: 0.7 THOU/MM3 (ref 1–4.8)
LYMPHOCYTES ABSOLUTE: 0.9 THOU/MM3 (ref 1–4.8)
MCH RBC QN AUTO: 31.8 PG (ref 27–31)
MCH RBC QN AUTO: 31.9 PG (ref 26–33)
MCH RBC QN AUTO: 31.9 PG (ref 27–31)
MCH RBC QN AUTO: 31.9 PG (ref 27–31)
MCH RBC QN AUTO: 32 PG (ref 26–33)
MCH RBC QN AUTO: 32 PG (ref 26–33)
MCH RBC QN AUTO: 32 PG (ref 27–31)
MCH RBC QN AUTO: 32.1 PG (ref 26–33)
MCH RBC QN AUTO: 32.1 PG (ref 27–31)
MCH RBC QN AUTO: 32.6 PG (ref 27–31)
MCH RBC QN AUTO: 32.7 PG (ref 26–33)
MCH RBC QN AUTO: 32.7 PG (ref 27–31)
MCH RBC QN AUTO: 32.8 PG (ref 26–33)
MCH RBC QN AUTO: 32.8 PG (ref 26–33)
MCH RBC QN AUTO: 32.8 PG (ref 27–31)
MCH RBC QN AUTO: 32.9 PG (ref 27–31)
MCHC RBC AUTO-ENTMCNC: 32.5 GM/DL (ref 32.2–35.5)
MCHC RBC AUTO-ENTMCNC: 32.7 GM/DL (ref 32.2–35.5)
MCHC RBC AUTO-ENTMCNC: 33 GM/DL (ref 32.2–35.5)
MCHC RBC AUTO-ENTMCNC: 33 GM/DL (ref 33–37)
MCHC RBC AUTO-ENTMCNC: 33.1 GM/DL (ref 33–37)
MCHC RBC AUTO-ENTMCNC: 33.5 GM/DL (ref 33–37)
MCHC RBC AUTO-ENTMCNC: 33.5 GM/DL (ref 33–37)
MCHC RBC AUTO-ENTMCNC: 33.7 GM/DL (ref 32.2–35.5)
MCHC RBC AUTO-ENTMCNC: 33.7 GM/DL (ref 33–37)
MCHC RBC AUTO-ENTMCNC: 33.8 GM/DL (ref 32.2–35.5)
MCHC RBC AUTO-ENTMCNC: 33.9 GM/DL (ref 33–37)
MCHC RBC AUTO-ENTMCNC: 34 GM/DL (ref 33–37)
MCHC RBC AUTO-ENTMCNC: 34.3 GM/DL (ref 33–37)
MCHC RBC AUTO-ENTMCNC: 35.1 GM/DL (ref 33–37)
MCV RBC AUTO: 100.3 FL (ref 80–94)
MCV RBC AUTO: 100.3 FL (ref 80–94)
MCV RBC AUTO: 94 FL (ref 80–94)
MCV RBC AUTO: 94 FL (ref 80–94)
MCV RBC AUTO: 95 FL (ref 80–94)
MCV RBC AUTO: 95.2 FL (ref 80–94)
MCV RBC AUTO: 95.3 FL (ref 80–94)
MCV RBC AUTO: 96 FL (ref 80–94)
MCV RBC AUTO: 96.6 FL (ref 80–94)
MCV RBC AUTO: 97 FL (ref 80–94)
MCV RBC AUTO: 97 FL (ref 80–94)
MCV RBC AUTO: 97.2 FL (ref 80–94)
MCV RBC AUTO: 97.6 FL (ref 80–94)
MCV RBC AUTO: 98.6 FL (ref 80–94)
MISCELLANEOUS 2: ABNORMAL
MISCELLANEOUS LAB TEST RESULT: ABNORMAL
MISCELLANEOUS LAB TEST RESULT: NORMAL
MONOCYTES # BLD: 2.6 %
MONOCYTES # BLD: 3 %
MONOCYTES # BLD: 3 %
MONOCYTES # BLD: 3.1 %
MONOCYTES # BLD: 3.5 %
MONOCYTES # BLD: 3.5 %
MONOCYTES # BLD: 3.6 %
MONOCYTES # BLD: 3.9 %
MONOCYTES ABSOLUTE: 0 THOU/MM3 (ref 0.4–1.3)
MONOCYTES ABSOLUTE: 0.1 THOU/MM3 (ref 0.4–1.3)
MONOCYTES: 2 % (ref 0–12)
MONOCYTES: 3 % (ref 0–12)
NITRITE, URINE: NEGATIVE
NUCLEATED RED BLOOD CELLS: 0 /100 WBC
O2 SATURATION: 93 %
O2 SATURATION: 94 %
OSMOLALITY CALCULATION: 271.8 MOSMOL/KG (ref 275–300)
OSMOLALITY CALCULATION: 273.4 MOSMOL/KG (ref 275–300)
OSMOLALITY CALCULATION: 280.9 MOSMOL/KG (ref 275–300)
PATHOLOGIST REVIEW: ABNORMAL
PATHOLOGIST REVIEW: ABNORMAL
PCO2: 35 MMHG (ref 35–45)
PCO2: 37 MMHG (ref 35–45)
PDW BLD-RTO: 13.7 % (ref 11.5–14.5)
PDW BLD-RTO: 13.8 % (ref 11.5–14.5)
PDW BLD-RTO: 14.2 % (ref 11.5–14.5)
PDW BLD-RTO: 14.3 % (ref 11.5–14.5)
PDW BLD-RTO: 14.3 % (ref 11.5–14.5)
PDW BLD-RTO: 14.4 % (ref 11.5–14.5)
PDW BLD-RTO: 15.3 % (ref 11.5–14.5)
PDW BLD-RTO: 15.5 % (ref 11.5–14.5)
PDW BLD-RTO: 15.6 % (ref 11.5–14.5)
PH BLOOD GAS: 7.48 (ref 7.35–7.45)
PH BLOOD GAS: 7.53 (ref 7.35–7.45)
PH UA: 6
PH UA: 7
PH UA: 7
PH, URINE: 5.5
PLATELET # BLD: 144 THOU/MM3 (ref 130–400)
PLATELET # BLD: 152 THOU/MM3 (ref 130–400)
PLATELET # BLD: 168 THOU/MM3 (ref 130–400)
PLATELET # BLD: 170 THOU/MM3 (ref 130–400)
PLATELET # BLD: 174 THOU/MM3 (ref 130–400)
PLATELET # BLD: 182 THOU/MM3 (ref 130–400)
PLATELET # BLD: 185 THOU/MM3 (ref 130–400)
PLATELET # BLD: 186 THOU/MM3 (ref 130–400)
PLATELET # BLD: 186 THOU/MM3 (ref 130–400)
PLATELET # BLD: 191 THOU/MM3 (ref 130–400)
PLATELET # BLD: 201 THOU/MM3 (ref 130–400)
PLATELET # BLD: 201 THOU/MM3 (ref 130–400)
PLATELET # BLD: 202 THOU/MM3 (ref 130–400)
PLATELET # BLD: 219 THOU/MM3 (ref 130–400)
PLATELET # BLD: 232 THOU/MM3 (ref 130–400)
PLATELET # BLD: 239 THOU/MM3 (ref 130–400)
PLATELET # BLD: 254 THOU/MM3 (ref 130–400)
PLATELET # BLD: 328 THOU/MM3 (ref 130–400)
PLATELET ESTIMATE: ADEQUATE
PMV BLD AUTO: 10 FL (ref 9.4–12.4)
PMV BLD AUTO: 6.5 FL (ref 7.4–10.4)
PMV BLD AUTO: 7 FL (ref 7.4–10.4)
PMV BLD AUTO: 7.3 FL (ref 7.4–10.4)
PMV BLD AUTO: 7.3 FL (ref 7.4–10.4)
PMV BLD AUTO: 7.7 FL (ref 7.4–10.4)
PMV BLD AUTO: 7.7 FL (ref 7.4–10.4)
PMV BLD AUTO: 7.8 FL (ref 7.4–10.4)
PMV BLD AUTO: 8.3 FL (ref 7.4–10.4)
PMV BLD AUTO: 8.5 MCM (ref 7.4–10.4)
PMV BLD AUTO: 8.9 FL (ref 9.4–12.4)
PMV BLD AUTO: 9 FL (ref 9.4–12.4)
PMV BLD AUTO: 9 FL (ref 9.4–12.4)
PMV BLD AUTO: 9.5 FL (ref 9.4–12.4)
PMV BLD AUTO: 9.5 FL (ref 9.4–12.4)
PMV BLD AUTO: 9.9 FL (ref 9.4–12.4)
PO2: 59 MMHG (ref 71–104)
PO2: 66 MMHG (ref 71–104)
POST VOID RESIDUAL (PVR): 48 ML
POTASSIUM REFLEX MAGNESIUM: 4.2 MEQ/L (ref 3.5–5.2)
POTASSIUM SERPL-SCNC: 3.4 MEQ/L (ref 3.5–5.2)
POTASSIUM SERPL-SCNC: 3.7 MEQ/L (ref 3.5–5.2)
POTASSIUM SERPL-SCNC: 3.7 MEQ/L (ref 3.5–5.2)
POTASSIUM SERPL-SCNC: 3.8 MEQ/L (ref 3.5–5.2)
POTASSIUM SERPL-SCNC: 3.8 MEQ/L (ref 3.5–5.2)
POTASSIUM SERPL-SCNC: 4.4 MEQ/L (ref 3.5–5.2)
POTASSIUM, WHOLE BLOOD: 3.6 MEQ/L (ref 3.5–4.9)
POTASSIUM, WHOLE BLOOD: 3.6 MEQ/L (ref 3.5–4.9)
POTASSIUM, WHOLE BLOOD: 3.9 MEQ/L (ref 3.5–4.9)
POTASSIUM, WHOLE BLOOD: 4 MEQ/L (ref 3.5–4.9)
POTASSIUM, WHOLE BLOOD: 4.2 MEQ/L (ref 3.5–4.9)
POTASSIUM, WHOLE BLOOD: 4.6 MEQ/L (ref 3.5–4.9)
PRO-BNP: 1801 PG/ML (ref 0–1800)
PRO-BNP: 2190 PG/ML (ref 0–1800)
PRO-BNP: 555.4 PG/ML (ref 0–1800)
PROCALCITONIN: 0.23 NG/ML (ref 0.01–0.09)
PROSTATE SPECIFIC ANTIGEN: 0.77 NG/ML (ref 0–1)
PROSTATE SPECIFIC ANTIGEN: 5.86 NG/ML (ref 0–1)
PROTEIN UA: ABNORMAL
PROTEIN UA: NEGATIVE
PROTEIN UA: NEGATIVE
PROTEIN UA: NEGATIVE MG/DL
PROTEIN UA: NEGATIVE MG/DL
PROTEIN, URINE: 30 MG/DL
RBC # BLD: 2.78 MILL/MM3 (ref 4.7–6.1)
RBC # BLD: 2.94 MILL/MM3 (ref 4.7–6.1)
RBC # BLD: 3.08 MILL/MM3 (ref 4.7–6.1)
RBC # BLD: 3.18 MILL/MM3 (ref 4.7–6.1)
RBC # BLD: 3.18 MILL/MM3 (ref 4.7–6.1)
RBC # BLD: 3.2 MILL/MM3 (ref 4.7–6.1)
RBC # BLD: 3.23 MILL/MM3 (ref 4.7–6.1)
RBC # BLD: 3.29 MILL/MM3 (ref 4.7–6.1)
RBC # BLD: 3.31 MILL/MM3 (ref 4.7–6.1)
RBC # BLD: 3.38 MILL/MM3 (ref 4.7–6.1)
RBC # BLD: 3.39 MILL/MM3 (ref 4.7–6.1)
RBC # BLD: 3.41 MILL/MM3 (ref 4.7–6.1)
RBC # BLD: 3.53 MILL/MM3 (ref 4.7–6.1)
RBC # BLD: 3.59 MILL/MM3 (ref 4.7–6.1)
RBC # BLD: 3.65 MILL/MM3 (ref 4.7–6.1)
RBC # BLD: 3.81 MILL/MM3 (ref 4.7–6.1)
RBC URINE: ABNORMAL /HPF
RBC URINE: ABNORMAL /HPF
RBC URINE: NORMAL /HPF
RENAL EPITHELIAL, UA: ABNORMAL
RENAL EPITHELIAL, UA: ABNORMAL
RENAL EPITHELIAL, UA: NORMAL
RESPIRATORY CULTURE: NORMAL
ROULEAUX: SLIGHT
SCAN OF BLOOD SMEAR: NORMAL
SEG NEUTROPHILS: 55.1 %
SEG NEUTROPHILS: 56 %
SEG NEUTROPHILS: 57.2 %
SEG NEUTROPHILS: 60 %
SEG NEUTROPHILS: 62 % (ref 43–75)
SEG NEUTROPHILS: 64.9 %
SEG NEUTROPHILS: 75 % (ref 43–75)
SEG NEUTROPHILS: 75.3 %
SEG NEUTROPHILS: 76.7 %
SEG NEUTROPHILS: 81.5 %
SEGMENTED NEUTROPHILS ABSOLUTE COUNT: 0.7 THOU/MM3 (ref 1.8–7.7)
SEGMENTED NEUTROPHILS ABSOLUTE COUNT: 0.9 THOU/MM3 (ref 1.8–7.7)
SEGMENTED NEUTROPHILS ABSOLUTE COUNT: 1 THOU/MM3 (ref 1.8–7.7)
SEGMENTED NEUTROPHILS ABSOLUTE COUNT: 1.1 THOU/MM3 (ref 1.8–7.7)
SEGMENTED NEUTROPHILS ABSOLUTE COUNT: 1.3 THOU/MM3 (ref 1.8–7.7)
SEGMENTED NEUTROPHILS ABSOLUTE COUNT: 1.4 THOU/MM3 (ref 1.8–7.7)
SEGMENTED NEUTROPHILS ABSOLUTE COUNT: 1.4 THOU/MM3 (ref 1.8–7.7)
SEGMENTED NEUTROPHILS ABSOLUTE COUNT: 1.9 THOU/MM3 (ref 1.8–7.7)
SODIUM BLD-SCNC: 135 MEQ/L (ref 135–145)
SODIUM BLD-SCNC: 136 MEQ/L (ref 135–145)
SODIUM BLD-SCNC: 136 MEQ/L (ref 135–145)
SODIUM BLD-SCNC: 137 MEQ/L (ref 135–145)
SODIUM BLD-SCNC: 139 MEQ/L (ref 135–145)
SODIUM, WHOLE BLOOD: 137 MEQ/L (ref 138–146)
SODIUM, WHOLE BLOOD: 139 MEQ/L (ref 138–146)
SODIUM, WHOLE BLOOD: 140 MEQ/L (ref 138–146)
SOURCE, BLOOD GAS: ABNORMAL
SOURCE, BLOOD GAS: ABNORMAL
SPECIFIC GRAVITY UA: 1.02 (ref 1–1.03)
SPECIFIC GRAVITY UA: 1.02 (ref 1–1.03)
SPECIFIC GRAVITY, URINE: 1.01 (ref 1–1.03)
SPECIFIC GRAVITY, URINE: 1.02 (ref 1–1.03)
SPECIFIC GRAVITY, URINE: 1.02 (ref 1–1.03)
SPECIFIC GRAVITY, URINE: >= 1.03 (ref 1–1.03)
TOTAL CO2, WHOLE BLOOD: 28 MEQ/L (ref 23–33)
TOTAL CO2, WHOLE BLOOD: 28 MEQ/L (ref 23–33)
TOTAL CO2, WHOLE BLOOD: 29 MEQ/L (ref 23–33)
TOTAL CO2, WHOLE BLOOD: 30 MEQ/L (ref 23–33)
TOTAL CO2, WHOLE BLOOD: 30 MEQ/L (ref 23–33)
TOTAL CO2, WHOLE BLOOD: 31 MEQ/L (ref 23–33)
TOTAL PROTEIN: 7.6 G/DL (ref 6.1–8)
TOTAL PROTEIN: 8.1 G/DL (ref 6.1–8)
TOTAL PROTEIN: 8.2 G/DL (ref 6.1–8)
TOTAL PROTEIN: 8.2 G/DL (ref 6.1–8)
TOTAL PROTEIN: 8.4 G/DL (ref 6.1–8)
TOTAL PROTEIN: 8.6 G/DL (ref 6.1–8)
TOTAL PROTEIN: 8.7 G/DL (ref 6.1–8)
TOTAL PROTEIN: 8.8 G/DL (ref 6.1–8)
TOTAL PROTEIN: 8.9 G/DL (ref 6.1–8)
TOTAL PROTEIN: 8.9 G/DL (ref 6.1–8)
TOTAL PROTEIN: 9 G/DL (ref 6.1–8)
TRIGL SERPL-MCNC: 63 MG/DL (ref 0–199)
TROPONIN T: < 0.01 NG/ML
TSH SERPL DL<=0.05 MIU/L-ACNC: 1.94 UIU/ML (ref 0.4–4.2)
UROBILINOGEN, URINE: 0.2 EU/DL
UROBILINOGEN, URINE: 1 EU/DL
VITAMIN B-12: 280 PG/ML (ref 211–911)
WBC # BLD: 1.1 THOU/MM3 (ref 4.8–10.8)
WBC # BLD: 1.4 THOU/MM3 (ref 4.8–10.8)
WBC # BLD: 1.5 THOU/MM3 (ref 4.8–10.8)
WBC # BLD: 1.6 THOU/MM3 (ref 4.8–10.8)
WBC # BLD: 1.6 THOU/MM3 (ref 4.8–10.8)
WBC # BLD: 1.7 THOU/MM3 (ref 4.8–10.8)
WBC # BLD: 1.7 THOU/MM3 (ref 4.8–10.8)
WBC # BLD: 1.9 THOU/MM3 (ref 4.8–10.8)
WBC # BLD: 1.9 THOU/MM3 (ref 4.8–10.8)
WBC # BLD: 2 THOU/MM3 (ref 4.8–10.8)
WBC # BLD: 2 THOU/MM3 (ref 4.8–10.8)
WBC # BLD: 2.2 THOU/MM3 (ref 4.8–10.8)
WBC # BLD: 2.4 THOU/MM3 (ref 4.8–10.8)
WBC # BLD: 2.5 THOU/MM3 (ref 4.8–10.8)
WBC UA: ABNORMAL /HPF
WBC UA: ABNORMAL /HPF
WBC UA: NORMAL /HPF
YEAST: ABNORMAL
YEAST: ABNORMAL
YEAST: NORMAL

## 2018-01-01 PROCEDURE — 88307 TISSUE EXAM BY PATHOLOGIST: CPT

## 2018-01-01 PROCEDURE — 4040F PNEUMOC VAC/ADMIN/RCVD: CPT | Performed by: FAMILY MEDICINE

## 2018-01-01 PROCEDURE — 6360000002 HC RX W HCPCS: Performed by: INTERNAL MEDICINE

## 2018-01-01 PROCEDURE — 2580000003 HC RX 258: Performed by: INTERNAL MEDICINE

## 2018-01-01 PROCEDURE — 2700000000 HC OXYGEN THERAPY PER DAY

## 2018-01-01 PROCEDURE — APPNB15 APP NON BILLABLE TIME 0-15 MINS: Performed by: NURSE PRACTITIONER

## 2018-01-01 PROCEDURE — G0463 HOSPITAL OUTPT CLINIC VISIT: HCPCS

## 2018-01-01 PROCEDURE — 2709999900 HC NON-CHARGEABLE SUPPLY

## 2018-01-01 PROCEDURE — 82948 REAGENT STRIP/BLOOD GLUCOSE: CPT

## 2018-01-01 PROCEDURE — 96365 THER/PROPH/DIAG IV INF INIT: CPT

## 2018-01-01 PROCEDURE — 83605 ASSAY OF LACTIC ACID: CPT

## 2018-01-01 PROCEDURE — 85730 THROMBOPLASTIN TIME PARTIAL: CPT

## 2018-01-01 PROCEDURE — 6370000000 HC RX 637 (ALT 250 FOR IP): Performed by: INTERNAL MEDICINE

## 2018-01-01 PROCEDURE — 82784 ASSAY IGA/IGD/IGG/IGM EACH: CPT

## 2018-01-01 PROCEDURE — 1200000000 HC SEMI PRIVATE

## 2018-01-01 PROCEDURE — G8979 MOBILITY GOAL STATUS: HCPCS

## 2018-01-01 PROCEDURE — 82746 ASSAY OF FOLIC ACID SERUM: CPT

## 2018-01-01 PROCEDURE — 80053 COMPREHEN METABOLIC PANEL: CPT

## 2018-01-01 PROCEDURE — 71046 X-RAY EXAM CHEST 2 VIEWS: CPT

## 2018-01-01 PROCEDURE — 4040F PNEUMOC VAC/ADMIN/RCVD: CPT | Performed by: INTERNAL MEDICINE

## 2018-01-01 PROCEDURE — 80076 HEPATIC FUNCTION PANEL: CPT

## 2018-01-01 PROCEDURE — 85025 COMPLETE CBC W/AUTO DIFF WBC: CPT

## 2018-01-01 PROCEDURE — 1111F DSCHRG MED/CURRENT MED MERGE: CPT | Performed by: INTERNAL MEDICINE

## 2018-01-01 PROCEDURE — 96401 CHEMO ANTI-NEOPL SQ/IM: CPT

## 2018-01-01 PROCEDURE — 1111F DSCHRG MED/CURRENT MED MERGE: CPT | Performed by: FAMILY MEDICINE

## 2018-01-01 PROCEDURE — 36600 WITHDRAWAL OF ARTERIAL BLOOD: CPT

## 2018-01-01 PROCEDURE — 94640 AIRWAY INHALATION TREATMENT: CPT

## 2018-01-01 PROCEDURE — 80047 BASIC METABLC PNL IONIZED CA: CPT

## 2018-01-01 PROCEDURE — 99213 OFFICE O/P EST LOW 20 MIN: CPT | Performed by: INTERNAL MEDICINE

## 2018-01-01 PROCEDURE — 97110 THERAPEUTIC EXERCISES: CPT

## 2018-01-01 PROCEDURE — 99215 OFFICE O/P EST HI 40 MIN: CPT | Performed by: INTERNAL MEDICINE

## 2018-01-01 PROCEDURE — 93010 ELECTROCARDIOGRAM REPORT: CPT | Performed by: INTERNAL MEDICINE

## 2018-01-01 PROCEDURE — 96374 THER/PROPH/DIAG INJ IV PUSH: CPT

## 2018-01-01 PROCEDURE — 99239 HOSP IP/OBS DSCHRG MGMT >30: CPT | Performed by: HOSPITALIST

## 2018-01-01 PROCEDURE — 97530 THERAPEUTIC ACTIVITIES: CPT

## 2018-01-01 PROCEDURE — 99231 SBSQ HOSP IP/OBS SF/LOW 25: CPT | Performed by: HOSPITALIST

## 2018-01-01 PROCEDURE — 36415 COLL VENOUS BLD VENIPUNCTURE: CPT

## 2018-01-01 PROCEDURE — G8417 CALC BMI ABV UP PARAM F/U: HCPCS | Performed by: INTERNAL MEDICINE

## 2018-01-01 PROCEDURE — G8978 MOBILITY CURRENT STATUS: HCPCS

## 2018-01-01 PROCEDURE — 97166 OT EVAL MOD COMPLEX 45 MIN: CPT

## 2018-01-01 PROCEDURE — 6370000000 HC RX 637 (ALT 250 FOR IP): Performed by: FAMILY MEDICINE

## 2018-01-01 PROCEDURE — 86334 IMMUNOFIX E-PHORESIS SERUM: CPT

## 2018-01-01 PROCEDURE — 84484 ASSAY OF TROPONIN QUANT: CPT

## 2018-01-01 PROCEDURE — 0220000000 HC SKILLED NURSING FACILITY

## 2018-01-01 PROCEDURE — 81003 URINALYSIS AUTO W/O SCOPE: CPT

## 2018-01-01 PROCEDURE — 99232 SBSQ HOSP IP/OBS MODERATE 35: CPT | Performed by: HOSPITALIST

## 2018-01-01 PROCEDURE — G8484 FLU IMMUNIZE NO ADMIN: HCPCS | Performed by: FAMILY MEDICINE

## 2018-01-01 PROCEDURE — 84165 PROTEIN E-PHORESIS SERUM: CPT

## 2018-01-01 PROCEDURE — 6360000002 HC RX W HCPCS: Performed by: FAMILY MEDICINE

## 2018-01-01 PROCEDURE — 85027 COMPLETE CBC AUTOMATED: CPT

## 2018-01-01 PROCEDURE — 81001 URINALYSIS AUTO W/SCOPE: CPT

## 2018-01-01 PROCEDURE — 93306 TTE W/DOPPLER COMPLETE: CPT

## 2018-01-01 PROCEDURE — 99213 OFFICE O/P EST LOW 20 MIN: CPT | Performed by: FAMILY MEDICINE

## 2018-01-01 PROCEDURE — 96366 THER/PROPH/DIAG IV INF ADDON: CPT

## 2018-01-01 PROCEDURE — G0378 HOSPITAL OBSERVATION PER HR: HCPCS

## 2018-01-01 PROCEDURE — 77002 NEEDLE LOCALIZATION BY XRAY: CPT | Performed by: RADIOLOGY

## 2018-01-01 PROCEDURE — A6454 SELF-ADHER BAND W>=3" <5"/YD: HCPCS

## 2018-01-01 PROCEDURE — G8427 DOCREV CUR MEDS BY ELIG CLIN: HCPCS | Performed by: INTERNAL MEDICINE

## 2018-01-01 PROCEDURE — 99214 OFFICE O/P EST MOD 30 MIN: CPT | Performed by: FAMILY MEDICINE

## 2018-01-01 PROCEDURE — 1123F ACP DISCUSS/DSCN MKR DOCD: CPT | Performed by: NURSE PRACTITIONER

## 2018-01-01 PROCEDURE — 93010 ELECTROCARDIOGRAM REPORT: CPT | Performed by: NUCLEAR MEDICINE

## 2018-01-01 PROCEDURE — 1123F ACP DISCUSS/DSCN MKR DOCD: CPT | Performed by: INTERNAL MEDICINE

## 2018-01-01 PROCEDURE — G8926 SPIRO NO PERF OR DOC: HCPCS | Performed by: FAMILY MEDICINE

## 2018-01-01 PROCEDURE — G8417 CALC BMI ABV UP PARAM F/U: HCPCS | Performed by: FAMILY MEDICINE

## 2018-01-01 PROCEDURE — G8427 DOCREV CUR MEDS BY ELIG CLIN: HCPCS | Performed by: NURSE PRACTITIONER

## 2018-01-01 PROCEDURE — 99232 SBSQ HOSP IP/OBS MODERATE 35: CPT | Performed by: INTERNAL MEDICINE

## 2018-01-01 PROCEDURE — 83883 ASSAY NEPHELOMETRY NOT SPEC: CPT

## 2018-01-01 PROCEDURE — 99214 OFFICE O/P EST MOD 30 MIN: CPT | Performed by: PHYSICIAN ASSISTANT

## 2018-01-01 PROCEDURE — 2500000003 HC RX 250 WO HCPCS: Performed by: INTERNAL MEDICINE

## 2018-01-01 PROCEDURE — G8427 DOCREV CUR MEDS BY ELIG CLIN: HCPCS | Performed by: FAMILY MEDICINE

## 2018-01-01 PROCEDURE — 20225 BONE BIOPSY TROCAR/NDL DEEP: CPT | Performed by: RADIOLOGY

## 2018-01-01 PROCEDURE — 1290000000 HC SEMI PRIVATE OTHER R&B

## 2018-01-01 PROCEDURE — 84153 ASSAY OF PSA TOTAL: CPT

## 2018-01-01 PROCEDURE — 83880 ASSAY OF NATRIURETIC PEPTIDE: CPT

## 2018-01-01 PROCEDURE — 1200000003 HC TELEMETRY R&B

## 2018-01-01 PROCEDURE — 1123F ACP DISCUSS/DSCN MKR DOCD: CPT | Performed by: FAMILY MEDICINE

## 2018-01-01 PROCEDURE — 2580000003 HC RX 258: Performed by: FAMILY MEDICINE

## 2018-01-01 PROCEDURE — 84443 ASSAY THYROID STIM HORMONE: CPT

## 2018-01-01 PROCEDURE — 3023F SPIROM DOC REV: CPT | Performed by: INTERNAL MEDICINE

## 2018-01-01 PROCEDURE — 4004F PT TOBACCO SCREEN RCVD TLK: CPT | Performed by: INTERNAL MEDICINE

## 2018-01-01 PROCEDURE — 3023F SPIROM DOC REV: CPT | Performed by: FAMILY MEDICINE

## 2018-01-01 PROCEDURE — 88311 DECALCIFY TISSUE: CPT

## 2018-01-01 PROCEDURE — 97535 SELF CARE MNGMENT TRAINING: CPT

## 2018-01-01 PROCEDURE — A6250 SKIN SEAL PROTECT MOISTURIZR: HCPCS

## 2018-01-01 PROCEDURE — 82803 BLOOD GASES ANY COMBINATION: CPT

## 2018-01-01 PROCEDURE — 2580000003 HC RX 258: Performed by: EMERGENCY MEDICINE

## 2018-01-01 PROCEDURE — 1036F TOBACCO NON-USER: CPT | Performed by: FAMILY MEDICINE

## 2018-01-01 PROCEDURE — 84160 ASSAY OF PROTEIN ANY SOURCE: CPT

## 2018-01-01 PROCEDURE — 92507 TX SP LANG VOICE COMM INDIV: CPT

## 2018-01-01 PROCEDURE — 99222 1ST HOSP IP/OBS MODERATE 55: CPT | Performed by: INTERNAL MEDICINE

## 2018-01-01 PROCEDURE — 84145 PROCALCITONIN (PCT): CPT

## 2018-01-01 PROCEDURE — 1101F PT FALLS ASSESS-DOCD LE1/YR: CPT | Performed by: INTERNAL MEDICINE

## 2018-01-01 PROCEDURE — 96375 TX/PRO/DX INJ NEW DRUG ADDON: CPT

## 2018-01-01 PROCEDURE — 72100 X-RAY EXAM L-S SPINE 2/3 VWS: CPT

## 2018-01-01 PROCEDURE — 87070 CULTURE OTHR SPECIMN AEROBIC: CPT

## 2018-01-01 PROCEDURE — 6360000004 HC RX CONTRAST MEDICATION: Performed by: INTERNAL MEDICINE

## 2018-01-01 PROCEDURE — 72158 MRI LUMBAR SPINE W/O & W/DYE: CPT

## 2018-01-01 PROCEDURE — 85610 PROTHROMBIN TIME: CPT

## 2018-01-01 PROCEDURE — 85049 AUTOMATED PLATELET COUNT: CPT

## 2018-01-01 PROCEDURE — 99214 OFFICE O/P EST MOD 30 MIN: CPT | Performed by: INTERNAL MEDICINE

## 2018-01-01 PROCEDURE — 99222 1ST HOSP IP/OBS MODERATE 55: CPT | Performed by: PHYSICIAN ASSISTANT

## 2018-01-01 PROCEDURE — G8988 SELF CARE GOAL STATUS: HCPCS

## 2018-01-01 PROCEDURE — 82607 VITAMIN B-12: CPT

## 2018-01-01 PROCEDURE — 6360000002 HC RX W HCPCS

## 2018-01-01 PROCEDURE — 70551 MRI BRAIN STEM W/O DYE: CPT

## 2018-01-01 PROCEDURE — 11310 SHAVE SKIN LESION 0.5 CM/<: CPT | Performed by: FAMILY MEDICINE

## 2018-01-01 PROCEDURE — 2500000003 HC RX 250 WO HCPCS: Performed by: FAMILY MEDICINE

## 2018-01-01 PROCEDURE — 1111F DSCHRG MED/CURRENT MED MERGE: CPT

## 2018-01-01 PROCEDURE — 99285 EMERGENCY DEPT VISIT HI MDM: CPT

## 2018-01-01 PROCEDURE — 88365 INSITU HYBRIDIZATION (FISH): CPT

## 2018-01-01 PROCEDURE — 4004F PT TOBACCO SCREEN RCVD TLK: CPT | Performed by: FAMILY MEDICINE

## 2018-01-01 PROCEDURE — 76000 FLUOROSCOPY <1 HR PHYS/QHP: CPT | Performed by: RADIOLOGY

## 2018-01-01 PROCEDURE — 99232 SBSQ HOSP IP/OBS MODERATE 35: CPT | Performed by: PHYSICIAN ASSISTANT

## 2018-01-01 PROCEDURE — 83036 HEMOGLOBIN GLYCOSYLATED A1C: CPT | Performed by: FAMILY MEDICINE

## 2018-01-01 PROCEDURE — 87205 SMEAR GRAM STAIN: CPT

## 2018-01-01 PROCEDURE — 6370000000 HC RX 637 (ALT 250 FOR IP): Performed by: NURSE PRACTITIONER

## 2018-01-01 PROCEDURE — G8482 FLU IMMUNIZE ORDER/ADMIN: HCPCS | Performed by: INTERNAL MEDICINE

## 2018-01-01 PROCEDURE — 97116 GAIT TRAINING THERAPY: CPT

## 2018-01-01 PROCEDURE — A9579 GAD-BASE MR CONTRAST NOS,1ML: HCPCS | Performed by: INTERNAL MEDICINE

## 2018-01-01 PROCEDURE — 81003 URINALYSIS AUTO W/O SCOPE: CPT | Performed by: NURSE PRACTITIONER

## 2018-01-01 PROCEDURE — G8987 SELF CARE CURRENT STATUS: HCPCS

## 2018-01-01 PROCEDURE — 87040 BLOOD CULTURE FOR BACTERIA: CPT

## 2018-01-01 PROCEDURE — 96368 THER/DIAG CONCURRENT INF: CPT

## 2018-01-01 PROCEDURE — 4004F PT TOBACCO SCREEN RCVD TLK: CPT | Performed by: NURSE PRACTITIONER

## 2018-01-01 PROCEDURE — 97163 PT EVAL HIGH COMPLEX 45 MIN: CPT

## 2018-01-01 PROCEDURE — 6360000002 HC RX W HCPCS: Performed by: EMERGENCY MEDICINE

## 2018-01-01 PROCEDURE — G8926 SPIRO NO PERF OR DOC: HCPCS | Performed by: INTERNAL MEDICINE

## 2018-01-01 PROCEDURE — 99495 TRANSJ CARE MGMT MOD F2F 14D: CPT | Performed by: FAMILY MEDICINE

## 2018-01-01 PROCEDURE — 71045 X-RAY EXAM CHEST 1 VIEW: CPT

## 2018-01-01 PROCEDURE — G8417 CALC BMI ABV UP PARAM F/U: HCPCS | Performed by: NURSE PRACTITIONER

## 2018-01-01 PROCEDURE — 99233 SBSQ HOSP IP/OBS HIGH 50: CPT | Performed by: INTERNAL MEDICINE

## 2018-01-01 PROCEDURE — 99238 HOSP IP/OBS DSCHRG MGMT 30/<: CPT | Performed by: INTERNAL MEDICINE

## 2018-01-01 PROCEDURE — 99239 HOSP IP/OBS DSCHRG MGMT >30: CPT | Performed by: FAMILY MEDICINE

## 2018-01-01 PROCEDURE — 6370000000 HC RX 637 (ALT 250 FOR IP): Performed by: EMERGENCY MEDICINE

## 2018-01-01 PROCEDURE — 97162 PT EVAL MOD COMPLEX 30 MIN: CPT

## 2018-01-01 PROCEDURE — 93005 ELECTROCARDIOGRAM TRACING: CPT | Performed by: FAMILY MEDICINE

## 2018-01-01 PROCEDURE — 6360000002 HC RX W HCPCS: Performed by: RADIOLOGY

## 2018-01-01 PROCEDURE — 80048 BASIC METABOLIC PNL TOTAL CA: CPT

## 2018-01-01 PROCEDURE — 4040F PNEUMOC VAC/ADMIN/RCVD: CPT | Performed by: NURSE PRACTITIONER

## 2018-01-01 PROCEDURE — 96367 TX/PROPH/DG ADDL SEQ IV INF: CPT

## 2018-01-01 PROCEDURE — 88364 INSITU HYBRIDIZATION (FISH): CPT

## 2018-01-01 PROCEDURE — 0QB03ZX EXCISION OF LUMBAR VERTEBRA, PERCUTANEOUS APPROACH, DIAGNOSTIC: ICD-10-PCS | Performed by: RADIOLOGY

## 2018-01-01 PROCEDURE — 2580000003 HC RX 258: Performed by: RADIOLOGY

## 2018-01-01 PROCEDURE — 93005 ELECTROCARDIOGRAM TRACING: CPT | Performed by: INTERNAL MEDICINE

## 2018-01-01 PROCEDURE — 92523 SPEECH SOUND LANG COMPREHEN: CPT

## 2018-01-01 PROCEDURE — L0625 LO FLEX L1-BELOW L5 PRE OTS: HCPCS

## 2018-01-01 PROCEDURE — APPSS30 APP SPLIT SHARED TIME 16-30 MINUTES: Performed by: NURSE PRACTITIONER

## 2018-01-01 PROCEDURE — 93005 ELECTROCARDIOGRAM TRACING: CPT | Performed by: EMERGENCY MEDICINE

## 2018-01-01 PROCEDURE — 80061 LIPID PANEL: CPT

## 2018-01-01 PROCEDURE — 1036F TOBACCO NON-USER: CPT | Performed by: INTERNAL MEDICINE

## 2018-01-01 PROCEDURE — 99223 1ST HOSP IP/OBS HIGH 75: CPT | Performed by: INTERNAL MEDICINE

## 2018-01-01 PROCEDURE — 51798 US URINE CAPACITY MEASURE: CPT | Performed by: NURSE PRACTITIONER

## 2018-01-01 PROCEDURE — 96376 TX/PRO/DX INJ SAME DRUG ADON: CPT

## 2018-01-01 PROCEDURE — 99211 OFF/OP EST MAY X REQ PHY/QHP: CPT

## 2018-01-01 PROCEDURE — 96402 CHEMO HORMON ANTINEOPL SQ/IM: CPT | Performed by: NURSE PRACTITIONER

## 2018-01-01 PROCEDURE — 51702 INSERT TEMP BLADDER CATH: CPT

## 2018-01-01 PROCEDURE — 99231 SBSQ HOSP IP/OBS SF/LOW 25: CPT | Performed by: PHYSICIAN ASSISTANT

## 2018-01-01 PROCEDURE — 99213 OFFICE O/P EST LOW 20 MIN: CPT | Performed by: NURSE PRACTITIONER

## 2018-01-01 RX ORDER — SODIUM CHLORIDE 0.9 % (FLUSH) 0.9 %
10 SYRINGE (ML) INJECTION PRN
Status: CANCELLED | OUTPATIENT
Start: 2018-01-01

## 2018-01-01 RX ORDER — SODIUM CHLORIDE 9 MG/ML
INJECTION, SOLUTION INTRAVENOUS ONCE
Status: COMPLETED | OUTPATIENT
Start: 2018-01-01 | End: 2018-01-01

## 2018-01-01 RX ORDER — EPINEPHRINE 1 MG/ML
0.3 INJECTION, SOLUTION, CONCENTRATE INTRAVENOUS PRN
Status: CANCELLED | OUTPATIENT
Start: 2018-01-01

## 2018-01-01 RX ORDER — LORAZEPAM 2 MG/ML
0.5 INJECTION INTRAMUSCULAR EVERY 4 HOURS PRN
Status: DISCONTINUED | OUTPATIENT
Start: 2018-01-01 | End: 2018-01-01

## 2018-01-01 RX ORDER — DILTIAZEM HYDROCHLORIDE 120 MG/1
120 CAPSULE, COATED, EXTENDED RELEASE ORAL DAILY
Status: DISCONTINUED | OUTPATIENT
Start: 2018-01-01 | End: 2018-01-01 | Stop reason: HOSPADM

## 2018-01-01 RX ORDER — METHYLPREDNISOLONE SODIUM SUCCINATE 125 MG/2ML
125 INJECTION, POWDER, LYOPHILIZED, FOR SOLUTION INTRAMUSCULAR; INTRAVENOUS PRN
Status: CANCELLED | OUTPATIENT
Start: 2018-01-01

## 2018-01-01 RX ORDER — DIPHENHYDRAMINE HYDROCHLORIDE 50 MG/ML
50 INJECTION INTRAMUSCULAR; INTRAVENOUS PRN
Status: CANCELLED | OUTPATIENT
Start: 2018-01-01

## 2018-01-01 RX ORDER — OXYCODONE AND ACETAMINOPHEN 7.5; 325 MG/1; MG/1
1 TABLET ORAL EVERY 4 HOURS PRN
Status: DISCONTINUED | OUTPATIENT
Start: 2018-01-01 | End: 2018-01-01 | Stop reason: HOSPADM

## 2018-01-01 RX ORDER — ALBUTEROL SULFATE 2.5 MG/3ML
2.5 SOLUTION RESPIRATORY (INHALATION) EVERY 6 HOURS PRN
Status: DISCONTINUED | OUTPATIENT
Start: 2018-01-01 | End: 2018-01-01 | Stop reason: HOSPADM

## 2018-01-01 RX ORDER — HEPARIN SODIUM (PORCINE) LOCK FLUSH IV SOLN 100 UNIT/ML 100 UNIT/ML
500 SOLUTION INTRAVENOUS PRN
Status: CANCELLED | OUTPATIENT
Start: 2018-01-01

## 2018-01-01 RX ORDER — SODIUM CHLORIDE 0.9 % (FLUSH) 0.9 %
5 SYRINGE (ML) INJECTION PRN
Status: CANCELLED | OUTPATIENT
Start: 2018-01-01

## 2018-01-01 RX ORDER — POLYETHYLENE GLYCOL 3350 17 G/17G
17 POWDER, FOR SOLUTION ORAL DAILY
Qty: 510 G | Refills: 1 | Status: ON HOLD | OUTPATIENT
Start: 2018-01-01 | End: 2018-01-01

## 2018-01-01 RX ORDER — NITROGLYCERIN 0.4 MG/1
0.4 TABLET SUBLINGUAL EVERY 5 MIN PRN
Status: DISCONTINUED | OUTPATIENT
Start: 2018-01-01 | End: 2018-01-01 | Stop reason: HOSPADM

## 2018-01-01 RX ORDER — 0.9 % SODIUM CHLORIDE 0.9 %
250 VIAL (ML) INJECTION CONTINUOUS
Status: CANCELLED | OUTPATIENT
Start: 2018-01-01

## 2018-01-01 RX ORDER — SODIUM CHLORIDE 9 MG/ML
INJECTION, SOLUTION INTRAVENOUS ONCE
Status: CANCELLED | OUTPATIENT
Start: 2018-01-01

## 2018-01-01 RX ORDER — NICOTINE 21 MG/24HR
1 PATCH, TRANSDERMAL 24 HOURS TRANSDERMAL DAILY
Status: DISCONTINUED | OUTPATIENT
Start: 2018-01-01 | End: 2018-01-01 | Stop reason: HOSPADM

## 2018-01-01 RX ORDER — LANOLIN ALCOHOL/MO/W.PET/CERES
1000 CREAM (GRAM) TOPICAL DAILY
Status: DISCONTINUED | OUTPATIENT
Start: 2018-01-01 | End: 2018-01-01 | Stop reason: HOSPADM

## 2018-01-01 RX ORDER — DIPHENHYDRAMINE HCL 25 MG
50 TABLET ORAL ONCE
Status: COMPLETED | OUTPATIENT
Start: 2018-01-01 | End: 2018-01-01

## 2018-01-01 RX ORDER — ASPIRIN 81 MG/1
81 TABLET, CHEWABLE ORAL DAILY
Status: DISCONTINUED | OUTPATIENT
Start: 2018-01-01 | End: 2018-01-01 | Stop reason: SDUPTHER

## 2018-01-01 RX ORDER — HYDROCODONE BITARTRATE AND ACETAMINOPHEN 5; 325 MG/1; MG/1
1 TABLET ORAL EVERY 6 HOURS PRN
Qty: 12 TABLET | Refills: 0 | Status: SHIPPED | OUTPATIENT
Start: 2018-01-01 | End: 2018-01-01 | Stop reason: SDUPTHER

## 2018-01-01 RX ORDER — PREDNISONE 20 MG/1
40 TABLET ORAL DAILY
Qty: 10 TABLET | Refills: 0 | Status: ON HOLD | OUTPATIENT
Start: 2018-01-01 | End: 2018-01-01

## 2018-01-01 RX ORDER — DIPHENHYDRAMINE HCL 25 MG
25 TABLET ORAL NIGHTLY PRN
Status: DISCONTINUED | OUTPATIENT
Start: 2018-01-01 | End: 2018-01-01 | Stop reason: HOSPADM

## 2018-01-01 RX ORDER — AMOXICILLIN AND CLAVULANATE POTASSIUM 875; 125 MG/1; MG/1
1 TABLET, FILM COATED ORAL 2 TIMES DAILY
Qty: 14 TABLET | Refills: 0 | Status: SHIPPED | OUTPATIENT
Start: 2018-01-01 | End: 2018-01-01

## 2018-01-01 RX ORDER — ASPIRIN 81 MG/1
81 TABLET ORAL DAILY
Status: DISCONTINUED | OUTPATIENT
Start: 2018-01-01 | End: 2018-01-01 | Stop reason: HOSPADM

## 2018-01-01 RX ORDER — PREDNISONE 20 MG/1
40 TABLET ORAL DAILY
Qty: 10 TABLET | Refills: 0 | Status: SHIPPED | OUTPATIENT
Start: 2018-01-01 | End: 2018-01-01

## 2018-01-01 RX ORDER — GUAIFENESIN 100 MG/5ML
400 SOLUTION ORAL 2 TIMES DAILY PRN
Status: DISCONTINUED | OUTPATIENT
Start: 2018-01-01 | End: 2018-01-01 | Stop reason: HOSPADM

## 2018-01-01 RX ORDER — SODIUM CHLORIDE 450 MG/100ML
INJECTION, SOLUTION INTRAVENOUS CONTINUOUS
Status: DISCONTINUED | OUTPATIENT
Start: 2018-01-01 | End: 2018-01-01 | Stop reason: HOSPADM

## 2018-01-01 RX ORDER — METHYLPREDNISOLONE SODIUM SUCCINATE 125 MG/2ML
125 INJECTION, POWDER, LYOPHILIZED, FOR SOLUTION INTRAMUSCULAR; INTRAVENOUS ONCE
Status: CANCELLED | OUTPATIENT
Start: 2018-01-01 | End: 2018-01-01

## 2018-01-01 RX ORDER — SODIUM CHLORIDE 0.9 % (FLUSH) 0.9 %
10 SYRINGE (ML) INJECTION PRN
Status: DISCONTINUED | OUTPATIENT
Start: 2018-01-01 | End: 2018-01-01 | Stop reason: HOSPADM

## 2018-01-01 RX ORDER — SODIUM CHLORIDE 0.9 % (FLUSH) 0.9 %
10 SYRINGE (ML) INJECTION EVERY 12 HOURS SCHEDULED
Status: CANCELLED | OUTPATIENT
Start: 2018-01-01

## 2018-01-01 RX ORDER — 0.9 % SODIUM CHLORIDE 0.9 %
10 VIAL (ML) INJECTION ONCE
Status: CANCELLED | OUTPATIENT
Start: 2018-01-01 | End: 2018-01-01

## 2018-01-01 RX ORDER — GUAIFENESIN 100 MG/5ML
400 SOLUTION ORAL 2 TIMES DAILY PRN
Status: CANCELLED | OUTPATIENT
Start: 2018-01-01

## 2018-01-01 RX ORDER — ONDANSETRON 2 MG/ML
4 INJECTION INTRAMUSCULAR; INTRAVENOUS EVERY 6 HOURS PRN
Status: DISCONTINUED | OUTPATIENT
Start: 2018-01-01 | End: 2018-01-01 | Stop reason: CLARIF

## 2018-01-01 RX ORDER — MORPHINE SULFATE 2 MG/ML
2 INJECTION, SOLUTION INTRAMUSCULAR; INTRAVENOUS
Status: DISCONTINUED | OUTPATIENT
Start: 2018-01-01 | End: 2018-01-01 | Stop reason: HOSPADM

## 2018-01-01 RX ORDER — SODIUM CHLORIDE 9 MG/ML
INJECTION, SOLUTION INTRAVENOUS ONCE
Status: DISCONTINUED | OUTPATIENT
Start: 2018-01-01 | End: 2018-01-01 | Stop reason: HOSPADM

## 2018-01-01 RX ORDER — LORAZEPAM 2 MG/ML
1 INJECTION INTRAMUSCULAR
Status: CANCELLED | OUTPATIENT
Start: 2018-01-01

## 2018-01-01 RX ORDER — FLUTICASONE PROPIONATE 50 MCG
2 SPRAY, SUSPENSION (ML) NASAL DAILY
Status: DISCONTINUED | OUTPATIENT
Start: 2018-01-01 | End: 2018-01-01

## 2018-01-01 RX ORDER — LEVOFLOXACIN 5 MG/ML
750 INJECTION, SOLUTION INTRAVENOUS EVERY 24 HOURS
Status: DISCONTINUED | OUTPATIENT
Start: 2018-01-01 | End: 2018-01-01 | Stop reason: HOSPADM

## 2018-01-01 RX ORDER — IPRATROPIUM BROMIDE AND ALBUTEROL SULFATE 2.5; .5 MG/3ML; MG/3ML
1 SOLUTION RESPIRATORY (INHALATION)
Status: DISCONTINUED | OUTPATIENT
Start: 2018-01-01 | End: 2018-01-01 | Stop reason: HOSPADM

## 2018-01-01 RX ORDER — ACETAMINOPHEN 500 MG
1000 TABLET ORAL NIGHTLY PRN
Status: DISCONTINUED | OUTPATIENT
Start: 2018-01-01 | End: 2018-01-01 | Stop reason: HOSPADM

## 2018-01-01 RX ORDER — ACETAMINOPHEN 500 MG
1000 TABLET ORAL NIGHTLY PRN
Status: CANCELLED | OUTPATIENT
Start: 2018-01-01

## 2018-01-01 RX ORDER — LIDOCAINE 50 MG/G
1 PATCH TOPICAL DAILY
Status: CANCELLED | OUTPATIENT
Start: 2018-01-01

## 2018-01-01 RX ORDER — GUAIFENESIN 600 MG/1
600 TABLET, EXTENDED RELEASE ORAL 2 TIMES DAILY
COMMUNITY
End: 2018-01-01

## 2018-01-01 RX ORDER — POTASSIUM CHLORIDE 20MEQ/15ML
40 LIQUID (ML) ORAL PRN
Status: DISCONTINUED | OUTPATIENT
Start: 2018-01-01 | End: 2018-01-01 | Stop reason: HOSPADM

## 2018-01-01 RX ORDER — HYDROCODONE BITARTRATE AND ACETAMINOPHEN 5; 325 MG/1; MG/1
1 TABLET ORAL EVERY 6 HOURS PRN
Status: DISCONTINUED | OUTPATIENT
Start: 2018-01-01 | End: 2018-01-01 | Stop reason: HOSPADM

## 2018-01-01 RX ORDER — GLYCOPYRROLATE 0.2 MG/ML
0.2 INJECTION INTRAMUSCULAR; INTRAVENOUS EVERY 4 HOURS PRN
Status: CANCELLED | OUTPATIENT
Start: 2018-01-01

## 2018-01-01 RX ORDER — LANOLIN ALCOHOL/MO/W.PET/CERES
1000 CREAM (GRAM) TOPICAL DAILY
Status: CANCELLED | OUTPATIENT
Start: 2018-01-01

## 2018-01-01 RX ORDER — FLUTICASONE PROPIONATE 50 MCG
2 SPRAY, SUSPENSION (ML) NASAL DAILY
Status: CANCELLED | OUTPATIENT
Start: 2018-01-01

## 2018-01-01 RX ORDER — DEXTROSE MONOHYDRATE 50 MG/ML
100 INJECTION, SOLUTION INTRAVENOUS PRN
Status: DISCONTINUED | OUTPATIENT
Start: 2018-01-01 | End: 2018-01-01 | Stop reason: HOSPADM

## 2018-01-01 RX ORDER — FAMOTIDINE 20 MG/1
20 TABLET, FILM COATED ORAL DAILY
Status: DISCONTINUED | OUTPATIENT
Start: 2018-01-01 | End: 2018-01-01

## 2018-01-01 RX ORDER — SODIUM CHLORIDE 0.9 % (FLUSH) 0.9 %
10 SYRINGE (ML) INJECTION EVERY 12 HOURS SCHEDULED
Status: DISCONTINUED | OUTPATIENT
Start: 2018-01-01 | End: 2018-01-01 | Stop reason: HOSPADM

## 2018-01-01 RX ORDER — HYDROCODONE BITARTRATE AND ACETAMINOPHEN 5; 325 MG/1; MG/1
1 TABLET ORAL EVERY 6 HOURS PRN
Qty: 12 TABLET | Refills: 0 | Status: SHIPPED | OUTPATIENT
Start: 2018-01-01 | End: 2018-01-01

## 2018-01-01 RX ORDER — NITROGLYCERIN 0.4 MG/1
0.4 TABLET SUBLINGUAL EVERY 5 MIN PRN
Status: CANCELLED | OUTPATIENT
Start: 2018-01-01

## 2018-01-01 RX ORDER — HYDROCODONE BITARTRATE AND ACETAMINOPHEN 5; 325 MG/1; MG/1
1 TABLET ORAL EVERY 6 HOURS PRN
Status: CANCELLED | OUTPATIENT
Start: 2018-01-01

## 2018-01-01 RX ORDER — ONDANSETRON 4 MG/1
4 TABLET, ORALLY DISINTEGRATING ORAL EVERY 6 HOURS PRN
Status: CANCELLED | OUTPATIENT
Start: 2018-01-01

## 2018-01-01 RX ORDER — SODIUM CHLORIDE 9 MG/ML
INJECTION, SOLUTION INTRAVENOUS ONCE
Status: CANCELLED | OUTPATIENT
Start: 2018-01-01 | End: 2018-01-01

## 2018-01-01 RX ORDER — IPRATROPIUM BROMIDE AND ALBUTEROL SULFATE 2.5; .5 MG/3ML; MG/3ML
1 SOLUTION RESPIRATORY (INHALATION) EVERY 4 HOURS PRN
Status: CANCELLED | OUTPATIENT
Start: 2018-01-01

## 2018-01-01 RX ORDER — ACETAMINOPHEN 325 MG/1
650 TABLET ORAL EVERY 4 HOURS PRN
Status: DISCONTINUED | OUTPATIENT
Start: 2018-01-01 | End: 2018-01-01 | Stop reason: HOSPADM

## 2018-01-01 RX ORDER — LIDOCAINE 50 MG/G
1 PATCH TOPICAL DAILY
Status: DISCONTINUED | OUTPATIENT
Start: 2018-01-01 | End: 2018-01-01 | Stop reason: HOSPADM

## 2018-01-01 RX ORDER — FLUTICASONE PROPIONATE 50 MCG
2 SPRAY, SUSPENSION (ML) NASAL DAILY
Status: DISCONTINUED | OUTPATIENT
Start: 2018-01-01 | End: 2018-01-01 | Stop reason: HOSPADM

## 2018-01-01 RX ORDER — FENTANYL CITRATE 50 UG/ML
50 INJECTION, SOLUTION INTRAMUSCULAR; INTRAVENOUS ONCE
Status: COMPLETED | OUTPATIENT
Start: 2018-01-01 | End: 2018-01-01

## 2018-01-01 RX ORDER — ASPIRIN 81 MG/1
81 TABLET ORAL DAILY
Status: CANCELLED | OUTPATIENT
Start: 2018-01-01

## 2018-01-01 RX ORDER — ACETAMINOPHEN 325 MG/1
650 TABLET ORAL ONCE
Status: DISCONTINUED | OUTPATIENT
Start: 2018-01-01 | End: 2018-01-01 | Stop reason: SDUPTHER

## 2018-01-01 RX ORDER — HALOPERIDOL 5 MG/ML
1 INJECTION INTRAMUSCULAR EVERY 6 HOURS PRN
Status: DISCONTINUED | OUTPATIENT
Start: 2018-01-01 | End: 2018-01-01 | Stop reason: HOSPADM

## 2018-01-01 RX ORDER — ACETAMINOPHEN 500 MG
500 TABLET ORAL NIGHTLY PRN
Status: DISCONTINUED | OUTPATIENT
Start: 2018-01-01 | End: 2018-01-01 | Stop reason: HOSPADM

## 2018-01-01 RX ORDER — ALBUTEROL SULFATE 90 UG/1
2 AEROSOL, METERED RESPIRATORY (INHALATION) EVERY 6 HOURS PRN
Qty: 3 INHALER | Refills: 2 | Status: ON HOLD | OUTPATIENT
Start: 2018-01-01 | End: 2018-01-01

## 2018-01-01 RX ORDER — FUROSEMIDE 40 MG/1
40 TABLET ORAL 2 TIMES DAILY
Status: CANCELLED | OUTPATIENT
Start: 2018-01-01

## 2018-01-01 RX ORDER — MORPHINE SULFATE 2 MG/ML
1 INJECTION, SOLUTION INTRAMUSCULAR; INTRAVENOUS
Status: DISCONTINUED | OUTPATIENT
Start: 2018-01-01 | End: 2018-01-01 | Stop reason: HOSPADM

## 2018-01-01 RX ORDER — UREA 10 %
6 LOTION (ML) TOPICAL NIGHTLY PRN
Status: DISCONTINUED | OUTPATIENT
Start: 2018-01-01 | End: 2018-01-01

## 2018-01-01 RX ORDER — ACETAMINOPHEN 500 MG
1000 TABLET ORAL NIGHTLY PRN
Status: DISCONTINUED | OUTPATIENT
Start: 2018-01-01 | End: 2018-01-01

## 2018-01-01 RX ORDER — AZITHROMYCIN 250 MG/1
TABLET, FILM COATED ORAL
Qty: 1 PACKET | Refills: 0 | Status: ON HOLD | OUTPATIENT
Start: 2018-01-01 | End: 2018-01-01 | Stop reason: HOSPADM

## 2018-01-01 RX ORDER — CEFAZOLIN SODIUM 1 G/50ML
1 INJECTION, SOLUTION INTRAVENOUS
Status: COMPLETED | OUTPATIENT
Start: 2018-01-01 | End: 2018-01-01

## 2018-01-01 RX ORDER — TRAMADOL HYDROCHLORIDE 50 MG/1
25 TABLET ORAL EVERY 6 HOURS PRN
Status: DISCONTINUED | OUTPATIENT
Start: 2018-01-01 | End: 2018-01-01

## 2018-01-01 RX ORDER — HYDROCODONE BITARTRATE AND ACETAMINOPHEN 5; 325 MG/1; MG/1
1 TABLET ORAL EVERY 6 HOURS PRN
Qty: 20 TABLET | Refills: 0 | Status: SHIPPED | OUTPATIENT
Start: 2018-01-01 | End: 2018-01-01

## 2018-01-01 RX ORDER — ACETAMINOPHEN,DIPHENHYDRAMINE HCL 500; 25 MG/1; MG/1
2 TABLET, FILM COATED ORAL NIGHTLY PRN
Status: DISCONTINUED | OUTPATIENT
Start: 2018-01-01 | End: 2018-01-01 | Stop reason: RX

## 2018-01-01 RX ORDER — POLYETHYLENE GLYCOL 3350 17 G/17G
17 POWDER, FOR SOLUTION ORAL DAILY
Status: DISCONTINUED | OUTPATIENT
Start: 2018-01-01 | End: 2018-01-01 | Stop reason: SDUPTHER

## 2018-01-01 RX ORDER — DIPHENHYDRAMINE HYDROCHLORIDE 50 MG/ML
50 INJECTION INTRAMUSCULAR; INTRAVENOUS ONCE
Status: CANCELLED | OUTPATIENT
Start: 2018-01-01 | End: 2018-01-01

## 2018-01-01 RX ORDER — ONDANSETRON 2 MG/ML
4 INJECTION INTRAMUSCULAR; INTRAVENOUS EVERY 6 HOURS PRN
Status: DISCONTINUED | OUTPATIENT
Start: 2018-01-01 | End: 2018-01-01 | Stop reason: HOSPADM

## 2018-01-01 RX ORDER — IPRATROPIUM BROMIDE AND ALBUTEROL SULFATE 2.5; .5 MG/3ML; MG/3ML
1 SOLUTION RESPIRATORY (INHALATION) ONCE
Status: COMPLETED | OUTPATIENT
Start: 2018-01-01 | End: 2018-01-01

## 2018-01-01 RX ORDER — FENTANYL 25 UG/H
1 PATCH TRANSDERMAL
Status: CANCELLED | OUTPATIENT
Start: 2018-01-01

## 2018-01-01 RX ORDER — POLYETHYLENE GLYCOL 3350 17 G/17G
17 POWDER, FOR SOLUTION ORAL DAILY PRN
Status: DISCONTINUED | OUTPATIENT
Start: 2018-01-01 | End: 2018-01-01 | Stop reason: HOSPADM

## 2018-01-01 RX ORDER — DILTIAZEM HYDROCHLORIDE 120 MG/1
120 CAPSULE, COATED, EXTENDED RELEASE ORAL DAILY
Status: CANCELLED | OUTPATIENT
Start: 2018-01-01

## 2018-01-01 RX ORDER — FUROSEMIDE 40 MG/1
40 TABLET ORAL 2 TIMES DAILY
Status: DISCONTINUED | OUTPATIENT
Start: 2018-01-01 | End: 2018-01-01 | Stop reason: HOSPADM

## 2018-01-01 RX ORDER — POLYETHYLENE GLYCOL 3350 17 G/17G
17 POWDER, FOR SOLUTION ORAL DAILY
Status: DISCONTINUED | OUTPATIENT
Start: 2018-01-01 | End: 2018-01-01 | Stop reason: HOSPADM

## 2018-01-01 RX ORDER — FUROSEMIDE 40 MG/1
40 TABLET ORAL 2 TIMES DAILY
Status: DISCONTINUED | OUTPATIENT
Start: 2018-01-01 | End: 2018-01-01

## 2018-01-01 RX ORDER — HYDROCODONE BITARTRATE AND ACETAMINOPHEN 5; 325 MG/1; MG/1
1 TABLET ORAL EVERY 6 HOURS PRN
COMMUNITY
End: 2018-01-01 | Stop reason: SDUPTHER

## 2018-01-01 RX ORDER — ALBUTEROL SULFATE 90 UG/1
2 AEROSOL, METERED RESPIRATORY (INHALATION) EVERY 6 HOURS PRN
Status: DISCONTINUED | OUTPATIENT
Start: 2018-01-01 | End: 2018-01-01 | Stop reason: HOSPADM

## 2018-01-01 RX ORDER — 0.9 % SODIUM CHLORIDE 0.9 %
1000 INTRAVENOUS SOLUTION INTRAVENOUS ONCE
Status: COMPLETED | OUTPATIENT
Start: 2018-01-01 | End: 2018-01-01

## 2018-01-01 RX ORDER — POLYETHYLENE GLYCOL 3350 17 G/17G
17 POWDER, FOR SOLUTION ORAL DAILY PRN
Status: CANCELLED | OUTPATIENT
Start: 2018-01-01

## 2018-01-01 RX ORDER — POTASSIUM CHLORIDE 20 MEQ/1
40 TABLET, EXTENDED RELEASE ORAL PRN
Status: DISCONTINUED | OUTPATIENT
Start: 2018-01-01 | End: 2018-01-01 | Stop reason: HOSPADM

## 2018-01-01 RX ORDER — IPRATROPIUM BROMIDE AND ALBUTEROL SULFATE 2.5; .5 MG/3ML; MG/3ML
1 SOLUTION RESPIRATORY (INHALATION) EVERY 4 HOURS PRN
Status: DISCONTINUED | OUTPATIENT
Start: 2018-01-01 | End: 2018-01-01 | Stop reason: HOSPADM

## 2018-01-01 RX ORDER — MULTIVITAMIN WITH FOLIC ACID 400 MCG
1 TABLET ORAL DAILY
Status: DISCONTINUED | OUTPATIENT
Start: 2018-01-01 | End: 2018-01-01 | Stop reason: HOSPADM

## 2018-01-01 RX ORDER — PREDNISONE 20 MG/1
20 TABLET ORAL DAILY
Status: DISCONTINUED | OUTPATIENT
Start: 2018-01-01 | End: 2018-01-01 | Stop reason: HOSPADM

## 2018-01-01 RX ORDER — DIPHENHYDRAMINE HCL 25 MG
50 TABLET ORAL NIGHTLY PRN
Status: DISCONTINUED | OUTPATIENT
Start: 2018-01-01 | End: 2018-01-01 | Stop reason: HOSPADM

## 2018-01-01 RX ORDER — MORPHINE SULFATE 2 MG/ML
2 INJECTION, SOLUTION INTRAMUSCULAR; INTRAVENOUS EVERY 4 HOURS PRN
Status: COMPLETED | OUTPATIENT
Start: 2018-01-01 | End: 2018-01-01

## 2018-01-01 RX ORDER — MIDAZOLAM HYDROCHLORIDE 1 MG/ML
1 INJECTION INTRAMUSCULAR; INTRAVENOUS ONCE
Status: COMPLETED | OUTPATIENT
Start: 2018-01-01 | End: 2018-01-01

## 2018-01-01 RX ORDER — IPRATROPIUM BROMIDE AND ALBUTEROL SULFATE 2.5; .5 MG/3ML; MG/3ML
1 SOLUTION RESPIRATORY (INHALATION)
Status: CANCELLED | OUTPATIENT
Start: 2018-01-01

## 2018-01-01 RX ORDER — ALBUTEROL SULFATE 90 UG/1
2 AEROSOL, METERED RESPIRATORY (INHALATION) EVERY 6 HOURS PRN
Status: DISCONTINUED | OUTPATIENT
Start: 2018-01-01 | End: 2018-01-01

## 2018-01-01 RX ORDER — HEPARIN SODIUM 1000 [USP'U]/ML
80 INJECTION, SOLUTION INTRAVENOUS; SUBCUTANEOUS ONCE
Status: COMPLETED | OUTPATIENT
Start: 2018-01-01 | End: 2018-01-01

## 2018-01-01 RX ORDER — METHYLPREDNISOLONE SODIUM SUCCINATE 40 MG/ML
40 INJECTION, POWDER, LYOPHILIZED, FOR SOLUTION INTRAMUSCULAR; INTRAVENOUS DAILY
Status: DISCONTINUED | OUTPATIENT
Start: 2018-01-01 | End: 2018-01-01

## 2018-01-01 RX ORDER — ALBUTEROL SULFATE 90 UG/1
2 AEROSOL, METERED RESPIRATORY (INHALATION) EVERY 6 HOURS PRN
Status: CANCELLED | OUTPATIENT
Start: 2018-01-01

## 2018-01-01 RX ORDER — IPRATROPIUM BROMIDE AND ALBUTEROL SULFATE 2.5; .5 MG/3ML; MG/3ML
1 SOLUTION RESPIRATORY (INHALATION)
Status: DISCONTINUED | OUTPATIENT
Start: 2018-01-01 | End: 2018-01-01

## 2018-01-01 RX ORDER — OXYCODONE AND ACETAMINOPHEN 7.5; 325 MG/1; MG/1
1 TABLET ORAL EVERY 8 HOURS PRN
Qty: 30 TABLET | Refills: 0 | Status: SHIPPED | OUTPATIENT
Start: 2018-01-01 | End: 2018-01-01

## 2018-01-01 RX ORDER — ACETAMINOPHEN,DIPHENHYDRAMINE HCL 500; 25 MG/1; MG/1
1 TABLET, FILM COATED ORAL NIGHTLY PRN
Status: DISCONTINUED | OUTPATIENT
Start: 2018-01-01 | End: 2018-01-01 | Stop reason: CLARIF

## 2018-01-01 RX ORDER — FUROSEMIDE 40 MG/1
40 TABLET ORAL DAILY
Qty: 14 TABLET | Refills: 3 | Status: ON HOLD | OUTPATIENT
Start: 2018-01-01 | End: 2018-01-01

## 2018-01-01 RX ORDER — LORAZEPAM 2 MG/ML
1 INJECTION INTRAMUSCULAR EVERY 4 HOURS PRN
Status: DISCONTINUED | OUTPATIENT
Start: 2018-01-01 | End: 2018-01-01 | Stop reason: CLARIF

## 2018-01-01 RX ORDER — LIDOCAINE 50 MG/G
3 PATCH TOPICAL DAILY
Status: DISCONTINUED | OUTPATIENT
Start: 2018-01-01 | End: 2018-01-01 | Stop reason: HOSPADM

## 2018-01-01 RX ORDER — GUAIFENESIN 100 MG/5ML
400 SYRUP ORAL 2 TIMES DAILY PRN
Status: ON HOLD | COMMUNITY
End: 2018-01-01

## 2018-01-01 RX ORDER — HALOPERIDOL 5 MG/ML
0.5 INJECTION INTRAMUSCULAR EVERY 6 HOURS PRN
Status: DISCONTINUED | OUTPATIENT
Start: 2018-01-01 | End: 2018-01-01

## 2018-01-01 RX ORDER — FENTANYL 25 UG/H
1 PATCH TRANSDERMAL
Status: DISCONTINUED | OUTPATIENT
Start: 2018-01-01 | End: 2018-01-01 | Stop reason: HOSPADM

## 2018-01-01 RX ORDER — GUAIFENESIN 600 MG/1
600 TABLET, EXTENDED RELEASE ORAL 2 TIMES DAILY
Status: DISCONTINUED | OUTPATIENT
Start: 2018-01-01 | End: 2018-01-01 | Stop reason: HOSPADM

## 2018-01-01 RX ORDER — DIPHENHYDRAMINE HCL 25 MG
50 TABLET ORAL NIGHTLY PRN
Status: DISCONTINUED | OUTPATIENT
Start: 2018-01-01 | End: 2018-01-01

## 2018-01-01 RX ORDER — POLYETHYLENE GLYCOL 3350 17 G/17G
17 POWDER, FOR SOLUTION ORAL DAILY
Status: CANCELLED | OUTPATIENT
Start: 2018-01-01

## 2018-01-01 RX ORDER — FUROSEMIDE 40 MG/1
40 TABLET ORAL DAILY
Status: DISCONTINUED | OUTPATIENT
Start: 2018-01-01 | End: 2018-01-01

## 2018-01-01 RX ORDER — POTASSIUM CHLORIDE 7.45 MG/ML
10 INJECTION INTRAVENOUS PRN
Status: DISCONTINUED | OUTPATIENT
Start: 2018-01-01 | End: 2018-01-01 | Stop reason: HOSPADM

## 2018-01-01 RX ORDER — TRAMADOL HYDROCHLORIDE 50 MG/1
50 TABLET ORAL EVERY 6 HOURS PRN
Status: CANCELLED | OUTPATIENT
Start: 2018-01-01

## 2018-01-01 RX ORDER — METHYLPREDNISOLONE SODIUM SUCCINATE 125 MG/2ML
125 INJECTION, POWDER, LYOPHILIZED, FOR SOLUTION INTRAMUSCULAR; INTRAVENOUS ONCE
Status: COMPLETED | OUTPATIENT
Start: 2018-01-01 | End: 2018-01-01

## 2018-01-01 RX ORDER — LANOLIN ALCOHOL/MO/W.PET/CERES
1000 CREAM (GRAM) TOPICAL DAILY
Status: DISCONTINUED | OUTPATIENT
Start: 2018-01-01 | End: 2018-01-01

## 2018-01-01 RX ORDER — MORPHINE SULFATE 2 MG/ML
2 INJECTION, SOLUTION INTRAMUSCULAR; INTRAVENOUS
Status: DISCONTINUED | OUTPATIENT
Start: 2018-01-01 | End: 2018-01-01

## 2018-01-01 RX ORDER — HALOPERIDOL 5 MG/ML
1 INJECTION INTRAMUSCULAR EVERY 6 HOURS PRN
Status: CANCELLED | OUTPATIENT
Start: 2018-01-01

## 2018-01-01 RX ORDER — MORPHINE SULFATE 2 MG/ML
1 INJECTION, SOLUTION INTRAMUSCULAR; INTRAVENOUS
Status: CANCELLED | OUTPATIENT
Start: 2018-01-01

## 2018-01-01 RX ORDER — LIDOCAINE 50 MG/G
1 PATCH TOPICAL DAILY
Qty: 30 PATCH | Refills: 0 | Status: ON HOLD | OUTPATIENT
Start: 2018-01-01 | End: 2018-01-01

## 2018-01-01 RX ORDER — OXYCODONE HYDROCHLORIDE AND ACETAMINOPHEN 5; 325 MG/1; MG/1
1 TABLET ORAL EVERY 4 HOURS PRN
Status: DISCONTINUED | OUTPATIENT
Start: 2018-01-01 | End: 2018-01-01 | Stop reason: HOSPADM

## 2018-01-01 RX ORDER — SENNA PLUS 8.6 MG/1
1 TABLET ORAL NIGHTLY PRN
Status: DISCONTINUED | OUTPATIENT
Start: 2018-01-01 | End: 2018-01-01

## 2018-01-01 RX ORDER — PANTOPRAZOLE SODIUM 40 MG/1
40 TABLET, DELAYED RELEASE ORAL
Status: DISCONTINUED | OUTPATIENT
Start: 2018-01-01 | End: 2018-01-01 | Stop reason: HOSPADM

## 2018-01-01 RX ORDER — ONDANSETRON 4 MG/1
4 TABLET, ORALLY DISINTEGRATING ORAL EVERY 6 HOURS PRN
Status: DISCONTINUED | OUTPATIENT
Start: 2018-01-01 | End: 2018-01-01 | Stop reason: HOSPADM

## 2018-01-01 RX ORDER — DEXTROSE MONOHYDRATE 25 G/50ML
12.5 INJECTION, SOLUTION INTRAVENOUS PRN
Status: DISCONTINUED | OUTPATIENT
Start: 2018-01-01 | End: 2018-01-01 | Stop reason: HOSPADM

## 2018-01-01 RX ORDER — METHYLPREDNISOLONE SODIUM SUCCINATE 40 MG/ML
40 INJECTION, POWDER, LYOPHILIZED, FOR SOLUTION INTRAMUSCULAR; INTRAVENOUS ONCE
Status: COMPLETED | OUTPATIENT
Start: 2018-01-01 | End: 2018-01-01

## 2018-01-01 RX ORDER — NICOTINE 21 MG/24HR
1 PATCH, TRANSDERMAL 24 HOURS TRANSDERMAL DAILY
Status: CANCELLED | OUTPATIENT
Start: 2018-01-01

## 2018-01-01 RX ORDER — TRAMADOL HYDROCHLORIDE 50 MG/1
50 TABLET ORAL EVERY 6 HOURS PRN
Status: DISCONTINUED | OUTPATIENT
Start: 2018-01-01 | End: 2018-01-01

## 2018-01-01 RX ORDER — MORPHINE SULFATE 4 MG/ML
4 INJECTION, SOLUTION INTRAMUSCULAR; INTRAVENOUS
Status: DISCONTINUED | OUTPATIENT
Start: 2018-01-01 | End: 2018-01-01

## 2018-01-01 RX ORDER — ASPIRIN 81 MG/1
81 TABLET ORAL DAILY
Status: DISCONTINUED | OUTPATIENT
Start: 2018-01-01 | End: 2018-01-01

## 2018-01-01 RX ORDER — FLUORIDE TOOTHPASTE
5 TOOTHPASTE DENTAL PRN
Status: CANCELLED | OUTPATIENT
Start: 2018-01-01

## 2018-01-01 RX ORDER — DILTIAZEM HYDROCHLORIDE 120 MG/1
120 CAPSULE, COATED, EXTENDED RELEASE ORAL DAILY
Status: DISCONTINUED | OUTPATIENT
Start: 2018-01-01 | End: 2018-01-01

## 2018-01-01 RX ORDER — NICOTINE POLACRILEX 4 MG
15 LOZENGE BUCCAL PRN
Status: DISCONTINUED | OUTPATIENT
Start: 2018-01-01 | End: 2018-01-01 | Stop reason: HOSPADM

## 2018-01-01 RX ORDER — MORPHINE SULFATE 2 MG/ML
2 INJECTION, SOLUTION INTRAMUSCULAR; INTRAVENOUS
Status: CANCELLED | OUTPATIENT
Start: 2018-01-01

## 2018-01-01 RX ORDER — ACETAMINOPHEN 650 MG/1
650 SUPPOSITORY RECTAL EVERY 6 HOURS PRN
Status: CANCELLED | OUTPATIENT
Start: 2018-01-01

## 2018-01-01 RX ORDER — HYDROCODONE BITARTRATE AND ACETAMINOPHEN 5; 325 MG/1; MG/1
1 TABLET ORAL EVERY 6 HOURS PRN
Qty: 60 TABLET | Refills: 0 | Status: ON HOLD | OUTPATIENT
Start: 2018-01-01 | End: 2018-01-01

## 2018-01-01 RX ORDER — HALOPERIDOL 5 MG/ML
1 INJECTION INTRAMUSCULAR EVERY 6 HOURS PRN
Status: DISCONTINUED | OUTPATIENT
Start: 2018-01-01 | End: 2018-01-01

## 2018-01-01 RX ORDER — HEPARIN SODIUM 10000 [USP'U]/100ML
18 INJECTION, SOLUTION INTRAVENOUS CONTINUOUS
Status: DISCONTINUED | OUTPATIENT
Start: 2018-01-01 | End: 2018-01-01

## 2018-01-01 RX ORDER — GUAIFENESIN 100 MG/5ML
400 SOLUTION ORAL 2 TIMES DAILY PRN
Status: DISCONTINUED | OUTPATIENT
Start: 2018-01-01 | End: 2018-01-01

## 2018-01-01 RX ORDER — ALBUTEROL SULFATE 2.5 MG/3ML
2.5 SOLUTION RESPIRATORY (INHALATION) EVERY 6 HOURS PRN
Status: DISCONTINUED | OUTPATIENT
Start: 2018-01-01 | End: 2018-01-01

## 2018-01-01 RX ORDER — ACETAMINOPHEN 500 MG
1000 TABLET ORAL ONCE
Status: COMPLETED | OUTPATIENT
Start: 2018-01-01 | End: 2018-01-01

## 2018-01-01 RX ORDER — POLYETHYLENE GLYCOL 3350 17 G/17G
17 POWDER, FOR SOLUTION ORAL DAILY PRN
COMMUNITY
End: 2018-01-01

## 2018-01-01 RX ORDER — SENNA PLUS 8.6 MG/1
1 TABLET ORAL NIGHTLY PRN
Status: CANCELLED | OUTPATIENT
Start: 2018-01-01

## 2018-01-01 RX ORDER — NITROGLYCERIN 0.4 MG/1
TABLET SUBLINGUAL
Qty: 25 TABLET | Refills: 3 | OUTPATIENT
Start: 2018-01-01

## 2018-01-01 RX ORDER — POLYETHYLENE GLYCOL 3350 17 G/17G
17 POWDER, FOR SOLUTION ORAL DAILY
Status: DISCONTINUED | OUTPATIENT
Start: 2018-01-01 | End: 2018-01-01

## 2018-01-01 RX ORDER — ONDANSETRON 4 MG/1
4 TABLET, ORALLY DISINTEGRATING ORAL EVERY 6 HOURS PRN
Status: DISCONTINUED | OUTPATIENT
Start: 2018-01-01 | End: 2018-01-01

## 2018-01-01 RX ORDER — ACETAMINOPHEN 325 MG/1
650 TABLET ORAL EVERY 4 HOURS PRN
Status: CANCELLED | OUTPATIENT
Start: 2018-01-01

## 2018-01-01 RX ORDER — DIAPER,BRIEF,INFANT-TODD,DISP
EACH MISCELLANEOUS ONCE
Status: DISCONTINUED | OUTPATIENT
Start: 2018-01-01 | End: 2018-01-01 | Stop reason: HOSPADM

## 2018-01-01 RX ORDER — PREDNISONE 10 MG/1
10 TABLET ORAL DAILY
Status: DISCONTINUED | OUTPATIENT
Start: 2018-01-01 | End: 2018-01-01 | Stop reason: HOSPADM

## 2018-01-01 RX ORDER — FAMOTIDINE 20 MG/1
20 TABLET, FILM COATED ORAL DAILY
Status: CANCELLED | OUTPATIENT
Start: 2018-01-01

## 2018-01-01 RX ORDER — KETOROLAC TROMETHAMINE 30 MG/ML
15 INJECTION, SOLUTION INTRAMUSCULAR; INTRAVENOUS EVERY 6 HOURS
Status: DISCONTINUED | OUTPATIENT
Start: 2018-01-01 | End: 2018-01-01 | Stop reason: HOSPADM

## 2018-01-01 RX ORDER — TRAMADOL HYDROCHLORIDE 50 MG/1
50 TABLET ORAL EVERY 6 HOURS PRN
Status: DISCONTINUED | OUTPATIENT
Start: 2018-01-01 | End: 2018-01-01 | Stop reason: HOSPADM

## 2018-01-01 RX ORDER — PREDNISONE 10 MG/1
TABLET ORAL
Qty: 22 TABLET | Refills: 0 | Status: ON HOLD | OUTPATIENT
Start: 2018-01-01 | End: 2018-01-01

## 2018-01-01 RX ORDER — OXYCODONE HYDROCHLORIDE AND ACETAMINOPHEN 5; 325 MG/1; MG/1
1 TABLET ORAL EVERY 4 HOURS PRN
Status: ON HOLD | COMMUNITY
End: 2018-01-01 | Stop reason: ALTCHOICE

## 2018-01-01 RX ORDER — DILTIAZEM HYDROCHLORIDE 5 MG/ML
10 INJECTION INTRAVENOUS ONCE
Status: COMPLETED | OUTPATIENT
Start: 2018-01-01 | End: 2018-01-01

## 2018-01-01 RX ORDER — LIDOCAINE 50 MG/G
1 PATCH TOPICAL DAILY
Status: DISCONTINUED | OUTPATIENT
Start: 2018-01-01 | End: 2018-01-01

## 2018-01-01 RX ORDER — HEPARIN SODIUM 1000 [USP'U]/ML
40 INJECTION, SOLUTION INTRAVENOUS; SUBCUTANEOUS PRN
Status: DISCONTINUED | OUTPATIENT
Start: 2018-01-01 | End: 2018-01-01 | Stop reason: ALTCHOICE

## 2018-01-01 RX ORDER — DIPHENHYDRAMINE HCL 25 MG
50 TABLET ORAL NIGHTLY PRN
Status: CANCELLED | OUTPATIENT
Start: 2018-01-01

## 2018-01-01 RX ORDER — ACETAMINOPHEN,DIPHENHYDRAMINE HCL 500; 25 MG/1; MG/1
2 TABLET, FILM COATED ORAL NIGHTLY PRN
Status: DISCONTINUED | OUTPATIENT
Start: 2018-01-01 | End: 2018-01-01 | Stop reason: CLARIF

## 2018-01-01 RX ORDER — POTASSIUM CHLORIDE 20 MEQ/1
40 TABLET, EXTENDED RELEASE ORAL ONCE
Status: COMPLETED | OUTPATIENT
Start: 2018-01-01 | End: 2018-01-01

## 2018-01-01 RX ORDER — FUROSEMIDE 40 MG/1
40 TABLET ORAL DAILY
Status: DISCONTINUED | OUTPATIENT
Start: 2018-01-01 | End: 2018-01-01 | Stop reason: HOSPADM

## 2018-01-01 RX ORDER — FLUORIDE TOOTHPASTE
TOOTHPASTE DENTAL PRN
Status: DISCONTINUED | OUTPATIENT
Start: 2018-01-01 | End: 2018-01-01 | Stop reason: HOSPADM

## 2018-01-01 RX ORDER — FUROSEMIDE 40 MG/1
40 TABLET ORAL DAILY
Status: CANCELLED | OUTPATIENT
Start: 2018-01-01

## 2018-01-01 RX ORDER — POLYETHYLENE GLYCOL 3350 17 G/17G
17 POWDER, FOR SOLUTION ORAL DAILY PRN
Status: DISCONTINUED | OUTPATIENT
Start: 2018-01-01 | End: 2018-01-01

## 2018-01-01 RX ORDER — ACETAMINOPHEN 325 MG/1
650 TABLET ORAL EVERY 4 HOURS PRN
Status: DISCONTINUED | OUTPATIENT
Start: 2018-01-01 | End: 2018-01-01

## 2018-01-01 RX ORDER — FAMOTIDINE 20 MG/1
20 TABLET, FILM COATED ORAL DAILY
Status: DISCONTINUED | OUTPATIENT
Start: 2018-01-01 | End: 2018-01-01 | Stop reason: HOSPADM

## 2018-01-01 RX ORDER — SENNA PLUS 8.6 MG/1
1 TABLET ORAL NIGHTLY PRN
Status: DISCONTINUED | OUTPATIENT
Start: 2018-01-01 | End: 2018-01-01 | Stop reason: HOSPADM

## 2018-01-01 RX ORDER — HEPARIN SODIUM 1000 [USP'U]/ML
80 INJECTION, SOLUTION INTRAVENOUS; SUBCUTANEOUS PRN
Status: DISCONTINUED | OUTPATIENT
Start: 2018-01-01 | End: 2018-01-01 | Stop reason: ALTCHOICE

## 2018-01-01 RX ORDER — FLUORIDE TOOTHPASTE
5 TOOTHPASTE DENTAL PRN
Status: DISCONTINUED | OUTPATIENT
Start: 2018-01-01 | End: 2018-01-01

## 2018-01-01 RX ORDER — DILTIAZEM HYDROCHLORIDE 120 MG/1
120 CAPSULE, COATED, EXTENDED RELEASE ORAL DAILY
Qty: 30 CAPSULE | Refills: 3 | OUTPATIENT
Start: 2018-01-01

## 2018-01-01 RX ORDER — DIGOXIN 125 MCG
125 TABLET ORAL DAILY
Status: DISCONTINUED | OUTPATIENT
Start: 2018-01-01 | End: 2018-01-01

## 2018-01-01 RX ORDER — PREDNISOLONE ACETATE 10 MG/ML
1 SUSPENSION/ DROPS OPHTHALMIC 4 TIMES DAILY
COMMUNITY
End: 2018-01-01 | Stop reason: ALTCHOICE

## 2018-01-01 RX ORDER — FLUORIDE TOOTHPASTE
5 TOOTHPASTE DENTAL PRN
Status: DISCONTINUED | OUTPATIENT
Start: 2018-01-01 | End: 2018-01-01 | Stop reason: HOSPADM

## 2018-01-01 RX ORDER — LORAZEPAM 2 MG/ML
1 INJECTION INTRAMUSCULAR EVERY 4 HOURS PRN
Status: DISCONTINUED | OUTPATIENT
Start: 2018-01-01 | End: 2018-01-01

## 2018-01-01 RX ORDER — BUDESONIDE AND FORMOTEROL FUMARATE DIHYDRATE 160; 4.5 UG/1; UG/1
2 AEROSOL RESPIRATORY (INHALATION) 2 TIMES DAILY
Qty: 3 INHALER | Refills: 3 | Status: ON HOLD | OUTPATIENT
Start: 2018-01-01 | End: 2018-01-01

## 2018-01-01 RX ORDER — NEOMYCIN SULFATE, POLYMYXIN B SULFATE AND DEXAMETHASONE 3.5; 10000; 1 MG/ML; [USP'U]/ML; MG/ML
1 SUSPENSION/ DROPS OPHTHALMIC 4 TIMES DAILY
COMMUNITY
End: 2018-01-01 | Stop reason: ALTCHOICE

## 2018-01-01 RX ORDER — HYDROCODONE BITARTRATE AND ACETAMINOPHEN 5; 325 MG/1; MG/1
1 TABLET ORAL EVERY 6 HOURS PRN
Status: DISCONTINUED | OUTPATIENT
Start: 2018-01-01 | End: 2018-01-01

## 2018-01-01 RX ORDER — ALBUTEROL SULFATE 2.5 MG/3ML
2.5 SOLUTION RESPIRATORY (INHALATION) EVERY 6 HOURS PRN
Status: CANCELLED | OUTPATIENT
Start: 2018-01-01

## 2018-01-01 RX ORDER — KETOROLAC TROMETHAMINE 30 MG/ML
15 INJECTION, SOLUTION INTRAMUSCULAR; INTRAVENOUS EVERY 6 HOURS
Status: CANCELLED | OUTPATIENT
Start: 2018-01-01 | End: 2018-08-15

## 2018-01-01 RX ADMIN — APIXABAN 2.5 MG: 2.5 TABLET, FILM COATED ORAL at 21:37

## 2018-01-01 RX ADMIN — Medication 10 ML: at 08:51

## 2018-01-01 RX ADMIN — IPRATROPIUM BROMIDE AND ALBUTEROL SULFATE 1 AMPULE: .5; 3 SOLUTION RESPIRATORY (INHALATION) at 17:31

## 2018-01-01 RX ADMIN — PREDNISONE 30 MG: 10 TABLET ORAL at 08:02

## 2018-01-01 RX ADMIN — Medication 1000 MCG: at 09:00

## 2018-01-01 RX ADMIN — ACETAMINOPHEN 1000 MG: 500 TABLET ORAL at 22:11

## 2018-01-01 RX ADMIN — HYDROCODONE BITARTRATE AND ACETAMINOPHEN 1 TABLET: 5; 325 TABLET ORAL at 18:42

## 2018-01-01 RX ADMIN — OXYCODONE HYDROCHLORIDE AND ACETAMINOPHEN 1 TABLET: 5; 325 TABLET ORAL at 08:01

## 2018-01-01 RX ADMIN — Medication 10 ML: at 20:33

## 2018-01-01 RX ADMIN — GUAIFENESIN 600 MG: 600 TABLET, EXTENDED RELEASE ORAL at 20:14

## 2018-01-01 RX ADMIN — DILTIAZEM HYDROCHLORIDE 120 MG: 120 CAPSULE, COATED, EXTENDED RELEASE ORAL at 09:19

## 2018-01-01 RX ADMIN — ASPIRIN 81 MG: 81 TABLET, COATED ORAL at 08:46

## 2018-01-01 RX ADMIN — TIOTROPIUM BROMIDE 18 MCG: 18 CAPSULE ORAL; RESPIRATORY (INHALATION) at 10:24

## 2018-01-01 RX ADMIN — Medication 2 PUFF: at 08:14

## 2018-01-01 RX ADMIN — OXYCODONE HYDROCHLORIDE AND ACETAMINOPHEN 1 TABLET: 7.5; 325 TABLET ORAL at 12:23

## 2018-01-01 RX ADMIN — Medication 2 PUFF: at 20:36

## 2018-01-01 RX ADMIN — HYDROCODONE BITARTRATE AND ACETAMINOPHEN 1 TABLET: 5; 325 TABLET ORAL at 11:38

## 2018-01-01 RX ADMIN — FUROSEMIDE 40 MG: 40 TABLET ORAL at 21:09

## 2018-01-01 RX ADMIN — SODIUM CHLORIDE: 9 INJECTION, SOLUTION INTRAVENOUS at 09:25

## 2018-01-01 RX ADMIN — FUROSEMIDE 40 MG: 40 TABLET ORAL at 10:35

## 2018-01-01 RX ADMIN — IPRATROPIUM BROMIDE AND ALBUTEROL SULFATE 1 AMPULE: .5; 3 SOLUTION RESPIRATORY (INHALATION) at 16:01

## 2018-01-01 RX ADMIN — SODIUM CHLORIDE: 9 INJECTION, SOLUTION INTRAVENOUS at 08:55

## 2018-01-01 RX ADMIN — ACETAMINOPHEN 1000 MG: 500 TABLET ORAL at 22:57

## 2018-01-01 RX ADMIN — BORTEZOMIB 2.4 MG: 3.5 INJECTION, POWDER, LYOPHILIZED, FOR SOLUTION INTRAVENOUS; SUBCUTANEOUS at 09:14

## 2018-01-01 RX ADMIN — GUAIFENESIN 600 MG: 600 TABLET, EXTENDED RELEASE ORAL at 21:30

## 2018-01-01 RX ADMIN — HYDROCODONE BITARTRATE AND ACETAMINOPHEN 1 TABLET: 5; 325 TABLET ORAL at 08:47

## 2018-01-01 RX ADMIN — Medication 2 PUFF: at 21:48

## 2018-01-01 RX ADMIN — THERA TABS 1 TABLET: TAB at 20:21

## 2018-01-01 RX ADMIN — ENOXAPARIN SODIUM 40 MG: 40 INJECTION, SOLUTION INTRAVENOUS; SUBCUTANEOUS at 17:12

## 2018-01-01 RX ADMIN — FENTANYL CITRATE 50 MCG: 50 INJECTION, SOLUTION INTRAMUSCULAR; INTRAVENOUS at 14:14

## 2018-01-01 RX ADMIN — SODIUM CHLORIDE: 9 INJECTION, SOLUTION INTRAVENOUS at 08:58

## 2018-01-01 RX ADMIN — MORPHINE SULFATE 1 MG: 2 INJECTION, SOLUTION INTRAMUSCULAR; INTRAVENOUS at 09:33

## 2018-01-01 RX ADMIN — DEXAMETHASONE SODIUM PHOSPHATE 12 MG: 4 INJECTION, SOLUTION INTRAMUSCULAR; INTRAVENOUS at 09:17

## 2018-01-01 RX ADMIN — APIXABAN 2.5 MG: 2.5 TABLET, FILM COATED ORAL at 21:22

## 2018-01-01 RX ADMIN — ASPIRIN 81 MG: 81 TABLET ORAL at 08:56

## 2018-01-01 RX ADMIN — HEPARIN SODIUM AND DEXTROSE 18 UNITS/KG/HR: 10000; 5 INJECTION INTRAVENOUS at 21:32

## 2018-01-01 RX ADMIN — Medication 10 ML: at 09:41

## 2018-01-01 RX ADMIN — Medication 10 ML: at 21:22

## 2018-01-01 RX ADMIN — DEXAMETHASONE SODIUM PHOSPHATE 12 MG: 4 INJECTION, SOLUTION INTRAMUSCULAR; INTRAVENOUS at 09:27

## 2018-01-01 RX ADMIN — SODIUM CHLORIDE: 9 INJECTION, SOLUTION INTRAVENOUS at 09:29

## 2018-01-01 RX ADMIN — Medication 10 ML: at 08:39

## 2018-01-01 RX ADMIN — SODIUM CHLORIDE: 9 INJECTION, SOLUTION INTRAVENOUS at 09:10

## 2018-01-01 RX ADMIN — IPRATROPIUM BROMIDE AND ALBUTEROL SULFATE 1 AMPULE: .5; 3 SOLUTION RESPIRATORY (INHALATION) at 07:24

## 2018-01-01 RX ADMIN — SODIUM CHLORIDE 2.4 MG: 9 INJECTION INTRAMUSCULAR; INTRAVENOUS; SUBCUTANEOUS at 09:25

## 2018-01-01 RX ADMIN — HEPARIN SODIUM AND DEXTROSE 14 UNITS/KG/HR: 10000; 5 INJECTION INTRAVENOUS at 18:12

## 2018-01-01 RX ADMIN — MORPHINE SULFATE 4 MG: 4 INJECTION INTRAVENOUS at 21:06

## 2018-01-01 RX ADMIN — ENOXAPARIN SODIUM 40 MG: 40 INJECTION SUBCUTANEOUS at 08:03

## 2018-01-01 RX ADMIN — TIOTROPIUM BROMIDE 18 MCG: 18 CAPSULE ORAL; RESPIRATORY (INHALATION) at 09:31

## 2018-01-01 RX ADMIN — Medication 2 PUFF: at 21:23

## 2018-01-01 RX ADMIN — HYDROCODONE BITARTRATE AND ACETAMINOPHEN 1 TABLET: 5; 325 TABLET ORAL at 17:27

## 2018-01-01 RX ADMIN — SODIUM CHLORIDE 2.4 MG: 9 INJECTION INTRAMUSCULAR; INTRAVENOUS; SUBCUTANEOUS at 09:35

## 2018-01-01 RX ADMIN — THERA TABS 1 TABLET: TAB at 21:21

## 2018-01-01 RX ADMIN — SODIUM CHLORIDE 2.4 MG: 9 INJECTION INTRAMUSCULAR; INTRAVENOUS; SUBCUTANEOUS at 08:53

## 2018-01-01 RX ADMIN — LORAZEPAM 0.5 MG: 2 INJECTION INTRAMUSCULAR; INTRAVENOUS at 11:57

## 2018-01-01 RX ADMIN — GUAIFENESIN 600 MG: 600 TABLET, EXTENDED RELEASE ORAL at 21:21

## 2018-01-01 RX ADMIN — POLYETHYLENE GLYCOL (3350) 17 G: 17 POWDER, FOR SOLUTION ORAL at 08:20

## 2018-01-01 RX ADMIN — CEFTRIAXONE SODIUM 1 G: 1 INJECTION, POWDER, FOR SOLUTION INTRAMUSCULAR; INTRAVENOUS at 18:10

## 2018-01-01 RX ADMIN — TIOTROPIUM BROMIDE 18 MCG: 18 CAPSULE ORAL; RESPIRATORY (INHALATION) at 07:52

## 2018-01-01 RX ADMIN — SODIUM CHLORIDE 12 MG: 9 INJECTION INTRAMUSCULAR; INTRAVENOUS; SUBCUTANEOUS at 08:51

## 2018-01-01 RX ADMIN — MORPHINE SULFATE 1 MG: 2 INJECTION, SOLUTION INTRAMUSCULAR; INTRAVENOUS at 11:48

## 2018-01-01 RX ADMIN — Medication 1000 MCG: at 08:06

## 2018-01-01 RX ADMIN — THERA TABS 1 TABLET: TAB at 20:07

## 2018-01-01 RX ADMIN — GUAIFENESIN 400 MG: 200 SOLUTION ORAL at 01:56

## 2018-01-01 RX ADMIN — FLUTICASONE PROPIONATE 2 SPRAY: 50 SPRAY, METERED NASAL at 09:40

## 2018-01-01 RX ADMIN — Medication 2 PUFF: at 19:47

## 2018-01-01 RX ADMIN — DILTIAZEM HYDROCHLORIDE 120 MG: 120 CAPSULE, COATED, EXTENDED RELEASE ORAL at 21:37

## 2018-01-01 RX ADMIN — INSULIN LISPRO 1 UNITS: 100 INJECTION, SOLUTION INTRAVENOUS; SUBCUTANEOUS at 20:50

## 2018-01-01 RX ADMIN — HYDROCODONE BITARTRATE AND ACETAMINOPHEN 1 TABLET: 5; 325 TABLET ORAL at 04:22

## 2018-01-01 RX ADMIN — GUAIFENESIN 600 MG: 600 TABLET, EXTENDED RELEASE ORAL at 22:15

## 2018-01-01 RX ADMIN — MORPHINE SULFATE 1 MG: 2 INJECTION, SOLUTION INTRAMUSCULAR; INTRAVENOUS at 22:29

## 2018-01-01 RX ADMIN — LORAZEPAM 0.5 MG: 2 INJECTION INTRAMUSCULAR; INTRAVENOUS at 11:28

## 2018-01-01 RX ADMIN — IPRATROPIUM BROMIDE AND ALBUTEROL SULFATE 1 AMPULE: .5; 3 SOLUTION RESPIRATORY (INHALATION) at 20:28

## 2018-01-01 RX ADMIN — MORPHINE SULFATE 1 MG: 2 INJECTION, SOLUTION INTRAMUSCULAR; INTRAVENOUS at 15:02

## 2018-01-01 RX ADMIN — FLUTICASONE PROPIONATE 2 SPRAY: 50 SPRAY, METERED NASAL at 08:46

## 2018-01-01 RX ADMIN — POLYETHYLENE GLYCOL (3350) 17 G: 17 POWDER, FOR SOLUTION ORAL at 08:51

## 2018-01-01 RX ADMIN — NITROGLYCERIN 0.4 MG: 0.4 TABLET, ORALLY DISINTEGRATING SUBLINGUAL at 10:40

## 2018-01-01 RX ADMIN — SODIUM CHLORIDE: 9 INJECTION, SOLUTION INTRAVENOUS at 08:38

## 2018-01-01 RX ADMIN — SODIUM CHLORIDE 2.4 MG: 9 INJECTION INTRAMUSCULAR; INTRAVENOUS; SUBCUTANEOUS at 09:11

## 2018-01-01 RX ADMIN — Medication 2 PUFF: at 21:10

## 2018-01-01 RX ADMIN — ACETAMINOPHEN 650 MG: 325 TABLET ORAL at 21:20

## 2018-01-01 RX ADMIN — Medication 1000 MCG: at 11:31

## 2018-01-01 RX ADMIN — Medication 2 PUFF: at 07:34

## 2018-01-01 RX ADMIN — THERA TABS 1 TABLET: TAB at 22:15

## 2018-01-01 RX ADMIN — ACETAMINOPHEN 1000 MG: 500 TABLET ORAL at 22:18

## 2018-01-01 RX ADMIN — DILTIAZEM HYDROCHLORIDE 120 MG: 120 CAPSULE, COATED, EXTENDED RELEASE ORAL at 08:47

## 2018-01-01 RX ADMIN — TIOTROPIUM BROMIDE 18 MCG: 18 CAPSULE ORAL; RESPIRATORY (INHALATION) at 10:50

## 2018-01-01 RX ADMIN — TIOTROPIUM BROMIDE 18 MCG: 18 CAPSULE ORAL; RESPIRATORY (INHALATION) at 08:10

## 2018-01-01 RX ADMIN — DILTIAZEM HYDROCHLORIDE 5 MG/HR: 5 INJECTION INTRAVENOUS at 21:42

## 2018-01-01 RX ADMIN — MORPHINE SULFATE 2 MG: 2 INJECTION, SOLUTION INTRAMUSCULAR; INTRAVENOUS at 13:26

## 2018-01-01 RX ADMIN — Medication 2 PUFF: at 22:21

## 2018-01-01 RX ADMIN — BORTEZOMIB 2.4 MG: 3.5 INJECTION, POWDER, LYOPHILIZED, FOR SOLUTION INTRAVENOUS; SUBCUTANEOUS at 09:28

## 2018-01-01 RX ADMIN — Medication 2 PUFF: at 11:15

## 2018-01-01 RX ADMIN — ACETAMINOPHEN 1000 MG: 500 TABLET ORAL at 21:33

## 2018-01-01 RX ADMIN — IPRATROPIUM BROMIDE AND ALBUTEROL SULFATE 1 AMPULE: .5; 3 SOLUTION RESPIRATORY (INHALATION) at 15:15

## 2018-01-01 RX ADMIN — SODIUM CHLORIDE 1000 ML: 9 INJECTION, SOLUTION INTRAVENOUS at 17:01

## 2018-01-01 RX ADMIN — ACETAMINOPHEN 1000 MG: 500 TABLET ORAL at 21:14

## 2018-01-01 RX ADMIN — SODIUM CHLORIDE: 9 INJECTION, SOLUTION INTRAVENOUS at 09:08

## 2018-01-01 RX ADMIN — Medication 2 PUFF: at 19:38

## 2018-01-01 RX ADMIN — GUAIFENESIN 600 MG: 600 TABLET, EXTENDED RELEASE ORAL at 08:39

## 2018-01-01 RX ADMIN — LEVOFLOXACIN 750 MG: 5 INJECTION, SOLUTION INTRAVENOUS at 12:57

## 2018-01-01 RX ADMIN — FLUTICASONE PROPIONATE 2 SPRAY: 50 SPRAY, METERED NASAL at 08:56

## 2018-01-01 RX ADMIN — SODIUM CHLORIDE 12 MG: 9 INJECTION INTRAMUSCULAR; INTRAVENOUS; SUBCUTANEOUS at 11:21

## 2018-01-01 RX ADMIN — PREDNISONE 30 MG: 10 TABLET ORAL at 22:25

## 2018-01-01 RX ADMIN — NITROGLYCERIN 0.4 MG: 0.4 TABLET, ORALLY DISINTEGRATING SUBLINGUAL at 10:25

## 2018-01-01 RX ADMIN — HYDROCODONE BITARTRATE AND ACETAMINOPHEN 1 TABLET: 5; 325 TABLET ORAL at 00:13

## 2018-01-01 RX ADMIN — POLYETHYLENE GLYCOL 3350 17 G: 17 POWDER, FOR SOLUTION ORAL at 08:47

## 2018-01-01 RX ADMIN — FLUTICASONE PROPIONATE 2 SPRAY: 50 SPRAY, METERED NASAL at 09:02

## 2018-01-01 RX ADMIN — MICONAZOLE NITRATE: 2 POWDER TOPICAL at 11:36

## 2018-01-01 RX ADMIN — Medication 2 PUFF: at 10:19

## 2018-01-01 RX ADMIN — SODIUM CHLORIDE 2.4 MG: 9 INJECTION INTRAMUSCULAR; INTRAVENOUS; SUBCUTANEOUS at 08:41

## 2018-01-01 RX ADMIN — MORPHINE SULFATE 2 MG: 2 INJECTION, SOLUTION INTRAMUSCULAR; INTRAVENOUS at 00:44

## 2018-01-01 RX ADMIN — Medication 10 ML: at 21:04

## 2018-01-01 RX ADMIN — PANTOPRAZOLE SODIUM 40 MG: 40 TABLET, DELAYED RELEASE ORAL at 06:21

## 2018-01-01 RX ADMIN — SODIUM CHLORIDE: 4.5 INJECTION, SOLUTION INTRAVENOUS at 12:38

## 2018-01-01 RX ADMIN — DIPHENHYDRAMINE HCL 50 MG: 25 TABLET ORAL at 02:43

## 2018-01-01 RX ADMIN — POLYETHYLENE GLYCOL (3350) 17 G: 17 POWDER, FOR SOLUTION ORAL at 08:45

## 2018-01-01 RX ADMIN — GADOTERIDOL 15 ML: 279.3 INJECTION, SOLUTION INTRAVENOUS at 16:49

## 2018-01-01 RX ADMIN — DIPHENHYDRAMINE HCL 50 MG: 25 TABLET ORAL at 21:22

## 2018-01-01 RX ADMIN — Medication 10 ML: at 22:18

## 2018-01-01 RX ADMIN — DIPHENHYDRAMINE HCL 50 MG: 25 TABLET ORAL at 22:57

## 2018-01-01 RX ADMIN — Medication 1000 MCG: at 08:46

## 2018-01-01 RX ADMIN — METHYLPREDNISOLONE SODIUM SUCCINATE 40 MG: 40 INJECTION, POWDER, FOR SOLUTION INTRAMUSCULAR; INTRAVENOUS at 08:57

## 2018-01-01 RX ADMIN — FLUTICASONE PROPIONATE 2 SPRAY: 50 SPRAY, METERED NASAL at 08:06

## 2018-01-01 RX ADMIN — Medication 10 ML: at 09:50

## 2018-01-01 RX ADMIN — IPRATROPIUM BROMIDE AND ALBUTEROL SULFATE 1 AMPULE: .5; 3 SOLUTION RESPIRATORY (INHALATION) at 12:41

## 2018-01-01 RX ADMIN — TIOTROPIUM BROMIDE 18 MCG: 18 CAPSULE ORAL; RESPIRATORY (INHALATION) at 07:37

## 2018-01-01 RX ADMIN — Medication 10 ML: at 20:14

## 2018-01-01 RX ADMIN — HYDROCODONE BITARTRATE AND ACETAMINOPHEN 1 TABLET: 5; 325 TABLET ORAL at 05:15

## 2018-01-01 RX ADMIN — TIOTROPIUM BROMIDE 18 MCG: 18 CAPSULE ORAL; RESPIRATORY (INHALATION) at 08:12

## 2018-01-01 RX ADMIN — FLUTICASONE PROPIONATE 2 SPRAY: 50 SPRAY, METERED NASAL at 09:49

## 2018-01-01 RX ADMIN — SODIUM CHLORIDE: 9 INJECTION, SOLUTION INTRAVENOUS at 11:17

## 2018-01-01 RX ADMIN — Medication 2 PUFF: at 20:27

## 2018-01-01 RX ADMIN — CEFTRIAXONE SODIUM 1 G: 1 INJECTION, POWDER, FOR SOLUTION INTRAMUSCULAR; INTRAVENOUS at 18:12

## 2018-01-01 RX ADMIN — AZITHROMYCIN MONOHYDRATE 500 MG: 500 INJECTION, POWDER, LYOPHILIZED, FOR SOLUTION INTRAVENOUS at 17:01

## 2018-01-01 RX ADMIN — CEFTRIAXONE SODIUM 1 G: 1 INJECTION, POWDER, FOR SOLUTION INTRAMUSCULAR; INTRAVENOUS at 17:01

## 2018-01-01 RX ADMIN — DILTIAZEM HYDROCHLORIDE 120 MG: 120 CAPSULE, COATED, EXTENDED RELEASE ORAL at 08:06

## 2018-01-01 RX ADMIN — Medication 2 UNITS: at 17:56

## 2018-01-01 RX ADMIN — CEFTRIAXONE SODIUM 1 G: 1 INJECTION, POWDER, FOR SOLUTION INTRAMUSCULAR; INTRAVENOUS at 03:37

## 2018-01-01 RX ADMIN — FUROSEMIDE 40 MG: 40 TABLET ORAL at 08:51

## 2018-01-01 RX ADMIN — Medication 10 ML: at 12:44

## 2018-01-01 RX ADMIN — DIPHENHYDRAMINE HCL 50 MG: 25 TABLET ORAL at 21:14

## 2018-01-01 RX ADMIN — DILTIAZEM HYDROCHLORIDE 5 MG/HR: 5 INJECTION INTRAVENOUS at 22:57

## 2018-01-01 RX ADMIN — ASPIRIN 81 MG: 81 TABLET, COATED ORAL at 08:51

## 2018-01-01 RX ADMIN — Medication 10 ML: at 09:46

## 2018-01-01 RX ADMIN — APIXABAN 2.5 MG: 2.5 TABLET, FILM COATED ORAL at 21:03

## 2018-01-01 RX ADMIN — Medication 2 PUFF: at 09:31

## 2018-01-01 RX ADMIN — Medication 2 UNITS: at 06:32

## 2018-01-01 RX ADMIN — IPRATROPIUM BROMIDE AND ALBUTEROL SULFATE 1 AMPULE: .5; 3 SOLUTION RESPIRATORY (INHALATION) at 03:03

## 2018-01-01 RX ADMIN — HYDROCODONE BITARTRATE AND ACETAMINOPHEN 1 TABLET: 5; 325 TABLET ORAL at 17:02

## 2018-01-01 RX ADMIN — TRAMADOL HYDROCHLORIDE 50 MG: 50 TABLET, FILM COATED ORAL at 13:49

## 2018-01-01 RX ADMIN — FAMOTIDINE 20 MG: 20 TABLET ORAL at 09:19

## 2018-01-01 RX ADMIN — BORTEZOMIB 2.4 MG: 3.5 INJECTION, POWDER, LYOPHILIZED, FOR SOLUTION INTRAVENOUS; SUBCUTANEOUS at 08:57

## 2018-01-01 RX ADMIN — MORPHINE SULFATE 1 MG: 2 INJECTION, SOLUTION INTRAMUSCULAR; INTRAVENOUS at 01:54

## 2018-01-01 RX ADMIN — FLUTICASONE PROPIONATE 2 SPRAY: 50 SPRAY, METERED NASAL at 09:00

## 2018-01-01 RX ADMIN — APIXABAN 2.5 MG: 2.5 TABLET, FILM COATED ORAL at 08:47

## 2018-01-01 RX ADMIN — THERA TABS 1 TABLET: TAB at 21:34

## 2018-01-01 RX ADMIN — SODIUM CHLORIDE: 9 INJECTION, SOLUTION INTRAVENOUS at 09:16

## 2018-01-01 RX ADMIN — Medication 2 PUFF: at 20:34

## 2018-01-01 RX ADMIN — GUAIFENESIN 600 MG: 600 TABLET, EXTENDED RELEASE ORAL at 20:21

## 2018-01-01 RX ADMIN — ACETAMINOPHEN 500 MG: 500 TABLET ORAL at 23:54

## 2018-01-01 RX ADMIN — CEFTRIAXONE SODIUM 1 G: 1 INJECTION, POWDER, FOR SOLUTION INTRAMUSCULAR; INTRAVENOUS at 18:15

## 2018-01-01 RX ADMIN — Medication 10 ML: at 21:24

## 2018-01-01 RX ADMIN — DEXAMETHASONE SODIUM PHOSPHATE 12 MG: 4 INJECTION, SOLUTION INTRAMUSCULAR; INTRAVENOUS at 08:32

## 2018-01-01 RX ADMIN — DILTIAZEM HYDROCHLORIDE 5 MG/HR: 5 INJECTION INTRAVENOUS at 21:16

## 2018-01-01 RX ADMIN — FLUTICASONE PROPIONATE 2 SPRAY: 50 SPRAY, METERED NASAL at 08:47

## 2018-01-01 RX ADMIN — Medication 10 ML: at 09:42

## 2018-01-01 RX ADMIN — OXYCODONE HYDROCHLORIDE AND ACETAMINOPHEN 1 TABLET: 5; 325 TABLET ORAL at 12:07

## 2018-01-01 RX ADMIN — SODIUM CHLORIDE 2.4 MG: 9 INJECTION INTRAMUSCULAR; INTRAVENOUS; SUBCUTANEOUS at 10:13

## 2018-01-01 RX ADMIN — Medication 10 ML: at 21:34

## 2018-01-01 RX ADMIN — Medication 10 ML: at 21:10

## 2018-01-01 RX ADMIN — OXYCODONE HYDROCHLORIDE AND ACETAMINOPHEN 1 TABLET: 5; 325 TABLET ORAL at 16:16

## 2018-01-01 RX ADMIN — IPRATROPIUM BROMIDE AND ALBUTEROL SULFATE 1 AMPULE: .5; 3 SOLUTION RESPIRATORY (INHALATION) at 10:40

## 2018-01-01 RX ADMIN — DILTIAZEM HYDROCHLORIDE 120 MG: 120 CAPSULE, COATED, EXTENDED RELEASE ORAL at 08:51

## 2018-01-01 RX ADMIN — SODIUM CHLORIDE 2.4 MG: 9 INJECTION INTRAMUSCULAR; INTRAVENOUS; SUBCUTANEOUS at 09:34

## 2018-01-01 RX ADMIN — BORTEZOMIB 2.4 MG: 3.5 INJECTION, POWDER, LYOPHILIZED, FOR SOLUTION INTRAVENOUS; SUBCUTANEOUS at 10:59

## 2018-01-01 RX ADMIN — ACETAMINOPHEN 1000 MG: 500 TABLET ORAL at 02:42

## 2018-01-01 RX ADMIN — ASPIRIN 81 MG: 81 TABLET, COATED ORAL at 08:48

## 2018-01-01 RX ADMIN — GUAIFENESIN 400 MG: 200 SOLUTION ORAL at 08:53

## 2018-01-01 RX ADMIN — POTASSIUM CHLORIDE 40 MEQ: 1500 TABLET, EXTENDED RELEASE ORAL at 18:10

## 2018-01-01 RX ADMIN — DIPHENHYDRAMINE HCL 50 MG: 25 TABLET ORAL at 22:18

## 2018-01-01 RX ADMIN — FUROSEMIDE 40 MG: 40 TABLET ORAL at 08:46

## 2018-01-01 RX ADMIN — MICONAZOLE NITRATE: 2 POWDER TOPICAL at 21:13

## 2018-01-01 RX ADMIN — SODIUM CHLORIDE: 9 INJECTION, SOLUTION INTRAVENOUS at 09:17

## 2018-01-01 RX ADMIN — GUAIFENESIN 600 MG: 600 TABLET, EXTENDED RELEASE ORAL at 20:07

## 2018-01-01 RX ADMIN — ASPIRIN 81 MG: 81 TABLET, COATED ORAL at 09:19

## 2018-01-01 RX ADMIN — ENOXAPARIN SODIUM 40 MG: 40 INJECTION, SOLUTION INTRAVENOUS; SUBCUTANEOUS at 21:22

## 2018-01-01 RX ADMIN — FLUTICASONE PROPIONATE 2 SPRAY: 50 SPRAY, METERED NASAL at 09:43

## 2018-01-01 RX ADMIN — DEXAMETHASONE SODIUM PHOSPHATE 12 MG: 4 INJECTION, SOLUTION INTRAMUSCULAR; INTRAVENOUS at 08:50

## 2018-01-01 RX ADMIN — BORTEZOMIB 2.4 MG: 3.5 INJECTION, POWDER, LYOPHILIZED, FOR SOLUTION INTRAVENOUS; SUBCUTANEOUS at 09:43

## 2018-01-01 RX ADMIN — APIXABAN 2.5 MG: 2.5 TABLET, FILM COATED ORAL at 09:19

## 2018-01-01 RX ADMIN — IPRATROPIUM BROMIDE AND ALBUTEROL SULFATE 1 AMPULE: .5; 3 SOLUTION RESPIRATORY (INHALATION) at 07:55

## 2018-01-01 RX ADMIN — DEXAMETHASONE SODIUM PHOSPHATE 12 MG: 4 INJECTION, SOLUTION INTRAMUSCULAR; INTRAVENOUS at 09:22

## 2018-01-01 RX ADMIN — ASPIRIN 81 MG: 81 TABLET, COATED ORAL at 08:47

## 2018-01-01 RX ADMIN — Medication 2 PUFF: at 17:25

## 2018-01-01 RX ADMIN — FAMOTIDINE 20 MG: 20 TABLET ORAL at 16:15

## 2018-01-01 RX ADMIN — DEXAMETHASONE SODIUM PHOSPHATE 12 MG: 4 INJECTION, SOLUTION INTRAMUSCULAR; INTRAVENOUS at 09:10

## 2018-01-01 RX ADMIN — APIXABAN 2.5 MG: 2.5 TABLET, FILM COATED ORAL at 08:51

## 2018-01-01 RX ADMIN — DEXAMETHASONE SODIUM PHOSPHATE 12 MG: 4 INJECTION, SOLUTION INTRAMUSCULAR; INTRAVENOUS at 09:34

## 2018-01-01 RX ADMIN — MICONAZOLE NITRATE: 2 POWDER TOPICAL at 21:00

## 2018-01-01 RX ADMIN — Medication 2 PUFF: at 20:10

## 2018-01-01 RX ADMIN — GUAIFENESIN AND DEXTROMETHORPHAN HYDROBROMIDE 1 TABLET: 600; 30 TABLET, EXTENDED RELEASE ORAL at 11:50

## 2018-01-01 RX ADMIN — ACETAMINOPHEN 650 MG: 325 TABLET ORAL at 02:12

## 2018-01-01 RX ADMIN — ASPIRIN 81 MG: 81 TABLET ORAL at 09:48

## 2018-01-01 RX ADMIN — AZITHROMYCIN MONOHYDRATE 500 MG: 500 INJECTION, POWDER, LYOPHILIZED, FOR SOLUTION INTRAVENOUS at 04:54

## 2018-01-01 RX ADMIN — IPRATROPIUM BROMIDE AND ALBUTEROL SULFATE 1 AMPULE: .5; 3 SOLUTION RESPIRATORY (INHALATION) at 20:20

## 2018-01-01 RX ADMIN — ACETAMINOPHEN 1000 MG: 500 TABLET ORAL at 21:22

## 2018-01-01 RX ADMIN — Medication 2 PUFF: at 20:11

## 2018-01-01 RX ADMIN — GUAIFENESIN 400 MG: 200 SOLUTION ORAL at 20:33

## 2018-01-01 RX ADMIN — Medication 2 PUFF: at 10:30

## 2018-01-01 RX ADMIN — SODIUM CHLORIDE 2.4 MG: 9 INJECTION INTRAMUSCULAR; INTRAVENOUS; SUBCUTANEOUS at 10:15

## 2018-01-01 RX ADMIN — Medication 2 PUFF: at 10:26

## 2018-01-01 RX ADMIN — APIXABAN 2.5 MG: 2.5 TABLET, FILM COATED ORAL at 08:06

## 2018-01-01 RX ADMIN — POLYETHYLENE GLYCOL (3350) 17 G: 17 POWDER, FOR SOLUTION ORAL at 10:34

## 2018-01-01 RX ADMIN — Medication 2 PUFF: at 10:52

## 2018-01-01 RX ADMIN — ACETAMINOPHEN 1000 MG: 500 TABLET, FILM COATED ORAL at 19:49

## 2018-01-01 RX ADMIN — CEFTRIAXONE SODIUM 1 G: 1 INJECTION, POWDER, FOR SOLUTION INTRAMUSCULAR; INTRAVENOUS at 18:36

## 2018-01-01 RX ADMIN — IPRATROPIUM BROMIDE AND ALBUTEROL SULFATE 1 AMPULE: .5; 3 SOLUTION RESPIRATORY (INHALATION) at 17:43

## 2018-01-01 RX ADMIN — TIOTROPIUM BROMIDE 18 MCG: 18 CAPSULE ORAL; RESPIRATORY (INHALATION) at 06:44

## 2018-01-01 RX ADMIN — SODIUM CHLORIDE: 9 INJECTION, SOLUTION INTRAVENOUS at 08:42

## 2018-01-01 RX ADMIN — SODIUM CHLORIDE 12 MG: 9 INJECTION INTRAMUSCULAR; INTRAVENOUS; SUBCUTANEOUS at 08:58

## 2018-01-01 RX ADMIN — THERA TABS 1 TABLET: TAB at 21:22

## 2018-01-01 RX ADMIN — Medication 2 PUFF: at 08:13

## 2018-01-01 RX ADMIN — TIOTROPIUM BROMIDE 18 MCG: 18 CAPSULE ORAL; RESPIRATORY (INHALATION) at 09:47

## 2018-01-01 RX ADMIN — HYDROCODONE BITARTRATE AND ACETAMINOPHEN 1 TABLET: 5; 325 TABLET ORAL at 08:51

## 2018-01-01 RX ADMIN — SODIUM CHLORIDE: 9 INJECTION, SOLUTION INTRAVENOUS at 08:31

## 2018-01-01 RX ADMIN — LORAZEPAM 0.5 MG: 2 INJECTION INTRAMUSCULAR; INTRAVENOUS at 16:31

## 2018-01-01 RX ADMIN — MORPHINE SULFATE 2 MG: 2 INJECTION, SOLUTION INTRAMUSCULAR; INTRAVENOUS at 18:56

## 2018-01-01 RX ADMIN — BORTEZOMIB 2.4 MG: 3.5 INJECTION, POWDER, LYOPHILIZED, FOR SOLUTION INTRAVENOUS; SUBCUTANEOUS at 09:30

## 2018-01-01 RX ADMIN — Medication 10 ML: at 21:37

## 2018-01-01 RX ADMIN — GUAIFENESIN 600 MG: 600 TABLET, EXTENDED RELEASE ORAL at 09:42

## 2018-01-01 RX ADMIN — Medication 2 PUFF: at 21:27

## 2018-01-01 RX ADMIN — HYDROCODONE BITARTRATE AND ACETAMINOPHEN 1 TABLET: 5; 325 TABLET ORAL at 09:19

## 2018-01-01 RX ADMIN — FLUTICASONE PROPIONATE 2 SPRAY: 50 SPRAY, METERED NASAL at 09:46

## 2018-01-01 RX ADMIN — SODIUM CHLORIDE: 9 INJECTION, SOLUTION INTRAVENOUS at 09:34

## 2018-01-01 RX ADMIN — MORPHINE SULFATE 1 MG: 2 INJECTION, SOLUTION INTRAMUSCULAR; INTRAVENOUS at 11:57

## 2018-01-01 RX ADMIN — Medication 1000 MCG: at 08:51

## 2018-01-01 RX ADMIN — Medication 10 ML: at 09:19

## 2018-01-01 RX ADMIN — APIXABAN 2.5 MG: 2.5 TABLET, FILM COATED ORAL at 20:09

## 2018-01-01 RX ADMIN — LORAZEPAM 0.5 MG: 2 INJECTION INTRAMUSCULAR; INTRAVENOUS at 02:12

## 2018-01-01 RX ADMIN — DEXAMETHASONE SODIUM PHOSPHATE 12 MG: 4 INJECTION, SOLUTION INTRAMUSCULAR; INTRAVENOUS at 09:08

## 2018-01-01 RX ADMIN — GUAIFENESIN 600 MG: 600 TABLET, EXTENDED RELEASE ORAL at 21:34

## 2018-01-01 RX ADMIN — DEXAMETHASONE SODIUM PHOSPHATE 12 MG: 4 INJECTION, SOLUTION INTRAMUSCULAR; INTRAVENOUS at 09:15

## 2018-01-01 RX ADMIN — METHYLPREDNISOLONE SODIUM SUCCINATE 40 MG: 40 INJECTION, POWDER, FOR SOLUTION INTRAMUSCULAR; INTRAVENOUS at 14:15

## 2018-01-01 RX ADMIN — BORTEZOMIB 2.4 MG: 3.5 INJECTION, POWDER, LYOPHILIZED, FOR SOLUTION INTRAVENOUS; SUBCUTANEOUS at 09:29

## 2018-01-01 RX ADMIN — OXYCODONE HYDROCHLORIDE AND ACETAMINOPHEN 1 TABLET: 5; 325 TABLET ORAL at 21:30

## 2018-01-01 RX ADMIN — ENOXAPARIN SODIUM 40 MG: 40 INJECTION, SOLUTION INTRAVENOUS; SUBCUTANEOUS at 17:19

## 2018-01-01 RX ADMIN — METHYLPREDNISOLONE SODIUM SUCCINATE 125 MG: 125 INJECTION, POWDER, FOR SOLUTION INTRAMUSCULAR; INTRAVENOUS at 03:37

## 2018-01-01 RX ADMIN — PANTOPRAZOLE SODIUM 40 MG: 40 TABLET, DELAYED RELEASE ORAL at 05:58

## 2018-01-01 RX ADMIN — Medication 10 ML: at 20:09

## 2018-01-01 RX ADMIN — ENOXAPARIN SODIUM 40 MG: 40 INJECTION, SOLUTION INTRAVENOUS; SUBCUTANEOUS at 16:49

## 2018-01-01 RX ADMIN — HEPARIN SODIUM AND DEXTROSE 14 UNITS/KG/HR: 10000; 5 INJECTION INTRAVENOUS at 18:07

## 2018-01-01 RX ADMIN — FUROSEMIDE 40 MG: 40 TABLET ORAL at 08:47

## 2018-01-01 RX ADMIN — SENNOSIDES 8.6 MG: 8.6 TABLET, FILM COATED ORAL at 09:19

## 2018-01-01 RX ADMIN — Medication 2 PUFF: at 10:29

## 2018-01-01 RX ADMIN — SODIUM CHLORIDE: 9 INJECTION, SOLUTION INTRAVENOUS at 09:55

## 2018-01-01 RX ADMIN — Medication 10 ML: at 21:32

## 2018-01-01 RX ADMIN — DIPHENHYDRAMINE HCL 50 MG: 25 TABLET ORAL at 21:33

## 2018-01-01 RX ADMIN — IPRATROPIUM BROMIDE AND ALBUTEROL SULFATE 1 AMPULE: .5; 3 SOLUTION RESPIRATORY (INHALATION) at 08:39

## 2018-01-01 RX ADMIN — ASPIRIN 81 MG: 81 TABLET ORAL at 10:20

## 2018-01-01 RX ADMIN — Medication 1000 MCG: at 09:19

## 2018-01-01 RX ADMIN — IPRATROPIUM BROMIDE AND ALBUTEROL SULFATE 1 AMPULE: .5; 3 SOLUTION RESPIRATORY (INHALATION) at 15:46

## 2018-01-01 RX ADMIN — ENOXAPARIN SODIUM 40 MG: 40 INJECTION SUBCUTANEOUS at 09:04

## 2018-01-01 RX ADMIN — DEXAMETHASONE SODIUM PHOSPHATE 12 MG: 4 INJECTION, SOLUTION INTRAMUSCULAR; INTRAVENOUS at 10:05

## 2018-01-01 RX ADMIN — SODIUM CHLORIDE 2.4 MG: 9 INJECTION INTRAMUSCULAR; INTRAVENOUS; SUBCUTANEOUS at 09:18

## 2018-01-01 RX ADMIN — MICONAZOLE NITRATE: 2 POWDER TOPICAL at 11:51

## 2018-01-01 RX ADMIN — ACETAMINOPHEN 1000 MG: 500 TABLET ORAL at 21:30

## 2018-01-01 RX ADMIN — MORPHINE SULFATE 1 MG: 2 INJECTION, SOLUTION INTRAMUSCULAR; INTRAVENOUS at 06:14

## 2018-01-01 RX ADMIN — FLUTICASONE PROPIONATE 2 SPRAY: 50 SPRAY, METERED NASAL at 08:03

## 2018-01-01 RX ADMIN — GUAIFENESIN 600 MG: 600 TABLET, EXTENDED RELEASE ORAL at 09:40

## 2018-01-01 RX ADMIN — DILTIAZEM HYDROCHLORIDE 10 MG: 5 INJECTION INTRAVENOUS at 21:37

## 2018-01-01 RX ADMIN — Medication 5 UNITS: at 16:16

## 2018-01-01 RX ADMIN — Medication 10 ML: at 08:35

## 2018-01-01 RX ADMIN — BORTEZOMIB 2.4 MG: 3.5 INJECTION, POWDER, LYOPHILIZED, FOR SOLUTION INTRAVENOUS; SUBCUTANEOUS at 08:56

## 2018-01-01 RX ADMIN — LEVOFLOXACIN 750 MG: 5 INJECTION, SOLUTION INTRAVENOUS at 11:50

## 2018-01-01 RX ADMIN — IPRATROPIUM BROMIDE AND ALBUTEROL SULFATE 1 AMPULE: .5; 3 SOLUTION RESPIRATORY (INHALATION) at 21:02

## 2018-01-01 RX ADMIN — MORPHINE SULFATE 4 MG: 4 INJECTION INTRAVENOUS at 19:10

## 2018-01-01 RX ADMIN — LORAZEPAM 1 MG: 2 INJECTION INTRAMUSCULAR; INTRAVENOUS at 05:27

## 2018-01-01 RX ADMIN — SODIUM CHLORIDE 2.4 MG: 9 INJECTION INTRAMUSCULAR; INTRAVENOUS; SUBCUTANEOUS at 09:21

## 2018-01-01 RX ADMIN — SODIUM CHLORIDE 2.4 MG: 9 INJECTION INTRAMUSCULAR; INTRAVENOUS; SUBCUTANEOUS at 11:25

## 2018-01-01 RX ADMIN — Medication 4 UNITS: at 08:03

## 2018-01-01 RX ADMIN — DIPHENHYDRAMINE HCL 25 MG: 25 TABLET ORAL at 23:54

## 2018-01-01 RX ADMIN — ASPIRIN 81 MG: 81 TABLET ORAL at 08:03

## 2018-01-01 RX ADMIN — HEPARIN SODIUM 6750 UNITS: 1000 INJECTION, SOLUTION INTRAVENOUS; SUBCUTANEOUS at 21:32

## 2018-01-01 RX ADMIN — GUAIFENESIN 600 MG: 600 TABLET, EXTENDED RELEASE ORAL at 08:35

## 2018-01-01 RX ADMIN — TIOTROPIUM BROMIDE 18 MCG: 18 CAPSULE ORAL; RESPIRATORY (INHALATION) at 08:44

## 2018-01-01 RX ADMIN — GUAIFENESIN 600 MG: 600 TABLET, EXTENDED RELEASE ORAL at 09:46

## 2018-01-01 RX ADMIN — HYDROCODONE BITARTRATE AND ACETAMINOPHEN 1 TABLET: 5; 325 TABLET ORAL at 16:14

## 2018-01-01 RX ADMIN — Medication 2 PUFF: at 17:14

## 2018-01-01 RX ADMIN — SODIUM CHLORIDE: 9 INJECTION, SOLUTION INTRAVENOUS at 09:22

## 2018-01-01 RX ADMIN — SODIUM CHLORIDE 2.4 MG: 9 INJECTION INTRAMUSCULAR; INTRAVENOUS; SUBCUTANEOUS at 09:41

## 2018-01-01 RX ADMIN — FUROSEMIDE 40 MG: 40 TABLET ORAL at 09:19

## 2018-01-01 RX ADMIN — ASPIRIN 81 MG: 81 TABLET, COATED ORAL at 10:35

## 2018-01-01 RX ADMIN — Medication 10 ML: at 20:21

## 2018-01-01 RX ADMIN — FUROSEMIDE 40 MG: 40 TABLET ORAL at 09:00

## 2018-01-01 RX ADMIN — DEXAMETHASONE SODIUM PHOSPHATE 12 MG: 4 INJECTION, SOLUTION INTRAMUSCULAR; INTRAVENOUS at 10:55

## 2018-01-01 RX ADMIN — TIOTROPIUM BROMIDE 18 MCG: 18 CAPSULE ORAL; RESPIRATORY (INHALATION) at 11:15

## 2018-01-01 RX ADMIN — POLYETHYLENE GLYCOL (3350) 17 G: 17 POWDER, FOR SOLUTION ORAL at 08:48

## 2018-01-01 RX ADMIN — Medication 10 ML: at 12:22

## 2018-01-01 RX ADMIN — SODIUM CHLORIDE: 9 INJECTION, SOLUTION INTRAVENOUS at 08:51

## 2018-01-01 RX ADMIN — SODIUM CHLORIDE: 9 INJECTION, SOLUTION INTRAVENOUS at 09:30

## 2018-01-01 RX ADMIN — LORAZEPAM 0.5 MG: 2 INJECTION INTRAMUSCULAR; INTRAVENOUS at 22:29

## 2018-01-01 RX ADMIN — SODIUM CHLORIDE 2.4 MG: 9 INJECTION INTRAMUSCULAR; INTRAVENOUS; SUBCUTANEOUS at 09:47

## 2018-01-01 RX ADMIN — MIDAZOLAM 1 MG: 1 INJECTION INTRAMUSCULAR; INTRAVENOUS at 14:14

## 2018-01-01 RX ADMIN — TIOTROPIUM BROMIDE 18 MCG: 18 CAPSULE ORAL; RESPIRATORY (INHALATION) at 10:17

## 2018-01-01 RX ADMIN — TIOTROPIUM BROMIDE 18 MCG: 18 CAPSULE ORAL; RESPIRATORY (INHALATION) at 10:37

## 2018-01-01 RX ADMIN — Medication 10 ML: at 08:47

## 2018-01-01 RX ADMIN — IPRATROPIUM BROMIDE AND ALBUTEROL SULFATE 1 AMPULE: .5; 3 SOLUTION RESPIRATORY (INHALATION) at 21:26

## 2018-01-01 RX ADMIN — NITROGLYCERIN 0.4 MG: 0.4 TABLET, ORALLY DISINTEGRATING SUBLINGUAL at 10:20

## 2018-01-01 RX ADMIN — METHYLPREDNISOLONE SODIUM SUCCINATE 40 MG: 40 INJECTION, POWDER, FOR SOLUTION INTRAMUSCULAR; INTRAVENOUS at 17:01

## 2018-01-01 RX ADMIN — BORTEZOMIB 2.4 MG: 3.5 INJECTION, POWDER, LYOPHILIZED, FOR SOLUTION INTRAVENOUS; SUBCUTANEOUS at 08:59

## 2018-01-01 RX ADMIN — SODIUM CHLORIDE: 9 INJECTION, SOLUTION INTRAVENOUS at 09:45

## 2018-01-01 RX ADMIN — SODIUM CHLORIDE: 9 INJECTION, SOLUTION INTRAVENOUS at 09:31

## 2018-01-01 RX ADMIN — DEXAMETHASONE SODIUM PHOSPHATE 12 MG: 4 INJECTION, SOLUTION INTRAMUSCULAR; INTRAVENOUS at 08:55

## 2018-01-01 RX ADMIN — THERA TABS 1 TABLET: TAB at 21:30

## 2018-01-01 RX ADMIN — ASPIRIN 81 MG: 81 TABLET ORAL at 09:40

## 2018-01-01 RX ADMIN — Medication 2 PUFF: at 07:52

## 2018-01-01 RX ADMIN — ENOXAPARIN SODIUM 40 MG: 40 INJECTION, SOLUTION INTRAVENOUS; SUBCUTANEOUS at 16:40

## 2018-01-01 RX ADMIN — TIOTROPIUM BROMIDE 18 MCG: 18 CAPSULE ORAL; RESPIRATORY (INHALATION) at 10:29

## 2018-01-01 RX ADMIN — SODIUM CHLORIDE: 9 INJECTION, SOLUTION INTRAVENOUS at 09:00

## 2018-01-01 RX ADMIN — CEFAZOLIN SODIUM 1 G: 1 INJECTION, SOLUTION INTRAVENOUS at 14:27

## 2018-01-01 RX ADMIN — SODIUM CHLORIDE: 9 INJECTION, SOLUTION INTRAVENOUS at 09:15

## 2018-01-01 RX ADMIN — HEPARIN SODIUM AND DEXTROSE 14 UNITS/KG/HR: 10000; 5 INJECTION INTRAVENOUS at 19:12

## 2018-01-01 RX ADMIN — Medication 1000 MCG: at 08:47

## 2018-01-01 RX ADMIN — DEXAMETHASONE SODIUM PHOSPHATE 12 MG: 4 INJECTION, SOLUTION INTRAMUSCULAR; INTRAVENOUS at 09:46

## 2018-01-01 RX ADMIN — FLUTICASONE PROPIONATE 2 SPRAY: 50 SPRAY, METERED NASAL at 08:39

## 2018-01-01 RX ADMIN — IPRATROPIUM BROMIDE AND ALBUTEROL SULFATE 1 AMPULE: .5; 3 SOLUTION RESPIRATORY (INHALATION) at 07:30

## 2018-01-01 RX ADMIN — Medication 6 UNITS: at 16:23

## 2018-01-01 RX ADMIN — Medication 1000 MCG: at 10:35

## 2018-01-01 RX ADMIN — Medication 10 ML: at 10:36

## 2018-01-01 RX ADMIN — Medication 2 PUFF: at 18:00

## 2018-01-01 RX ADMIN — ASPIRIN 81 MG: 81 TABLET, COATED ORAL at 08:06

## 2018-01-01 RX ADMIN — SODIUM CHLORIDE: 9 INJECTION, SOLUTION INTRAVENOUS at 08:45

## 2018-01-01 RX ADMIN — SODIUM CHLORIDE: 9 INJECTION, SOLUTION INTRAVENOUS at 08:52

## 2018-01-01 RX ADMIN — OXYCODONE HYDROCHLORIDE AND ACETAMINOPHEN 1 TABLET: 7.5; 325 TABLET ORAL at 17:12

## 2018-01-01 RX ADMIN — ENOXAPARIN SODIUM 40 MG: 40 INJECTION SUBCUTANEOUS at 10:20

## 2018-01-01 RX ADMIN — IPRATROPIUM BROMIDE AND ALBUTEROL SULFATE 1 AMPULE: .5; 3 SOLUTION RESPIRATORY (INHALATION) at 21:32

## 2018-01-01 RX ADMIN — SODIUM CHLORIDE 2.4 MG: 9 INJECTION INTRAMUSCULAR; INTRAVENOUS; SUBCUTANEOUS at 09:50

## 2018-01-01 RX ADMIN — Medication 4 UNITS: at 11:53

## 2018-01-01 RX ADMIN — Medication 2 PUFF: at 09:43

## 2018-01-01 RX ADMIN — DIPHENHYDRAMINE HCL 50 MG: 25 TABLET ORAL at 22:11

## 2018-01-01 RX ADMIN — Medication 2 PUFF: at 08:44

## 2018-01-01 RX ADMIN — FLUTICASONE PROPIONATE 2 SPRAY: 50 SPRAY, METERED NASAL at 10:35

## 2018-01-01 RX ADMIN — ENOXAPARIN SODIUM 40 MG: 40 INJECTION, SOLUTION INTRAVENOUS; SUBCUTANEOUS at 16:46

## 2018-01-01 RX ADMIN — ALBUTEROL SULFATE 2.5 MG: 2.5 SOLUTION RESPIRATORY (INHALATION) at 10:12

## 2018-01-01 RX ADMIN — SODIUM CHLORIDE 2.4 MG: 9 INJECTION INTRAMUSCULAR; INTRAVENOUS; SUBCUTANEOUS at 09:10

## 2018-01-01 RX ADMIN — HYDROCODONE BITARTRATE AND ACETAMINOPHEN 1 TABLET: 5; 325 TABLET ORAL at 18:14

## 2018-01-01 RX ADMIN — IPRATROPIUM BROMIDE AND ALBUTEROL SULFATE 1 AMPULE: .5; 3 SOLUTION RESPIRATORY (INHALATION) at 12:32

## 2018-01-01 RX ADMIN — MICONAZOLE NITRATE: 2 POWDER TOPICAL at 10:45

## 2018-01-01 RX ADMIN — DEXAMETHASONE SODIUM PHOSPHATE 12 MG: 4 INJECTION, SOLUTION INTRAMUSCULAR; INTRAVENOUS at 09:30

## 2018-01-01 RX ADMIN — Medication 2 PUFF: at 21:33

## 2018-01-01 RX ADMIN — LORAZEPAM 0.5 MG: 2 INJECTION INTRAMUSCULAR; INTRAVENOUS at 06:14

## 2018-01-01 RX ADMIN — SODIUM CHLORIDE 12 MG: 9 INJECTION INTRAMUSCULAR; INTRAVENOUS; SUBCUTANEOUS at 08:42

## 2018-01-01 RX ADMIN — Medication 10 ML: at 08:06

## 2018-01-01 RX ADMIN — SODIUM CHLORIDE: 9 INJECTION, SOLUTION INTRAVENOUS at 10:55

## 2018-01-01 RX ADMIN — GUAIFENESIN 600 MG: 600 TABLET, EXTENDED RELEASE ORAL at 09:48

## 2018-01-01 RX ADMIN — Medication 2 PUFF: at 06:46

## 2018-01-01 RX ADMIN — FLUTICASONE PROPIONATE 2 SPRAY: 50 SPRAY, METERED NASAL at 17:56

## 2018-01-01 RX ADMIN — MICONAZOLE NITRATE: 2 POWDER TOPICAL at 09:40

## 2018-01-01 RX ADMIN — DEXAMETHASONE SODIUM PHOSPHATE 12 MG: 4 INJECTION, SOLUTION INTRAMUSCULAR; INTRAVENOUS at 09:19

## 2018-01-01 RX ADMIN — DIPHENHYDRAMINE HCL 50 MG: 25 TABLET ORAL at 21:30

## 2018-01-01 RX ADMIN — FUROSEMIDE 40 MG: 40 TABLET ORAL at 08:06

## 2018-01-01 RX ADMIN — MICONAZOLE NITRATE: 2 POWDER TOPICAL at 20:33

## 2018-01-01 RX ADMIN — MORPHINE SULFATE 1 MG: 2 INJECTION, SOLUTION INTRAMUSCULAR; INTRAVENOUS at 19:47

## 2018-01-01 RX ADMIN — ASPIRIN 81 MG: 81 TABLET ORAL at 09:42

## 2018-01-01 RX ADMIN — DEXAMETHASONE SODIUM PHOSPHATE 12 MG: 4 INJECTION, SOLUTION INTRAMUSCULAR; INTRAVENOUS at 08:56

## 2018-01-01 RX ADMIN — LORAZEPAM 1 MG: 2 INJECTION INTRAMUSCULAR; INTRAVENOUS at 23:05

## 2018-01-01 RX ADMIN — FLUTICASONE PROPIONATE 2 SPRAY: 50 SPRAY, METERED NASAL at 08:35

## 2018-01-01 RX ADMIN — FLUTICASONE PROPIONATE 2 SPRAY: 50 SPRAY, METERED NASAL at 09:15

## 2018-01-01 RX ADMIN — LORAZEPAM 1 MG: 2 INJECTION INTRAMUSCULAR; INTRAVENOUS at 19:16

## 2018-01-01 RX ADMIN — IPRATROPIUM BROMIDE AND ALBUTEROL SULFATE 1 AMPULE: .5; 3 SOLUTION RESPIRATORY (INHALATION) at 12:23

## 2018-01-01 RX ADMIN — Medication 2 UNITS: at 12:04

## 2018-01-01 RX ADMIN — HYDROCODONE BITARTRATE AND ACETAMINOPHEN 1 TABLET: 5; 325 TABLET ORAL at 10:35

## 2018-01-01 RX ADMIN — ALBUTEROL SULFATE 2.5 MG: 2.5 SOLUTION RESPIRATORY (INHALATION) at 12:50

## 2018-01-01 RX ADMIN — Medication 2 PUFF: at 07:37

## 2018-01-01 RX ADMIN — LEVOFLOXACIN 750 MG: 5 INJECTION, SOLUTION INTRAVENOUS at 11:31

## 2018-01-01 ASSESSMENT — ENCOUNTER SYMPTOMS
VOMITING: 0
ABDOMINAL PAIN: 0
ABDOMINAL PAIN: 0
DYSPNEA ASSOCIATED WITH: EXERTION
DIARRHEA: 0
EYE DISCHARGE: 0
EYE PAIN: 0
ABDOMINAL DISTENTION: 0
ABDOMINAL DISTENTION: 0
TROUBLE SWALLOWING: 0
SORE THROAT: 0
DYSPNEA ASSOCIATED WITH: EXERTION
SORE THROAT: 0
RHINORRHEA: 0
SINUS PRESSURE: 0
WHEEZING: 0
BACK PAIN: 0
EYE REDNESS: 0
VOMITING: 0
BLURRED VISION: 0
WHEEZING: 0
VOICE CHANGE: 0
COUGH: 0
CHEST TIGHTNESS: 1
SHORTNESS OF BREATH: 0
SORE THROAT: 0
NAUSEA: 0
APNEA: 0
BACK PAIN: 1
TROUBLE SWALLOWING: 0
DYSPNEA ASSOCIATED WITH: EXERTION
SHORTNESS OF BREATH: 0
PHOTOPHOBIA: 0
DYSPNEA ASSOCIATED WITH: EXERTION
SHORTNESS OF BREATH: 1
ABDOMINAL PAIN: 0
EYE DISCHARGE: 0
EYE ITCHING: 0
COUGH: 0
DYSPNEA ASSOCIATED WITH: EXERTION
DYSPNEA ASSOCIATED WITH: EXERTION
CHEST TIGHTNESS: 0
COUGH: 1
DIARRHEA: 0
NAUSEA: 0
COUGH: 1
STRIDOR: 0
VOMITING: 0
DYSPNEA ASSOCIATED WITH: EXERTION
SHORTNESS OF BREATH: 1
BACK PAIN: 1
CHEST TIGHTNESS: 0
STRIDOR: 0
CHOKING: 0
DIARRHEA: 0
NAUSEA: 0
ABDOMINAL PAIN: 0
NAUSEA: 0
BLOOD IN STOOL: 0
EYE ITCHING: 0
RECTAL PAIN: 0
COLOR CHANGE: 0
WHEEZING: 0
EYE REDNESS: 0
VOICE CHANGE: 0
SPUTUM PRODUCTION: 0
EYE PAIN: 0
DYSPNEA ASSOCIATED WITH: EXERTION
RECTAL PAIN: 0
RHINORRHEA: 0
COLOR CHANGE: 0
PHOTOPHOBIA: 0
EYE REDNESS: 0
VOMITING: 0
APNEA: 0
BACK PAIN: 0
DYSPNEA ASSOCIATED WITH: EXERTION
CHOKING: 0
RHINORRHEA: 0
DYSPNEA ASSOCIATED WITH: EXERTION
DYSPNEA ASSOCIATED WITH: EXERTION
WHEEZING: 0
EYE DISCHARGE: 0
DIARRHEA: 0
SINUS PRESSURE: 0
CONSTIPATION: 0
SORE THROAT: 0
BLOOD IN STOOL: 0

## 2018-01-01 ASSESSMENT — PAIN DESCRIPTION - ONSET
ONSET: ON-GOING

## 2018-01-01 ASSESSMENT — PAIN DESCRIPTION - DESCRIPTORS
DESCRIPTORS: ACHING;CONSTANT
DESCRIPTORS: THROBBING
DESCRIPTORS: ACHING;CONSTANT
DESCRIPTORS: ACHING;CONSTANT
DESCRIPTORS: THROBBING
DESCRIPTORS: SPASM;SQUEEZING
DESCRIPTORS: THROBBING
DESCRIPTORS: ACHING;CONSTANT
DESCRIPTORS: SHARP
DESCRIPTORS: SQUEEZING;SPASM
DESCRIPTORS: THROBBING
DESCRIPTORS: ACHING

## 2018-01-01 ASSESSMENT — PAIN DESCRIPTION - ORIENTATION
ORIENTATION: LEFT;RIGHT;MID
ORIENTATION: LOWER
ORIENTATION: RIGHT;LEFT
ORIENTATION: LOWER
ORIENTATION: RIGHT;LEFT
ORIENTATION: RIGHT;LEFT
ORIENTATION: LOWER
ORIENTATION: RIGHT;LEFT
ORIENTATION: RIGHT;LEFT
ORIENTATION: LOWER
ORIENTATION: RIGHT;LEFT
ORIENTATION: RIGHT;LOWER
ORIENTATION: LOWER
ORIENTATION: RIGHT;LEFT
ORIENTATION: LOWER

## 2018-01-01 ASSESSMENT — PAIN DESCRIPTION - LOCATION
LOCATION: BACK
LOCATION: KNEE
LOCATION: BACK
LOCATION: KNEE
LOCATION: KNEE
LOCATION: BACK
LOCATION: KNEE
LOCATION: BACK
LOCATION: BACK
LOCATION: KNEE
LOCATION: BACK;HIP
LOCATION: KNEE
LOCATION: BACK
LOCATION: BACK
LOCATION: KNEE
LOCATION: BACK

## 2018-01-01 ASSESSMENT — PAIN SCALES - GENERAL
PAINLEVEL_OUTOF10: 0
PAINLEVEL_OUTOF10: 10
PAINLEVEL_OUTOF10: 5
PAINLEVEL_OUTOF10: 0
PAINLEVEL_OUTOF10: 0
PAINLEVEL_OUTOF10: 10
PAINLEVEL_OUTOF10: 0
PAINLEVEL_OUTOF10: 5
PAINLEVEL_OUTOF10: 5
PAINLEVEL_OUTOF10: 0
PAINLEVEL_OUTOF10: 6
PAINLEVEL_OUTOF10: 4
PAINLEVEL_OUTOF10: 0
PAINLEVEL_OUTOF10: 0
PAINLEVEL_OUTOF10: 8
PAINLEVEL_OUTOF10: 0
PAINLEVEL_OUTOF10: 4
PAINLEVEL_OUTOF10: 0
PAINLEVEL_OUTOF10: 5
PAINLEVEL_OUTOF10: 8
PAINLEVEL_OUTOF10: 2
PAINLEVEL_OUTOF10: 0
PAINLEVEL_OUTOF10: 4
PAINLEVEL_OUTOF10: 0
PAINLEVEL_OUTOF10: 5
PAINLEVEL_OUTOF10: 7
PAINLEVEL_OUTOF10: 0
PAINLEVEL_OUTOF10: 8
PAINLEVEL_OUTOF10: 0
PAINLEVEL_OUTOF10: 6
PAINLEVEL_OUTOF10: 10
PAINLEVEL_OUTOF10: 8
PAINLEVEL_OUTOF10: 0
PAINLEVEL_OUTOF10: 8
PAINLEVEL_OUTOF10: 5
PAINLEVEL_OUTOF10: 0
PAINLEVEL_OUTOF10: 10
PAINLEVEL_OUTOF10: 0
PAINLEVEL_OUTOF10: 4
PAINLEVEL_OUTOF10: 5
PAINLEVEL_OUTOF10: 5
PAINLEVEL_OUTOF10: 0
PAINLEVEL_OUTOF10: 4
PAINLEVEL_OUTOF10: 10
PAINLEVEL_OUTOF10: 7
PAINLEVEL_OUTOF10: 0
PAINLEVEL_OUTOF10: 0
PAINLEVEL_OUTOF10: 8
PAINLEVEL_OUTOF10: 0
PAINLEVEL_OUTOF10: 9
PAINLEVEL_OUTOF10: 5
PAINLEVEL_OUTOF10: 0
PAINLEVEL_OUTOF10: 6
PAINLEVEL_OUTOF10: 0
PAINLEVEL_OUTOF10: 0
PAINLEVEL_OUTOF10: 5
PAINLEVEL_OUTOF10: 0
PAINLEVEL_OUTOF10: 6
PAINLEVEL_OUTOF10: 3
PAINLEVEL_OUTOF10: 0
PAINLEVEL_OUTOF10: 6
PAINLEVEL_OUTOF10: 5
PAINLEVEL_OUTOF10: 7
PAINLEVEL_OUTOF10: 7
PAINLEVEL_OUTOF10: 5
PAINLEVEL_OUTOF10: 8
PAINLEVEL_OUTOF10: 3
PAINLEVEL_OUTOF10: 8
PAINLEVEL_OUTOF10: 0
PAINLEVEL_OUTOF10: 5
PAINLEVEL_OUTOF10: 5
PAINLEVEL_OUTOF10: 0
PAINLEVEL_OUTOF10: 6
PAINLEVEL_OUTOF10: 0
PAINLEVEL_OUTOF10: 0
PAINLEVEL_OUTOF10: 5
PAINLEVEL_OUTOF10: 0
PAINLEVEL_OUTOF10: 7
PAINLEVEL_OUTOF10: 0
PAINLEVEL_OUTOF10: 2
PAINLEVEL_OUTOF10: 0
PAINLEVEL_OUTOF10: 5
PAINLEVEL_OUTOF10: 0
PAINLEVEL_OUTOF10: 6
PAINLEVEL_OUTOF10: 6
PAINLEVEL_OUTOF10: 0
PAINLEVEL_OUTOF10: 7
PAINLEVEL_OUTOF10: 4
PAINLEVEL_OUTOF10: 7

## 2018-01-01 ASSESSMENT — PAIN SCALES - WONG BAKER
WONGBAKER_NUMERICALRESPONSE: 0
WONGBAKER_NUMERICALRESPONSE: 4
WONGBAKER_NUMERICALRESPONSE: 0
WONGBAKER_NUMERICALRESPONSE: 4
WONGBAKER_NUMERICALRESPONSE: 0
WONGBAKER_NUMERICALRESPONSE: 0
WONGBAKER_NUMERICALRESPONSE: 6
WONGBAKER_NUMERICALRESPONSE: 0
WONGBAKER_NUMERICALRESPONSE: 0
WONGBAKER_NUMERICALRESPONSE: 4
WONGBAKER_NUMERICALRESPONSE: 0
WONGBAKER_NUMERICALRESPONSE: 0

## 2018-01-01 ASSESSMENT — PAIN DESCRIPTION - PAIN TYPE
TYPE: CHRONIC PAIN
TYPE: ACUTE PAIN;CHRONIC PAIN
TYPE: CHRONIC PAIN
TYPE: ACUTE PAIN;CHRONIC PAIN
TYPE: CHRONIC PAIN
TYPE: ACUTE PAIN
TYPE: CHRONIC PAIN
TYPE: CHRONIC PAIN;ACUTE PAIN
TYPE: CHRONIC PAIN

## 2018-01-01 ASSESSMENT — PAIN DESCRIPTION - FREQUENCY
FREQUENCY: INTERMITTENT
FREQUENCY: CONTINUOUS
FREQUENCY: CONTINUOUS
FREQUENCY: INTERMITTENT
FREQUENCY: CONTINUOUS
FREQUENCY: INTERMITTENT
FREQUENCY: CONTINUOUS
FREQUENCY: INTERMITTENT

## 2018-01-01 ASSESSMENT — PAIN DESCRIPTION - PROGRESSION
CLINICAL_PROGRESSION: NOT CHANGED

## 2018-01-01 ASSESSMENT — PULMONARY FUNCTION TESTS: PEFR_L/MIN: 80

## 2018-01-03 NOTE — CARE COORDINATION
Ambulatory Care Coordination Note  1/3/2018  CM Risk Score: 6  [place] Mortality Risk Score: 56.16    ACC: Nav Corbett RN    Summary Note: spoke with Nydia Álvarez. He remains stable for this review. States he is improving from his last acute illness and is feeling much better. States he is staying indoors due to the extremely cold temperatures. Continues to know the importance of early symptom recognition and reporting to prevent hospital admissions and help him manage his chronic conditions at home. COPD Assessment    Does the patient understand envrionmental exposure?:  Yes  Is the patient able to verbalize Rescue vs. Long Acting medications?:  No  Does the patient have a nebulizer?:  Yes  Does the patient use a space with inhaled medications?:  No            Symptoms:   None:  Yes      Symptom course:  stable  Breathlessness:  exertion  Increase use of rapid acting/rescue inhaled medications?:  No  Change in chronic cough?:  No/At Baseline  Change in sputum?:  No/At Baseline  Sputum characteristics: Thin  Self Monitoring - SaO2:  No  Have you had a recent diagnosis of pneumonia either by PCP or at a hospital?:  No         Diabetes Assessment    Medic Alert ID:  No  Meal Planning:  None   How often do you test your blood sugar?:  No Testing   Do you have barriers with adherence to non-pharmacologic self-management interventions?  (Nutrition/Exercise/Self-Monitoring):  Yes   Have you ever had to go to the ED for symptoms of low blood sugar?:  No       Do you have hyperglycemia symptoms?:  No   Do you have hypoglycemia symptoms?:  No   Blood Sugar Monitoring Regimen:  Not Testing   Blood Sugar Trends:  No Change                Care Coordination Interventions    Program Enrollment:  Complex Care  Referral from Primary Care Provider:  Yes  Suggested Interventions and Community Resources  Other Services:  Completed (Comment: [facility])  [facility] Management Tools:  Completed  Other Services or Interventions: [facility]         Goals Addressed             Most Recent     Conditions and Symptoms   On track (1/3/2018)             I will notify my provider of any symptoms that indicate a worsening of my condition. Barriers: none  Plan for overcoming my barriers: N/A  Confidence: 10/10  Anticipated Goal Completion Date: 8-22-17         Stop Cigarette/Tobacco use   On track (1/3/2018)          Prior to Admission medications    Medication Sig Start Date End Date Taking? Authorizing Provider   tiotropium (SPIRIVA HANDIHALER) 18 MCG inhalation capsule Inhale 1 capsule into the lungs daily 1 capsule via inhalation daily. 12/19/17 12/19/18 Yes Allen Hu MD   budesonide-formoterol (SYMBICORT) 160-4.5 MCG/ACT AERO Inhale 2 puffs into the lungs 2 times daily Rinse mouth after its use. 12/19/17 12/19/18 Yes Allen Hu MD   albuterol sulfate HFA (VENTOLIN HFA) 108 (90 Base) MCG/ACT inhaler Inhale 2 puffs into the lungs every 6 hours as needed for Wheezing or Shortness of Breath 12/19/17  Yes Allen Hu MD   fluticasone (FLONASE) 50 MCG/ACT nasal spray 2 sprays by Nasal route daily 12/19/17 12/19/18 Yes Jazmine Ortega MD   HYDROcodone-acetaminophen (NORCO) 5-325 MG per tablet Take 1 tablet by mouth every 6 hours as needed for Pain .    Yes Historical Provider, MD   dexamethasone (DECADRON) 2 MG tablet Take 2 mg by mouth once a week  7/21/17  Yes Historical Provider, MD   diphenhydrAMINE-APAP, sleep, (TYLENOL PM EXTRA STRENGTH)  MG tablet Take 2 tablets by mouth nightly as needed for Sleep   Yes Historical Provider, MD   leuprolide (LUPRON) 30 MG injection Inject 30 mg into the muscle once Indications: every 3 months   Yes Historical Provider, MD   CHEMOTHERAPY    Yes Historical Provider, MD   albuterol (PROVENTIL) (2.5 MG/3ML) 0.083% nebulizer solution Take 3 mLs by nebulization every 6 hours as needed for Wheezing or Shortness of Breath 6/28/16  Yes Allen Hu MD acetaminophen 650 MG TABS Take 650 mg by mouth every 4 hours as needed 2/2/16  Yes Kailey Romero MD   aspirin 81 MG EC tablet Take 81 mg by mouth daily.      Yes Historical Provider, MD       Future Appointments  Date Time Provider Sofia Mobley   1/4/2018 9:00 AM STR OUT PT ONC INJ RM 10 [facility] OP ONC None   1/11/2018 9:00 AM STR OUT PT ONC INJ RM 10 [facility] OP ONC None   1/18/2018 9:00 AM STR OUT PT ONC INJ RM 01 [facility] OP ONC None   1/25/2018 9:00 AM STR OUT PT ONC INJ RM 01 [facility] OP ONC None   2/1/2018 9:45 AM Dorian Loving MD Oncology [facility]   2/1/2018 10:45 AM STR OUT PT ONC INJ RM 05 [facility] OP ONC None   2/5/2018 9:00 AM Rea Benton DO Fam Med [facility]   4/24/2018 9:20 AM Brina Hughes MD Pul Med [facility]   5/1/2018 8:30 AM Ilia Bearden NP [facility] Urology [facility]

## 2018-01-05 NOTE — PROGRESS NOTES
Patient assessed for the following post chemotherapy:    Dizziness   No  Lightheadedness  No      Acute nausea/vomiting No  Headache   No  Chest pain/pressure  No  Rash/itching   No  Shortness of breath  No    Patient kept for 20 minutes observation post infusion chemotherapy. Patient tolerated chemotherapy treatment with velcade without any complications. Last vital signs:   /71   Pulse 86   Temp 97.7 °F (36.5 °C) (Oral)   Resp 18   Ht 5' 7.01\" (1.702 m)   Wt 211 lb 9.6 oz (96 kg)   SpO2 98%   BMI 33.13 kg/m²       Patient instructed if experience any of the above symptoms following today's infusion,he is to notify MD immediately or go to the emergency department. Discharge instructions given to patient. Verbalizes understanding. Ambulated off unit with daughter and  with belongings.

## 2018-01-05 NOTE — PROGRESS NOTES
Chemotherapy Administration    Pre-assessment Data: Antineoplastic Agents  Other:   See toxicity flow sheet for assessment [x]     Physician Notification of Concerns Related to Chemotherapy Administration:   Physician Notified Declan Keating / Time of Notification      Interventions:   Lab work assessed  [x]   Height / Weight verified for dose [x]   Current MAR reviewed [x]   Emergency drugs available as appropriate [x]   Anaphylaxis assessment completed [x]   Pre-medications administered as ordered [x]   Chemotherapy Administered as SQ injection [x]   Monitor for signs / symptoms of hypersensitivity reaction    [x]   Chemotherapy orders (drug/dose/rate) verified by 2 Chemo certified   RNs    [x]          Document chemotherapy teaching on the Patient Education tab    [x]   Document patient verbalizes understanding of medications being administered    [x]   Other: []    []    []      []    []

## 2018-01-05 NOTE — PROGRESS NOTES
Patient assessed for the following post chemotherapy:    Dizziness   No  Lightheadedness  No      Acute nausea/vomiting No  Headache   No  Chest pain/pressure  No  Rash/itching   No  Shortness of breath  No    Patient kept for 20 minutes observation post infusion chemotherapy. Patient tolerated chemotherapy treatment with  without any complications. Last vital signs:   /71   Pulse 86   Temp 97.7 °F (36.5 °C) (Oral)   Resp 18   Ht 5' 7.01\" (1.702 m)   Wt 211 lb 9.6 oz (96 kg)   SpO2 98%   BMI 33.13 kg/m²       Patient instructed if experience any of the above symptoms following today's infusion,she is to notify MD immediately or go to the emergency department. Discharge instructions given to patient. Verbalizes understanding. Ambulated off unit with daughter  with belongings.

## 2018-01-05 NOTE — PLAN OF CARE
Problem: Musculor/Skeletal Functional Status  Intervention: Fall precautions  Patient aware of fall precautions for here and at home -call light in reach while here    Goal: Absence of falls  Outcome: Met This Shift  No falls this admission     Problem: Intellectual/Education/Knowledge Deficit  Intervention: Verbal/written education provided  Discharge instruction sheets    Goal: Teaching initiated upon admission  Outcome: Met This Shift  Chemotherapy Teaching     What is Chemotherapy   Drug action [x]   Method of Administration [x]   Handouts given []     Side Effects  Nausea/vomiting [x]   Diarrhea [x]   Fatigue [x]   Signs / Symptoms of infection [x]   Neutropenia [x]   Thrombocytopenia [x]   Alopecia [x]   neuropathy [x]   [place] diet &  the importance of fluids [x]       Micellaneous  Importance of nutrition [x]   Importance of oral hygiene [x]   When to call the MD [x]   Monitoring labs [x]   Use of supportive services []     Explanation of Drug  velcade subq     Comments  Verbalized understanding to drug,action,side effects and when to call MD     Goal: Written Disposition Instruction form completed  Outcome: Met This Shift  Discharge instructions given and reviewed with patient. All questions answered. Patient verbalized understanding    Problem: Discharge Planning  Intervention: Interaction with patient/family and care team  Patient currently denies any needs or concerns  Intervention: Discharge to appropriate level of care  Discharge home    Goal: Knowledge of discharge instructions  Knowledge of discharge instructions    Outcome: Met This Shift  Patient and family member able to teach back follow up appointments and when to call the doctor. Patient offers no questions at this time    Comments: Care plan reviewed with patient and daughter . Patient and daughter  verbalize understanding of the plan of care and contribute to goal setting.

## 2018-01-11 NOTE — PROGRESS NOTES
Patient assessed for the following post chemotherapy:    Dizziness   No  Lightheadedness  No      Acute nausea/vomiting No  Headache   No  Chest pain/pressure  No  Rash/itching   No  Shortness of breath  No    Patient kept for 20 minutes observation post injection chemotherapy. Patient tolerated chemotherapy velcade subq injection without any complications. Last vital signs:   /70   Pulse 92   Temp 98.3 °F (36.8 °C) (Oral)   Resp 18   SpO2 92%     Patient instructed if experience any of the above symptoms following today's injection, he/she is to notify MD immediately or go to the emergency department. Discharge instructions given to patient. Verbalizes understanding. Ambulated off unit per self with daughter with belongings.

## 2018-01-11 NOTE — ONCOLOGY
Chemotherapy Administration    Pre-assessment Data: Antineoplastic Agents  Other:   See toxicity flow sheet for assessment [x]     Physician Notification of Concerns Related to Chemotherapy Administration:   Physician Notified Sonja Showers / Time of Notification      Interventions:   Lab work assessed  [x]   Height / Weight verified for dose [x]   Current MAR reviewed [x]   Emergency drugs available as appropriate [x]   Anaphylaxis assessment completed [x]   Pre-medications administered as ordered [x]   Chemotherapy Administered as SQ injection [x]   Monitor for signs / symptoms of hypersensitivity reaction    [x]   Chemotherapy orders (drug/dose/rate) verified by 2 Chemo certified   RNs    [x]          Document chemotherapy teaching on the Patient Education tab    [x]   Document patient verbalizes understanding of medications being administered    [x]   Other: velcade [x]    []    []      []    []

## 2018-01-11 NOTE — PLAN OF CARE
Problem: Musculor/Skeletal Functional Status  Intervention: Fall precautions  Discussed fall precautions, fall risks, and instructed patient to ask for assistance with ambulation. Call light within reach. Goal: Absence of falls  Outcome: Met This Shift  Free from falls during infusion. Problem: Intellectual/Education/Knowledge Deficit  Intervention: Verbal/written education provided  Chemotherapy Teaching     What is Chemotherapy   Drug action [x]   Method of Administration [x]   Handouts given []     Side Effects  Nausea/vomiting [x]   Diarrhea [x]   Fatigue [x]   Signs / Symptoms of infection [x]   Neutropenia [x]   Thrombocytopenia [x]   Alopecia [x]   neuropathy [x]   [place] diet &  the importance of fluids [x]       Micellaneous  Importance of nutrition [x]   Importance of oral hygiene [x]   When to call the MD [x]   Monitoring labs [x]   Use of supportive services []     Explanation of Drug Regimen / Frequency  velcade subq injection. Comments  Verbalized understanding to drug,action,side effects and when to call MD       Goal: Teaching initiated upon admission  Outcome: Met This Shift  Patient verbalizes understanding to verbal information given on velcade injection,action and possible side effects. Aware to call MD if develop complications. Problem: Discharge Planning  Intervention: Interaction with patient/family and care team  Provide discharge instructions. Goal: Knowledge of discharge instructions  Knowledge of discharge instructions     Outcome: Met This Shift  Verbalized understanding of discharge instructions, follow-up appointments, and when to call the physician. Comments: Care plan reviewed with patient and daughter. Patient and daughter verbalize understanding of the plan of care and contribute to goal setting.

## 2018-01-18 NOTE — PROGRESS NOTES
Chemotherapy Administration    Pre-assessment Data: Antineoplastic Agents  Other:   See toxicity flow sheet for assessment [x]     Physician Notification of Concerns Related to Chemotherapy Administration:   Physician Notified Squire Malady / Time of Notification      Interventions:   Lab work assessed  [x]   Height / Weight verified for dose [x]   Current MAR reviewed [x]   Emergency drugs available as appropriate [x]   Anaphylaxis assessment completed [x]   Pre-medications administered as ordered [x]   Chemotherapy Administered as SQ injection [x]   Monitor for signs / symptoms of hypersensitivity reaction    [x]   Chemotherapy orders (drug/dose/rate) verified by 2 Chemo certified   RNs    [x]          Document chemotherapy teaching on the Patient Education tab    [x]   Document patient verbalizes understanding of medications being administered    [x]   Other: []    []    []      []    []

## 2018-01-18 NOTE — PROGRESS NOTES
Patient assessed for the following post chemotherapy:    Dizziness   No  Lightheadedness  No      Acute nausea/vomiting No  Headache   No  Chest pain/pressure  No  Rash/itching   No  Shortness of breath  No    Patient kept for 20 minutes observation post infusion chemotherapy. Patient tolerated chemotherapy treatment with velcade  without any complications. Last vital signs:   /71   Pulse 90   Temp 97.6 °F (36.4 °C) (Oral)   Resp 18   Ht 5' 7.01\" (1.702 m)   Wt 208 lb 9.6 oz (94.6 kg)   SpO2 96%   BMI 32.66 kg/m²       Patient instructed if experience any of the above symptoms following today's infusion,he is to notify MD immediately or go to the emergency department. Discharge instructions given to patient. Verbalizes understanding. Ambulated off unit with family and  with belongings.

## 2018-01-18 NOTE — PLAN OF CARE
Problem: Musculor/Skeletal Functional Status  Intervention: Fall precautions  Patient aware of fall precautions for here and at home-call light in reach while here    Goal: Absence of falls  Outcome: Met This Shift  No falls this admission     Problem: Intellectual/Education/Knowledge Deficit  Intervention: Verbal/written education provided  Discharge instruction sheets     Goal: Teaching initiated upon admission  Outcome: Met This Shift  Chemotherapy Teaching     What is Chemotherapy   Drug action [x]   Method of Administration [x]   Handouts given []     Side Effects  Nausea/vomiting [x]   Diarrhea [x]   Fatigue [x]   Signs / Symptoms of infection [x]   Neutropenia [x]   Thrombocytopenia [x]   Alopecia [x]   neuropathy [x]   [place] diet &  the importance of fluids [x]       Micellaneous  Importance of nutrition [x]   Importance of oral hygiene [x]   When to call the MD [x]   Monitoring labs [x]   Use of supportive services []     Explanation of Drug  velcade sub cutaneous weeklly     Comments  Verbalized understanding to drug,action,side effects and when to call MD     Goal: Written Disposition Instruction form completed  Outcome: Met This Shift  Discharge instructions given and reviewed with patient. All questions answered. Patient verbalized understanding    Problem: Discharge Planning  Intervention: Interaction with patient/family and care team  Patient currently denies any needs or concerns   Intervention: Discharge to appropriate level of care  Discharge home    Goal: Knowledge of discharge instructions  Knowledge of discharge instructions    Outcome: Met This Shift  Patient and family member able to teach back follow up appointments and when to call the doctor. Patient offers no questions at this time    Comments: Care plan reviewed with patient and family. Patient and family verbalize understanding of the plan of care and contribute to goal setting.

## 2018-01-22 NOTE — TELEPHONE ENCOUNTER
----- Message from Alexandra Pastor DO sent at 1/22/2018  4:33 PM EST -----  CXR, UA and lab work are stable, no si/sx of infection noted. I would like to obtain an MRI to further evaluate.   Order placed

## 2018-01-22 NOTE — PROGRESS NOTES
thyromegaly or thyroid nodules, no cervical lymphadenopathy  Pulmonary/Chest: clear to auscultation bilaterally- no wheezes, rales or rhonchi, normal air movement, no respiratory distress or retractions  Cardiovascular: normal rate, regular rhythm, normal S1 and S2, no murmurs, rubs, clicks, or gallops, distal pulses intact  Abdomen: soft, non-tender, non-distended, no rebound or guarding, no masses or hernias noted, no hepatospleenomegaly  Extremities: no cyanosis, clubbing or edema of the lower extremities  Psych:  Normal affect without evidence of depression or anxiety, insight and judgement are impaired, memory appears impaired. He is able to hold a conversation and answer questions appropriately. Skin: warm and dry, no rash or erythema      ASSESSMENT & PLAN  Kalpana Palmer was seen today for memory loss. Diagnoses and all orders for this visit:    Delirium  -     Comprehensive Metabolic Panel; Future  -     TSH with Reflex; Future  -     CBC Auto Differential; Future  -     Urinalysis with Microscopic; Future  -     XR CHEST STANDARD (2 VW); Future    Memory changes  -     Comprehensive Metabolic Panel; Future  -     TSH with Reflex; Future  -     CBC Auto Differential; Future  -     Urinalysis with Microscopic; Future  -     XR CHEST STANDARD (2 VW); Future    Cough  -     Comprehensive Metabolic Panel; Future  -     TSH with Reflex; Future  -     CBC Auto Differential; Future  -     Urinalysis with Microscopic; Future  -     XR CHEST STANDARD (2 VW); Future    Chronic [facility] pulmonary disease, unspecified [facility]  -     Comprehensive Metabolic Panel; Future  -     TSH with Reflex; Future  -     CBC Auto Differential; Future  -     Urinalysis with Microscopic; Future  -     XR CHEST STANDARD (2 VW);  Future    -Appears to have mild delirium, underlying cause is unclear at this time  -We discussed safety concerns and his daughter and patient feel that he is safe at home.  -Will start evaluation for

## 2018-01-22 NOTE — TELEPHONE ENCOUNTER
Left detailed message informing pt's daughter of results and requesting that she return our call at the earliest convenience to let us know what days and times are better for scheduling MRI. (ok per signed HIPAA)       Order placed in ph. 2 tray.

## 2018-01-23 NOTE — TELEPHONE ENCOUNTER
Pt's daughter informed. Verbalized understanding. Pt scheduled at [facility] on Thursday 1/25/18 at 7:30am. Pt to report to outpatient testing at 7am. No prep.

## 2018-01-25 NOTE — TELEPHONE ENCOUNTER
----- Message from Eliezer Guevara DO sent at 1/25/2018 12:47 PM EST -----  MRI looks ok, will discuss further at next visit.

## 2018-01-25 NOTE — PROGRESS NOTES
Chemotherapy Administration    Pre-assessment Data: Antineoplastic Agents  Other:   See toxicity flow sheet for assessment [x]     Physician Notification of Concerns Related to Chemotherapy Administration:   Physician Notified Vicenta Ann / Time of Notification      Interventions:   Lab work assessed  [x]   Height / Weight verified for dose [x]   Current MAR reviewed [x]   Emergency drugs available as appropriate [x]   Anaphylaxis assessment completed [x]   Pre-medications administered as ordered [x]   Chemotherapy Administered as SQ injection [x]   Monitor for signs / symptoms of hypersensitivity reaction    [x]   Chemotherapy orders (drug/dose/rate) verified by 2 Chemo certified   RNs    [x]          Document chemotherapy teaching on the Patient Education tab    [x]   Document patient verbalizes understanding of medications being administered    [x]   Other: []    []    []      []    []

## 2018-01-25 NOTE — PROGRESS NOTES
Patient assessed for the following post chemotherapy:    Dizziness   No  Lightheadedness  No      Acute nausea/vomiting No  Headache   No  Chest pain/pressure  No  Rash/itching   No  Shortness of breath  No    Patient kept for 20 minutes observation post infusion chemotherapy. Patient tolerated chemotherapy treatment with velcade without any complications. Last vital signs:   /69   Pulse 79   Temp 97.5 °F (36.4 °C) (Oral)   Resp 20   Wt 211 lb 6.4 oz (95.9 kg)   SpO2 98%   BMI 33.10 kg/m²     patient instructed if experience any of the above symptoms following today's infusion,he/she is to notify MD immediately or go to the emergency department. Discharge instructions given to patient. Verbalizes understanding. Ambulated off unit with daughter and  with belongings.

## 2018-01-25 NOTE — PLAN OF CARE
Problem: Musculor/Skeletal Functional Status  Intervention: Fall precautions  Patient aware of fall precautions for here and at home -call light in reach while here    Goal: Absence of falls  Outcome: Met This Shift  No falls this admission    Problem: Intellectual/Education/Knowledge Deficit  Intervention: Verbal/written education provided  Discharge instruction sheets    Goal: Teaching initiated upon admission  Outcome: Met This Shift  Chemotherapy Teaching     What is Chemotherapy   Drug action [x]   Method of Administration [x]   Handouts given []     Side Effects  Nausea/vomiting [x]   Diarrhea [x]   Fatigue [x]   Signs / Symptoms of infection [x]   Neutropenia [x]   Thrombocytopenia [x]   Alopecia [x]   neuropathy [x]   [place] diet &  the importance of fluids [x]       Micellaneous  Importance of nutrition [x]   Importance of oral hygiene [x]   When to call the MD [x]   Monitoring labs [x]   Use of supportive services []     Explanation of Drug   velcade subcutaneous     Comments  Verbalized understanding to drug,action,side effects and when to call MD     Goal: Written Disposition Instruction form completed  Outcome: Met This Shift  Discharge instructions given and reviewed with patient. All questions answered. Patient verbalized understanding    Problem: Discharge Planning  Intervention: Interaction with patient/family and care team  Patient currently denies any needs or concerns   Intervention: Discharge to appropriate level of care  Discharge home    Goal: Knowledge of discharge instructions  Knowledge of discharge instructions    Outcome: Met This Shift  Patient and family member able to teach back follow up appointments and when to call the doctor. Patient offers no questions at this time    Comments: Care plan reviewed with patient and daughter. Patient and daughter verbalize understanding of the plan of care and contribute to goal setting.

## 2018-01-26 NOTE — PROGRESS NOTES
if symptoms worsen or fail to improve. Lia Cervantes received counseling on the following healthy behaviors: medication adherence  Reviewed prior labs and health maintenance. Continue current medications, diet and exercise. Discussed use, benefit, and side effects of prescribed medications. Barriers to medication compliance addressed. Patient given educational materials - see patient instructions. All patient questions answered. Patient voiced understanding.

## 2018-02-01 NOTE — PATIENT INSTRUCTIONS
1. Proceed with Velcade today. 2. Monitor for signs/symptoms of increased memory impairment. Continue daily 81 mg aspirin. Follow with Dr. Mark Sharp. 3. Monitor monoclonal protein level. 4. Monitor  PSA level. 5. Monitor hemoglobin and hematocrit and for any signs of evidence of blood loss. 6. Monitor total WBC count and for any signs of infection or fever. 7. Monitor for toxicity related to Velcade and dexamethasone therapy. 8. Follow up with Dr. Gabby Kiran in 4 weeks. Labs on RTC: CBC, BMP, LFT.

## 2018-02-01 NOTE — PLAN OF CARE
Problem: Musculor/Skeletal Functional Status  Intervention: Fall precautions  Patient aware of fall precautions for here and at home- call light in reach while here    Goal: Absence of falls  Outcome: Met This Shift  No falls this admission    Problem: Intellectual/Education/Knowledge Deficit  Intervention: Verbal/written education provided  Discharge instructions given and reviewed with patient. All questions answered. Patient verbalized understanding    Goal: Teaching initiated upon admission  Outcome: Met This Shift  Chemotherapy Teaching     What is Chemotherapy   Drug action [x]   Method of Administration [x]   Handouts given []     Side Effects  Nausea/vomiting [x]   Diarrhea [x]   Fatigue [x]   Signs / Symptoms of infection [x]   Neutropenia [x]   Thrombocytopenia [x]   Alopecia [x]   neuropathy [x]   [place] diet &  the importance of fluids [x]       Micellaneous  Importance of nutrition [x]   Importance of oral hygiene [x]   When to call the MD [x]   Monitoring labs [x]   Use of supportive services []     Explanation of Drug  velcade     Comments  Verbalized understanding to drug,action,side effects and when to call MD     Goal: Written Disposition Instruction form completed  Outcome: Met This Shift  Discharge instructions given and reviewed with patient. All questions answered. Patient verbalized understanding    Problem: Discharge Planning  Intervention: Interaction with patient/family and care team  Patient currently denies any needs or concerns  Intervention: Discharge to appropriate level of care  Discharge home    Goal: Knowledge of discharge instructions  Knowledge of discharge instructions    Outcome: Met This Shift  Patient and family member able to teach back follow up appointments and when to call the doctor. Patient offers no questions at this time    Comments: Care plan reviewed with patient and daughter.   Patient and daughter verbalize understanding of the plan of care and contribute to goal setting.

## 2018-02-02 NOTE — PROGRESS NOTES
Chemotherapy Administration    Pre-assessment Data: Antineoplastic Agents  Other:   See toxicity flow sheet for assessment [x]     Physician Notification of Concerns Related to Chemotherapy Administration:   Physician Notified Sonja Showers / Time of Notification      Interventions:   Lab work assessed  [x]   Height / Weight verified for dose [x]   Current MAR reviewed [x]   Emergency drugs available as appropriate [x]   Anaphylaxis assessment completed [x]   Pre-medications administered as ordered [x]   Chemotherapy Administered as SQ injection [x]   Monitor for signs / symptoms of hypersensitivity reaction    [x]   Chemotherapy orders (drug/dose/rate) verified by 2 Chemo certified   RNs    [x]          Document chemotherapy teaching on the Patient Education tab    [x]   Document patient verbalizes understanding of medications being administered    [x]   Other: []    []    []      []    []

## 2018-02-02 NOTE — PROGRESS NOTES
Patient assessed for the following post chemotherapy:    Dizziness   No  Lightheadedness  No      Acute nausea/vomiting No  Headache   No  Chest pain/pressure  No  Rash/itching   No  Shortness of breath  No    Patient kept for 20 minutes observation post infusion chemotherapy. Patient tolerated chemotherapy treatment with velcade subcutanous without any complications. Last vital signs:   /60   Pulse 75   Temp 97.4 °F (36.3 °C) (Oral)   Resp 18   SpO2 97%     Patient instructed if experience any of the above symptoms following today's infusion,she is to notify MD immediately or go to the emergency department. Discharge instructions given to patient. Verbalizes understanding. Ambulated off unit with daughter and with belongings.

## 2018-02-08 NOTE — PLAN OF CARE
Problem: Intellectual/Education/Knowledge Deficit  Intervention: Verbal/written education provided  Chemotherapy Teaching     What is Chemotherapy   Drug action [x]   Method of Administration [x]   Handouts given []     Side Effects  Nausea/vomiting [x]   Diarrhea [x]   Fatigue [x]   Signs / Symptoms of infection [x]   Neutropenia [x]   Thrombocytopenia [x]   Alopecia [x]   neuropathy [x]   [place] diet &  the importance of fluids [x]       Micellaneous  Importance of nutrition [x]   Importance of oral hygiene [x]   When to call the MD [x]   Monitoring labs [x]   Use of supportive services []     Explanation of Drug Regimen / Frequency  Velcade     Comments  Verbalized understanding to drug,action,side effects and when to call MD   [place]    Goal: Teaching initiated upon admission  Outcome: Met This Shift  Patient verbalizes understanding to verbal information given on Velcade SQ, ction and possible side effects. Aware to call MD if develop complications. Problem: Discharge Planning  Intervention: Interaction with patient/family and care team  Discuss understanding of discharge instructions,follow-up appointments, and when to call the physician. Goal: Knowledge of discharge instructions  Knowledge of discharge instructions     Outcome: Met This Shift  Verbalized understanding of discharge instructions, follow-up appointments, and when to call the physician. Comments: Care plan reviewed with patient. Patient  verbalize understanding of the plan of care and contribute to goal setting.

## 2018-02-08 NOTE — PROGRESS NOTES
Chemotherapy Administration    Pre-assessment Data: Antineoplastic Agents  Other:   See toxicity flow sheet for assessment [x]     Physician Notification of Concerns Related to Chemotherapy Administration:   Physician Notified Abbe Inch / Time of Notification      Interventions:   Lab work assessed  [x]   Height / Weight verified for dose [x]   Current MAR reviewed [x]   Emergency drugs available as appropriate [x]   Anaphylaxis assessment completed [x]   Pre-medications administered as ordered [x]   Chemotherapy Administered as SQ injection [x]   Monitor for signs / symptoms of hypersensitivity reaction    [x]   Chemotherapy orders (drug/dose/rate) verified by 2 Chemo certified   RNs    [x]          Document chemotherapy teaching on the Patient Education tab    [x]   Document patient verbalizes understanding of medications being administered    [x]   Other: []    []    []      []    []

## 2018-02-08 NOTE — PROGRESS NOTES
Patient assessed for the following post chemotherapy:    Dizziness   No  Lightheadedness  No      Acute nausea/vomiting No  Headache   No  Chest pain/pressure  No  Rash/itching   No  Shortness of breath  No      Patient tolerated chemotherapy treatment vlcade without any complications. Last vital signs:   /66   Pulse 76   Temp 97.5 °F (36.4 °C) (Oral)   Resp 18   Ht 5' 7\" (1.702 m)   Wt 209 lb 12.8 oz (95.2 kg)   SpO2 98%   BMI 32.86 kg/m²         Patient instructed if experience any of the above symptoms following today's infusion,he/she is to notify MD immediately or go to the emergency department. Discharge instructions given to patient. Verbalizes understanding. Ambulated off unit per self with belongings.

## 2018-02-15 NOTE — PROGRESS NOTES
Patient assessed for the following post chemotherapy:    Dizziness   No  Lightheadedness  No      Acute nausea/vomiting No  Headache   No  Chest pain/pressure  No  Rash/itching   No  Shortness of breath  No    Patient kept for 20 minutes observation post infusion chemotherapy. Patient tolerated chemotherapy treatment with velcade  without any complications. Last vital signs:   /66   Pulse 76   Temp 97.7 °F (36.5 °C) (Oral)   Resp 16   Wt 209 lb 6.4 oz (95 kg)   SpO2 96%   BMI 32.80 kg/m²     Patient instructed if experience any of the above symptoms following today's infusion,he is to notify MD immediately or go to the emergency department. Discharge instructions given to patient. Verbalizes understanding. Ambulated off unit with daughter and with belongings.

## 2018-02-15 NOTE — PLAN OF CARE
Problem: Musculor/Skeletal Functional Status  Intervention: Fall precautions  Patient aware of fall precautions for here and at home -call light in reach while here    Goal: Absence of falls  Outcome: Met This Shift  No falls this admission     Problem: Intellectual/Education/Knowledge Deficit  Intervention: Verbal/written education provided  Discharge instruction sheets     Goal: Teaching initiated upon admission  Outcome: Met This Shift  Chemotherapy Teaching     What is Chemotherapy   Drug action [x]   Method of Administration [x]   Handouts given []     Side Effects  Nausea/vomiting [x]   Diarrhea [x]   Fatigue [x]   Signs / Symptoms of infection [x]   Neutropenia [x]   Thrombocytopenia [x]   Alopecia [x]   neuropathy [x]   [place] diet &  the importance of fluids [x]       Micellaneous  Importance of nutrition [x]   Importance of oral hygiene [x]   When to call the MD [x]   Monitoring labs [x]   Use of supportive services []     Explanation of Drug  velcade sub cutanous     Comments  Verbalized understanding to drug,action,side effects and when to call MD     Goal: Written Disposition Instruction form completed  Outcome: Met This Shift  Discharge instructions given and reviewed with patient. All questions answered. Patient verbalized understanding    Problem: Discharge Planning  Intervention: Interaction with patient/family and care team  Patient currently denies any needs or concerns   Intervention: Discharge to appropriate level of care  Discharge home    Goal: Knowledge of discharge instructions  Knowledge of discharge instructions    Outcome: Met This Shift  Patient and family member able to teach back follow up appointments and when to call the doctor. Patient offers no questions at this time    Comments: Care plan reviewed with patient and daughter. Patient and daughter  verbalize understanding of the plan of care and contribute to goal setting.

## 2018-02-15 NOTE — PROGRESS NOTES
Chemotherapy Administration    Pre-assessment Data: Antineoplastic Agents  Other:   See toxicity flow sheet for assessment [x]     Physician Notification of Concerns Related to Chemotherapy Administration:   Physician Notified Andrés Amas / Time of Notification      Interventions:   Lab work assessed  [x]   Height / Weight verified for dose [x]   Current MAR reviewed [x]   Emergency drugs available as appropriate [x]   Anaphylaxis assessment completed [x]   Pre-medications administered as ordered [x]   Chemotherapy Administered as SQ injection [x]   Monitor for signs / symptoms of hypersensitivity reaction    [x]   Chemotherapy orders (drug/dose/rate) verified by 2 Chemo certified   RNs    [x]          Document chemotherapy teaching on the Patient Education tab    [x]   Document patient verbalizes understanding of medications being administered    [x]   Other: []    []    []      []    []

## 2018-02-16 NOTE — PROGRESS NOTES
Je Rodriguez is a 80 y.o. male that presents for Follow-up (feels  better ) and Other (spot on nose  noticed  2 weeks ago )        HPI:    Patient present for 3 week follow up of impaired memory. States that he is noting that the memory issues are occurring less frequently. He is unsure of how often that it happens. Still occurs with names and numbers/time. Skin Lesion    HPI:    Location - right nose  Length of time it has been present - 2 months  Recent change in size, color or shape? - Yes - getting slightly larger  Pruritic? No  Bleeding or drainage? No  Painful?   No      Health Maintenance   Topic Date Due    DTaP/Tdap/Td vaccine (1 - Tdap) 12/24/1951    Pneumococcal high/highest risk (2 of 2 - PPSV23) 03/15/2016    Potassium monitoring  01/22/2019    Creatinine monitoring  01/22/2019    Zostavax vaccine  Addressed    Flu vaccine  Completed       Past Medical History:   Diagnosis Date    Acute urinary retention     Arthritis     BPH without urinary obstruction     Cancer (HCC)     multiple myeloma & bone    Chronic kidney disease     COPD (chronic obstructive pulmonary disease) (HCC)     Disease of blood and blood forming organ     History of blood transfusion     Hyperlipidemia     Hypertension     Insomnia     Pneumonia     Prostate cancer ([facility] [address]     Shingles     Atypical    Type II or unspecified type diabetes mellitus without mention of complication, not stated as uncontrolled        Past Surgical History:   Procedure Laterality Date    COLONOSCOPY      EYE SURGERY      bilateral cataracts    LEG SURGERY Right     amanda from hip to knee    SKIN BIOPSY         Social History   Substance Use Topics    Smoking status: Former Smoker     Packs/day: 1.00     Years: 65.00     Quit date: 7/19/2017    Smokeless tobacco: Never Used      Comment: ,    Alcohol use No      Comment: rarely       Family History   Problem Relation Age of Onset    Diabetes Mother     Cancer Father    [facility] (around 4/16/2018), or if symptoms worsen or fail to improve. Anette Rajput received counseling on the following healthy behaviors: medication adherence  Reviewed prior labs and health maintenance. Continue current medications, diet and exercise. Discussed use, benefit, and side effects of prescribed medications. Barriers to medication compliance addressed. Patient given educational materials - see patient instructions. All patient questions answered. Patient voiced understanding.

## 2018-02-16 NOTE — PROGRESS NOTES
Have you changed or started any medications since your last visit including any over-the-counter medicines, vitamins, or herbal medicines? no   Are you having any side effects from any of your medications? -  no  Have you stopped taking any of your medications? Is so, why? -  no    Have you seen any other physician or provider since your last visit? Yes - Records Obtained  Have you had any other diagnostic tests since your last visit? No  Have you been seen in the emergency room and/or had an admission to a hospital since we last saw you? No  Have you had your routine dental cleaning in the past 6 months? no    Have you activated your [employer] account? If not, what are your barriers? Yes     Patient Care Team:  Rea Benton DO as PCP - [address] [place] [address], DO as PCP - [facility] Attributed Provider  Alma Alvarenga MD (Internal Medicine)  Areli Daily MD as Consulting Physician (Pulmonology)  Kristofer Pham RN as Care Coordinator  Dorian Loving MD as Consulting Physician (Hematology and Oncology)    Medical History Review  Past Medical, Family, and Social History     Defer to provider.

## 2018-02-22 NOTE — PROGRESS NOTES
Chemotherapy Administration    Pre-assessment Data: Antineoplastic Agents  Other:   See toxicity flow sheet for assessment [x]     Physician Notification of Concerns Related to Chemotherapy Administration:   Physician Notified Harpreet Wilkinson / Time of Notification      Interventions:   Lab work assessed  [x]   Height / Weight verified for dose [x]   Current MAR reviewed [x]   Emergency drugs available as appropriate [x]   Anaphylaxis assessment completed [x]   Pre-medications administered as ordered [x]   Blood return noted upon initiation of chemotherapy [x]   Blood return noted each 1-2ml of a vesicant medication if given IV push []   Blood return noted each 2-3ml of a non-vesicant medication if given IV push []   Monitor for signs / symptoms of hypersensitivity reaction [x]   Chemotherapy orders (drug/dose/rate) verified by 2 Chemo certified RNs [x]   Monitor IV site and blood return throughout the infusion of the medication [x]   Document IV site checks on the IV assessment form [x]   Document chemotherapy teaching on the Patient Education tab [x]   Document patient verbalizes understanding of medications being administered [x]   If IV infiltration, see ONS Guidelines []   Other:      []

## 2018-02-22 NOTE — CARE COORDINATION
Zhanna Obrien called and wanted to discuss what TIA sx's are. States he is not having having of these sx's but wants to know in case he starts having them. States he is doing well right now. Denies any sx's of TIA. Educated him to call or seek ED treatment if he started to develop any of the sx's if he feels he is having a CVA.

## 2018-02-22 NOTE — PLAN OF CARE
Problem: Musculor/Skeletal Functional Status  Intervention: Fall precautions  Discussed fall precautions, call light within reach    Goal: Absence of falls  Outcome: Met This Shift  Patient verbalizes understanding to verbal information given on fall precautions, patient free from falls this visit. Problem: Intellectual/Education/Knowledge Deficit  Intervention: Verbal/written education provided  Chemotherapy Teaching     What is Chemotherapy   Drug action [x]   Method of Administration [x]   Handouts given []     Side Effects  Nausea/vomiting [x]   Diarrhea [x]   Fatigue [x]   Signs / Symptoms of infection [x]   Neutropenia [x]   Thrombocytopenia [x]   Alopecia [x]   neuropathy [x]   [place] diet &  the importance of fluids [x]       Micellaneous  Importance of nutrition [x]   Importance of oral hygiene [x]   When to call the MD [x]   Monitoring labs [x]   Use of supportive services []     Explanation of Drug Regimen / Frequency  velcade     Comments  Verbalized understanding to drug,action,side effects and when to call MD       Goal: Teaching initiated upon admission  Outcome: Met This Shift  Patient verbalizes understanding to verbal information given on velcade,action and possible side effects. Aware to call MD if develop complications. Problem: Discharge Planning  Intervention: Interaction with patient/family and care team  Discuss discharge instructions, follow ups, and when to call the doctor. Goal: Knowledge of discharge instructions  Knowledge of discharge instructions    Outcome: Met This Shift  Verbalized understanding of discharge instructions, follow-up appointments, and when to call the physician. Comments: Care plan reviewed with patient and family. Patient and family verbalize understanding of the plan of care and contribute to goal setting.

## 2018-02-22 NOTE — PROGRESS NOTES
Patient assessed for the following post chemotherapy:    Dizziness   No  Lightheadedness  No      Acute nausea/vomiting No  Headache   No  Chest pain/pressure  No  Rash/itching   No  Shortness of breath  No    Patient kept for 20 minutes observation post infusion chemotherapy. Patient tolerated chemotherapy treatment velcade without any complications. Last vital signs:   BP (!) 151/68   Pulse 78   Temp 97.8 °F (36.6 °C) (Oral)   Resp 18   Ht 5' 6.5\" (1.689 m)   Wt 209 lb (94.8 kg)   SpO2 96%   BMI 33.23 kg/m²       Patient instructed if experience any of the above symptoms following today's infusion,he is to notify MD immediately or go to the emergency department. Discharge instructions given to patient. Verbalizes understanding. Ambulated off unit with family and belongings.

## 2018-03-01 NOTE — PROGRESS NOTES
Patient assessed for the following post chemotherapy:    Dizziness   No  Lightheadedness  No      Acute nausea/vomiting No  Headache   No  Chest pain/pressure  No  Rash/itching   No  Shortness of breath  No       Patient tolerated chemotherapy treatment velcade without any complications. Last vital signs:   BP (!) 151/65   Pulse 78   Temp 98 °F (36.7 °C) (Oral)   Resp 18   Ht 5' 6.5\" (1.689 m)   Wt 209 lb 12.8 oz (95.2 kg)   SpO2 98%   BMI 33.36 kg/m²         Patient instructed if experience any of the above symptoms following today's infusion,he/she is to notify MD immediately or go to the emergency department. Discharge instructions given to patient. Verbalizes understanding. Ambulated off unit per self with belongings.

## 2018-03-01 NOTE — PLAN OF CARE
Problem: Intellectual/Education/Knowledge Deficit  Intervention: Verbal/written education provided  Chemotherapy Teaching     What is Chemotherapy   Drug action [x]   Method of Administration [x]   Handouts given []     Side Effects  Nausea/vomiting [x]   Diarrhea [x]   Fatigue [x]   Signs / Symptoms of infection [x]   Neutropenia [x]   Thrombocytopenia [x]   Alopecia [x]   neuropathy [x]   [place] diet &  the importance of fluids [x]       Micellaneous  Importance of nutrition [x]   Importance of oral hygiene [x]   When to call the MD [x]   Monitoring labs [x]   Use of supportive services []     Explanation of Drug Regimen / Frequency  velcade     Comments  Verbalized understanding to drug,action,side effects and when to call MD       Goal: Teaching initiated upon admission  Outcome: Met This Shift  Patient verbalizes understanding to verbal information given onvelcade,action and possible side effects. Aware to call MD if develop complications. Problem: Discharge Planning  Intervention: Discharge to appropriate level of care  Discuss discharge instructions, follow ups and when to call doctor. Goal: Knowledge of discharge instructions  Knowledge of discharge instructions    Outcome: Met This Shift  Verbalized understanding of discharge instructions, follow ups and when to call doctor    Problem: Falls - Risk of: Intervention: Assess risk factors for falls  Assess for fall risk, instruct to ask for assistance with ambulation    Goal: Will remain free from falls  Will remain free from falls  Outcome: Met This Shift  Free from falls while in infusion center. Verbalized understanding of fall prevention and to ask for assistance with ambulation    Comments: Care plan reviewed with patient. Patient verbalized understanding of the plan of care and contribute to goal setting.

## 2018-03-01 NOTE — PROGRESS NOTES
Chemotherapy Administration    Pre-assessment Data: Antineoplastic Agents  Other:   See toxicity flow sheet for assessment [x]     Physician Notification of Concerns Related to Chemotherapy Administration:   Physician Notified Cristhian Kemp / Time of Notification Saw dr Noah Bryson today     Interventions:   Lab work assessed  [x]   Height / Weight verified for dose [x]   Current MAR reviewed [x]   Emergency drugs available as appropriate [x]   Anaphylaxis assessment completed [x]   Pre-medications administered as ordered [x]   Chemotherapy Administered as SQ injection [x]   Monitor for signs / symptoms of hypersensitivity reaction    [x]   Chemotherapy orders (drug/dose/rate) verified by 2 Chemo certified   RNs    [x]          Document chemotherapy teaching on the Patient Education tab    [x]   Document patient verbalizes understanding of medications being administered    [x]   Other: []    []    []      []    []

## 2018-03-05 NOTE — CARE COORDINATION
Ambulatory Care Coordination Note  3/5/2018  CM Risk Score: 6  [place]ia Mortality Risk Score: 56.16    ACC: Nav Corbett, JT    Summary Note: Spoke with Nydia Álvarez. He remains stable for this review. Breathing is at baseline. Continues to be active with chemo and denies side effects. Denies further needs at this time. Diabetes Assessment    Medic Alert ID:  No  Meal Planning:  None   How often do you test your blood sugar?:  No Testing   Do you have barriers with adherence to non-pharmacologic self-management interventions? (Nutrition/Exercise/Self-Monitoring):  Yes   Have you ever had to go to the ED for symptoms of low blood sugar?:  No       Do you have hyperglycemia symptoms?:  No   Do you have hypoglycemia symptoms?:  No   Blood Sugar Monitoring Regimen:  Not Testing   Blood Sugar Trends:  No Change          COPD Assessment    Does the patient understand envrionmental exposure?:  Yes  Is the patient able to verbalize Rescue vs. Long Acting medications?:  No  Does the patient have a nebulizer?:  Yes  Does the patient use a space with inhaled medications?:  No            Symptoms:   None:  Yes      Symptom course:  stable  Breathlessness:  exertion  Increase use of rapid acting/rescue inhaled medications?:  No  Change in chronic cough?:  No/At Baseline  Change in sputum?:  No/At Baseline  Self Monitoring - SaO2:  No  Have you had a recent diagnosis of pneumonia either by PCP or at a hospital?:  No               Care Coordination Interventions    Program Enrollment:  Complex Care  Referral from Primary Care Provider:  Yes  Suggested Interventions and Community Resources  Other Services:  Completed (Comment: [facility])  Zone Management Tools:  Completed  Other [facility]         Goals Addressed             Most Recent     Conditions and Symptoms   On track (3/5/2018)             I will notify my provider of any symptoms that indicate a worsening of my condition.     Barriers: none  Plan for overcoming my barriers: N/A  Confidence: 10/10  Anticipated Goal Completion Date: 8-22-17              Prior to Admission medications    Medication Sig Start Date End Date Taking? Authorizing Provider   Multiple Vitamins-Minerals (CENTRUM SILVER 50+MEN PO) Take 1 tablet by mouth   Yes Historical Provider, MD   tiotropium (SPIRIVA HANDIHALER) 18 MCG inhalation capsule Inhale 1 capsule into the lungs daily 1 capsule via inhalation daily. 12/19/17 12/19/18 Yes Gatito Chan MD   budesonide-formoterol (SYMBICORT) 160-4.5 MCG/ACT AERO Inhale 2 puffs into the lungs 2 times daily Rinse mouth after its use. 12/19/17 12/19/18 Yes Gatito Chan MD   albuterol sulfate HFA (VENTOLIN HFA) 108 (90 Base) MCG/ACT inhaler Inhale 2 puffs into the lungs every 6 hours as needed for Wheezing or Shortness of Breath 12/19/17  Yes Gatito Chan MD   fluticasone (FLONASE) 50 MCG/ACT nasal spray 2 sprays by Nasal route daily 12/19/17 12/19/18 Yes Jonathan Ortega MD   HYDROcodone-acetaminophen (NORCO) 5-325 MG per tablet Take 1 tablet by mouth every 6 hours as needed for Pain . Yes Historical Provider, MD   diphenhydrAMINE-APAP, sleep, (TYLENOL PM EXTRA STRENGTH)  MG tablet Take 2 tablets by mouth nightly as needed for Sleep   Yes Historical Provider, MD   leuprolide (LUPRON) 30 MG injection Inject 30 mg into the muscle once Indications: every 3 months   Yes Historical Provider, MD   CHEMOTHERAPY    Yes Historical Provider, MD   albuterol (PROVENTIL) (2.5 MG/3ML) 0.083% nebulizer solution Take 3 mLs by nebulization every 6 hours as needed for Wheezing or Shortness of Breath 6/28/16  Yes Gatito Chan MD   acetaminophen 650 MG TABS Take 650 mg by mouth every 4 hours as needed 2/2/16  Yes Kailey Romero MD   aspirin 81 MG EC tablet Take 81 mg by mouth daily.      Yes Historical Provider, MD       Future Appointments  Date Time Provider Sofia Mobley   3/8/2018 8:30 AM STR OUT PT ONC INJ RM 04 [facility] OP ONC None   3/15/2018 8:30 AM STR OUT PT ONC INJ RM 07 STR[facility] OP ONC None   3/22/2018 8:30 AM STR OUT PT ONC INJ RM 08 STR[facility] OP ONC None   3/29/2018 8:30 AM STR OUT PT ONC INJ RM 02 STR[facility] OP ONC None   4/5/2018 8:30 AM STR OUT PT ONC INJ RM 16 STR[facility] OP ONC None   4/12/2018 8:30 AM STR OUT PT ONC INJ RM 12 STR[facility] OP ONC None   4/19/2018 8:15 AM Julio Costa MD Oncology [facility] [place]   4/19/2018 9:15 AM STR OUT PT ONC INJ RM 12 STR[facility] OP ONC None   4/24/2018 9:20 AM [facility] [place]   4/27/2018 8:00 AM Corrina Khan DO [facility]   5/4/2018 11:00 AM ASHWIN Hernandez [place] [facility] [place]

## 2018-03-08 NOTE — PROGRESS NOTES
Chemotherapy Administration    Pre-assessment Data: Antineoplastic Agents  Other:   See toxicity flow sheet for assessment [x]     Physician Notification of Concerns Related to Chemotherapy Administration:   Physician Notified Danya Wallace / Time of Notification      Interventions:   Lab work assessed  [x]   Height / Weight verified for dose [x]   Current MAR reviewed [x]   Emergency drugs available as appropriate [x]   Anaphylaxis assessment completed [x]   Pre-medications administered as ordered [x]   Chemotherapy Administered as SQ injection [x]   Monitor for signs / symptoms of hypersensitivity reaction    [x]   Chemotherapy orders (drug/dose/rate) verified by 2 Chemo certified   RNs    [x]          Document chemotherapy teaching on the Patient Education tab    [x]   Document patient verbalizes understanding of medications being administered    [x]   Other: []    []    []      []    []

## 2018-03-08 NOTE — PROGRESS NOTES
Patient assessed for the following post chemotherapy:    Dizziness   No  Lightheadedness  No      Acute nausea/vomiting No  Headache   No  Chest pain/pressure  No  Rash/itching   No  Shortness of breath  No    Patient kept for 20 minutes observation post infusion chemotherapy. Patient tolerated chemotherapy treatment with velcade subcutanous without any complications. Last vital signs:   BP (!) 144/63   Pulse 77   Temp 97.5 °F (36.4 °C) (Oral)   Resp 18   Wt 211 lb 3.2 oz (95.8 kg)   SpO2 95%   BMI 33.58 kg/m²       Patient instructed if experience any of the above symptoms following today's infusion,he is to notify MD immediately or go to the emergency department. Discharge instructions given to patient. Verbalizes understanding. Ambulated off unit with daughter and  with belongings.

## 2018-03-13 NOTE — CARE COORDINATION
Resources  Other Services:  Completed ([facility]  Zone Management Tools:  Completed  Other Services or Interventions:  [facility]         Goals Addressed             Most Recent     Conditions and Symptoms   On track (3/13/2018)             I will notify my provider of any symptoms that indicate a worsening of my condition. Barriers: none  Plan for overcoming my barriers: N/A  Confidence: 10/10  Anticipated Goal Completion Date: 8-22-17              Prior to Admission medications    Medication Sig Start Date End Date Taking? Authorizing Provider   neomycin-polymyxin-dexameth (MAXITROL) 3.5-46870-0.1 ophthalmic suspension Place 1 drop into both eyes 4 times daily   Yes Historical Provider, MD   Multiple Vitamins-Minerals (CENTRUM SILVER 50+MEN PO) Take 1 tablet by mouth   Yes Historical Provider, MD   tiotropium (SPIRIVA HANDIHALER) 18 MCG inhalation capsule Inhale 1 capsule into the lungs daily 1 capsule via inhalation daily. 12/19/17 12/19/18 Yes Kailyn Gee MD   budesonide-formoterol (SYMBICORT) 160-4.5 MCG/ACT AERO Inhale 2 puffs into the lungs 2 times daily Rinse mouth after its use. 12/19/17 12/19/18 Yes Kailyn Gee MD   albuterol sulfate HFA (VENTOLIN HFA) 108 (90 Base) MCG/ACT inhaler Inhale 2 puffs into the lungs every 6 hours as needed for Wheezing or Shortness of Breath 12/19/17  Yes Kailyn Gee MD   fluticasone (FLONASE) 50 MCG/ACT nasal spray 2 sprays by Nasal route daily 12/19/17 12/19/18 Yes Cruz Ortega MD   HYDROcodone-acetaminophen (NORCO) 5-325 MG per tablet Take 1 tablet by mouth every 6 hours as needed for Pain .    Yes Historical Provider, MD   diphenhydrAMINE-APAP, sleep, (TYLENOL PM EXTRA STRENGTH)  MG tablet Take 2 tablets by mouth nightly as needed for Sleep   Yes Historical Provider, MD   leuprolide (LUPRON) 30 MG injection Inject 30 mg into the muscle once Indications: every 3 months   Yes Historical Provider, MD

## 2018-03-15 NOTE — PROGRESS NOTES
- MRI from outside hospital shows retro-achilles soft tissue wound, no osteomyelitis  - continue zosyn from outside hospital  - consult Infectious disease  - consult podiatry   Patient assessed for the following post chemotherapy:    Dizziness   No  Lightheadedness  No      Acute nausea/vomiting No  Headache   No  Chest pain/pressure  No  Rash/itching   No  Shortness of breath  No    Patient kept for 20 minutes observation post infusion chemotherapy. Patient tolerated chemotherapy treatment Velcade SQ injection without any complications. Last vital signs:   /84   Pulse 74   Temp 98 °F (36.7 °C) (Oral)   Resp 18   SpO2 96%       Patient instructed if experience any of the above symptoms following today's infusion,he is to notify MD immediately or go to the emergency department. Discharge instructions given to patient. Verbalizes understanding. Ambulated off unit in stable condition accompanied by daughter with belongings.

## 2018-03-15 NOTE — ONCOLOGY
Chemotherapy Administration    Pre-assessment Data: Antineoplastic Agents  Other:   See toxicity flow sheet for assessment [x]     Physician Notification of Concerns Related to Chemotherapy Administration:   Physician Notified Abbe Inch / Time of Notification      Interventions:   Lab work assessed  [x]   Height / Weight verified for dose [x]   Current MAR reviewed [x]   Emergency drugs available as appropriate [x]   Anaphylaxis assessment completed [x]   Pre-medications administered as ordered [x]   Chemotherapy Administered as SQ injection [x]   Monitor for signs / symptoms of hypersensitivity reaction    [x]   Chemotherapy orders (drug/dose/rate) verified by 2 Chemo certified   RNs    [x]          Document chemotherapy teaching on the Patient Education tab    [x]   Document patient verbalizes understanding of medications being administered    [x]   Other:Velcade SQ []    []    []      []    []

## 2018-03-15 NOTE — PLAN OF CARE
Problem: Intellectual/Education/Knowledge Deficit  Intervention: Verbal/written education provided  Chemotherapy Teaching     What is Chemotherapy   Drug action [x]   Method of Administration [x]   Handouts given []     Side Effects  Nausea/vomiting [x]   Diarrhea [x]   Fatigue [x]   Signs / Symptoms of infection [x]   Neutropenia [x]   Thrombocytopenia [x]   Alopecia [x]   neuropathy [x]   [place] diet &  the importance of fluids [x]       Micellaneous  Importance of nutrition [x]   Importance of oral hygiene [x]   When to call the MD [x]   Monitoring labs [x]   Use of supportive services []     Explanation of Drug Regimen / Frequency  Velcade SQ injection     Comments  Verbalized understanding to drug,action,side effects and when to call MD       Goal: Teaching initiated upon admission  Outcome: Met This Shift  Patient verbalizes understanding to verbal information given on Velcade,action and possible side effects. Aware to call MD if develop complications. Problem: Discharge Planning  Intervention: Discharge to appropriate level of care  Discuss understanding of discharge instructions,follow-up appointments, and when to call the physician. Goal: Knowledge of discharge instructions  Knowledge of discharge instructions     Outcome: Met This Shift  Verbalized understanding of discharge instructions, follow-up appointments, and when to call the physician. Comments: Care plan reviewed with patient and daughter. Patient and daughter verbalize understanding of the plan of care and contribute to goal setting.

## 2018-03-22 NOTE — PLAN OF CARE
Problem: Intellectual/Education/Knowledge Deficit  Intervention: Verbal/written education provided  Chemotherapy Teaching     What is Chemotherapy   Drug action [x]   Method of Administration [x]   Handouts given []     Side Effects  Nausea/vomiting [x]   Diarrhea [x]   Fatigue [x]   Signs / Symptoms of infection [x]   Neutropenia [x]   Thrombocytopenia [x]   Alopecia [x]   neuropathy [x]   [place] diet &  the importance of fluids [x]       Micellaneous  Importance of nutrition [x]   Importance of oral hygiene [x]   When to call the MD [x]   Monitoring labs [x]   Use of supportive services []     Explanation of Drug Regimen / Frequency  velcade injection     Comments  Verbalized understanding to drug,action,side effects and when to call MD       Goal: Teaching initiated upon admission  Outcome: Met This Shift  Patient verbalizes understanding to verbal information given on velcade,action and possible side effects. Aware to call MD if develop complications. Problem: Discharge Planning  Intervention: Discharge to appropriate level of care  Provide discharge instructions. Goal: Knowledge of discharge instructions  Knowledge of discharge instructions     Outcome: Met This Shift  Verbalized understanding of discharge instructions, follow-up appointments, and when to call the physician. Problem: Musculor/Skeletal Functional Status  Goal: Absence of falls  Outcome: Met This Shift  Free from falls during infusion. Comments:   Care plan reviewed with patient and daughter. Patient and daughter verbalize understanding of the plan of care and contribute to goal setting.

## 2018-03-22 NOTE — PROGRESS NOTES
Patient assessed for the following post chemotherapy:    Dizziness   No  Lightheadedness  No      Acute nausea/vomiting No  Headache   No  Chest pain/pressure  No  Rash/itching   No  Shortness of breath  No    Patient kept for 20 minutes observation post injection chemotherapy. Patient tolerated chemotherapy treatment velcade subq injection without any complications. Last vital signs:   BP (!) 155/71   Pulse 80   Temp 97.5 °F (36.4 °C) (Oral)   Resp 18   Ht 5' 6.5\" (1.689 m)   Wt 209 lb (94.8 kg)   SpO2 95%   BMI 33.23 kg/m²     Patient instructed if experience any of the above symptoms following today's infusion and injection, he/she is to notify MD immediately or go to the emergency department. Discharge instructions given to patient. Verbalizes understanding. Ambulated off unit per self with daughter with belongings.

## 2018-03-29 NOTE — PROGRESS NOTES
Patient assessed for the following post chemotherapy:    Dizziness   No  Lightheadedness  No      Acute nausea/vomiting No  Headache   No  Chest pain/pressure  No  Rash/itching   No  Shortness of breath  No    Patient kept for 20 minutes observation post injection chemotherapy. Patient tolerated chemotherapy treatment velcade subq injection without any complications. Last vital signs:   /63   Pulse 81   Temp 97.7 °F (36.5 °C) (Oral)   Resp 18   SpO2 94%     Patient instructed if experience any of the above symptoms following today's injection, he/she is to notify MD immediately or go to the emergency department. Discharge instructions given to patient. Verbalizes understanding. Ambulated off unit per self with daughter with belongings.

## 2018-03-29 NOTE — PLAN OF CARE
Problem: Pain:  Intervention: Promote participation in pain management plan  Patient encouraged to take pain med as prescribed and to call the physician if pain is uncontrolled. Patient does not take pain med regularly. Goal: Control of chronic pain  Control of chronic pain   Outcome: Met This Shift  Patient rates chronic bilat knee pain @ \"4\" upon admission and @ discharge. Problem: Musculor/Skeletal Functional Status  Intervention: Fall precautions  Discussed fall precautions, fall risks, and instructed patient to ask for assistance with ambulation. Call light within reach. Goal: Absence of falls  Outcome: Met This Shift  Free from falls during this admission. Problem: Intellectual/Education/Knowledge Deficit  Intervention: Verbal/written education provided  Chemotherapy Teaching     What is Chemotherapy   Drug action [x]   Method of Administration [x]   Handouts given []     Side Effects  Nausea/vomiting [x]   Diarrhea [x]   Fatigue [x]   Signs / Symptoms of infection [x]   Neutropenia [x]   Thrombocytopenia [x]   Alopecia [x]   neuropathy [x]   [place] diet &  the importance of fluids [x]       Micellaneous  Importance of nutrition [x]   Importance of oral hygiene [x]   When to call the MD [x]   Monitoring labs [x]   Use of supportive services []     Explanation of Drug Regimen / Frequency  velcade injection     Comments  Verbalized understanding to drug,action,side effects and when to call MD       Goal: Teaching initiated upon admission  Outcome: Met This Shift  Patient verbalizes understanding to verbal information given on velcade,action and possible side effects. Aware to call MD if develop complications. Problem: Discharge Planning  Intervention: Discharge to appropriate level of care  Provide discharge instructions.     Goal: Knowledge of discharge instructions  Knowledge of discharge instructions     Outcome: Met This Shift  Verbalized understanding of discharge instructions, follow-up appointments, and when to call the physician. Comments: Care plan reviewed with patient and daughter. Patient and daughter verbalize understanding of the plan of care and contribute to goal setting.

## 2018-03-29 NOTE — ONCOLOGY
Chemotherapy Administration    Pre-assessment Data: Antineoplastic Agents  Other:   See toxicity flow sheet for assessment [x]     Physician Notification of Concerns Related to Chemotherapy Administration:   Physician Notified Danyel Redd / Time of Notification      Interventions:   Lab work assessed  [x]   Height / Weight verified for dose [x]   Current MAR reviewed [x]   Emergency drugs available as appropriate [x]   Anaphylaxis assessment completed [x]   Pre-medications administered as ordered [x]   Chemotherapy Administered as SQ injection [x]   Monitor for signs / symptoms of hypersensitivity reaction    [x]   Chemotherapy orders (drug/dose/rate) verified by 2 Chemo certified   RNs    [x]          Document chemotherapy teaching on the Patient Education tab    [x]   Document patient verbalizes understanding of medications being administered    [x]   Other: velcade [x]    []    []      []    []

## 2018-04-05 NOTE — ONCOLOGY
Chemotherapy Administration    Pre-assessment Data: Antineoplastic Agents  Other:   See toxicity flow sheet for assessment [x]     Physician Notification of Concerns Related to Chemotherapy Administration:   Physician Notified Gama Snare / Time of Notification      Interventions:   Lab work assessed  [x]   Height / Weight verified for dose [x]   Current MAR reviewed [x]   Emergency drugs available as appropriate [x]   Anaphylaxis assessment completed [x]   Pre-medications administered as ordered [x]   Chemotherapy Administered as SQ injection [x]   Monitor for signs / symptoms of hypersensitivity reaction    [x]   Chemotherapy orders (drug/dose/rate) verified by 2 Chemo certified   RNs    [x]          Document chemotherapy teaching on the Patient Education tab    [x]   Document patient verbalizes understanding of medications being administered    [x]   Other: velcade [x]    []    []      []    []

## 2018-04-05 NOTE — PROGRESS NOTES
Patient assessed for the following post chemotherapy:    Dizziness   No  Lightheadedness  No      Acute nausea/vomiting No  Headache   No  Chest pain/pressure  No  Rash/itching   No  Shortness of breath  No    Patient kept for 20 minutes observation post injection chemotherapy. Patient tolerated chemotherapy treatment velcade subq injection without any complications. Last vital signs:   BP (!) 152/71   Pulse 78   Temp 97.6 °F (36.4 °C) (Oral)   Resp 18   SpO2 95%     Patient instructed if experience any of the above symptoms following today's infusion and injection, he/she is to notify MD immediately or go to the emergency department. Discharge instructions given to patient. Verbalizes understanding. Ambulated off unit per self with daughter with belongings.

## 2018-04-05 NOTE — PLAN OF CARE
Problem: Pain:  Intervention: Promote participation in pain management plan  Patient encouraged to take pain med as prescribed and to call the physician if pain is uncontrolled. Goal: Control of chronic pain  Control of chronic pain   Outcome: Met This Shift  Patient rates chronic knee pain @ \"5\" upon admission and discharge. Problem: Musculor/Skeletal Functional Status  Intervention: Fall precautions  Discussed fall precaution, fall risks, and instructed patient to ask for assistance with ambulation. Call light within reach. Goal: Absence of falls  Outcome: Met This Shift  Free from falls during infusion. Problem: Intellectual/Education/Knowledge Deficit  Intervention: Verbal/written education provided          Chemotherapy Teaching     What is Chemotherapy   Drug action [x]   Method of Administration [x]   Handouts given []     Side Effects  Nausea/vomiting [x]   Diarrhea [x]   Fatigue [x]   Signs / Symptoms of infection [x]   Neutropenia [x]   Thrombocytopenia [x]   Alopecia [x]   neuropathy [x]   [place] diet &  the importance of fluids [x]       Micellaneous  Importance of nutrition [x]   Importance of oral hygiene [x]   When to call the MD [x]   Monitoring labs [x]   Use of supportive services []     Explanation of Drug Regimen / Frequency  velcade      Comments  Verbalized understanding to drug,action,side effects and when to call MD           Goal: Teaching initiated upon admission  Outcome: Met This Shift  Patient verbalizes understanding to verbal information given on velcade injection,action and possible side effects. Aware to call MD if develop complications. Problem: Discharge Planning  Intervention: Discharge to appropriate level of care  Provide discharge instructions.     Goal: Knowledge of discharge instructions  Knowledge of discharge instructions     Outcome: Met This Shift  Verbalized understanding of discharge instructions, follow-up appointments, and when to call the physician. Comments: Care plan reviewed with patient and daughter. Patient and daughter verbalize understanding of the plan of care and contribute to goal setting.

## 2018-04-12 NOTE — PLAN OF CARE
Problem: Pain:  Intervention: Promote participation in pain management plan  Patient encouraged to take pain med as prescribed and to call the physician if pain is uncontrolled. Goal: Control of chronic pain  Control of chronic pain   Outcome: Met This Shift  Patient rates chronic knee pain @ \"8\" upon admission and discharge. Problem: Musculor/Skeletal Functional Status  Intervention: Fall precautions  Discussed fall precautions, fall risks, and instructed patient to ask for assistance with ambulation. Patient c/o chronic knee pain. Call light within reach. Goal: Absence of falls  Outcome: Met This Shift  Free from falls during infusion and injection. Problem: Intellectual/Education/Knowledge Deficit  Intervention: Verbal/written education provided  Chemotherapy Teaching     What is Chemotherapy   Drug action [x]   Method of Administration [x]   Handouts given []     Side Effects  Nausea/vomiting [x]   Diarrhea [x]   Fatigue [x]   Signs / Symptoms of infection [x]   Neutropenia [x]   Thrombocytopenia [x]   Alopecia [x]   neuropathy [x]   [place] diet &  the importance of fluids [x]       Micellaneous  Importance of nutrition [x]   Importance of oral hygiene [x]   When to call the MD [x]   Monitoring labs [x]   Use of supportive services []     Explanation of Drug Regimen / Frequency  velcade subq injection. Comments  Verbalized understanding to drug,action,side effects and when to call MD       Goal: Teaching initiated upon admission  Outcome: Met This Shift    Patient verbalizes understanding to verbal information given on velcade injection,action and possible side effects. Aware to call MD if develop complications. Problem: Discharge Planning  Intervention: Discharge to appropriate level of care  Provide discharge instructions.     Goal: Knowledge of discharge instructions  Knowledge of discharge instructions     Outcome: Met This Shift  Verbalized understanding of discharge instructions, follow-up appointments, and when to call the physician. Comments: Care plan reviewed with patient and daughter. Patient and daughter verbalize understanding of the plan of care and contribute to goal setting.

## 2018-04-12 NOTE — ONCOLOGY
Chemotherapy Administration    Pre-assessment Data: Antineoplastic Agents  Other:   See toxicity flow sheet for assessment [x]     Physician Notification of Concerns Related to Chemotherapy Administration:   Physician Notified Denece Zaid / Time of Notification      Interventions:   Lab work assessed  [x]   Height / Weight verified for dose [x]   Current MAR reviewed [x]   Emergency drugs available as appropriate [x]   Anaphylaxis assessment completed [x]   Pre-medications administered as ordered [x]   Chemotherapy Administered as SQ injection [x]   Monitor for signs / symptoms of hypersensitivity reaction    [x]   Chemotherapy orders (drug/dose/rate) verified by 2 Chemo certified   RNs    [x]          Document chemotherapy teaching on the Patient Education tab    [x]   Document patient verbalizes understanding of medications being administered    [x]   Other: velcade [x]    []    []      []    []

## 2018-04-12 NOTE — PROGRESS NOTES
Patient assessed for the following post chemotherapy:    Dizziness   No  Lightheadedness  No      Acute nausea/vomiting No  Headache   No  Chest pain/pressure  No  Rash/itching   No  Shortness of breath  No    Patient kept for 20 minutes observation post injection chemotherapy. Patient tolerated chemotherapy treatment velcade subq injection without any complications. Last vital signs:   /64   Pulse 75   Temp 97.9 °F (36.6 °C) (Oral)   Resp 18   Ht 5' 6.5\" (1.689 m)   Wt 207 lb (93.9 kg)   SpO2 97%   BMI 32.91 kg/m²     Patient instructed if experience any of the above symptoms following today's injection, he/she is to notify MD immediately or go to the emergency department. Discharge instructions given to patient. Verbalizes understanding. Ambulated off unit per self with daughter with belongings.

## 2018-04-19 NOTE — PLAN OF CARE
Problem: Musculor/Skeletal Functional Status  Intervention: Fall precautions  Discussed fall precautions, fall risks, and instructed patient to ask for assistance with ambulation. Call light within reach. Goal: Absence of falls  Outcome: Met This Shift  Free from falls during infusion. Problem: Intellectual/Education/Knowledge Deficit  Intervention: Verbal/written education provided  Chemotherapy Teaching     What is Chemotherapy   Drug action [x]   Method of Administration [x]   Handouts given []     Side Effects  Nausea/vomiting [x]   Diarrhea [x]   Fatigue [x]   Signs / Symptoms of infection [x]   Neutropenia [x]   Thrombocytopenia [x]   Alopecia [x]   neuropathy [x]   [place] diet &  the importance of fluids [x]       Micellaneous  Importance of nutrition [x]   Importance of oral hygiene [x]   When to call the MD [x]   Monitoring labs [x]   Use of supportive services []     Explanation of Drug Regimen / Frequency  velcade injection     Comments  Verbalized understanding to drug,action,side effects and when to call MD       Goal: Teaching initiated upon admission  Outcome: Met This Shift  Patient verbalizes understanding to verbal information given on velcade injection,action and possible side effects. Aware to call MD if develop complications. Problem: Discharge Planning  Intervention: Discharge to appropriate level of care  Provide discharge instructions. Goal: Knowledge of discharge instructions  Knowledge of discharge instructions     Outcome: Met This Shift    Verbalized understanding of discharge instructions, follow-up appointments, and when to call the physician. Comments: Care plan reviewed with patient and daughter. Patient and daughter verbalize understanding of the plan of care and contribute to goal setting.

## 2018-04-19 NOTE — PROGRESS NOTES
Patient assessed for the following post chemotherapy:    Dizziness   No  Lightheadedness  No      Acute nausea/vomiting No  Headache   No  Chest pain/pressure  No  Rash/itching   No  Shortness of breath  No    Patient kept for 20 minutes observation post injection chemotherapy. Patient tolerated chemotherapy treatment velcade subq injection without any complications. Last vital signs:   BP (!) 144/67   Pulse 77   Temp 97.7 °F (36.5 °C) (Oral)   Resp 18   Ht 5' 6.5\" (1.689 m)   Wt 206 lb (93.4 kg)   SpO2 96%   BMI 32.75 kg/m²     Patient instructed if experience any of the above symptoms following today's injection,he/she is to notify MD immediately or go to the emergency department. Discharge instructions given to patient. Verbalizes understanding. Ambulated off unit per self with daughter with belongings.

## 2018-04-19 NOTE — ONCOLOGY
Chemotherapy Administration    Pre-assessment Data: Antineoplastic Agents  Other:   See toxicity flow sheet for assessment [x]     Physician Notification of Concerns Related to Chemotherapy Administration:   Physician Notified Ankur Zarate / Time of Notification Dr Zeke Jimenez seen today.      Interventions:   Lab work assessed  [x]   Height / Weight verified for dose [x]   Current MAR reviewed [x]   Emergency drugs available as appropriate [x]   Anaphylaxis assessment completed [x]   Pre-medications administered as ordered [x]   Chemotherapy Administered as SQ injection [x]   Monitor for signs / symptoms of hypersensitivity reaction    [x]   Chemotherapy orders (drug/dose/rate) verified by 2 Chemo certified   RNs    [x]          Document chemotherapy teaching on the Patient Education tab    [x]   Document patient verbalizes understanding of medications being administered    [x]   Other: velcade [x]    []    []      []    []

## 2018-04-26 NOTE — PLAN OF CARE
Problem: Pain:  Intervention: Promote participation in pain management plan  Patient encouraged to take pain med as prescribed and to call the physician if pain is uncontrolled. Goal: Control of chronic pain  Control of chronic pain   Outcome: Met This Shift  Patient rates chronic knee pain @ \"5\" upon admission and discharge. Problem: Musculor/Skeletal Functional Status  Intervention: Fall precautions  Discussed fall precautions, fall risks, and instructed patient to ask for assistance with ambulation. Patient c/o bilat knee pain. Call light within reach. Goal: Absence of falls  Outcome: Met This Shift  Free from falls during infusion. Problem: Intellectual/Education/Knowledge Deficit  Intervention: Verbal/written education provided  Chemotherapy Teaching     What is Chemotherapy   Drug action [x]   Method of Administration [x]   Handouts given []     Side Effects  Nausea/vomiting [x]   Diarrhea [x]   Fatigue [x]   Signs / Symptoms of infection [x]   Neutropenia [x]   Thrombocytopenia [x]   Alopecia [x]   neuropathy [x]   [place] diet &  the importance of fluids [x]       Micellaneous  Importance of nutrition [x]   Importance of oral hygiene [x]   When to call the MD [x]   Monitoring labs [x]   Use of supportive services []     Explanation of Drug Regimen / Frequency  velcade injection     Comments  Verbalized understanding to drug,action,side effects and when to call MD       Goal: Teaching initiated upon admission  Outcome: Met This Shift  Patient verbalizes understanding to verbal information given on velcade injection,action and possible side effects. Aware to call MD if develop complications. Problem: Discharge Planning  Intervention: Discharge to appropriate level of care  Provide discharge instructions.     Goal: Knowledge of discharge instructions  Knowledge of discharge instructions     Outcome: Met This Shift  Verbalized understanding of discharge instructions, follow-up appointments, and

## 2018-04-26 NOTE — PROGRESS NOTES
Patient assessed for the following post chemotherapy:    Dizziness   No  Lightheadedness  No      Acute nausea/vomiting No  Headache   No  Chest pain/pressure  No  Rash/itching   No  Shortness of breath  No    Patient kept for 20 minutes observation post infusion chemotherapy. Patient tolerated chemotherapy treatment velcade subq injection without any complications. Last vital signs:   BP (!) 142/67   Pulse 72   Temp 97.7 °F (36.5 °C) (Oral)   Resp 18   Ht 5' 6.5\" (1.689 m)   Wt 208 lb 12.8 oz (94.7 kg)   SpO2 95%   BMI 33.20 kg/m²     Patient instructed if experience any of the above symptoms following today's infusion and injection, he/she is to notify MD immediately or go to the emergency department. Discharge instructions given to patient. Verbalizes understanding. Ambulated off unit per self with daughter with belongings.

## 2018-05-03 NOTE — PROGRESS NOTES
Chemotherapy Administration    Pre-assessment Data: Antineoplastic Agents  Other:   See toxicity flow sheet for assessment [x]     Physician Notification of Concerns Related to Chemotherapy Administration:   Physician Notified Les Baldwin / Time of Notification      Interventions:   Lab work assessed  [x]   Height / Weight verified for dose [x]   Current MAR reviewed [x]   Emergency drugs available as appropriate [x]   Anaphylaxis assessment completed [x]   Pre-medications administered as ordered [x]   Chemotherapy Administered as SQ injection [x]   Monitor for signs / symptoms of hypersensitivity reaction    [x]   Chemotherapy orders (drug/dose/rate) verified by 2 Chemo certified   RNs    [x]          Document chemotherapy teaching on the Patient Education tab    [x]   Document patient verbalizes understanding of medications being administered    [x]   Other: []    []    []      []    []

## 2018-05-03 NOTE — PROGRESS NOTES
Patient assessed for the following post chemotherapy:    Dizziness   No  Lightheadedness  No      Acute nausea/vomiting No  Headache   No  Chest pain/pressure  No  Rash/itching   No  Shortness of breath  No    Patient kept for 20 minutes observation post infusion chemotherapy. Patient tolerated chemotherapy treatment with velcade without any complications. Last vital signs:   /62   Pulse 74   Temp 97.8 °F (36.6 °C) (Oral)   Resp 16   Wt 205 lb (93 kg)   SpO2 98%   BMI 32.59 kg/m²       Patient instructed if experience any of the above symptoms following today's infusion,he is to notify MD immediately or go to the emergency department. Discharge instructions given to patient. Verbalizes understanding. Ambulated off unit with daughter and  with belongings.

## 2018-05-03 NOTE — PLAN OF CARE
Problem: Musculor/Skeletal Functional Status  Intervention: Fall precautions  Patient aware of fall precautions for here and at home call light in reach    Goal: Absence of falls  Outcome: Met This Shift  No falls this admission    Problem: Intellectual/Education/Knowledge Deficit  Intervention: Verbal/written education provided  Discharge instruction sheets    Goal: Teaching initiated upon admission  Outcome: Met This Shift  Chemotherapy Teaching     What is Chemotherapy   Drug action [x]   Method of Administration [x]   Handouts given []     Side Effects  Nausea/vomiting [x]   Diarrhea [x]   Fatigue [x]   Signs / Symptoms of infection [x]   Neutropenia [x]   Thrombocytopenia [x]   Alopecia [x]   neuropathy [x]   [place] diet &  the importance of fluids [x]       Micellaneous  Importance of nutrition [x]   Importance of oral hygiene [x]   When to call the MD [x]   Monitoring labs [x]   Use of supportive services []     Explanation of Drug   velcade     Comments  Verbalized understanding to drug,action,side effects and when to call MD     Goal: Written Disposition Instruction form completed  Outcome: Met This Shift  Discharge instructions given and reviewed with patient. All questions answered. Patient verbalized understanding    Problem: Discharge Planning  Intervention: Interaction with patient/family and care team  Patient currently denies any needs or concerns  Intervention: Discharge to appropriate level of care  Discharge home    Goal: Knowledge of discharge instructions  Knowledge of discharge instructions    Outcome: Met This Shift  Patient and family member able to teach back follow up appointments and when to call the doctor. Patient offers no questions at this time    Comments: Care plan reviewed with patient and daughter . Patient and daughter verbalize understanding of the plan of care and contribute to goal setting.

## 2018-05-10 NOTE — PROGRESS NOTES
Patient assessed for the following post chemotherapy:    Dizziness   No  Lightheadedness  No      Acute nausea/vomiting No  Headache   No  Chest pain/pressure  No  Rash/itching   No  Shortness of breath  No    Patient kept for 20 minutes observation post infusion chemotherapy. Patient tolerated chemotherapy treatment Velcade SQ injection without any complications. Last vital signs:   BP (!) 146/63   Pulse 75   Temp 97.8 °F (36.6 °C) (Oral)   Resp 18   Ht 5' 8\" (1.727 m)   Wt 206 lb (93.4 kg)   SpO2 97%   BMI 31.32 kg/m²     Patient instructed if experience any of the above symptoms following today's infusion,he is to notify MD immediately or go to the emergency department. Discharge instructions given to patient. Verbalizes understanding. Ambulated off unit in stable condition accompanied by daughter with belongings.

## 2018-05-10 NOTE — ONCOLOGY
Chemotherapy Administration    Pre-assessment Data: Antineoplastic Agents  Other:   See toxicity flow sheet for assessment [x]     Physician Notification of Concerns Related to Chemotherapy Administration:   Physician Notified Natalia Lewis / Time of Notification      Interventions:   Lab work assessed  [x]   Height / Weight verified for dose [x]   Current MAR reviewed [x]   Emergency drugs available as appropriate [x]   Anaphylaxis assessment completed [x]   Pre-medications administered as ordered [x]   Chemotherapy Administered as SQ injection [x]   Monitor for signs / symptoms of hypersensitivity reaction    [x]   Chemotherapy orders (drug/dose/rate) verified by 2 Chemo certified   RNs    [x]          Document chemotherapy teaching on the Patient Education tab    [x]   Document patient verbalizes understanding of medications being administered    [x]   Other:Velcade []    []    []      []    []

## 2018-05-17 NOTE — PLAN OF CARE
Problem: Pain:  Intervention: Promote participation in pain management plan  To call MD if pain is not tolerable. Goal: Control of chronic pain  Control of chronic pain   Outcome: Met This Shift  Pt states chronic knees pain is tolerable at this time with current medications. Problem: Intellectual/Education/Knowledge Deficit  Intervention: Verbal/written education provided  Chemotherapy Teaching     What is Chemotherapy   Drug action [x]   Method of Administration [x]   Handouts given []     Side Effects  Nausea/vomiting [x]   Diarrhea [x]   Fatigue [x]   Signs / Symptoms of infection [x]   Neutropenia [x]   Thrombocytopenia [x]   Alopecia [x]   neuropathy [x]   [place] diet &  the importance of fluids [x]       Micellaneous  Importance of nutrition [x]   Importance of oral hygiene [x]   When to call the MD [x]   Monitoring labs [x]   Use of supportive services []     Explanation of Drug Regimen / Frequency  velcade weekly     Comments  Verbalized understanding to drug,action,side effects and when to call MD       Goal: Teaching initiated upon admission  Outcome: Met This Shift  Patient verbalizes understanding to verbal information given on velcade subQ,action and possible side effects. Aware to call MD if develop complications. Problem: Discharge Planning  Intervention: Interaction with patient/family and care team  Discuss understanding of discharge instructions,follow-up appointments, and when to call the physician. Goal: Knowledge of discharge instructions  Knowledge of discharge instructions     Outcome: Met This Shift  Verbalized understanding of discharge instructions, follow-up appointments, and when to call the physician. Problem: Musculor/Skeletal Functional Status  Intervention: Fall precautions  Verbalized understanding of fall prevention to ask for assistance with ambulation. Call light within reach.     Goal: Absence of falls  Outcome: Met This Shift  No falls occurred during this visit. Comments: Care plan reviewed with patient and daughter. Patient and daughter verbalize understanding of the plan of care and contribute to goal setting.

## 2018-05-17 NOTE — PROGRESS NOTES
Patient assessed for the following post chemotherapy:    Dizziness   No  Lightheadedness  No      Acute nausea/vomiting No  Headache   No  Chest pain/pressure  No  Rash/itching   No  Shortness of breath  No    Patient kept for 20 minutes observation post injection chemotherapy. Patient tolerated chemotherapy treatment velcade subQ without any complications. Last vital signs:   /61   Pulse 75   Temp 97.6 °F (36.4 °C) (Oral)   Resp 18   SpO2 97%       Patient instructed if experience any of the above symptoms following today's infusion,he/she is to notify MD immediately or go to the emergency department. Discharge instructions given to patient. Verbalizes understanding. Ambulated off unit accompanied by daughter with belongings.

## 2018-05-17 NOTE — ONCOLOGY
Chemotherapy Administration    Pre-assessment Data: Antineoplastic Agents  Other:   See toxicity flow sheet for assessment [x]     Physician Notification of Concerns Related to Chemotherapy Administration:   Physician Notified Skeet Peed / Time of Notification      Interventions:   Lab work assessed  [x]   Height / Weight verified for dose [x]   Current MAR reviewed [x]   Emergency drugs available as appropriate [x]   Anaphylaxis assessment completed [x]   Pre-medications administered as ordered [x]   Chemotherapy Administered as SQ injection [x]   Monitor for signs / symptoms of hypersensitivity reaction    [x]   Chemotherapy orders (drug/dose/rate) verified by 2 Chemo certified   RNs    [x]          Document chemotherapy teaching on the Patient Education tab    [x]   Document patient verbalizes understanding of medications being administered    [x]   Other: []    []    []      []    []

## 2018-05-24 NOTE — PROGRESS NOTES
Patient assessed for the following post chemotherapy:    Dizziness   No  Lightheadedness  No      Acute nausea/vomiting No  Headache   No  Chest pain/pressure  No  Rash/itching   No  Shortness of breath  No       Patient tolerated chemotherapy treatment velcade without any complications. Last vital signs:   BP (!) 150/66   Pulse 80   Temp 97.9 °F (36.6 °C) (Oral)   Resp 22   Ht 5' 8\" (1.727 m)   SpO2 98%       Patient instructed if experience any of the above symptoms following today's infusion,he/she is to notify MD immediately or go to the emergency department. Discharge instructions given to patient. Verbalizes understanding. Ambulated off unit per self with belongings.

## 2018-05-24 NOTE — PLAN OF CARE
Problem: Intellectual/Education/Knowledge Deficit  Intervention: Verbal/written education provided  Chemotherapy Teaching     What is Chemotherapy   Drug action [x]   Method of Administration [x]   Handouts given []     Side Effects  Nausea/vomiting [x]   Diarrhea [x]   Fatigue [x]   Signs / Symptoms of infection [x]   Neutropenia [x]   Thrombocytopenia [x]   Alopecia [x]   neuropathy [x]   [place] diet &  the importance of fluids [x]       Micellaneous  Importance of nutrition [x]   Importance of oral hygiene [x]   When to call the MD [x]   Monitoring labs [x]   Use of supportive services []     Explanation of Drug Regimen / Frequency  velcade     Comments  Verbalized understanding to drug,action,side effects and when to call MD       Goal: Teaching initiated upon admission  Outcome: Met This Shift  Patient verbalizes understanding to verbal information given on velcade,action and possible side effects. Aware to call MD if develop complications. Problem: Discharge Planning  Intervention: Discharge to appropriate level of care  Discuss discharge instructions, follow ups and when to call doctor. Goal: Knowledge of discharge instructions  Knowledge of discharge instructions    Outcome: Met This Shift  Verbalized understanding of discharge instructions, follow ups and when to call doctor    Problem: Falls - Risk of: Intervention: Assess risk factors for falls  Assess for fall risk, instruct to ask for assistance with ambulation    Goal: Will remain free from falls  Will remain free from falls  Outcome: Met This Shift  Free from falls while in infusion center. Verbalized understanding of fall prevention and to ask for assistance with ambulation    Comments: Care plan reviewed with patient and daughter. Patient and daughter verbalized understanding of the plan of care and contribute to goal setting.

## 2018-05-24 NOTE — PROGRESS NOTES
Chemotherapy Administration    Pre-assessment Data: Antineoplastic Agents  Other:   See toxicity flow sheet for assessment [x]     Physician Notification of Concerns Related to Chemotherapy Administration:   Physician Notified David Boop / Time of Notification      Interventions:   Lab work assessed  [x]   Height / Weight verified for dose [x]   Current MAR reviewed [x]   Emergency drugs available as appropriate [x]   Anaphylaxis assessment completed [x]   Pre-medications administered as ordered [x]   Chemotherapy Administered as SQ injection [x]   Monitor for signs / symptoms of hypersensitivity reaction    [x]   Chemotherapy orders (drug/dose/rate) verified by 2 Chemo certified   RNs    [x]          Document chemotherapy teaching on the Patient Education tab    [x]   Document patient verbalizes understanding of medications being administered    [x]   Other: []    []    []      []    []

## 2018-05-31 PROBLEM — J44.1 COPD WITH EXACERBATION (HCC): Status: RESOLVED | Noted: 2017-07-19 | Resolved: 2018-01-01

## 2018-05-31 NOTE — PROGRESS NOTES
Patient assessed for the following post chemotherapy:    Dizziness   No  Lightheadedness  No      Acute nausea/vomiting No  Headache   no  Chest pain/pressure  No  Rash/itching   No  Shortness of breath  No    Patient kept for 20 minutes observation post infusion chemotherapy. Patient tolerated chemotherapy treatment velcade without any complications. Last vital signs:   /63   Pulse 74   Temp 97.9 °F (36.6 °C) (Oral)   Resp 18   Ht 5' 8\" (1.727 m)   Wt 197 lb (89.4 kg)   SpO2 97%   BMI 29.95 kg/m²         Patient instructed if experience any of the above symptoms following today's infusion,he/she is to notify MD immediately or go to the emergency department. Discharge instructions given to patient. Verbalizes understanding. Ambulated off unit per self with belongings.

## 2018-05-31 NOTE — PROGRESS NOTES
Chemotherapy Administration    Pre-assessment Data: Antineoplastic Agents  Other:   See toxicity flow sheet for assessment [x]     Physician Notification of Concerns Related to Chemotherapy Administration:   Physician Notified Terri Swain / Time of Notification      Interventions:   Lab work assessed  [x]   Height / Weight verified for dose [x]   Current MAR reviewed [x]   Emergency drugs available as appropriate [x]   Anaphylaxis assessment completed [x]   Pre-medications administered as ordered [x]   Chemotherapy Administered as SQ injection [x]   Monitor for signs / symptoms of hypersensitivity reaction    [x]   Chemotherapy orders (drug/dose/rate) verified by 2 Chemo certified   RNs    [x]          Document chemotherapy teaching on the Patient Education tab    [x]   Document patient verbalizes understanding of medications being administered    [x]   Other: []    []    []      []    []

## 2018-05-31 NOTE — PLAN OF CARE
Problem: Intellectual/Education/Knowledge Deficit  Intervention: Verbal/written education provided  Chemotherapy Teaching     What is Chemotherapy   Drug action [x]   Method of Administration [x]   Handouts given []     Side Effects  Nausea/vomiting [x]   Diarrhea [x]   Fatigue [x]   Signs / Symptoms of infection [x]   Neutropenia [x]   Thrombocytopenia [x]   Alopecia [x]   neuropathy [x]   Leupp diet &  the importance of fluids [x]       Micellaneous  Importance of nutrition [x]   Importance of oral hygiene [x]   When to call the MD [x]   Monitoring labs [x]   Use of supportive services []     Explanation of Drug Regimen / Frequency  velcade     Comments  Verbalized understanding to drug,action,side effects and when to call MD       Goal: Teaching initiated upon admission  Outcome: Met This Shift  Patient verbalizes understanding to verbal information given on velcade,action and possible side effects. Aware to call MD if develop complications. Problem: Discharge Planning  Intervention: Discharge to appropriate level of care  Discuss discharge instructions, follow ups and when to call doctor. Goal: Knowledge of discharge instructions  Knowledge of discharge instructions    Outcome: Met This Shift  Verbalized understanding of discharge instructions, follow ups and when to call doctor    Problem: Falls - Risk of: Intervention: Assess risk factors for falls  Assess for fall risk, instruct to ask for assistance with ambulation    Goal: Will remain free from falls  Will remain free from falls  Outcome: Met This Shift  Free from falls while in infusion center. Verbalized understanding of fall prevention and to ask for assistance with ambulation    Comments: Care plan reviewed with patient. Patient  verbalized understanding of the plan of care and contribute to goal setting.

## 2018-06-07 NOTE — PLAN OF CARE
Problem: Musculor/Skeletal Functional Status  Intervention: Fall precautions  Patient aware of fall precautions for here and at home -call light in reach while here    Goal: Absence of falls  Outcome: Met This Shift  No falls this admission    Problem: Intellectual/Education/Knowledge Deficit  Intervention: Verbal/written education provided  Discharge instruction sheets    Goal: Teaching initiated upon admission  Outcome: Met This Shift  Chemotherapy Teaching     What is Chemotherapy   Drug action [x]   Method of Administration [x]   Handouts given []     Side Effects  Nausea/vomiting [x]   Diarrhea [x]   Fatigue [x]   Signs / Symptoms of infection [x]   Neutropenia [x]   Thrombocytopenia [x]   Alopecia [x]   neuropathy [x]   Strawn diet &  the importance of fluids [x]       Micellaneous  Importance of nutrition [x]   Importance of oral hygiene [x]   When to call the MD [x]   Monitoring labs [x]   Use of supportive services []     Explanation of Drug   velcade     Comments  Verbalized understanding to drug,action,side effects and when to call MD     Goal: Written Disposition Instruction form completed  Outcome: Met This Shift  Discharge instructions given and reviewed with patient. All questions answered. Patient verbalized understanding    Problem: Discharge Planning  Intervention: Interaction with patient/family and care team  Patient currently denies any needs or concerns  Intervention: Discharge to appropriate level of care  Discharge home with family support    Goal: Knowledge of discharge instructions  Knowledge of discharge instructions    Outcome: Met This Shift  Patient and family member able to teach back follow up appointments and when to call the doctor. Patient offers no questions at this time    Comments: Care plan reviewed with patient and duaghter. Patient and daughter verbalize understanding of the plan of care and contribute to goal setting.

## 2018-06-07 NOTE — PROGRESS NOTES
Patient assessed for the following post chemotherapy:    Dizziness   No  Lightheadedness  No      Acute nausea/vomiting No  Headache   No  Chest pain/pressure  No  Rash/itching   No  Shortness of breath  No    Patient kept for 20 minutes observation post infusion chemotherapy. Patient tolerated chemotherapy treatment with velcade  without any complications. Last vital signs:   BP (!) 157/67   Pulse 75   Temp 97.7 °F (36.5 °C) (Oral)   Resp 18   Wt 198 lb 3.2 oz (89.9 kg)   SpO2 99%   BMI 30.14 kg/m²     Patient instructed if experience any of the above symptoms following today's infusion,he is to notify MD immediately or go to the emergency department. Discharge instructions given to patient. Verbalizes understanding. Ambulated off unit with family and  with belongings.

## 2018-06-07 NOTE — PROGRESS NOTES
Chemotherapy Administration    Pre-assessment Data: Antineoplastic Agents  Other:   See toxicity flow sheet for assessment [x]     Physician Notification of Concerns Related to Chemotherapy Administration:   Physician Notified Kevon [place] / Time of Notification      Interventions:   Lab work assessed  [x]   Height / Weight verified for dose [x]   Current MAR reviewed [x]   Emergency drugs available as appropriate [x]   Anaphylaxis assessment completed [x]   Pre-medications administered as ordered [x]   Chemotherapy Administered as SQ injection [x]   Monitor for signs / symptoms of hypersensitivity reaction    [x]   Chemotherapy orders (drug/dose/rate) verified by 2 Chemo certified   RNs    [x]          Document chemotherapy teaching on the Patient Education tab    [x]   Document patient verbalizes understanding of medications being administered    [x]   Other: []    []    []      []    []

## 2018-06-14 NOTE — PLAN OF CARE
Problem: Pain:  Intervention: Opioid analgesia side-effects  Patient and daughter aware of current pain medication regime    Goal: Control of acute pain  Control of acute pain   Outcome: Ongoing  Patient has new onset of back pain has seen the family doctor and will see specialist at [facility] in the future (26)    Problem: Musculor/Skeletal Functional Status  Intervention: Fall precautions  Patient and daughter aware of fall precautions for here and at home -call light in reach while here     Goal: Absence of falls  Outcome: Met This Shift  No falls this admission    Problem: Intellectual/Education/Knowledge Deficit  Intervention: Verbal/written education provided  Discharge instruction sheets    Goal: Teaching initiated upon admission  Outcome: Met This Shift  Chemotherapy Teaching     What is Chemotherapy   Drug action [x]   Method of Administration [x]   Handouts given []     Side Effects  Nausea/vomiting [x]   Diarrhea [x]   Fatigue [x]   Signs / Symptoms of infection [x]   Neutropenia [x]   Thrombocytopenia [x]   Alopecia [x]   neuropathy [x]   Titus diet &  the importance of fluids [x]       Micellaneous  Importance of nutrition [x]   Importance of oral hygiene [x]   When to call the MD [x]   Monitoring labs [x]   Use of supportive services []     Explanation of Drug   velcade     Comments  Verbalized understanding to drug,action,side effects and when to call MD     Goal: Written Disposition Instruction form completed  Outcome: Met This Shift  Discharge instructions given and reviewed with patient. All questions answered. Patient verbalized understanding    Problem: Discharge Planning  Intervention: Interaction with patient/family and care team  Discharge home with support of daughter  Intervention: Discharge to appropriate level of care  Discharge instructions given and reviewed with patient. All questions answered.  Patient verbalized understanding    Goal: Knowledge of discharge instructions  Knowledge of

## 2018-06-14 NOTE — PROGRESS NOTES
Patient assessed for the following post chemotherapy:    Dizziness   No  Lightheadedness  No      Acute nausea/vomiting No  Headache   No  Chest pain/pressure  No  Rash/itching   No  Shortness of breath  No    Patient kept for 20 minutes observation post infusion chemotherapy. Patient tolerated chemotherapy treatment with velcade without any complications. Last vital signs:   BP (!) 154/66   Pulse 74   Temp 97.7 °F (36.5 °C) (Oral)   Resp 18   SpO2 97%     Patient instructed if experience any of the above symptoms following today's infusion,she is to notify MD immediately or go to the emergency department. Discharge instructions given to patient. Verbalizes understanding./off unit via wheelchair with daughter who had belongings.

## 2018-06-14 NOTE — PROGRESS NOTES
Chemotherapy Administration    Pre-assessment Data: Antineoplastic Agents  Other:   See toxicity flow sheet for assessment [x]     Physician Notification of Concerns Related to Chemotherapy Administration:   Physician Notified Kevon [place] / Time of Notification      Interventions:   Lab work assessed  [x]   Height / Weight verified for dose [x]   Current MAR reviewed [x]   Emergency drugs available as appropriate [x]   Anaphylaxis assessment completed [x]   Pre-medications administered as ordered [x]   Chemotherapy Administered as SQ injection [x]   Monitor for signs / symptoms of hypersensitivity reaction    [x]   Chemotherapy orders (drug/dose/rate) verified by 2 Chemo certified   RNs    [x]          Document chemotherapy teaching on the Patient Education tab    [x]   Document patient verbalizes understanding of medications being administered    [x]   Other: []    []    []      []    []

## 2018-06-21 PROBLEM — M48.56XA PATHOLOGIC COMPRESSION FRACTURE OF LUMBAR VERTEBRA (HCC): Status: ACTIVE | Noted: 2018-01-01

## 2018-06-21 PROBLEM — E43 SEVERE MALNUTRITION (HCC): Chronic | Status: ACTIVE | Noted: 2018-01-01

## 2018-06-21 NOTE — CONSULTS
hydroxide, ondansetron, diphenhydrAMINE **AND** acetaminophen  IV Drips/Infusions    Past Medical History         Diagnosis Date    Acute urinary retention     Arthritis     BPH without urinary obstruction     Cancer (HCC)     multiple myeloma & bone    Chronic kidney disease     COPD (chronic obstructive pulmonary disease) (HCC)     Disease of blood and blood forming organ     History of blood transfusion     Hyperlipidemia     Hypertension     Insomnia     Pneumonia     Prostate cancer ([facility] [address]     Shingles     Atypical    Type II or unspecified type diabetes mellitus without mention of complication, not stated as uncontrolled       Past Surgical History           Procedure Laterality Date    COLONOSCOPY      EYE SURGERY      bilateral cataracts    LEG SURGERY Right     amanda from hip to knee    SKIN BIOPSY       Diet    DIET GENERAL;  Dietary Nutrition Supplements: Standard High Calorie Oral Supplement  Allergies    Shellfish-derived products and Flomax [tamsulosin hcl]  Social History     Social History     Social History    Marital status:      Spouse name: N/A    Number of children: 4    Years of education: N/A     Occupational History    Not on file. Social History Main Topics    Smoking status: Current Every Day Smoker     Packs/day: 1.00     Years: 65.00     Last attempt to quit: 7/19/2017    Smokeless tobacco: Never Used      Comment: pts smoking a half a pack a day    Alcohol use No      Comment: rarely    Drug use: No    Sexual activity: Not Currently     Other Topics Concern    Not on file     Social History Narrative    No narrative on file     Family History          Problem Relation Age of Onset    Diabetes Mother     Cancer Father     Cancer Brother      liver     ROS     Review of Systems   Noted in HPI   Vitals     height is 5' 8\" (1.727 m) and weight is 188 lb (85.3 kg). His oral temperature is 97.9 °F (36.6 °C).  His blood pressure is 132/58 (abnormal) and retropulsion, L3-4 moderate, L4-5 moderate, L5-S1 slight Lateral recess stenosis:  L3-4 moderate right and severe left, L4-5 moderate bilateral, L5-S1 slight bilateral Degenerative hypertrophic facet arthrosis, ligamentum flavum hypertrophy, vertebral endplate hypertrophy, and disc bulge contribute to neural foraminal stenosis. Right neural foraminal stenosis: L2-3 minimal, L3-4 slight to moderate, L4-5 moderate, L5-S1 moderate Left neural foraminal stenosis: L3-4 moderate, L4-5 slight, L5-S1 moderate Impression:  Moderate to severe compression fracture of the L2 vertebral body contains marrow edema and IV contrast enhancement. This is most compatible with an acute or subacute compression fracture. Suspicion is raised of pathologic fracture given history of multiple myeloma and prostate cancer. Differential includes a mechanical posttraumatic fracture L2 slight fracture retropulsion associated with slight central canal stenosis Multilevel degenerative change, spondylolisthesis, disc narrowing, diffuse disc bulge, central canal stenosis, lateral recess stenosis, and neural foraminal stenosis Electronically Signed By: Rehan Kemp       Assessment/Recommendations    1. Multiple Myeloma - followed by Dr. Nate Salinas with full oncology history as above. The patient receives weekly Velcade with last injection on 6/14/18. He was due for Velcade today on 6/21/18.   -Will hold Velcade during acute hospitalization   -Will obtain kappa/debbie and serum immunofixation.   -Okay for kyphoplasty for compression fracture of L2, attempt for biopsy of L2 lesion if able  2. L2 Compression Fracture - as noted on MRI of Lspine 6/21/18. orthopedic surgery following, possible pathologic fracture from multiple myeloma. Patient denies any falls or trauma in the last month. Plan for kyphoplasty, recommend biopsy during kyphoplasty. 3. 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 WBC 1700 on 6/14/18.  Obtain CBC in am.   4. Chronic Anemia - likely chemotherapy induced. Baseline Hgb 10-11's since 2012. Will continue to monitor. Other comorbidities: history of prostate cancer, COPD, multilevel degenerative disc disease      Case discussed with nurse and patient/family/Hospitalist.  Questions and concerns addressed.   Discussed with Dr. Keshia Tolentino    Electronically signed by   Wm Reyes PA-C on 6/21/2018 at 6:23 PM

## 2018-06-21 NOTE — PROGRESS NOTES
Nutrition Assessment    Type and Reason for Visit: Initial, Positive Nutrition Screen (Poor Appetite/Intake)    Nutrition Recommendations: Continue current diet, Ensure Enlive TID, and MVI w/minerals daily. Malnutrition Assessment:  · Malnutrition Status: Meets the criteria for severe malnutrition  · Context: Chronic illness  · Findings of the 6 clinical characteristics of malnutrition (Minimum of 2 out of 6 clinical characteristics is required to make the diagnosis of moderate or severe Protein Calorie Malnutrition based on AND/ASPEN Guidelines):  1. Energy Intake-Less than or equal to 75%, greater than or equal to 1 month    2. Weight Loss-7.5% loss or greater (-9.7% weight loss), in 1 month (per EMR)    Nutrition Diagnosis:   · Problem: Severe malnutrition, in context of chronic illness  · Etiology: related to Pain, Insufficient energy/nutrient consumption (back pain)     Signs and symptoms:  as evidenced by Diet history of poor intake, Patient report of, Weight loss greater than or equal to 7.5% in 3 months (consuming 75% or less of energy intake for one month)    Nutrition Assessment:  · Subjective Assessment: Pt admitted d/t L2 lumbar compression fx. Hx prostate cancer (diagnosed 2014) s/p chemotherapy. Pt and daughter seen- she reports his po intake has been decreased over the past month d/t back pain/decreased appetite. Pt reports he was trying to lose weight a few months ago, but states he wasn't trying to lose weight over the past month. Pt admits he wasn't eating as much. Daughter states he isn't as big eater. Daughter states he was trying to consume x1 Boost daily at home. PT amenable to consume Ensure Enlive TID. Pt denies N/V/D/C or chewing/swallowing difficulty with food. On MVI.   · Wound Type: None  · Current Nutrition Therapies:  · Oral Diet Orders: General   · Oral Diet intake: 51-75%, 26-50% (pt ate 50% of lunch today)  · Oral Nutrition Supplement (ONS) Orders: Standard High Calorie Oral Supplement (Ensure Enlive TID- Chocolate & Vanilla TID)  · ONS intake:  (initiated today)  · Anthropometric Measures:  · Ht: 5' 8\" (172.7 cm)   · Current Body Wt: 187 lb (84.8 kg) (6/21; none documented)  · Admission Body Wt: 187 lb (84.8 kg) (6/21; none documented)  · Usual Body Wt: 200 lb (90.7 kg) (per pt report; 207 lb 14.4 oz on 5/4/18; 209 lb 12.8 oz on 3/1/18; 210 lb 12.8 oz on 12/19/17)  · % Weight Change: -9.7% weight loss ,  in 1 month per chart review  · Ideal Body Wt: 154 lb (69.9 kg), % Ideal Body 121%  · BMI Classification: BMI 25.0 - 29.9 Overweight (28.5)  · Comparative Standards (Estimated Nutrition Needs):  · Estimated Daily Total Kcal: 3691-0042 kcal/day  · Estimated Daily Protein (g):  g/day    Estimated Intake vs Estimated Needs: Intake Improving    Nutrition Risk Level: Moderate    Nutrition Interventions:   Continue current diet, Start ONS, Vitamin Supplement (Started Ensure Enlive TID. Continue MVI w/minerals daily)  Continued Inpatient Monitoring, Education Initiated (Encouraged po intake of meals and ONS at best effort during LOS)    Nutrition Evaluation:   · Evaluation: Goals set   · Goals: Pt will consume 75% or more of meals during LOS    · Monitoring: Meal Intake, Supplement Intake, Skin Integrity, Weight, Pertinent Labs    See Adult Nutrition Doc Flowsheet for more detail.      Electronically signed by Vaishali Lubin RD, LD on 6/21/18 at 1:56 PM    Contact Number: [phone]

## 2018-06-22 NOTE — PROGRESS NOTES
Hospitalist Progress Note    Patient:  Dori Huang      Unit/Bed:5K-24/024-A    YOB: 1932    MRN: [id number]       Acct: [de-identified]     PCP: Emanuel Moya DO    Date of Admission: 6/21/2018    Chief Complaint: LBP    Hospital Course: 80 y.o. male who presented to [address] [place] [address] with back pain, inability to ambulate - this has been getting worse over the past weeks to the point he hasn't been walking this week. MRI revealed pathological compression fractures, further work up and management in progress. Subjective: He was seen and examined today at the bedside. No overnight events. Medications:  Reviewed    Infusion Medications   Scheduled Medications    tiotropium  18 mcg Inhalation Daily    multivitamin  1 tablet Oral Daily    guaiFENesin  600 mg Oral BID    fluticasone  2 spray Nasal Daily    mometasone-formoterol  2 puff Inhalation BID    aspirin  81 mg Oral Daily    sodium chloride flush  10 mL Intravenous 2 times per day    enoxaparin  40 mg Subcutaneous Q24H    nicotine  1 patch Transdermal Daily    lidocaine  1 patch Transdermal Daily     PRN Meds: albuterol sulfate HFA, albuterol, acetaminophen, oxyCODONE-acetaminophen **OR** oxyCODONE-acetaminophen, sodium chloride flush, potassium chloride **OR** potassium chloride **OR** potassium chloride, magnesium hydroxide, ondansetron, diphenhydrAMINE **AND** acetaminophen      Intake/Output Summary (Last 24 hours) at 06/22/18 1804  Last data filed at 06/22/18 1200   Gross per 24 hour   Intake             1170 ml   Output             1110 ml   Net               60 ml       Diet:  DIET GENERAL;  Dietary Nutrition Supplements: Standard High Calorie Oral Supplement    Exam:  BP (!) 143/65   Pulse 96   Temp 98.6 °F (37 °C) (Oral)   Resp 18   Ht 5' 8\" (1.727 m)   Wt 188 lb (85.3 kg)   SpO2 93%   BMI 28.59 kg/m²     General appearance: No apparent distress, appears stated age and cooperative.   HEENT: Pupils

## 2018-06-22 NOTE — H&P
noted in the HPI, all other systems negative/at baseline. PHYSICAL EXAM:    BP (!) 152/70   Pulse 94   Temp 98 °F (36.7 °C) (Oral)   Resp 18   Ht 5' 8\" (1.727 m)   Wt 188 lb (85.3 kg)   SpO2 95%   BMI 28.59 kg/m²       General appearance:  No apparent distress, appears stated age and cooperative. HEENT:  Normal cephalic, atraumatic without obvious deformity. Pupils equal, round, and reactive to light. Extra ocular muscles intact. Conjunctivae/corneas clear. Neck: Supple, with full range of motion. No jugular venous distention. Trachea midline. Respiratory:  Normal respiratory effort. Clear to auscultation, bilaterally without Rales/Wheezes/Rhonchi. Cardiovascular:  Regular rate and rhythm with normal S1/S2 without murmurs, rubs or gallops. Abdomen: Soft, non-tender, non-distended with normal bowel sounds. Musculoskeletal:  No clubbing, cyanosis or edema bilaterally. Full range of motion without deformity. Point tenderness on back  Skin: Skin color, texture, turgor normal.  No rashes or lesions. Neurologic:  Neurovascularly intact without any focal sensory/motor deficits. Cranial nerves: II-XII intact, grossly non-focal.  Psychiatric:  Alert and oriented, thought content appropriate, normal insight  Capillary Refill: Brisk,< 3 seconds   Peripheral Pulses: +2 palpable, equal bilaterally       Labs:     Recent Labs      06/22/18   0519   WBC  1.4*   HGB  10.4*   HCT  30.8*   PLT  186     Recent Labs      06/22/18   0519   NA  139   K  4.2   CL  98   CO2  29   BUN  10   CREATININE  0.8   CALCIUM  9.7     No results for input(s): AST, ALT, BILIDIR, BILITOT, ALKPHOS in the last 72 hours. No results for input(s): INR in the last 72 hours. No results for input(s): Bongelacio Alaniz in the last 72 hours.     Urinalysis:      Lab Results   Component Value Date    NITRU Negative 05/04/2018    WBCUA NONE SEEN 01/22/2018    BACTERIA NONE 01/22/2018    RBCUA 0-2 01/22/2018    BLOODU Negative 05/04/2018 BLOODU NEGATIVE 01/22/2018    SPECGRAV 1.022 01/22/2018    GLUCOSEU Negative 05/04/2018       Radiology:   I have reviewed the imaging studies with the following interpretation:  MRI LUMBAR [address]   Final Result   1. Acute compression fractures of the L2 vertebral body with minimal retropulsion of the dorsal superior endplate resulting in mild central canal impingement. 2. Multilevel chronic degenerative changes described above at individual levels      The lumbar vertebral bodies are normally aligned. There is normal marrow signal throughout. There is no bone marrow edema. There are no compression fractures. No pars defects are noted. The discs have normal signal throughout. The visualized aspects of the distal spinal cord are normal. The nerve roots of the cauda equina and the tip of the conus are normal.      There are no gross abnormalities in the distal thoracic spine. On the axial images, at L1-L2,      At L2-3,      At L3-4,      At L4-5,      At L5-S1,         There is no abnormal enhancement. There are no suspicious findings in the visualized aspects of the retroperitoneum and paraspinal soft tissues. IMPRESSION:                  **This report has been created using voice recognition software. It may contain minor errors which are inherent in voice recognition technology. **      Final report electronically signed by Dr. Anival Cassidy on 6/22/2018 3:28 AM            Diet:  DIET GENERAL;  Dietary Nutrition Supplements: Standard High Calorie Oral Supplement    DVT prophylaxis: [x] Lovenox                                 [] SCDs                                 [] SQ Heparin                                 [] Encourage ambulation           [] Already on Anticoagulation    Code Status: Full Code      PT/OT Eval Status: pending    Disposition:    [x] Home       [x] TCU       [] Rehab       [] Psych       [] SNF       [] [facility]       [] Other-

## 2018-06-22 NOTE — PROGRESS NOTES
agreeable to PT session with SPT. General:  Follows Commands: Within Functional Limits    Vision: Impaired  Vision Exceptions: Wears glasses at all times    Hearing: Exceptions to [facility]  Hearing Exceptions: Hard of hearing/hearing concerns, Bilateral hearing aid (has hearing aides, at home)     Pain:  Yes.   Pain Assessment  Pain Assessment: 0-10  Pain Level: 5  Pain Location: Back  Pain Orientation: Lower     Social/Functional History:    Lives With: Alone  Type of Home: House  Home Layout: Able to Live on Main level with bedroom/bathroom  Home Access: Stairs to enter with rails  Entrance Stairs - Number of Steps: 4 steps onto porch with B rails; 1 step into home with no rails  Entrance Stairs - Rails: Both  Home Equipment: Standard walker, Rolling walker, 4 wheeled walker, Cane, [employer]     Bathroom Shower/Tub: Tub/Shower unit  Bathroom Toilet: Handicap height  Bathroom Equipment: Grab bars in shower, Shower chair, Hand-held shower, Grab bars around toilet  Bathroom Accessibility: Accessible    Receives Help From: Family  [facility]: [facility]  (pt does own laundry (includes ironing), assist with cleaning every other week, min assist with cookng, able to perform SMP many meals delivered or microwavable)  Ambulation Assistance: Independent  Transfer Assistance: Independent    Active : Yes  Mode of Transportation: Car  Occupation: Retired  Type of occupation:   Additional Comments: pt reports no use of AD PTA    Objective:  RLE AROM: WFL     LLE AROM : WFL    Strength RLE: WFL  Comment: grossly assessed 4+/5    Strength LLE: WFL  Comment: grossly assessed 4+/5 except hip flexion 4/5    Sensation  Overall Sensation Status: Impaired (occasional numbness/tingling into BLE)    Supine to Sit: Stand by assistance  Sit to Supine: Stand by assistance    Transfers  Sit to Stand: Stand by assistance  Stand to sit: Stand by assistance       Ambulation 1  Surface: level Treatment Recommendations: Strengthening, Balance Training, Functional Mobility Training, Transfer Training, Endurance Training, Gait Training, Stair training, Safety Education & Training, Patient/Caregiver Education & Training, Home Exercise Program    Goals:  Patient goals : to return home  Short term goals  Time Frame for Short term goals: 1 week  Short term goal 1: Pt to perform all bed mobility at S for getting in/out of bed safely. Short term goal 2: Pt to transfer at S for getting up/down from various surfaces. Short term goal 3: Pt to ambulate 200' with no AD at S for traveling household and community distances. Short term goal 4: Pt to negotiate four 6\" steps with B handrails at SBA for entering and exiting the home. Long term goals  Time Frame for Long term goals : N/A d/t short ELOS    Evaluation Complexity: Based on the findings of patient history, examination, clinical presentation, and decision making during this evaluation, the evaluation of Nicole Sawyer  is of high complexity. PT G-Codes  Functional Limitation: Mobility: Walking and moving around  Mobility: Walking and Moving Around Current Status (): At least 40 percent but less than 60 percent impaired, limited or restricted  Mobility: Walking and Moving Around Goal Status ():  At least 20 percent but less than 40 percent impaired, limited or restricted       AM-PAC Inpatient Mobility without Stair Climbing Raw Score : 15  AM-PAC Inpatient without Stair Climbing T-Scale Score : 43.03  Mobility Inpatient CMS 0-100% Score: 47.43  Mobility Inpatient without Stair CMS G-Code Modifier : SHILOH Sheldon, SPT

## 2018-06-22 NOTE — CARE COORDINATION
6/22/18, 2:58 PM      Julio C Mata       Admitted from: ER, patient presented with severe back pain, unable to ambulate. 6/21/2018/ [address] day: 1   Location: [facility]24/024-A Reason for admit: Pathological compression fracture of lumbar vertebra (HCC) [V12.07WP]  Pathological compression fracture of lumbar vertebra, initial encounter ([facility] [place] [C69.01GO] Status: Inpt. Admit order signed?: no, note on chart. PMH:  has a past medical history of Acute urinary retention; Arthritis; BPH without urinary obstruction; Cancer ([facility] [facility] Chronic kidney disease; COPD (chronic obstructive pulmonary disease) ([facility] [place] Disease of blood and blood forming organ; History of blood transfusion; Hyperlipidemia; Hypertension; Insomnia; Pneumonia; Prostate cancer ([facility] [place] Shingles; and Type II or unspecified type diabetes mellitus without mention of complication, not stated as uncontrolled. Procedure: L-2 Kyphoplasty and biopsy  Pertinent abnormal Imaging:MRI of lumbar spine:  1. Acute compression fractures of the L2 vertebral body with minimal retropulsion of the dorsal superior endplate resulting in mild central canal impingement.         Medications:  Scheduled Meds:   tiotropium  18 mcg Inhalation Daily    multivitamin  1 tablet Oral Daily    guaiFENesin  600 mg Oral BID    fluticasone  2 spray Nasal Daily    mometasone-formoterol  2 puff Inhalation BID    aspirin  81 mg Oral Daily    sodium chloride flush  10 mL Intravenous 2 times per day    enoxaparin  40 mg Subcutaneous Q24H    nicotine  1 patch Transdermal Daily    lidocaine  1 patch Transdermal Daily     Continuous Infusions:   Pertinent Info/Orders/Treatment Plan:  Dulera inhaler, Nicotine patch for smoking cessation, prn Morphine or Percocet for pain, Potassium replacement protocol, kyphoplasty, TLSO back brace, up with assistance.    Diet: DIET GENERAL;  Dietary Nutrition Supplements: Standard High Calorie Oral Supplement   DVT Prophylaxis: Lovenox  Smoking status:

## 2018-06-22 NOTE — PROGRESS NOTES
Pneumonia     Prostate cancer ([facility] [facility]     Shingles     Atypical    Type II or unspecified type diabetes mellitus without mention of complication, not stated as uncontrolled      Past Surgical History:   Procedure Laterality Date    COLONOSCOPY      EYE SURGERY      bilateral cataracts    LEG SURGERY Right     amanda from hip to knee    SKIN BIOPSY             Subjective  Chart Reviewed: Yes (med chart)  Patient assessed for rehabilitation services?: Yes  Family / Caregiver Present: Yes (daughter)         General:  Overall Orientation Status: Within Functional Limits    Vision: Impaired  Vision Exceptions: Wears glasses at all times    Hearing: Exceptions to [facility]  Hearing Exceptions: Hard of hearing/hearing concerns, Bilateral hearing aid (has hearing aides, at home)         Pain:  Pain Assessment  Patient Currently in Pain: Yes  Pain Assessment: 0-10  Pain Level: 6  Pain Type: Acute pain;Chronic pain  Pain Location: Back       Social/Functional History:  Lives With: Alone (2 puppies)  Type of Home: House  Home Layout: Two level, Able to Live on Main level with bedroom/bathroom [facility]; basement with chair lift access)  Home Access: Stairs to enter with rails  Entrance Stairs - Number of Steps: 4 to home, 1 to kitchen  Home Equipment: Standard walker, Rolling walker, 4 wheeled walker, Cane, [employer]     Bathroom Shower/Tub: Tub/Shower unit  Bathroom Toilet: Handicap height  Bathroom Equipment: Grab bars in shower, Shower chair, Hand-held shower, Grab bars around toilet  Bathroom Accessibility: Accessible    Receives Help From: Family  ADL Assistance: Independent  Homemaking Assistance:  (pt does own laundry (includes ironing), assist with cleaning every other week, min assist with cookng, able to perform SMP many meals delivered or microwavable)       Ambulation Assistance: Independent (without device)  Transfer Assistance: Independent    Active : Yes  Mode of Transportation: Car (recently bought a

## 2018-06-22 NOTE — CONSULTS
Hcl] Other (See Comments)     Drops b/p       Prior to Visit Medications    Medication Sig Taking? Authorizing Provider   HYDROcodone-acetaminophen (NORCO) 5-325 MG per tablet Take 1 tablet by mouth every 6 hours as needed for Pain. . Yes Historical Provider, MD   guaiFENesin (MUCINEX) 600 MG extended release tablet Take 600 mg by mouth 2 times daily Yes Historical Provider, MD   budesonide-formoterol (SYMBICORT) 160-4.5 MCG/ACT AERO Inhale 2 puffs into the lungs 2 times daily Rinse mouth after its use. Yes Claudene Oh, MD   tiotropium (SPIRIVA HANDIHALER) 18 MCG inhalation capsule Inhale 1 capsule into the lungs daily 1 capsule via inhalation daily. Yes Claudene Oh, MD   albuterol sulfate HFA (VENTOLIN HFA) 108 (90 Base) MCG/ACT inhaler Inhale 2 puffs into the lungs every 6 hours as needed for Wheezing or Shortness of Breath Yes Claudene Oh, MD   Multiple Vitamins-Minerals (CENTRUM SILVER 50+MEN PO) Take 1 tablet by mouth Yes Historical Provider, MD   diphenhydrAMINE-APAP, sleep, (TYLENOL PM EXTRA STRENGTH)  MG tablet Take 2 tablets by mouth nightly as needed for Sleep Yes Historical Provider, MD   leuprolide (LUPRON) 30 MG injection Inject 30 mg into the muscle once Indications: every 3 months Yes Historical Provider, MD   albuterol (PROVENTIL) (2.5 MG/3ML) 0.083% nebulizer solution Take 3 mLs by nebulization every 6 hours as needed for Wheezing or Shortness of Breath Yes Claudene Oh, MD   acetaminophen 650 MG TABS Take 650 mg by mouth every 4 hours as needed Yes Kailey Romero MD   aspirin 81 MG EC tablet Take 81 mg by mouth daily.    Yes Historical Provider, MD   fluticasone (FLONASE) 50 MCG/ACT nasal spray 2 sprays by Nasal route daily  Claudene Oh, MD   CHEMOTHERAPY   Historical Provider, MD     Past Medical History:   Diagnosis Date    Acute urinary retention     Arthritis     BPH without urinary obstruction     Cancer ([facility] [address]     multiple myeloma &

## 2018-06-22 NOTE — PLAN OF CARE
Problem: Impaired respiratory status  Goal: Clear lung sounds  Outcome: Ongoing  Dulera MDI started to help keep lungs clear.

## 2018-06-23 NOTE — PROGRESS NOTES
Hospitalist Progress Note    Patient:  Lobo Wise      Unit/Bed:5K-24/024-A    YOB: 1932    MRN: [id number]       Acct: [de-identified]     PCP: David Jha DO    Date of Admission: 6/21/2018    Chief Complaint: LBP     Hospital Course: 80 y. o. male who presented to [facility] with back pain, inability to ambulate - this has been getting worse over the past weeks to the point he hasn't been walking this week.  MRI revealed pathological compression fractures, further work up and management in progress.     Subjective: He was seen and examined today at the bedside. No overnight events    Medications:  Reviewed    Infusion Medications   Scheduled Medications    tiotropium  18 mcg Inhalation Daily    multivitamin  1 tablet Oral Daily    guaiFENesin  600 mg Oral BID    fluticasone  2 spray Nasal Daily    mometasone-formoterol  2 puff Inhalation BID    aspirin  81 mg Oral Daily    sodium chloride flush  10 mL Intravenous 2 times per day    enoxaparin  40 mg Subcutaneous Q24H    nicotine  1 patch Transdermal Daily    lidocaine  1 patch Transdermal Daily     PRN Meds: albuterol sulfate HFA, albuterol, acetaminophen, oxyCODONE-acetaminophen **OR** oxyCODONE-acetaminophen, sodium chloride flush, potassium chloride **OR** potassium chloride **OR** potassium chloride, magnesium hydroxide, ondansetron, diphenhydrAMINE **AND** acetaminophen      Intake/Output Summary (Last 24 hours) at 06/23/18 1800  Last data filed at 06/23/18 1208   Gross per 24 hour   Intake             1360 ml   Output                0 ml   Net             1360 ml       Diet:  DIET GENERAL;  Dietary Nutrition Supplements: Standard High Calorie Oral Supplement    Exam:  BP (!) 142/67   Pulse 89   Temp 97.4 °F (36.3 °C) (Oral)   Resp 16   Ht 5' 8\" (1.727 m)   Wt 188 lb (85.3 kg)   SpO2 96%   BMI 28.59 kg/m²     General appearance: No apparent distress, appears stated age and cooperative.   HEENT: Pupils equal, normally aligned. There is normal marrow signal throughout. There is no bone marrow edema. There are no compression fractures. No pars defects are noted. The discs have normal signal throughout. The visualized aspects of the distal spinal cord are normal. The nerve roots of the cauda equina and the tip of the conus are normal.      There are no gross abnormalities in the distal thoracic spine. On the axial images, at L1-L2,      At L2-3,      At L3-4,      At L4-5,      At L5-S1,         There is no abnormal enhancement. There are no suspicious findings in the visualized aspects of the retroperitoneum and paraspinal soft tissues. IMPRESSION:                  **This report has been created using voice recognition software. It may contain minor errors which are inherent in voice recognition technology. **      Final report electronically signed by Dr. Mel Kingston on 6/22/2018 3:28 AM          Diet: DIET GENERAL;  Dietary Nutrition Supplements: Standard High Calorie Oral Supplement    DVT prophylaxis: [] Lovenox                                 [x] SCDs                                 [] SQ Heparin                                 [] Encourage ambulation           [] Already on Anticoagulation     Disposition:    [x] Home       [] TCU       [] Rehab       [] Psych       [] SNF       [] [facility]       [] Other-    Code Status: Full Code        Assessment/Plan:        Active Hospital Problems    Diagnosis Date Noted    Pathologic compression fracture of lumbar vertebra ([address] Shavon Christiansen 06/21/2018    Severe malnutrition ([address] [E43] 06/21/2018    Chronic anemia [D64.9]     Multiple myeloma ([address] [C90.00]     Controlled type 2 diabetes mellitus without complication ([address] [W59.6]     Bone metastasis ([address] [C79.51] 03/22/2016    Chronic leukopenia [D72.819] 02/01/2016    Anemia in neoplastic disease [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

## 2018-06-23 NOTE — FLOWSHEET NOTE
06/23/18 0107   Encounter Summary   Services provided to: Patient and family together   Referral/Consult From: [facility] Family members   Place of [facility] None   Continue Visiting Yes  (6/22/18 continue support)   Complexity of Encounter Moderate   Length of Encounter 15 minutes   Routine   Type Initial   Assessment Anxious   Intervention Nurtured hope; Active listening;Discussed illness/injury and it's impact   Spiritual/Confucianist   Type Spiritual support     This staff visited patient during rounding to provide initial spiritual assessment and support. At the time of entry, patient was laying in bed and another family member was also in the room. Patient stated, \"I am in chronic pain because of my back issue. Patient daughter stated that patient has been suffering from severe pain so the family decided it was time for them to do something about it because he could no longer tolerate the pain. So he is here for a procedure. Patient's daughter also said that the family might have a conversation with doctor and  to see if patient can go to a rehab center after the back procedure since he might not be able to go home at this time. Patient indicated that the plan would work for him. This staff offered prayer for a since of direction and clear discernment as the make plan for patient in the future. [place] service remains available to this patient at anytime as needed.

## 2018-06-23 NOTE — PLAN OF CARE
Problem: Impaired respiratory status  Goal: Clear lung sounds  Outcome: Ongoing  mdi to maintain open airways

## 2018-06-24 NOTE — PROGRESS NOTES
Hospitalist Progress Note    Patient:  Beryl Tabares      Unit/Bed:5K-24/024-A    YOB: 1932    MRN: [id number]       Acct: [de-identified]     PCP: Olga Chowdary DO    Date of Admission: 6/21/2018    Chief Complaint: LBP     Hospital Course: 80 y. o. male who presented to [address] [place] [address] with back pain, inability to ambulate - this has been getting worse over the past weeks to the point he hasn't been walking this week.  MRI revealed pathological compression fractures, further work up and management in progress.     Subjective: He was seen and examined today at the bedside. No overnight events. Medications:  Reviewed    Infusion Medications   Scheduled Medications    tiotropium  18 mcg Inhalation Daily    multivitamin  1 tablet Oral Daily    guaiFENesin  600 mg Oral BID    fluticasone  2 spray Nasal Daily    mometasone-formoterol  2 puff Inhalation BID    aspirin  81 mg Oral Daily    sodium chloride flush  10 mL Intravenous 2 times per day    enoxaparin  40 mg Subcutaneous Q24H    nicotine  1 patch Transdermal Daily    lidocaine  1 patch Transdermal Daily     PRN Meds: albuterol sulfate HFA, albuterol, acetaminophen, oxyCODONE-acetaminophen **OR** oxyCODONE-acetaminophen, sodium chloride flush, potassium chloride **OR** potassium chloride **OR** potassium chloride, magnesium hydroxide, ondansetron, diphenhydrAMINE **AND** acetaminophen      Intake/Output Summary (Last 24 hours) at 06/24/18 1149  Last data filed at 06/24/18 0631   Gross per 24 hour   Intake              600 ml   Output                0 ml   Net              600 ml       Diet:  DIET GENERAL;  Dietary Nutrition Supplements: Standard High Calorie Oral Supplement    Exam:  BP (!) 123/59   Pulse 86   Temp 98.3 °F (36.8 °C) (Oral)   Resp 18   Ht 5' 8\" (1.727 m)   Wt 188 lb (85.3 kg)   SpO2 92%   BMI 28.59 kg/m²     General appearance: No apparent distress, appears stated age and cooperative.   HEENT: Pupils hypertension [I10]        1. Pathologic compression fracture  -Secondary to MM  -Ortho consulted; recommendations include TLSO brace  -IR scheduled possible kyphoplasty on 6/25 or 6/26  -Oncology consulted, recommendations appreciated  -Continue analgesics  -PT/OT     2. DM type 2  -Resume home meds     3.  HTN  -Resume home meds        Electronically signed by Marilu Roland MD on 6/24/2018 at 11:49 AM

## 2018-06-25 NOTE — PROGRESS NOTES
Physical Therapy   [address] ONC MED 5K - [id number]    Time In: 0840  Time Out: 5419  Timed Code Treatment Minutes: 23 Minutes  Minutes: 23          Date: 2018  Patient Name: Ajit Storye,  Gender:  male        MRN: [id number]  : 1932  (80 y.o.)  Referral Date : 18  Referring Practitioner: MARJORIE Weiss DO  Diagnosis: pathological compression fracture of lumbar vertebra  Additional Pertinent Hx: 80 y.o. male who presented to [address] [place] [address] with back pain, inability to ambulate - this has been getting worse over the past weeks to the point he hasn't been walking this week. Patient is very pleasant, good historian. He admits to severe pain at this time, worse if he tries to get up. Pain is sharp, lower and central.  It is slightly improved compared to arrival but still quite severe.      Past Medical History:   Diagnosis Date    Acute urinary retention     Arthritis     BPH without urinary obstruction     Cancer (HCC)     multiple myeloma & bone    Chronic kidney disease     COPD (chronic obstructive pulmonary disease) (HCC)     Disease of blood and blood forming organ     History of blood transfusion     Hyperlipidemia     Hypertension     Insomnia     Pneumonia     Prostate cancer ([facility] [address]     Shingles     Atypical    Type II or unspecified type diabetes mellitus without mention of complication, not stated as uncontrolled      Past Surgical History:   Procedure Laterality Date    COLONOSCOPY      EYE SURGERY      bilateral cataracts    LEG SURGERY Right     amanda from hip to knee    SKIN BIOPSY         Restrictions/Precautions:  General Precautions, Fall Risk                    Other position/activity restrictions: TLSO ordered, RN ok'd activity       Prior Level of Function:  ADL Assistance: Independent  Homemaking Assistance:  (pt does own laundry (includes ironing), assist with cleaning every other week, min assist with cookng, able to perform SMP many meals delivered or microwavable)  Ambulation Assistance: Independent  Transfer Assistance: Independent  Additional Comments: pt reports no use of AD PTA    Subjective:  Chart Reviewed: Yes  Family / Caregiver Present: No  Subjective: RN approved session. Pt resting in bed upon arrival, pleasant and agreeable to therapy. Pain:  Denies. Social/Functional:  Lives With: Alone  Type of Home: House  Home Layout: Able to Live on Main level with bedroom/bathroom  Home Access: Stairs to enter with rails  Entrance Stairs - Number of Steps: 4 steps onto porch with B rails; 1 step into home with no rails  Entrance Stairs - Rails: Both  Home Equipment: Standard walker, Rolling walker, 4 wheeled walker, Cane, Wheelchair-manual     Objective:  Supine to Sit: Stand by assistance (Cuing for log roll technique. )    Transfers  Sit to Stand: Stand by assistance (From EOB)  Stand to sit: Stand by assistance       Ambulation 1  Surface: level tile  Device: No Device  Assistance: Contact guard assistance;Minimal assistance  Quality of Gait: Good neetu. Min forward flexed posture. 1 LOB requiring Min A to correct, pt states \"I need to pay attention to what I am doing\". Distance: 250 feet x1     Exercises:  Exercises  Comments: Seated in bedside chair pt completed BLE ankle pumps, LAQ, marches, reclined hip abd/add, heel slides, quad set, SLR all x15 reps to increase strength for improved functional mobility. Activity Tolerance:  Activity Tolerance: Patient Tolerated treatment well    Assessment: Body structures, Functions, Activity limitations: Decreased functional mobility , Decreased endurance, Decreased balance  Assessment: Pt tolerated session well. Limited by decreased endurance and decreased balance. Home with home health and assist as needed.    Prognosis: Good  REQUIRES PT FOLLOW UP: Yes  Discharge Recommendations: Continue to assess pending progress, Home with

## 2018-06-25 NOTE — PROGRESS NOTES
Department of Radiology  Post Procedure Progress Note      Pre-Procedure Diagnosis:  Fracture L2, myeloma, prostate cancer    Procedure Performed:  Biopsy L2    Anesthesia: local / versed and fentanyl    Findings: successful    Immediate Complications:  None    Estimated Blood Loss: minimal    SEE DICTATED PROCEDURE NOTE FOR COMPLETE DETAILS.     Malgorzata Tam MD   6/25/2018 2:32 PM

## 2018-06-25 NOTE — PROGRESS NOTES
03/22/2016    Chronic leukopenia [D72.819] 02/01/2016    Anemia in neoplastic disease [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 hypertension [I10]        1. Pathologic compression fracture  -Secondary to MM  -Ortho consulted; recommendations include TLSO brace  -Kyphoplasty  by IR today  -Oncology consulted, recommendations appreciated  -Continue analgesics  -PT/OT     2. DM type 2  -Resume home meds     3.  HTN  -Resume home meds        Electronically signed by Cynthia Mcdowell MD on 6/25/2018 at 1:13 PM

## 2018-06-25 NOTE — PROGRESS NOTES
[facility]  INPATIENT OCCUPATIONAL THERAPY  STR ONC MED 5K  DAILY NOTE    Time:  Time In: 1011  Time Out: 1056  Timed Code Treatment Minutes: 45 Minutes  Minutes: 45    Date: 2018  Patient Name: Jose Chase,   Gender: male      Room: [facility]-A  MRN: [id number]  : 1932  (80 y.o.)  Referring Practitioner: Anish Osorio DO  Diagnosis: Pathologic compression fracture of lumbar vertebra  Additional Pertinent Hx: 80 y.o. male admitted for pathologic fracture of L2. He was directly admitted by his orthopedic surgeon, Dr. Ewa Garza, under the Hospitalist service. The patient states he has had gradually worsening pain in his low back over the last month. He was seen by his PCP on 18 and an xray of the lumbar spine was obtained which showed age-indeterminate superior endplate depression noted at L2, marked degenerative disc disease. The patient continued to have progressive pain and was seen on 18 at [facility] where a lumbar xray showed compression fracture of the L2 vertebrae. He was to have a MRI on 18 with possibly kyphoplasty after. Due to uncontrolled pain he was admitted to [facility]. MRI of the lumbar spine was completed showing moderate to severe compression fracture of the L2 vertebral body, compatible with an acute or subacute compression fracture, suspicion of pathologic fracture. The patient denies any recent trauma, falls. Plan for kyphoplasty and TLSO.     Past Medical History:   Diagnosis Date    Acute urinary retention     Arthritis     BPH without urinary obstruction     Cancer (HCC)     multiple myeloma & bone    Chronic kidney disease     COPD (chronic obstructive pulmonary disease) (HCC)     Disease of blood and blood forming organ     History of blood transfusion     Hyperlipidemia     Hypertension     Insomnia     Pneumonia     Prostate cancer ([facility] [address]     Shingles     Atypical    Type II or unspecified type diabetes mellitus without mention of complication, not stated as uncontrolled      Past Surgical History:   Procedure Laterality Date    COLONOSCOPY      EYE SURGERY      bilateral cataracts    LEG SURGERY Right     amanda from hip to knee    SKIN BIOPSY         Restrictions/Precautions:  General Precautions, Fall Risk  Other position/activity restrictions: TLSO ordered, RN ok'd activity       Prior Level of Function:  ADL Assistance: Independent  Homemaking Assistance:  (pt does own laundry (includes ironing), assist with cleaning every other week, min assist with cookng, able to perform SMP many meals delivered or microwavable)  Ambulation Assistance: Independent  Transfer Assistance: Independent  Additional Comments: pt reports no use of AD PTA    Subjective  Subjective: Patient plesant and cooperative sitting in recliner chair upon arrival requested shower at this time. Daughter present. Patient refusing TLSO brace this date, daughter reporting makes patient feel claustrophobic. Patient scheduled to have kyphoplasty at 2pm this date    Pain:  Pain Assessment  Patient Currently in Pain: Denies     Objective  ADL  Grooming: Stand by assistance (Seated while washing/drying hair)  UE Bathing: Stand by assistance (seated on shower bench to wash UE )  LE Bathing: Stand by assistance (Close SBA while standing to wash goldie and bottom, min cues for safety needed)  UE Dressing: Stand by assistance (ladarius/doff hospital shirt, vcs for safety to sit to complete)  LE Dressing: Minimal assistance (Min difficulty donning/doffing socks, assist with donning, augie Farr however reports daughter \"takes care of it\")  Toileting: Stand by assistance (standing to void)     Transfers  Sit to stand: Contact guard assistance  Stand to sit: Contact guard assistance  Toilet Transfers  Toilet - Technique: Ambulating  Equipment Used: Standard toilet  Toilet Transfer: Stand by assistance  Shower Transfers  Shower - Transfer From: Other  Shower - Transfer Type:  To and goal 2: Pt to tolerate dynamic standing task for greater than 7 mins with 1 UE support at SBA for increased ind with simple IADL and standing ADL tasks  Short term goal 3: Pt to complete light resistance BUE strengthening for increasing functional strength and ROM required for BADL task completion  Short term goal 4: Pt to complete BADL routine with no greater than SBA for increased safety with self cares in dc environment  Long term goals  Time Frame for Long term goals : NA d/t [employer]MAN

## 2018-06-25 NOTE — PROGRESS NOTES
Oncology consult was requested to follow up on above. The patient is followed by Dr. Debbie Kemp for history of multiple myeloma. Oncology history as listed below. Over the last 24 hours: The patient underwent biopsy of L2 by IR today. Kyphoplasty was not completed as the patient's pain was well controlled. He is currently sitting in bedside chair with abdominal binder in place. He denies fever, chills, shortness of breath, abdominal pain, nausea, vomiting, bowel or urinary changes. He denies pain at this time. Oncology History  Per Dr. Debbie Kemp' progress note on 4/19/18  Dori Huang is a 80 y. o. male with a history of myeloma and prostate cancer. The patient was initially diagnosed in May, 2012 He has associated leukopenia and anemia. Sammie Benton was noted to have an elevated total protein with an elevated monoclonal protein, IgA subtype. The patient is here today for follow up evaluation. His general condition has been fairly stable. Jeffrey Reis continues to receive therapy with Velcade without significant complications.  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 Jeffrey Reis has not had significant progression of his malignancy.  The patient reports that his chronic pain has been stable. Jeffrey Reis does not have any kidney or urinary difficulties at this time. [facility] prostate cancer is also well controlled.  The patient denies fever or other signs of infection.  His bowel and bladder habits have been fairly stable.   ECOG performance status is level I.  Meds    Current Medications    bacitracin zinc   Topical Once    tiotropium  18 mcg Inhalation Daily    multivitamin  1 tablet Oral Daily    guaiFENesin  600 mg Oral BID    fluticasone  2 spray Nasal Daily    mometasone-formoterol  2 puff Inhalation BID    aspirin  81 mg Oral Daily    sodium chloride flush  10 mL Intravenous 2 times per day    enoxaparin  40 mg Subcutaneous Q24H    nicotine  1 patch Transdermal Daily    lidocaine  1 patch Transdermal Daily Cancer Father     Cancer Brother         liver     ROS     Review of Systems   As noted in HPI/Subjective   Vitals     height is 5' 8\" (1.727 m) and weight is 188 lb (85.3 kg). His oral temperature is 98.4 °F (36.9 °C). His blood pressure is 127/70 and his pulse is 100. His respiration is 16 and oxygen saturation is 95%. Exam   Physical Exam   General appearance: No apparent distress, chronically ill appearing, elderly and cooperative. HEENT: Pupils equal, round, and reactive to light. Conjunctivae/corneas clear. Oral mucosa moist, dentures in place. Neck: Supple, with full range of motion. No jugular venous distention. Trachea midline. Respiratory:  Normal respiratory effort. Clear to auscultation, bilaterally without Rales/Wheezes/Rhonchi. Cardiovascular: Regular rate and rhythm with normal S1/S2   Abdomen: Soft, non-tender, non-distended with active bowel sounds. Musculoskeletal: limited range of motion  Skin: Skin color, texture, turgor normal.  No rashes or lesions. Neurologic:  Neurovascularly intact without any focal sensory/motor deficits. Psychiatric: Alert and oriented  Capillary Refill: Brisk,< 3 seconds   Peripheral Pulses: +2 palpable, equal bilaterally        Labs   CBC  Recent Labs      06/25/18   0828   WBC  1.4*   RBC  3.23*   HGB  10.3*   HCT  31.2*   MCV  96.6*   MCH  32.0*   MCHC  33.1   RDW  15.5*   PLT  201   MPV  7.8      INR  Recent Labs      06/25/18   0828   INR  1.15*     PTT  Recent Labs      06/25/18   0828   APTT  46.4*       Radiology        Xr Lumbar Spine (2-3 Views)    Result Date: 6/1/2018  PROCEDURE: XR LUMBAR SPINE (2-3 VIEWS) CLINICAL INFORMATION: Acute right-sided low back pain without sciatica COMPARISON: No prior study. TECHNIQUE:  Lumbar spine 2 views  FINDINGS: 2 views of the lumbar spine were obtained. There is age-indeterminate superior endplate depression noted at L2. There is marked lower lumbar facet arthrosis, most pronounced at L3-4 through L5-S1. Moderate to marked degenerative disc disease is demonstrated at multiple levels, most pronounced at the L5-S1 level. Where there is disc space narrowing, hypertrophic endplate change and endplate sclerosis. There is also associated neural foraminal narrowing at L5-S1.     1. There is age-indeterminate superior endplate depression noted at L2. Correlate clinically for point tenderness. 2. Marked lower lumbar facet arthrosis is demonstrated, most pronounced at L3-4 through L5-S1. 3. Moderate to severe multilevel lower lumbar degenerative disc disease, most pronounced at L5-S1. **This report has been created using voice recognition software. It may contain minor errors which are inherent in voice recognition technology. ** Final report electronically signed by Dr. Odessa Rivera on 6/1/2018 4:37 PM    Mri Lumbar Spine W Wo Contrast    Result Date: 6/22/2018  PROCEDURE: MRI LUMBAR SPINE W WO CONTRAST CLINICAL INFORMATION: LOW BACK PAIN, ACUTE, MULTIPLE MYELOMA, SPINE FRACTURE, . COMPARISON: May 23, 2016 TECHNIQUE: Sagittal and axial T1 and T2-weighted images were obtained to the lumbar spine. Postcontrast axial and sagittal T1-weighted images were also obtained. FINDINGS: There is interval fracture of the L2 vertebral body with approximately 40-50% height loss centrally focused with generalized edema throughout the marrow. There is minimal retropulsion of the dorsal superior endplate with mild impingement upon the ventral  thecal sac. Cord signal remains intact with normal tapering of the conus at T12-L1. Frontal soft tissues are unremarkable. Disc spaces: L1-2: The disc space is narrow. The central canal and foramen remain normal. L2-3: There is mild retropulsion of the dorsal superior endplate of L2 which impinges minimally upon the ventral thecal sac. Bilateral foramen remain patent.  L3-4: Disc space is narrow with a posterior broad-based disc bulge which extends into the left lateral recess combining with facet contain minor errors which are inherent in voice recognition technology. ** Final report electronically signed by Dr. Luke Hong on 6/25/2018 2:49 PM    Ir Fluoroscopy Less Than 1 Hour    Result Date: 6/22/2018  Re: Inpatient evaluation for vertebroplasty. Dear Dr. Harjinder Casanova: I had the pleasure of spending 30 minutes with your patient, Christie Beltran, in the specials suite of [facility] radiology department, to evaluate him for possible kyphoplasty and bone marrow biopsy given his prior history of multiple myeloma and prostate  cancer, at your request. As you will recall, Mr. Daniel Jaimes is a very pleasant 80year old Male who presented with gradual onset of back pain approximately 4 weeks ago after helping to build a place that for the patient's grandson. The patient underwent x-ray  evaluation of the lumbar spine on 5/31/2018 demonstrating an age-indeterminate superior plate compression deformity at L2. Subsequently, the patient was admitted for pain management and MRI imaging of the lumbar spine. He underwent MRI evaluation on 6/21/2018 demonstrating acute compression fracture of L2 with minimal retropulsion. There is approximately 40-50% loss of vertebral body height at L2. . The patient is currently on aspirin but has been off of his aspirin report issued Saturday, June 16. The patient also received Lovenox yesterday. He is allergic to shellfish and Flomax. The nature and purpose of vertebroplasty as well as the potential risks, complications and expected outcomes were discussed at length with Mr. Daniel Jaimes and he was given the opportunity to ask several questions, which were answered to his satisfaction. He was then placed on the fluoroscopic table in a prone position and the spine was percussed and palpated in an effort to elicit and localize the pain. Fluoroscopic spot films were obtained to document the location of the pain and the appearance of her spine.  There was 8 out of 10 pain to palpation at the L2 level where the patient's known fracture is located. PLAN: Mr. Atiya Noland is suffering from a pathologic compression fracture of L2 , secondary to osteoporosis. The is an appropriate candidate for kyphoplasty and bone marrow biopsy at this level and I believe the will obtain significant pain relief from this procedure. The patient is tentatively scheduled for kyphoplasty and bone marrow biopsy on Monday, 6/25/2018. As always, thank you for allowing me to participate in the care of your patient. If you have any questions, please feel free to contact me at your convenience. Sincerely, Nithya Jones MD **This report has been created using voice recognition software. It may contain minor errors which are inherent in voice recognition technology. ** Final report electronically signed by Dr. Stevenson Gonzalez on 6/22/2018 6:59 PM      Assessment/Recommendations    1. Multiple Myeloma - followed by Dr. Johnny Suggs with full oncology history as above. The patient receives weekly Velcade with last injection on 6/14/18. He was due for Velcade on 6/21/18.              -Will hold Velcade during acute hospitalization              -S/P L2 biopsy today on 6/25/18. Will follow pathology. 2. L2 Compression Fracture - as noted on MRI of Lspine 6/21/18. orthopedic surgery following, possible pathologic fracture from multiple myeloma. Patient denies any falls or trauma in the last month. -S/P L2 biopsy 6/25/18. kyphoplasty not completed due to no pain at time of procedure. Ortho surgery following. 3. 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 WBC 1700 on 6/14/18. Obtain CBC in am.   4. Chronic Anemia - likely chemotherapy induced. Baseline Hgb 10-11's since 2012. Will continue to monitor. Other comorbidities: history of prostate cancer, COPD, multilevel degenerative disc disease      Case discussed with nurse and patient/family. Questions and concerns addressed.   Discussed with Dr. Johnny Suggs     Electronically signed by   Darshana Elise PA-C on 6/25/2018 at 4:35 PM

## 2018-06-25 NOTE — PROGRESS NOTES
[facility]  Sedation/Analgesia History & Physical    Pt Name: Ajit Storey  MRN: [id number]  YOB: 1932  Provider Performing Procedure: Spencer Manning MD  Primary Care Physician: Arron Uribe DO    PRE-PROCEDURE   DNR-CCA/DNR-CC []Yes [x]No  Brief History/Pre-Procedure Diagnosis: Fracture L2, myeloma, prostate cancer          MEDICAL HISTORY  []CAD/Valve  []Liver Disease  []Lung Disease []Diabetes  []Hypertension []Renal Disease  []Additional information:       has a past medical history of Acute urinary retention; Arthritis; BPH without urinary obstruction; Cancer ([facility] [address] Chronic kidney disease; COPD (chronic obstructive pulmonary disease) ([facility] [address] [facility] Disease of blood and blood forming organ; History of blood transfusion; Hyperlipidemia; Hypertension; Insomnia; Pneumonia; Prostate cancer ([facility] [address] Shingles; and Type II or unspecified type diabetes mellitus without mention of complication, not stated as uncontrolled. SURGICAL HISTORY   has a past surgical history that includes Colonoscopy; skin biopsy; eye surgery; and Leg Surgery (Right).   Additional information:       ALLERGIES   Allergies as of 06/21/2018 - Review Complete 06/21/2018   Allergen Reaction Noted    Shellfish-derived products  03/10/2015    Flomax [tamsulosin hcl] Other (See Comments) 05/18/2017     Additional information:       MEDICATIONS   Coumadin Use Last 5 Days [x]No []Yes  Antiplatelet drug therapy use last 5 days  [x]No []Yes  Other anticoagulant use last 5 days  [x]No []Yes    Current Facility-Administered Medications:     0.45 % sodium chloride infusion, , Intravenous, Continuous, Berna Rollins MD, Last Rate: 20 mL/hr at 06/25/18 1238    ceFAZolin (ANCEF) 1 g in dextrose 5 % 50 mL IVPB (premix), 1 g, Intravenous, 60 Min Pre-Op, Berna Rollins MD, Last Rate: 100 mL/hr at 06/25/18 1427, 1 g at 06/25/18 1427    tiotropium (SPIRIVA) inhalation capsule 18 mcg, 18 mcg, Inhalation, Daily, Evelyn Mcclure, DO, 18 mcg at 06/25/18 1115    multivitamin 1 tablet, 1 tablet, Oral, Daily, Anish Osorio, DO, 1 tablet at 06/24/18 2007    guaiFENesin [facility]) extended release tablet 600 mg, 600 mg, Oral, BID, Racheln Devon, DO, 600 mg at 06/24/18 2007    fluticasone (FLONASE) 50 MCG/ACT nasal spray 2 spray, 2 spray, Nasal, Daily, Anish Schultek, DO, 2 spray at 06/25/18 0915    mometasone-formoterol (DULERA) 200-5 MCG/ACT inhaler 2 puff, 2 puff, Inhalation, BID, Anish Osorio, DO, 2 puff at 06/25/18 1115    aspirin EC tablet 81 mg, 81 mg, Oral, Daily, Anish Schultek, DO    albuterol sulfate  (90 Base) MCG/ACT inhaler 2 puff, 2 puff, Inhalation, Q6H PRN, Anish Osorio, DO    albuterol (PROVENTIL) nebulizer solution 2.5 mg, 2.5 mg, Nebulization, Q6H PRN, Anish Schultek, DO    acetaminophen (TYLENOL) tablet 650 mg, 650 mg, Oral, Q4H PRN, Anish Schultek, DO    oxyCODONE-acetaminophen (PERCOCET) 5-325 MG per tablet 1 tablet, 1 tablet, Oral, Q4H PRN, 1 tablet at 06/22/18 2130 **OR** oxyCODONE-acetaminophen (PERCOCET) 7.5-325 MG per tablet 1 tablet, 1 tablet, Oral, Q4H PRN, Anish Osorio, DO, 1 tablet at 06/21/18 1712    sodium chloride flush 0.9 % injection 10 mL, 10 mL, Intravenous, 2 times per day, Anish Schultek, DO, 10 mL at 06/25/18 1244    sodium chloride flush 0.9 % injection 10 mL, 10 mL, Intravenous, PRN, Anish Schultek, DO    potassium chloride (KLOR-CON M) extended release tablet 40 mEq, 40 mEq, Oral, PRN **OR** potassium chloride 20 MEQ/15ML (10%) oral solution 40 mEq, 40 mEq, Oral, PRN **OR** potassium chloride 10 mEq/100 mL IVPB (Peripheral Line), 10 mEq, Intravenous, PRN, Anish Osorio, DO    magnesium hydroxide (MILK OF MAGNESIA) 400 MG/5ML suspension 30 mL, 30 mL, Oral, Daily PRN, Anish Osorio,     ondansetron [facility]) injection 4 mg, 4 mg, Intravenous, Q6H PRN, Anish Osorio DO    enoxaparin (LOVENOX) injection 40 mg, 40 mg, Subcutaneous, Q24H, Anish Osorio DO, 40 mg at 06/23/18 1640    diphenhydrAMINE (BENADRYL) tablet 50 mg, 50 mg, Oral, Nightly PRN, 50 mg at 06/24/18 2211 **AND** acetaminophen (TYLENOL) tablet 1,000 mg, 1,000 mg, Oral, Nightly PRN, Emmanuel Harris, , 1,000 mg at 06/24/18 2211    nicotine (NICODERM CQ) 14 MG/24HR 1 patch, 1 patch, Transdermal, Daily, Emmanuel Harris DO, 1 patch at 06/25/18 1102    lidocaine (LIDODERM) 5 % 1 patch, 1 patch, Transdermal, Daily, Emmanuel Harris DO, 1 patch at 06/25/18 0920  Prior to Admission medications    Medication Sig Start Date End Date Taking? Authorizing Provider   HYDROcodone-acetaminophen (NORCO) 5-325 MG per tablet Take 1 tablet by mouth every 6 hours as needed for Pain. .   Yes Historical Provider, MD   guaiFENesin (MUCINEX) 600 MG extended release tablet Take 600 mg by mouth 2 times daily   Yes Historical Provider, MD   budesonide-formoterol (SYMBICORT) 160-4.5 MCG/ACT AERO Inhale 2 puffs into the lungs 2 times daily Rinse mouth after its use. 4/24/18 4/24/19 Yes Trish Sacks, MD   tiotropium (SPIRIVA HANDIHALER) 18 MCG inhalation capsule Inhale 1 capsule into the lungs daily 1 capsule via inhalation daily.  4/24/18 4/24/19 Yes Trish Sacks, MD   albuterol sulfate HFA (VENTOLIN HFA) 108 (90 Base) MCG/ACT inhaler Inhale 2 puffs into the lungs every 6 hours as needed for Wheezing or Shortness of Breath 4/24/18  Yes Trish Sacks, MD   Multiple Vitamins-Minerals (CENTRUM SILVER 50+MEN PO) Take 1 tablet by mouth   Yes Historical Provider, MD   diphenhydrAMINE-APAP, sleep, (TYLENOL PM EXTRA STRENGTH)  MG tablet Take 2 tablets by mouth nightly as needed for Sleep   Yes Historical Provider, MD   leuprolide (LUPRON) 30 MG injection Inject 30 mg into the muscle once Indications: every 3 months   Yes Historical Provider, MD   albuterol (PROVENTIL) (2.5 MG/3ML) 0.083% nebulizer solution Take 3 mLs by nebulization every 6 hours as needed for Wheezing or Shortness of Breath 6/28/16  Yes Rajani RODRIGUEZ to nursing sedation/analgesia documentation record)    Kassi Franco MD  Electronically signed 6/25/2018 at 2:31 PM

## 2018-06-26 NOTE — PLAN OF CARE
Problem: Pain:  Goal: Pain level will decrease  Pain level will decrease   Outcome: Met This Shift  Has had no c/o pain this shift. He is wearing an abdominal binder when he is up. Problem: Nutrition  Goal: Optimal nutrition therapy  Outcome: Ongoing  Appetite fair for supper. Problem: Discharge Planning:  Goal: Patients continuum of care needs are met  Patients continuum of care needs are met   Outcome: Ongoing  Care plan reviewed with patient and family. Patient and family verbalize understanding of the plan of care and contribute to goal setting. He is planning to go home but his sister is going to stay with him. Problem: Falls - Risk of:  Goal: Will remain free from falls  Will remain free from falls   Outcome: Ongoing  Bed and chair alarm utilized. He needs reminded not to get up by him self but then he is compliant.

## 2018-06-26 NOTE — PROGRESS NOTES
Down for biopsy today. Did not have the kyphoplasty as he was not having pain and he told them his pain has been gone for 2 days. Vital signs remained stable and dressing on lower left back has some shadowing but has remained unchanged. He will not wear the turtle brace but did allow us to place an abdominal binder which he stated is comfortable. It gives him support with repositioning.

## 2018-06-26 NOTE — PROGRESS NOTES
[facility]    Patient - Christie Beltran   MRN -  [id number]   [facility] # - [de-identified]   - 1932      Date of Admission -  2018 10:29 AM  Date of evaluation -  2018  [place] - Edi Abdi MD Primary Care Physician - Wood Lalast, DO       Reason for Consult    Multiple Myeloma  Pathologic fracture of [address] [place] [address] Court Problems    Diagnosis Date Noted    Pathologic compression fracture of lumbar vertebra [facility]) Wendy Avilaannah 2018    Severe malnutrition ([address] [E43] 2018    Chronic anemia [D64.9]     Multiple myeloma ([address] [C90.00]     Controlled type 2 diabetes mellitus without complication ([address] [U91.2]     Bone metastasis ([facility] [C79.51] 2016    Chronic leukopenia [D72.819] 2016    Anemia in neoplastic disease [D63.0] 2015    BPH without urinary obstruction [N40.0]     Essential hypertension [I10]      HPI/Subjective   Christie Beltran is a 80 y.o. male admitted for pathologic fracture of L2. He was directly admitted by his orthopedic surgeon, Dr. Harjinder Casanova, under the Hospitalist service. The patient states he has had gradually worsening pain in his low back over the last month. He was seen by his PCP on 18 and an xray of the lumbar spine was obtained which showed age-indeterminate superior endplate depression noted at L2, marked degenerative disc disease. The patient continued to have progressive pain and was seen on 18 at [address] [place] [address] where a lumbar xray showed compression fracture of the L2 vertebrae. He was to have a MRI on 18 with possibly kyphoplasty after. Due to uncontrolled pain he was admitted to [facility]. MRI of the lumbar spine was completed showing moderate to severe compression fracture of the L2 vertebral body, compatible with an acute or subacute compression fracture, suspicion of pathologic fracture. The patient denies any recent trauma, falls. disc bulge combined with facet arthrosis and hypertrophy to produce chronic severe canal impingement with moderate right and mild left foramen stenosis L5-S1: Chronic degenerative disc marginal osteophyte changes with a posterior disc bulge are present. The central canal shows mild chronic narrowing. There is severe right and mild to moderate left foramen stenosis. The regional soft tissues are negative for acute pathology. There is no visualized aneurysm or obstructive uropathy. 1. Acute compression fractures of the L2 vertebral body with minimal retropulsion of the dorsal superior endplate resulting in mild central canal impingement. 2. Multilevel chronic degenerative changes described above at individual levels The lumbar vertebral bodies are normally aligned. There is normal marrow signal throughout. There is no bone marrow edema. There are no compression fractures. No pars defects are noted. The discs have normal signal throughout. The visualized aspects of the distal spinal cord are normal. The nerve roots of the cauda equina and the tip of the conus are normal. There are no gross abnormalities in the distal thoracic spine. On the axial images, at L1-L2, At L2-3, At L3-4, At L4-5, At L5-S1, There is no abnormal enhancement. There are no suspicious findings in the visualized aspects of the retroperitoneum and paraspinal soft tissues. IMPRESSION: **This report has been created using voice recognition software. It may contain minor errors which are inherent in voice recognition technology. ** Final report electronically signed by Dr. Misti Laguna on 6/22/2018 3:28 AM    Rodrigo Fields Guided Needle Placement    Result Date: 6/25/2018  PROCEDURE: PERCUTANEOUS VERTEBRAL BODY BIOPSY CLINICAL INFORMATION: Compression fracture L2. Multiple myeloma. Prostate cancer.  PERFORMED BY: Edgar Morrissey M.D. BIOPSY LEVEL: L2 vertebral body APPROACH: Transpedicular, L2, right side NEEDLES: 11-gauge kyphoplasty needles, coaxial 13-gauge biopsy device, coaxial vertebral body drill, 18-gauge Bard biopsy gun. Paula Juventino SPECIMENS OBTAINED: A single 13-gauge specimen was obtained in addition to 2 18-gauge core specimens. SEDATION: Versed 1 mg and fentanyl 50 mcg, IV; the patient was sedated for 30 minutes during the procedure and monitored with EKG and pulse ox monitoring devices by a registered nurse. FLUOROSCOPY TIME: 2 minutes 33 seconds FLUOROSCOPIC IMAGES: 6 TECHNIQUE: Signed informed consent was obtained prior to performing this procedure. The patient was sedated, as indicated above. Note: The patient was initially scheduled for kyphoplasty, however, since he is absolutely pain-free at this time, kyphoplasty was not performed, but we decided to proceed with a biopsy. With the patient on the fluoroscopic table in a prone position, the skin was marked, prepped, and draped in a sterile fashion. Following local anesthesia and utilizing aseptic technique, an 11-gauge vertebral augmentation needle was successfully inserted  into the the L2 vertebral body, as listed above, under fluoroscopic visualization, confirmed with fluoroscopic images. Biopsy specimens were then obtained utilizing the device is listed above. Spot films were obtained, confirming the various biopsy device is in place. Paula Juventino Spot Films were obtained at the end of this procedure for confirmation documentation and the needles were removed. The patient tolerated the procedure well. Status status post successful L2 vertebral body biopsy. As mentioned above, since the patient was having no pain at the L2 level at the time of today's visit, it was decided not to proceed with kyphoplasty at this time. . **This report has been created using voice recognition software. It may contain minor errors which are inherent in voice recognition technology. ** Final report electronically signed by Dr. Mariah Murray on 6/25/2018 2:49 PM    Ir Fluoroscopy Less Than 1 Hour    Result Date: 6/22/2018  Re: Inpatient evaluation for vertebroplasty. Dear Dr. Taqueria Avila: I had the pleasure of spending 30 minutes with your patient, Sahara Khan, in the specials suite of [facility] radiology department, to evaluate him for possible kyphoplasty and bone marrow biopsy given his prior history of multiple myeloma and prostate  cancer, at your request. As you will recall, Mr. Macario Dyer is a very pleasant 80year old Male who presented with gradual onset of back pain approximately 4 weeks ago after helping to build a place that for the patient's grandson. The patient underwent x-ray  evaluation of the lumbar spine on 5/31/2018 demonstrating an age-indeterminate superior plate compression deformity at L2. Subsequently, the patient was admitted for pain management and MRI imaging of the lumbar spine. He underwent MRI evaluation on 6/21/2018 demonstrating acute compression fracture of L2 with minimal retropulsion. There is approximately 40-50% loss of vertebral body height at L2. . The patient is currently on aspirin but has been off of his aspirin report issued Saturday, June 16. The patient also received Lovenox yesterday. He is allergic to shellfish and Flomax. The nature and purpose of vertebroplasty as well as the potential risks, complications and expected outcomes were discussed at length with Mr. Macario Dyer and he was given the opportunity to ask several questions, which were answered to his satisfaction. He was then placed on the fluoroscopic table in a prone position and the spine was percussed and palpated in an effort to elicit and localize the pain. Fluoroscopic spot films were obtained to document the location of the pain and the appearance of her spine. There was 8 out of 10 pain to palpation at the L2 level where the patient's known fracture is located. PLAN: Mr. Macario Dyer is suffering from a pathologic compression fracture of L2 , secondary to osteoporosis.  The is an appropriate candidate for kyphoplasty and bone marrow

## 2018-06-26 NOTE — PROGRESS NOTES
recognition software. It may contain minor errors which are inherent in voice recognition technology. **      Final report electronically signed by Dr. Jaden Lawrence on 6/25/2018 2:49 PM      IR FLUOROSCOPY LESS THAN 1 HOUR   Final Result      MRI LUMBAR SPINE W WO CONTRAST   Final Result   1. Acute compression fractures of the L2 vertebral body with minimal retropulsion of the dorsal superior endplate resulting in mild central canal impingement. 2. Multilevel chronic degenerative changes described above at individual levels      The lumbar vertebral bodies are normally aligned. There is normal marrow signal throughout. There is no bone marrow edema. There are no compression fractures. No pars defects are noted. The discs have normal signal throughout. The visualized aspects of the distal spinal cord are normal. The nerve roots of the cauda equina and the tip of the conus are normal.      There are no gross abnormalities in the distal thoracic spine. On the axial images, at L1-L2,      At L2-3,      At L3-4,      At L4-5,      At L5-S1,         There is no abnormal enhancement. There are no suspicious findings in the visualized aspects of the retroperitoneum and paraspinal soft tissues. IMPRESSION:                  **This report has been created using voice recognition software. It may contain minor errors which are inherent in voice recognition technology. **      Final report electronically signed by Dr. Farida Ya on 6/22/2018 3:28 AM          Diet: DIET GENERAL;    DVT prophylaxis: [x] Lovenox                                 [] SCDs                                 [] SQ Heparin                                 [] Encourage ambulation           [] Already on Anticoagulation     Disposition:    [x] Home       [] TCU       [] Rehab       [] Psych       [] SNF       [] [place]       [] Other-    Code Status: Full Code        Assessment/Plan:    Anticipated Discharge in : Once biopsy results available    Active Hospital Problems    Diagnosis Date Noted    Pathologic compression fracture of lumbar vertebra [facility]) [M48.56XA] 06/21/2018    Severe malnutrition (HCC) [E43] 06/21/2018    Chronic anemia [D64.9]     Multiple myeloma ([facility] [C90.00]     Controlled type 2 diabetes mellitus without complication ([facility] [F56.6]     Bone metastasis ([facility] [C79.51] 03/22/2016    Chronic leukopenia [D72.819] 02/01/2016    Anemia in neoplastic disease [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 hypertension [I10]        1. Pathologic compression fracture  -Secondary to MM  -Ortho consulted; recommendations include TLSO brace  -Kyphoplasty  was scheduled but not performed instead biopsy was done by IR 06/25/2018.  -Oncology consulted, recommendations appreciated. Awaiting for biopsy results.  -Continue analgesics  -PT/OT     2. DM type 2  -Resume home meds     3.  HTN  -Resume home meds        Electronically signed by Roxy Bella MD on 6/26/2018 at 1:37 PM

## 2018-06-26 NOTE — PROGRESS NOTES
[facility]  [facility]  [id number]      Date: 2018  Patient Name: Julio C Mata        CSN: [id number]   : 1932  (80 y.o.)  Gender: male   Referring Practitioner: Manjinder Ravi DO  Diagnosis: Pathologic compression fracture of lumbar vertebra         REASON FOR MISSED TREATMENT:  Missed Treat. Will see tomorrow.

## 2018-06-26 NOTE — FLOWSHEET NOTE
· Subjective: Patient was sitting on the edge of his bed with his niece beside him for support and encouragement. He was approachable and engaged with the . He stated, tearfully, that his \"room was full\" earlier this morning with family support. He is grateful for the love and support he has received. Patient shared about his back problem. He stated the need for his back to be cemented due to misalignment. He expressed feeling \"excruciating pain,\" but now it is gone. · Objective: Per report, patient is dealing with pathologic compression fracture of his lumbar vertebra. · Assessment: Patient and niece were both receptive to the  and engaged in conversation during the encounter sharing his stories of life, as well as his back situation. He is grateful for the relief without surgery, and is hopeful to be able to return home soon. Patient lives alone, but is sustained through support from his daughters, as well as other family members. Patient is a believer in God, but is not a Hindu man. He is spiritual knowing that God has been with him in the past and present. He is not currently active in a local community of believers. · Plan:  provided an empathic listening presence for the patient to share during the encounter. Patient is Age [de-identified] and  confirmed his Advance Directives documents. He has a completed Living Will and [address] on file as he wishes. Patient has \"no needs\" for further spiritual care at this time. However, chaplains remain available for further emotional and spiritual support as needed.       06/26/18 1415   Encounter Summary   Services provided to: Patient and family together   Referral/Consult From: [address] [facility];Family members   Place of Hinduism None   Continue Visiting Yes  (6/26/2018)   Complexity of Encounter Moderate   Length of Encounter 30 minutes   Spiritual Assessment Completed Yes

## 2018-06-27 NOTE — PLAN OF CARE
Problem: Pain:  Goal: Pain level will decrease  Pain level will decrease   Outcome: Ongoing  Patient has denied pain so far this shift will continue to monitor. Problem: Nutrition  Goal: Optimal nutrition therapy  Outcome: Ongoing  Tolerating ordered diet well. Problem: Safety:  Goal: Free from accidental physical injury  Free from accidental physical injury   Outcome: Ongoing  Patient has bed alarm on, bed in low position, call light and personal items in reach. Fall assessment complete. No falls noted this shift. Non skid socks on. Uses call light appropriately and gait is steady. Patient does not like the original back brace that is in his room but tolerating the ABD binder that is on. Problem: Daily Care:  Goal: Daily care needs are met  Daily care needs are met   Outcome: Ongoing  Daily care needs are being met and will continue to monitor. Problem: Falls - Risk of:  Goal: Will remain free from falls  Will remain free from falls   Outcome: Ongoing  Patient has bed alarm on, bed in low position, call light and personal items in reach. Fall assessment complete. No falls noted this shift. Non skid socks on. Uses call light appropriately and gait is steady. Patient does not like the original back brace that is in his room but tolerating the ABD binder that is on. Comments: Care plan reviewed with patient. Patient verbalize understanding of the plan of care and contribute to goal setting.

## 2018-06-27 NOTE — PROGRESS NOTES
appears stated age and cooperative. HEENT: Pupils equal, round, and reactive to light. Conjunctivae/corneas clear. Neck: Supple, with full range of motion. No jugular venous distention. Trachea midline. Respiratory:  Normal respiratory effort. Clear to auscultation, bilaterally without Rales/Wheezes/Rhonchi. Cardiovascular: Regular rate and rhythm with normal S1/S2 without murmurs, rubs or gallops. Abdomen: Soft, non-tender, non-distended with normal bowel sounds. Musculoskeletal: passive and active ROM x 4 extremities. Skin: Skin color, texture, turgor normal.  No rashes or lesions. Neurologic:  Neurovascularly intact without any focal sensory/motor deficits. Cranial nerves: II-XII intact, grossly non-focal.  Psychiatric: Alert and oriented, thought content appropriate, normal insight  Capillary Refill: Brisk,< 3 seconds   Peripheral Pulses: +2 palpable, equal bilaterally       Labs:   Recent Labs      06/25/18   0828   WBC  1.4*   HGB  10.3*   HCT  31.2*   PLT  201     No results for input(s): NA, K, CL, CO2, BUN, CREATININE, CALCIUM, PHOS in the last 72 hours. Invalid input(s): MAGNES  No results for input(s): AST, ALT, BILIDIR, BILITOT, ALKPHOS in the last 72 hours. Recent Labs      06/25/18   0828   INR  1.15*     No results for input(s): [place] in the last 72 hours. Urinalysis:    Lab Results   Component Value Date    NITRU Negative 05/04/2018    WBCUA NONE SEEN 01/22/2018    BACTERIA NONE 01/22/2018    RBCUA 0-2 01/22/2018    BLOODU Negative 05/04/2018    BLOODU NEGATIVE 01/22/2018    SPECGRAV 1.022 01/22/2018    GLUCOSEU Negative 05/04/2018       Radiology:  IR FLUORO GUIDED NEEDLE PLACEMENT   Final Result   Status status post successful L2 vertebral body biopsy. As mentioned above, since the patient was having no pain at the L2 level at the time of today's visit, it was decided not to proceed with kyphoplasty at this time. .            **This report has been created using voice recognition software. It may contain minor errors which are inherent in voice recognition technology. **      Final report electronically signed by Dr. Jaden Lawrence on 6/25/2018 2:49 PM      IR FLUOROSCOPY LESS THAN 1 HOUR   Final Result      MRI LUMBAR SPINE W WO CONTRAST   Final Result   1. Acute compression fractures of the L2 vertebral body with minimal retropulsion of the dorsal superior endplate resulting in mild central canal impingement. 2. Multilevel chronic degenerative changes described above at individual levels      The lumbar vertebral bodies are normally aligned. There is normal marrow signal throughout. There is no bone marrow edema. There are no compression fractures. No pars defects are noted. The discs have normal signal throughout. The visualized aspects of the distal spinal cord are normal. The nerve roots of the cauda equina and the tip of the conus are normal.      There are no gross abnormalities in the distal thoracic spine. On the axial images, at L1-L2,      At L2-3,      At L3-4,      At L4-5,      At L5-S1,         There is no abnormal enhancement. There are no suspicious findings in the visualized aspects of the retroperitoneum and paraspinal soft tissues. IMPRESSION:                  **This report has been created using voice recognition software. It may contain minor errors which are inherent in voice recognition technology. **      Final report electronically signed by Dr. Farida Ya on 6/22/2018 3:28 AM          Diet: DIET GENERAL;    DVT prophylaxis: [x] Lovenox                                 [] SCDs                                 [] SQ Heparin                                 [] Encourage ambulation           [] Already on Anticoagulation     Disposition:    [x] Home       [] TCU       [] Rehab       [] Psych       [] SNF       [] [place]       [] Other-    Code Status: Full Code    PT/OT Eval Status: ongoing    Assessment/Plan:    Anticipated Discharge in : Once biopsy results available and     Active Hospital Problems    Diagnosis Date Noted    Pathologic compression fracture of lumbar vertebra ([facility] [address] [M48.56XA] 06/21/2018    Severe malnutrition (HCC) [E43] 06/21/2018    Chronic anemia [D64.9]     Multiple myeloma ([facility] [address] [C90.00]     Controlled type 2 diabetes mellitus without complication ([facility] [address] [G23.9]     Bone metastasis ([facility] [address] [C79.51] 03/22/2016    Chronic leukopenia [D72.819] 02/01/2016    Anemia in neoplastic disease [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 hypertension [I10]              1. Pathologic compression fracture  -Secondary to MM  -Ortho consulted; recommendations include TLSO brace  -Awaiting for biopsy of spine done by IR 06/25/2018.  -Oncology consulted, recommendations appreciated. Awaiting for biopsy results.  -Continue analgesics  -PT/OT     2. DM type 2  -Resume home meds     3.  HTN  -Resume home meds        Electronically signed by Dwight Perkins MD on 6/27/2018 at 11:38 AM

## 2018-06-28 NOTE — PLAN OF CARE
Problem: Pain:  Goal: Pain level will decrease  Pain level will decrease   Outcome: Ongoing  Patient denies pain throughout shift. Patient Has prn pain medications ordered if needed. Problem: Nutrition  Goal: Optimal nutrition therapy  Outcome: Ongoing  Patient has general diet ordered. Tolerating well. Problem: Safety:  Goal: Free from accidental physical injury  Free from accidental physical injury   Outcome: Met This Shift      Problem: Daily Care:  Goal: Daily care needs are met  Daily care needs are met   Outcome: Ongoing  Patient assisted with daily cares as needed throughout shift. Problem: Discharge Planning:  Goal: Patients continuum of care needs are met  Patients continuum of care needs are met   Outcome: Ongoing  Discharge planning ongoing at this time for this patient. Problem: Falls - Risk of:  Goal: Will remain free from falls  Will remain free from falls   Outcome: Ongoing  Patient free of falls throughout shift. Patient is up with standby assist. Patient wearing abd binder when up. Stead gait. Comments: Care plan reviewed with patient. Patient verbalizes understanding of plan of care.

## 2018-06-28 NOTE — PROGRESS NOTES
[employer] software THERAPY MISSED TREATMENT NOTE  [facility]  [id number]      Date: 2018  Patient Name: Loel Osler        CSN: [id number]   : 1932  (80 y.o.)  Gender: male   Referring Practitioner: Rebecca Waldron DO  Diagnosis: Pathologic compression fracture of lumbar vertebra         REASON FOR MISSED TREATMENT:  Missed Treat. Pt is being discharged shortly.

## 2018-06-28 NOTE — PROGRESS NOTES
Department of Orthopedic Surgery  Spine Service  Attending Progress Note        Subjective:  Patient continues to deny any pain in the back this morning. Patient has been ambulating to chair and to the bathroom. The patient was asking for the result of his biopsy but was told that the result is not yet available. Vitals  VITALS:  BP (!) 147/66   Pulse 77   Temp 97.6 °F (36.4 °C) (Oral)   Resp 16   Ht 5' 8\" (1.727 m)   Wt 188 lb (85.3 kg)   SpO2 94%   BMI 28.59 kg/m²   24HR INTAKE/OUTPUT:    Intake/Output Summary (Last 24 hours) at 06/28/18 0706  Last data filed at 06/28/18 0507   Gross per 24 hour   Intake              860 ml   Output                0 ml   Net              860 ml     URINARY CATHETER OUTPUT ([place]):     DRAIN/TUBE OUTPUT:         PHYSICAL EXAM:    Orientation:  alert and oriented to person, place and time    Lower Extremity Motor :  quadriceps, extensor hallucis longus, dorsiflexion, plantarflexion 5/5 bilaterally  Lower Extremity Sensory:  Intact L1-S1    Flatus:  positive    ABNORMAL EXAM FINDINGS:  none    LABS:    HgB:    Lab Results   Component Value Date    HGB 10.3 06/25/2018       ASSESSMENT AND PLAN:    Post operative day 3 status post L2 biopsy     1:  Monitor labs and vital signs  2:  Activity Level:  OOB as tolerated  3:  Pain Control:  No change  4:  Discharge Planning:  Patient had L2 biopsy but L2 Vertebral Augmentation procedure (Kyphoplasty) was not completed because patient did not complain of any pain upon palpation of the lumbar spine spinous process Monday. Continue with ambulation as tolerated. Patient should continue use of the abdominal binder whenever he is up and ambulating. 5:  Awaiting result for L2 biopsy before discharging patient home.

## 2018-06-28 NOTE — CARE COORDINATION
Discharge orders on chart. Met with Lydia Barron and he is in agreement for discharge to home today. He will follow up on the pathology results after discharge. His daughters are aware of his discharge and well be coming to transport him home. 6/28/18, 1:57 PM    Discharge plan discussed by  and . Discharge plan reviewed with patient/ family. Patient/ family verbalize understanding of discharge plan and are in agreement with plan. Understanding was demonstrated using the teach back method.   IMM Letter  IMM Letter date given[de-identified] 06/28/18  IMM Letter time given[de-identified] 8526

## 2018-06-28 NOTE — PLAN OF CARE
Problem: Impaired respiratory status  Goal: Clear lung sounds  Outcome: Ongoing  Improve breath sounds, increase aeration and decrease WOB. Cont. home regime.

## 2018-06-28 NOTE — PROGRESS NOTES
Discharge instructions given. He voiced understanding and is able to teachback his medications and his Dr appointments. His daughter is also involved in his care and oversees it. She is able to teachback the meds and appointments.

## 2018-06-29 NOTE — TELEPHONE ENCOUNTER
Confirmed that Shyann Pruitt with [facility] will be reaching out to [facility] for transition of care call.

## 2018-07-05 NOTE — PROGRESS NOTES
Velcade given as charted. Tolerated well. Discharged in satisfactory condition. Ambulated off unit per self.

## 2018-07-05 NOTE — PLAN OF CARE
Problem: Intellectual/Education/Knowledge Deficit  Intervention: Verbal/written education provided  Chemotherapy Teaching     What is Chemotherapy   Drug action [x]   Method of Administration [x]   Handouts given []     Side Effects  Nausea/vomiting [x]   Diarrhea [x]   Fatigue [x]   Signs / Symptoms of infection [x]   Neutropenia [x]   Thrombocytopenia [x]   Alopecia [x]   neuropathy [x]   [place] diet &  the importance of fluids [x]       Micellaneous  Importance of nutrition [x]   Importance of oral hygiene [x]   When to call the MD [x]   Monitoring labs [x]   Use of supportive services []     Explanation of Drug Regimen / Frequency  velcade     Comments  Verbalized understanding to drug,action,side effects and when to call MD       Goal: Teaching initiated upon admission  Outcome: Met This Shift  Patient verbalizes understanding to verbal information given on velcade,action and possible side effects. Aware to call MD if develop complications. Problem: Discharge Planning  Intervention: Discharge to appropriate level of care  Discuss discharge instructions, follow ups and when to call doctor. Goal: Knowledge of discharge instructions  Knowledge of discharge instructions    Outcome: Met This Shift  Verbalized understanding of discharge instructions, follow ups and when to call doctor    Problem: Falls - Risk of: Intervention: Assess risk factors for falls  Assess for fall risk, instruct to ask for assistance with ambulation    Goal: Will remain free from falls  Will remain free from falls  Outcome: Met This Shift  Free from falls while in infusion center. Verbalized understanding of fall prevention and to ask for assistance with ambulation    Comments: Care plan reviewed with patient. Patient verbalized understanding of the plan of care and contribute to goal setting.

## 2018-07-05 NOTE — PROGRESS NOTES
Chemotherapy Administration    Pre-assessment Data: Antineoplastic Agents  Other:   See toxicity flow sheet for assessment [x]     Physician Notification of Concerns Related to Chemotherapy Administration:   Physician Notified Hayde Iverson / Time of Notification      Interventions:   Lab work assessed  [x]   Height / Weight verified for dose [x]   Current MAR reviewed [x]   Emergency drugs available as appropriate [x]   Anaphylaxis assessment completed [x]   Pre-medications administered as ordered [x]   Chemotherapy Administered as SQ injection [x]   Monitor for signs / symptoms of hypersensitivity reaction    [x]   Chemotherapy orders (drug/dose/rate) verified by 2 Chemo certified   RNs    [x]          Document chemotherapy teaching on the Patient Education tab    [x]   Document patient verbalizes understanding of medications being administered    [x]   Other: []    []    []      []    []

## 2018-07-08 PROBLEM — I51.89 DIASTOLIC DYSFUNCTION WITHOUT HEART FAILURE: Status: ACTIVE | Noted: 2018-01-01

## 2018-07-08 PROBLEM — J44.1 COPD EXACERBATION (HCC): Status: ACTIVE | Noted: 2018-01-01

## 2018-07-08 PROBLEM — D53.9 MACROCYTIC ANEMIA: Status: ACTIVE | Noted: 2018-01-01

## 2018-07-08 PROBLEM — J20.8 ACUTE BRONCHITIS DUE TO OTHER SPECIFIED ORGANISMS: Status: ACTIVE | Noted: 2018-01-01

## 2018-07-08 NOTE — ED PROVIDER NOTES
[facility]  eMERGENCY dEPARTMENT eNCOUnter          [address] [facility]       Chief Complaint   Patient presents with    Shortness of Breath    Cough       Nurses Notes reviewed and I agree except as noted in the HPI. HISTORY OF PRESENT ILLNESS    Van Lizama is a 80 y.o. male who presents to Emergency Department with increasing shortness of breath and cough, producing sputum for the past 2 days. The pt is a current smoker with a h/o COPD and he is not on home oxygen. He called his PCP, who prescribed him a Z-Fredis and prednisone, which he has been taking. He used his rescue inhaler 4 times tonight and has treid an albuterol treatment to no relief. The pt lives at home alone. The pt reports chronic back pain secondary to a pathological L4 fracture associated with his multiple myeloma. No chest pain. No further complaints at the time of the initial encounter. REVIEW OF SYSTEMS     Review of Systems   Constitutional: Negative for appetite change, chills, fatigue and fever. HENT: Negative for congestion, ear pain, rhinorrhea and sore throat. Eyes: Negative for discharge, redness and visual disturbance. Respiratory: Positive for cough and shortness of breath. Negative for wheezing. Cardiovascular: Negative for chest pain, palpitations and leg swelling. Gastrointestinal: Negative for abdominal pain, diarrhea, nausea and vomiting. Genitourinary: Negative for decreased urine volume, difficulty urinating and dysuria. Musculoskeletal: Positive for back pain. Negative for arthralgias, joint swelling and neck pain. Skin: Negative for pallor and rash. Allergic/Immunologic: Negative for environmental allergies. Neurological: Negative for dizziness, syncope, weakness, light-headedness and headaches. Hematological: Negative for adenopathy. Psychiatric/Behavioral: Negative for agitation, confusion, dysphoric mood and suicidal ideas. The patient is not nervous/anxious. Place 1 patch onto the skin daily 12 hours on, 12 hours off. MULTIPLE VITAMINS-MINERALS (CENTRUM SILVER 50+MEN PO)    Take 1 tablet by mouth    PREDNISONE (DELTASONE) 20 MG TABLET    Take 2 tablets by mouth daily for 5 days    TIOTROPIUM (SPIRIVA HANDIHALER) 18 MCG INHALATION CAPSULE    Inhale 1 capsule into the lungs daily 1 capsule via inhalation daily. ALLERGIES     is allergic to shellfish-derived products and flomax [tamsulosin hcl]. FAMILY HISTORY     indicated that his mother is . He indicated that his father is . He indicated that his sister is alive. He indicated that his brother is . family history includes Cancer in his brother and father; Diabetes in his mother. SOCIAL HISTORY      reports that he has been smoking. He has a 65.00 pack-year smoking history. He has never used smokeless tobacco. He reports that he does not drink alcohol or use drugs. PHYSICAL EXAM     INITIAL VITALS:  weight is 188 lb (85.3 kg). His oral temperature is 99.3 °F (37.4 °C). His blood pressure is 132/52 (abnormal) and his pulse is 108. His respiration is 26 and oxygen saturation is 94%. Physical Exam   Constitutional: He is oriented to person, place, and time. He appears well-developed and well-nourished. HENT:   Head: Normocephalic and atraumatic. Right Ear: External ear normal.   Left Ear: External ear normal.   Eyes: Conjunctivae are normal. Right eye exhibits no discharge. Left eye exhibits no discharge. No scleral icterus. Neck: Normal range of motion. Neck supple. No JVD present. Cardiovascular: Normal rate, regular rhythm and normal heart sounds. Pulmonary/Chest: Effort normal. No stridor. No respiratory distress. He has decreased breath sounds (Consistent with COPD changes). He has no wheezes. He has no rhonchi. He has no rales. Abdominal: Soft. He exhibits no distension. Musculoskeletal: Normal range of motion.         Right lower leg: He exhibits edema mill/mm3    Hemoglobin 10.5 (L) 14.0 - 18.0 gm/dl    Hematocrit 32.1 (L) 42.0 - 52.0 %    .3 (H) 80.0 - 94.0 fL    MCH 32.8 26.0 - 33.0 pg    MCHC 32.7 32.2 - 35.5 gm/dl    RDW-CV 14.8 (H) 11.5 - 14.5 %    RDW-SD 54.5 (H) 35.0 - 45.0 fL    Platelets 100 167 - 504 thou/mm3    MPV 9.9 9.4 - 12.4 fL    Seg Neutrophils 81.5 %    Lymphocytes 15.5 %    Monocytes 3.0 %    Eosinophils 0.0 %    Basophils 0.0 %    Immature Granulocytes 0.0 %    Segs Absolute 1.4 (L) 1.8 - 7.7 thou/mm3    Lymphocytes # 0.3 (L) 1.0 - 4.8 thou/mm3    Monocytes # 0.1 (L) 0.4 - 1.3 thou/mm3    Eosinophils # 0.0 0.0 - 0.4 thou/mm3    Basophils # 0.0 0.0 - 0.1 thou/mm3    Immature Grans (Abs) 0.00 0.00 - 0.07 thou/mm3    nRBC 0 /100 wbc   Comprehensive Metabolic Panel   Result Value Ref Range    Glucose 150 (H) 70 - 108 mg/dL    CREATININE 0.9 0.4 - 1.2 mg/dL    BUN 14 7 - 22 mg/dL    Sodium 135 135 - 145 meq/L    Potassium 3.7 3.5 - 5.2 meq/L    Chloride 95 (L) 98 - 111 meq/L    CO2 27 23 - 33 meq/L    Calcium 9.4 8.5 - 10.5 mg/dL    AST 11 5 - 40 U/L    Alkaline Phosphatase 97 38 - 126 U/L    Total Protein 8.2 (H) 6.1 - 8.0 g/dL    Alb 3.3 (L) 3.5 - 5.1 g/dL    Total Bilirubin 0.2 (L) 0.3 - 1.2 mg/dL    ALT 9 (L) 11 - 66 U/L   Brain Natriuretic Peptide   Result Value Ref Range    Pro-.4 0.0 - 1800.0 pg/mL   Troponin   Result Value Ref Range    Troponin T < 0.010 ng/ml   Blood Gas, Arterial   Result Value Ref Range    pH, Blood Gas 7.48 (H) 7.35 - 7.45    PCO2 37 35 - 45 mmhg    PO2 66 (L) 71 - 104 mmhg    HCO3 28 23 - 28 mmol/l    Base Excess (Calculated) 4.3 (H) -2.5 - 2.5 mmol/l    O2 Sat 94 %    IFIO2 2     DEVICE Cannula     Rocky Test Positive     Source: R Radial     COLLECTED BY: [id number]    Procalcitonin   Result Value Ref Range    Procalcitonin 0.23 (H) 0.01 - 0.09 ng/mL   Glomerular Filtration Rate, Estimated   Result Value Ref Range    Est, Glom Filt Rate 80 (A) ml/min/1.73m2   Osmolality   Result Value Ref Range Osmolality Calc 273.4 (L) 275.0 - 300 mOsmol/kg   Anion Gap   Result Value Ref Range    Anion Gap 13.0 8.0 - 16.0 meq/L   Scan of Blood Smear   Result Value Ref Range    SCAN OF BLOOD SMEAR see below        EMERGENCY DEPARTMENT COURSE:   Vitals:    Vitals:    07/08/18 0218 07/08/18 0314 07/08/18 0341 07/08/18 0452   BP: 138/70  (!) 157/121 (!) 132/52   Pulse: 105  109 108   Resp: 24 30 26 26   Temp: 99.3 °F (37.4 °C)      TempSrc: Oral      SpO2: 95% 94% 94% 94%   Weight: 188 lb (85.3 kg)          2:42 AM    Patient is seen and evaluated in a timely fashion. Medical Decision Making    He is given DuoNeb treatment. Solu-Medrol 125 mg IV. He is also given Rocephin and Zithromax given that he could have underlying community-acquired pneumonia. His lab works are reassuring. His chest x-ray surprisingly has no acute intrathoracic findings other than COPD changes. His ABG on 2 L/min of oxygen shows borderline hypoxia with PaO2 66. He apparently has COPD exacerbation was some baseline hypoxia. Admission warrants, particularly his white count is 1.7 and patient has history of multiple myeloma. Discussed and admitted by Dr. Patricia Correa to hospitalist service. CRITICAL CARE:   None    CONSULTS:  Dr. Pastrana Epp:  None    FINAL IMPRESSION      1. COPD exacerbation ([facility] [address]    2. Hypoxia    3. Tobacco abuse    4. Leukopenia, unspecified type    5. Multiple myeloma, remission status unspecified ([facility])          DISPOSITION/PLAN   Admit    PATIENT REFERRED TO:  No follow-up provider specified.     DISCHARGE MEDICATIONS:  New Prescriptions    No medications on file       (Please note that portions of this note were completed with a voice recognition program.  Efforts were made to edit the dictations but occasionally words are mis-transcribed.)    Scribe:  Arnulfo Snellen 7/8/18 2:53 AM Scribing for and in the presence of Elle Jarquin MD.    Signed by: Arnulfo Snellen, Scribe, 7/8/18 5:43 AM    Provider:  I

## 2018-07-08 NOTE — PROGRESS NOTES
Hospitalist Progress Note  [facility]       Patient: Debra Martin  Unit/Bed: 0C-13/047-Q  YOB: 1932  MRN: [id number]  Acct: [de-identified]   Admitting Diagnosis: COPD exacerbation ([facility] [J44.1]  [facility] exacerbation ([facility] [J44.1]  Admit Date:  7/8/2018  Hospital Day: 0    Subjective:    Patient is feeling fine. Has a sob and a productive cough. Still an active smoker a ppd. No on home O2. Patient Seen, Chart, Labs, Radiology studies, and Consults reviewed. Objective:   /62   Pulse 78   Temp 97.8 °F (36.6 °C) (Oral)   Resp 20   Ht 5' 7\" (1.702 m)   Wt 186 lb 9.6 oz (84.6 kg)   SpO2 96%   BMI 29.23 kg/m²       Intake/Output Summary (Last 24 hours) at 07/08/18 1700  Last data filed at 07/08/18 1320   Gross per 24 hour   Intake              120 ml   Output              725 ml   Net             -605 ml     Review of Systems   Constitutional: Negative for activity change, appetite change, chills, diaphoresis, fever and unexpected weight change. HENT: Negative for congestion, drooling, ear pain, hearing loss, nosebleeds, rhinorrhea, sinus pressure, sore throat, tinnitus, trouble swallowing and voice change. Eyes: Negative for photophobia, pain, discharge, redness, itching and visual disturbance. Respiratory: Positive for cough and shortness of breath. Negative for apnea, choking, chest tightness, wheezing and stridor. Cardiovascular: Negative for chest pain, palpitations and leg swelling. Gastrointestinal: Negative for abdominal distention, abdominal pain, blood in stool, diarrhea, nausea, rectal pain and vomiting. Endocrine: Negative for cold intolerance, heat intolerance, polyphagia and polyuria. Genitourinary: Negative for decreased urine volume, difficulty urinating, enuresis, frequency, hematuria, penile pain, scrotal swelling and urgency. Musculoskeletal: Negative for arthralgias, gait problem, myalgias, neck pain and neck stiffness.    Skin: Negative for color change, pallor, rash and wound. Allergic/Immunologic: Negative for food allergies. Neurological: Positive for weakness. Negative for dizziness, seizures, syncope, facial asymmetry, speech difficulty, light-headedness and headaches. Hematological: Negative for adenopathy. Does not bruise/bleed easily. Psychiatric/Behavioral: Negative for agitation, confusion, dysphoric mood, self-injury, sleep disturbance and suicidal ideas. The patient is not hyperactive. Physical Exam   Constitutional: He is oriented to person, place, and time. He appears well-developed and well-nourished. No distress. HENT:   Head: Normocephalic and atraumatic. Right Ear: External ear normal.   Left Ear: External ear normal.   Nose: Nose normal.   Mouth/Throat: Oropharynx is clear and moist. No oropharyngeal exudate. Eyes: Conjunctivae and EOM are normal. Pupils are equal, round, and reactive to light. Right eye exhibits no discharge. Left eye exhibits no discharge. No scleral icterus. Neck: Normal range of motion. Neck supple. No JVD present. No tracheal deviation present. No thyromegaly present. Cardiovascular: Normal rate, regular rhythm, normal heart sounds and intact distal pulses. Exam reveals no gallop and no friction rub. No murmur heard. Pulmonary/Chest: Effort normal and breath sounds normal. No stridor. No respiratory distress. He has no wheezes. He has no rales. He exhibits no tenderness. Abdominal: Soft. Bowel sounds are normal. He exhibits distension. He exhibits no mass. There is no tenderness. There is no rebound and no guarding. Musculoskeletal: Normal range of motion. He exhibits no edema, tenderness or deformity. Lymphadenopathy:     He has no cervical adenopathy. Neurological: He is alert and oriented to person, place, and time. He displays normal reflexes. No cranial nerve deficit. He exhibits normal muscle tone. Coordination normal.   Skin: Skin is warm and dry. No rash noted.  He is not diaphoretic. No erythema. No pallor. Psychiatric: He has a normal mood and affect. His behavior is normal. Judgment and thought content normal.   Nursing note and vitals reviewed. Medications:    levofloxacin  750 mg Intravenous Q24H    methylPREDNISolone  40 mg Intravenous Daily    ipratropium-albuterol  1 ampule Inhalation Q4H WA    pantoprazole  40 mg Oral QAM AC    enoxaparin  40 mg Subcutaneous Daily    aspirin  81 mg Oral Daily    fluticasone  2 spray Nasal Daily    lidocaine  1 patch Transdermal Daily     Continuous Infusions:  PRN Meds:HYDROcodone-acetaminophen, dextromethorphan-guaiFENesin    Data:  CBC:   Recent Labs      07/08/18   0232  07/08/18   0609   WBC  1.7*  1.6*   RBC  3.20*  3.08*   HGB  10.5*  10.1*   HCT  32.1*  30.9*   MCV  100.3*  100.3*   PLT  152  144     BMP:   Recent Labs      07/08/18   0232  07/08/18   0609   NA  135  136   K  3.7  3.7   CL  95*  98   CO2  27  25   BUN  14  13   CREATININE  0.9  0.8     BNP: No results for input(s): BNP in the last 72 hours. PT/INR: No results for input(s): PROTIME, INR in the last 72 hours. APTT: No results for input(s): APTT in the last 72 hours. CARDIAC ENZYMES:   Recent Labs      07/08/18   0232   TROPONINT  < 0.010     FASTING LIPID PANEL:  Lab Results   Component Value Date    CHOL 143 11/19/2015    HDL 47 11/19/2015    TRIG 121 11/19/2015     LIVER PROFILE:   Recent Labs      07/08/18   0232   AST  11   ALT  9*   BILITOT  0.2*   ALKPHOS  97      ABGs:   Lab Results   Component Value Date    PH 7.48 07/08/2018    PCO2 37 07/08/2018    PO2 66 07/08/2018    HCO3 28 07/08/2018    O2SAT 94 07/08/2018     MICROBIOLOGY & HISTOPATHOLOGY. None. TOXICOLOGY. None. ENDOSCOPE STUDIES. None. PROCEDURES. None. RADIOLOGY. CXR-7/19/2018. 1. No acute intrathoracic process. 2. Chronic lung disease. ECHO-2/10/2016. Normal left ventricle size and normal systolic function. Ejection fraction  was estimated at 55-60%. There were no regional left ventricular wall  motion abnormalities and wall thickness    was within normal limits.   Doppler parameters were consistent with abnormal left ventricular relaxation (grade 1 diastolic dysfunction).   Aortic valve appears tricuspid.   Leaflets exhibited mild to moderately increased thickness and mildly   reduced cuspal separation of the aortic valve. There was evidence of  increased velocity across the aortic valve. velocity was 2.7 m/sec, mean   gradient was 15 mm Hg, aortic valve area was calculated at 1.43 cm2.   Mild to moderate aortic stenosis is present.   Mild-to-moderate aortic regurgitation is noted.   Mild mitral regurgitation is present.   Mild tricuspid regurgitation visualized.   Mild pulmonic regurgitation visualized.     ASSESSMENT AND  PLAN. 1.PULMONARY. ACUTE HYPOXIC RESPIRATORY FAILURE-O2.     ACUTE BRONCHITIS W/ COPD EXACERBATION-PRN NEBS,STEROIDS ,LEVAQUIN. SPUTUM STUDIES. 2.CARDIOVASCULAR. HTN-CONTROLLED. CHRONIC DIASTOLIC HEART DYSFUNCTION W/O CHF EXACERBATION. MILD /MODERATE AS/AR. 3.GI.     GERD-PPI. 4.ENDO. HYPERGLYCEMIA D/T T2 DM AND STEROIDS USE -SSI,A1C CHECK. 5.LIFE STYLE.     CHRONIC TOBACCO USE DISORDER. 6.HEM-ONC. CHRONIC LEUKOPENIA W/ WBC 1.7. H/O MULTIPLE MYELOMA. MACROCYTIC ANEMIA HGB 10.5 AND .3     B12 AND FA CHECK.         7.UROLOGY. BPH W/O OBSTRUCTIVE UROPATHY. H/O CA PROSTATE-ON LUPRON. 8.DISPO. PLANNED D/C TO HOME SOON.       Electronically signed by Dilshad Brunson MD on 7/8/2018 at 5:00 PM    [address] [place] [address]

## 2018-07-08 NOTE — ED TRIAGE NOTES
Pt presents to the ED room 6 for shortness of breath that started x2 days ago and a cough that started around the same time. Pt was taking a \"z-pack\" for an upper respiratory infection. Pt denies any chest pain. Pt was at 90% RA according to the EMS, pt is at 95% on 2L in the ED. Pt has wheezing bilaterally. EKg done.

## 2018-07-09 PROBLEM — E43 SEVERE MALNUTRITION (HCC): Status: ACTIVE | Noted: 2018-01-01

## 2018-07-09 NOTE — CONSULTS
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     Priority: High    Severe malnutrition ([address] [E43] 2018     Class: Acute    COPD exacerbation (HCC) [J44.1] 2018    Macrocytic anemia [D53.9]     Diastolic dysfunction without heart failure [I51.9] 2018    Acute bronchitis due to other specified organisms [J20.8] 2018    COPD with exacerbation ([address] [J44.1] 2017    Tobacco use disorder [F17.200] 10/05/2016    Acute respiratory failure with hypoxia (HCC) [J96.01]     Chronic leukopenia [D72.819] 2016    DM II (diabetes mellitus, type II), controlled ([address] [E11.9] 2015    Prostate CA ([address] Shelah Endow 2014    Moderate aortic regurgitation [I35.1] 2013    BPH without urinary obstruction [N40.0]      Reason for Consult    For management of COPD and exacerbation  HPI   History Obtained From: Patient and electronic medical record. Dejan Martin is a 80 y.o. male  was initially admitted under hospitalist service. Pulmonary medicine was consulted for further management of COPD/exacerbation. The patient is a 80 y.o. male who follows with my clinic for his moderate COPD presented with worsening of shortness of breath for 2days prior to hospitalization. His current illness started as gradual worsening of shortness of breath with cough and wheezing. He also noticed decline in his functional status. He started using his rescue inhalers/nebulizations more frequently at home with no significant improvement in hisshort ness of breath, cough or wheezing.  Due to education: N/A     Occupational History    Not on file. Social History Main Topics    Smoking status: Current Every Day Smoker     Packs/day: 1.00     Years: 65.00     Last attempt to quit: 7/19/2017    Smokeless tobacco: Never Used      Comment: pts smoking a half a pack a day    Alcohol use No      Comment: rarely    Drug use: No    Sexual activity: Not Currently     Other Topics Concern    Not on file     Social History Narrative    No narrative on file       [place]iview of systems   General/Constitutional: No recent loss of weight or appetite changes. No fever or chills. HENT: Negative. Eyes: Negative. Upper respiratory tract: Occasional nasal stuffiness with no post nasal drip. Lower respiratory tract/ lungs: Frequent cough with light yellow to brown sputum production. No hemoptysis. Cardiovascular: No palpitations or chest pain. Gastrointestinal: No nausea or vomiting. Neurological: No focal neurologiacal weakness. Extremities: No edema. Musculoskeletal: No complaints. Genitourinary: No complaints. Hematological: Negative. Psychiatric/Behavioral: Negative. Skin: No itching. Vitals     height is 5' 7\" (1.702 m) and weight is 186 lb 9.6 oz (84.6 kg). His oral temperature is 97.8 °F (36.6 °C). His blood pressure is 118/58 (abnormal) and his pulse is 98. His respiration is 18 and oxygen saturation is 94%. Body mass index is 29.23 kg/m². SUPPLEMENTAL O2: O2 Flow Rate (L/min): 2 L/min       I/O      Intake/Output Summary (Last 24 hours) at 07/09/18 1848  Last data filed at 07/09/18 1748   Gross per 24 hour   Intake             1577 ml   Output                0 ml   Net             1577 ml     I/O last 3 completed shifts: In: 6207 [P.O.:1170;  I.V.:47]  Out: -    Patient Vitals for the past 96 hrs (Last 3 readings):   Weight   07/08/18 0815 186 lb 9.6 oz (84.6 kg)   07/08/18 0218 188 lb (85.3 kg)       Exam   General Appearance: moderately built, moderately nourished in no acute distress. HEENT: Normal, Head is normocephalic, atraumatic. Oropharynx is clear and moist.  No oral thrush. PERRL  Neck - Supple, No JVD present. No tracheal deviation. Lungs - Bilateral air entry present. Bilateral good breath sounds heard. Bilateral occasional expiratory wheezes. No rales. Cardiovascular - Heart sounds are normal.  Regular rhythm normal rate without murmur, gallop or rub. Abdomen - Soft, nontender, nondistended, no masses or organomegaly  Neurologic - Awake, alert, oriented. There are no focal motor or sensory deficits  Extremities - No cyanosis, clubbing or edema. Musculoskeletal: Normal range of motion. Patient exhibits no tenderness. Lymphadenopathy:  No cervical adenopathy. Psychiatric: Patient  has a normal mood and affect. Skin - No bruising or bleeding. Labs  - [facility]   ABG  Lab Results   Component Value Date    PH 7.48 07/08/2018    PO2 66 07/08/2018    PCO2 37 07/08/2018    HCO3 28 07/08/2018    O2SAT 94 07/08/2018     Lab Results   Component Value Date    IFIO2 2 07/08/2018     CBC  Recent Labs      07/08/18   0232  07/08/18   0609   WBC  1.7*  1.6*   RBC  3.20*  3.08*   HGB  10.5*  10.1*   HCT  32.1*  30.9*   MCV  100.3*  100.3*   MCH  32.8  32.8   MCHC  32.7  32.7   PLT  152  144   MPV  9.9  10.0      BMP  Recent Labs      07/08/18   0232  07/08/18   0609   NA  135  136   K  3.7  3.7   CL  95*  98   CO2  27  25   BUN  14  13   CREATININE  0.9  0.8   GLUCOSE  150*  257*   CALCIUM  9.4  9.1     LFT  Recent Labs      07/08/18   0232   AST  11   ALT  9*   BILITOT  0.2*   ALKPHOS  97     TROP  Lab Results   Component Value Date    TROPONINT < 0.010 07/08/2018    TROPONINT < 0.010 07/19/2017    TROPONINT < 0.010 10/18/2016     BNP  No results for input(s): BNP in the last 72 hours. Lactic Acid  No results for input(s): LACTA in the last 72 hours. INR  No results for input(s): INR, PROTIME in the last 72 hours.   PTT  No results for disease. Chest Xray:  4/17/2018  PROCEDURE: XR CHEST (2 VW)  1. No acute intrathoracic process. 2. Chronic lung disease.            CT Scans  (See actual reports for details)  CTA of chest:  Jul 19, 2017  PROCEDURE: CTA CHEST W WO CONTRAST  NO CT EVIDENCE OF PULMONARY EMBOLISM.   INTERVAL DEVELOPMENT OF BILATERAL SUBSEGMENTAL ATELECTASIS VERSUS LESS LIKELY BILATERAL PNEUMONIA. FINDINGS SUPERIMPOSED UPON GROSSLY STABLE CHRONIC LUNG DISEASE CHARACTERISTICS, POSSIBLY REPRESENTING COPD, THOUGH POORLY DEFINED DUE TO MOTION DEGRADATION.              Assessment   Moderate COPD with exacerbation due to acute bronchitis with viral Vs bacterial etiolgy. Hx of tobacco abuse. Chronic kidney disease  Hypertension. Type II or unspecified type diabetes mellitus without mention of complication, not stated as uncontrolled    Plan   -Change Solumedrol to prednisone at 30mg PO daily with tapering doses  -Follow pending cultures. -Accuchecks monitoring ACHS with low dose insulin sliding scale for coverage.  -Continue Duonebs 3ml via nebs Q6h prn.  -Taper and discontinue prednisone.  -Will restart back on Flonase 50mcg/INH 2sprays each nostril daily PRN at the time of discharge.  -Will restart back on Symbicort 160/4.5mcg spray MDI, 2 puffs via inhalation BID. Refills given. -Will restart back on Spiriva 18mcg/Cap DPI. 1Cap via inhalation daily. Refills given. -Will restart back on Albuterol HFA 90mcg/Spray MDI, 2puffs  Q6Hprn.   -Will restart back on Mucinex bid.  -He instructed to use Albuterol HFA  inhaler 2 puffs Q6h prn or Albuterol 2.5mg nebs Q6h prn (the nebulizer) at one time as rescue medication not both at the same time. He verbalizes understanding.   - Patient educated to update his pneumococcal vaccine with family physician and take influenza vaccine in coming season with out fail. Patient verbalizes understanding.  - Khushi Jackson and his daughter were educated about my impression and plan.  They verbalizes

## 2018-07-09 NOTE — PROGRESS NOTES
Hospitalist Progress Note  [facility]       Patient: Julio C Mata  Unit/Bed: 8R-28/305-S  YOB: 1932  MRN: [id number]  Acct: [de-identified]   Admitting Diagnosis: COPD exacerbation ([facility] [J44.1]  [facility] [J[facility]]  Admit Date:  7/8/2018  Hospital Day: 1    Subjective:    Patient is feeling fine is off oxygen. Seen by pulmonary this afternoon. Patient Seen, Chart, Labs, Radiology studies, and Consults reviewed. Objective:   BP (!) 118/58   Pulse 98   Temp 97.8 °F (36.6 °C) (Oral)   Resp 18   Ht 5' 7\" (1.702 m)   Wt 186 lb 9.6 oz (84.6 kg)   SpO2 94%   BMI 29.23 kg/m²       Intake/Output Summary (Last 24 hours) at 07/09/18 1913  Last data filed at 07/09/18 1748   Gross per 24 hour   Intake             1577 ml   Output                0 ml   Net             1577 ml     Review of Systems   Constitutional: Negative for activity change, appetite change, chills, diaphoresis, fever and unexpected weight change. HENT: Negative for congestion, drooling, ear pain, hearing loss, nosebleeds, rhinorrhea, sinus pressure, sore throat, tinnitus, trouble swallowing and voice change. Eyes: Negative for photophobia, pain, discharge, redness, itching and visual disturbance. Respiratory: Negative for apnea, cough, choking, chest tightness, shortness of breath, wheezing and stridor. Cardiovascular: Negative for chest pain, palpitations and leg swelling. Gastrointestinal: Negative for abdominal distention, abdominal pain, blood in stool, diarrhea, nausea, rectal pain and vomiting. Endocrine: Negative for cold intolerance, heat intolerance, polyphagia and polyuria. Genitourinary: Negative for decreased urine volume, difficulty urinating, enuresis, frequency, hematuria, penile pain, scrotal swelling and urgency. Musculoskeletal: Negative for arthralgias, gait problem, myalgias, neck pain and neck stiffness. Skin: Negative for color change, pallor, rash and wound. Allergic/Immunologic: Negative for food allergies. Neurological: Negative for dizziness, seizures, syncope, facial asymmetry, speech difficulty, weakness, light-headedness and headaches. Hematological: Negative for adenopathy. Does not bruise/bleed easily. Psychiatric/Behavioral: Negative for agitation, confusion, dysphoric mood, self-injury, sleep disturbance and suicidal ideas. The patient is not hyperactive. Physical Exam   Constitutional: He is oriented to person, place, and time. He appears well-developed and well-nourished. No distress. HENT:   Head: Normocephalic and atraumatic. Right Ear: External ear normal.   Left Ear: External ear normal.   Nose: Nose normal.   Mouth/Throat: Oropharynx is clear and moist. No oropharyngeal exudate. Eyes: Conjunctivae and EOM are normal. Pupils are equal, round, and reactive to light. Right eye exhibits no discharge. Left eye exhibits no discharge. No scleral icterus. Neck: Normal range of motion. Neck supple. No JVD present. No tracheal deviation present. No thyromegaly present. Cardiovascular: Normal rate, regular rhythm, normal heart sounds and intact distal pulses. Exam reveals no gallop and no friction rub. No murmur heard. Pulmonary/Chest: Effort normal and breath sounds normal. No stridor. No respiratory distress. He has no wheezes. He has no rales. He exhibits no tenderness. Abdominal: Soft. Bowel sounds are normal. He exhibits distension. He exhibits no mass. There is no tenderness. There is no rebound and no guarding. Musculoskeletal: Normal range of motion. He exhibits no edema, tenderness or deformity. Lymphadenopathy:     He has no cervical adenopathy. Neurological: He is alert and oriented to person, place, and time. He displays normal reflexes. No cranial nerve deficit. He exhibits normal muscle tone. Coordination normal.   Skin: Skin is warm and dry. No rash noted. He is not diaphoretic. No erythema. No pallor. of 7/9/2018 07:46   Ref. Range 7/8/2018 10:01   Folate Latest Ref Range: 4.8 - 24.2 ng/mL > 20.0   Vitamin B-12 Latest Ref Range: 211 - 911 pg/mL 280       MICROBIOLOGY & HISTOPATHOLOGY. Results for Aquiles Reyes (MRN [id number]) as of 7/9/2018 07:46   Ref. Range 7/8/2018 14:30   Color, UA Latest Ref Range: YELLOW-STR  YELLOW   Bilirubin, Urine Latest Ref Range: NEGATIVE  NEGATIVE   Ketones, Urine Latest Ref Range: NEGATIVE  NEGATIVE   Specific Gravity, UA Latest Ref Range: 1.002 - 1.03  1.024   Blood, Urine Latest Ref Range: NEGATIVE  NEGATIVE   pH, UA Latest Ref Range: 5.0 - 9.0  7.0   Protein, UA Latest Ref Range: NEGATIVE mg/dl NEGATIVE   Urobilinogen, Urine Latest Ref Range: 0.0 - 1.0 eu/dl 0.2   Nitrite, Urine Latest Ref Range: NEGATIVE  NEGATIVE   Leukocytes, UA Latest Ref Range: NEGATIVE  NEGATIVE   Glucose, Urine Latest Ref Range: NEGATIVE mg/dl >= 1000 (A)   Casts Latest Units: /lpf NONE SEEN   WBC, UA Latest Ref Range: 0-4/hpf /hpf 0-2   RBC, UA Latest Ref Range: 0-2/hpf /hpf 0-2   Epi Cells Latest Ref Range: 3-5/hpf /hpf NONE SEEN   Renal Epithelial, Urine Latest Ref Range: NONE SEEN  NONE SEEN   Bacteria, UA Latest Ref Range: FEW/NONE S  NONE   Yeast, Urine Latest Ref Range: NONE SEEN  NONE SEEN   Crystals Latest Ref Range: NONE SEEN  NONE SEEN   Character, Urine Latest Ref Range: CLR-SL.ODETTE  CLEAR       TOXICOLOGY. None. ENDOSCOPE STUDIES. None. PROCEDURES. None. RADIOLOGY. CXR-7/19/2018. 1. No acute intrathoracic process. 2. Chronic lung disease. ECHO-2/10/2016. Normal left ventricle size and normal systolic function. Ejection fraction  was estimated at 55-60%.    There were no regional left ventricular wall  motion abnormalities and wall thickness    was within normal limits.   Doppler parameters were consistent with abnormal left ventricular relaxation (grade 1 diastolic dysfunction).   Aortic valve appears tricuspid.   Leaflets exhibited mild to moderately increased thickness and mildly   reduced cuspal separation of the aortic valve. There was evidence of  increased velocity across the aortic valve. velocity was 2.7 m/sec, mean   gradient was 15 mm Hg, aortic valve area was calculated at 1.43 cm2.   Mild to moderate aortic stenosis is present.   Mild-to-moderate aortic regurgitation is noted.   Mild mitral regurgitation is present.   Mild tricuspid regurgitation visualized.   Mild pulmonic regurgitation visualized.     ASSESSMENT AND  PLAN. 1.PULMONARY. ACUTE HYPOXIC RESPIRATORY FAILURE-IMPROVED -PRN O2.     ACUTE BRONCHITIS W/ COPD EXACERBATION-PRN NEBS,STEROIDS ,LEVAQUIN. PULMONARY CONSULT. 2.CARDIOVASCULAR. HTN-CONTROLLED. CHRONIC DIASTOLIC HEART DYSFUNCTION W/O CHF EXACERBATION. MILD /MODERATE AS/AR. 3.GI.     GERD-PPI. 4.ENDO. HYPERGLYCEMIA D/T T2 DM AND STEROIDS USE -SSI. A1C 7.2 ON 4/27/2018. 5. LIFE STYLE.     CHRONIC TOBACCO USE DISORDER. 6.HEM-ONC. CHRONIC LEUKOPENIA W/ WBC 1.7. H/O MULTIPLE MYELOMA -ON WEEKLY CHEMO. MACROCYTIC ANEMIA HGB 10.5 AND .3     B12 280 AND FA >20.0.         7. UROLOGY. BPH W/O OBSTRUCTIVE UROPATHY. H/O CA PROSTATE-NOT ON LUPRON. 8.DISPO. PLANNED D/C TO HOME SOON.       Electronically signed by Lady Gonzalez MD on 7/9/2018 at 7:13 PM    Rounding Hospitalist

## 2018-07-09 NOTE — CARE COORDINATION
DISCHARGE BARRIERS  7/9/18, 11:36 AM    Reason for Referral:  Home health  Mental Status: alert, answers questions appropriately  Decision Making: makes own decisions with support from daughters   Family/Social/Home Environment:  Oren Landau lives at home alone. His daughters and other family members visit daily to assist with care and household tasks, including transportation. Oren Landau is able to manage his personal care and medications. Current Services: none   Current Equipment: walker, grab bars in bathroom, has chair lift to basement, but rarely goes to the basement  Payment Source: medicare, [employer] secondary   Concerns or Barriers to Discharge:  Plans home alone, with family support   Collabrative List of ECF/HH were provided: declined list, had [facility] before and prefers this agency    Teach Back Method used with patient and daughters regarding care plan and discharge plan  Patient and daughters verbalize understanding of the plan of care and contribute to goal setting. Anticipated Needs/Discharge Plan:  Spoke with Oren Landau and his daughters. Oren Landau lives at home alone, and family assists with care and household tasks. Oren Landau is agreeable to [facility], and requests [facility]. Will need HH orders.        Electronically signed by NAZARIO Clemente on 7/9/2018 at 11:36 AM

## 2018-07-09 NOTE — CARE COORDINATION
7/9/18, 12:05 PM      Israel Brantley       Admitted from: ED 7/8/2018/ [facility] day: 1   Location: [facility]13/013 Reason for admit: COPD exacerbation ([facility] [J44.1]  COPD exacerbation ([facility] [facility] [J44.1] Status: inpatient  Admit order signed?: yes  PMH:  has a past medical history of Acute urinary retention; Arthritis; BPH without urinary obstruction; Cancer ([facility] [facility] Chronic kidney disease; COPD (chronic obstructive pulmonary disease) ([facility] [facility] Disease of blood and blood forming organ; History of blood transfusion; Hyperlipidemia; Hypertension; Insomnia; Pneumonia; Prostate cancer ([facility] [facility] Shingles; and Type II or unspecified type diabetes mellitus without mention of complication, not stated as uncontrolled. Procedure: no  Pertinent abnormal Imaging:Chest xray:  1. No acute intrathoracic process. 2. Chronic lung disease. Medications:  Scheduled Meds:   levofloxacin  750 mg Intravenous Q24H    methylPREDNISolone  40 mg Intravenous Daily    ipratropium-albuterol  1 ampule Inhalation Q4H WA    pantoprazole  40 mg Oral QAM AC    enoxaparin  40 mg Subcutaneous Daily    aspirin  81 mg Oral Daily    fluticasone  2 spray Nasal Daily    lidocaine  1 patch Transdermal Daily    insulin lispro  0-12 Units Subcutaneous TID WC    insulin lispro  0-6 Units Subcutaneous Nightly     Continuous Infusions:   dextrose        Pertinent Info/Orders/Treatment Plan:Pulmonary consult for  acute bronchitis w/ COPD exacerbation-on chemo for myeloma. IV antibiotics. Pain management. I&O. IS. Telemetry monitoring,   Diet: DIET GENERAL;   DVT Prophylaxis: Lovenox  Smoking status:  reports that he has been smoking. He has a 65.00 pack-year smoking history. He has never used smokeless tobacco.   Influenza Vaccination Screening Completed: n/a  Pneumonia Vaccination Screening Completed: yes  PCP: Silvia Rubio DO  Readmission: Patient has been readmitted within 10 days. Patient went to f/u appointment? no  If yes, was it within 7 days?

## 2018-07-10 NOTE — PROGRESS NOTES
Prescriptions for Prednisone, Augmentin and Vitamin B12 called in to [facility] [address] [facility] [place] by beto RN

## 2018-07-10 NOTE — PLAN OF CARE
Offered Home Health COPD or Pulmonary Rehab to patient? Zone management tool for COPD Diagnosis given to the patient as an education reference. Patient verbalizes understanding that the care team will be referencing this tool throughout their hospital stay and again on discharge. Nurse Oly Draper notified of the reference tool being received by the patient.
Problem: DISCHARGE BARRIERS  Goal: Patient's continuum of care needs are met  Outcome: Ongoing  Planning home with home health. Please see progress note 07/09/18.
Problem: Falls - Risk of:  Goal: Will remain free from falls  Will remain free from falls   Outcome: Met This Shift  Fall precautions - non-skids, 2/4 bedrails, use of front wheel walker, gait belt & x 1 assistance with ambulation. Chair & bed alarm and use of call light. Patient uses call light appropriately, however when he has to use the restroom he is impulsive & will put on call light but not wait until the staff is in the room before getting up. Educated on the importance of waiting for staff, patient verbalizes understanding however does not comply. Ambulated entire until and tolerated well. No falls occurred. Problem: Pain:  Goal: Patient's pain/discomfort is manageable  Patient's pain/discomfort is manageable   Outcome: Met This Shift  Denies pain. Patient does have chronic back issues. Lidoderm patch to back and Tylenol PM given, as patient takes this at night for rest and to prevent back discomfort. Denies pain thus far. Problem: Skin Integrity:  Goal: Skin integrity will stabilize  Skin integrity will stabilize   Outcome: Met This Shift  Skin clean, dry & intact. Able to turn & reposition self. No areas of skin breakdown noted. Problem: Discharge Planning:  Goal: Patients continuum of care needs are met  Patients continuum of care needs are met   Outcome: Ongoing  Patient from home with family support. Pending D/C plans to return home. Problem: Impaired respiratory status  Goal: Clear lung sounds  Outcome: Not Met This Shift  Inspiratory wheezes in lungs bilaterally, worse in right. Expiratory wheezes in left lung base and throughout right lung. Scheduled breathing TX given. Productive cough with clear sputum. Respiratory culture sent on day shift. Pending results. Comments: Care plan reviewed with patient. Patient verbalize understanding of the plan of care and contribute to goal setting.
Problem: Impaired respiratory status  Goal: Clear lung sounds  Outcome: Met This Shift  Pt has clear lung sounds t/o and is on room air. Pt shows no signs of respiratory distress  Will continue with therapy as ordered for maintenance.
Problem: Impaired respiratory status  Goal: Clear lung sounds  Outcome: Ongoing  Breath sounds are diminished and clear, wheezing in the right lower lobe. Continue with treatments to help improve breath sounds.
Problem: Infection:  Goal: Will remain free from infection  Will remain free from infection   Outcome: Met This Shift  Afebrile, no s/s of infection. Sputum culture pending. Problem: Safety:  Goal: Free from accidental physical injury  Free from accidental physical injury   Outcome: Ongoing  Patient up with contact assist, gait belt & walker. Patient can be impulsive & forgetful. Bed & chair alarm utilized for additional safety. Goal: Free from intentional harm  Free from intentional harm   Outcome: Completed Date Met: 07/09/18      Problem: Daily Care:  Goal: Daily care needs are met  Daily care needs are met   Outcome: Ongoing  Patient needs some assistance with daily care. Patient can become SOB with exertion. Problem: Pain:  Goal: Patient's pain/discomfort is manageable  Patient's pain/discomfort is manageable   Outcome: Met This Shift  Patient has chronic back pain, refused any pain medication this shift. Problem: Skin Integrity:  Goal: Skin integrity will stabilize  Skin integrity will stabilize   Outcome: Met This Shift      Problem: Discharge Planning:  Goal: Patients continuum of care needs are met  Patients continuum of care needs are met   Outcome: Ongoing  Patient will be discharged to home with family support. Problem: Urinary Elimination:  Goal: Ability to achieve a balanced intake and output will improve  Ability to achieve a balanced intake and output will improve   Outcome: Met This Shift      Problem: Impaired respiratory status  Goal: Clear lung sounds  Outcome: Ongoing  Patient has a productive strong cough of thick yellow mucus. Patient has wheezing & diminished lung sounds in bases. Patient is on room air with O2 sat @ 94-95 %. Problem: Nutrition  Goal: Optimal nutrition therapy  Outcome: Completed Date Met: 07/09/18      Comments: Care plan reviewed with patient and daughters. .  Patient and daughters verbalize understanding of the plan of care and contribute to goal setting.
Problem: Infection:  Goal: Will remain free from infection  Will remain free from infection   Outcome: Ongoing  Pt's vitals have remained within baseline with pt denying any new pain or inflammation, no new breakdown seen on pt either. Will continue to monitor. Problem: Safety:  Goal: Free from accidental physical injury  Free from accidental physical injury   Outcome: Ongoing  Pt remains impulsive with ambulation to toilet, has been confused intermittently and able to be re-oriented verbally. Remains pleasant, but forgetful. Bed and chair alarms remain on and functioning entire shift due to impulsiveness to toilet for pt. Problem: Daily Care:  Goal: Daily care needs are met  Daily care needs are met   Outcome: Ongoing  Pt able to perform ADL's with minimal assistance from staff this shift denies any further needs at this time. Problem: Pain:  Goal: Patient's pain/discomfort is manageable  Patient's pain/discomfort is manageable   Outcome: Ongoing  Pt's pain goal has been \"no pain\" and remains met throughout shift so far. Pt has had no pain this entire shift so far. Problem: Skin Integrity:  Goal: Skin integrity will stabilize  Skin integrity will stabilize   Outcome: Ongoing  Aside from some scattered abrasions/bruises, pt has no new skin breakdown this shift. Will continue to monitor. Problem: Discharge Planning:  Goal: Patients continuum of care needs are met  Patients continuum of care needs are met   Outcome: Ongoing  Pt planning d/c to home with daughters to help with care at home--denies any further needs at this time. Problem: Bowel/Gastric:  Goal: Will not experience complications related to bowel motility  Will not experience complications related to bowel motility   Outcome: Ongoing  No BM this shift so far with pt. Has active bowel sounds x4, no cramping/pain. Will continue to monitor.     Problem: Urinary Elimination:  Goal: Ability to achieve a balanced intake and output will
Problem: Nutrition  Goal: Optimal nutrition therapy  Outcome: Ongoing  Nutrition Problem: Severe malnutrition, in context of acute illness or injury  Intervention: Food and/or Nutrient Delivery: Continue current diet, Start ONS  Nutritional Goals: Pt. to consume greater than 75% of meals during LOS
sounds are hypoactive x 4. Problem: Urinary Elimination:  Goal: Ability to achieve a balanced intake and output will improve  Ability to achieve a balanced intake and output will improve   Outcome: Ongoing  Patient voiding adequate amounts of clear yellow urine, patient is continent. Patient verbalizes appropriate appetite, denies n/v. Able to consume food and drink with no issues noted. Comments: Care plan reviewed with patient and family. Patient and family verbalize understanding of the plan of care and contribute to goal setting.

## 2018-07-10 NOTE — PROGRESS NOTES
Discharge instructions given to pt and daughter. Questions answered. Discharged to home with personal belongings  Per private vehicle.

## 2018-07-10 NOTE — TELEPHONE ENCOUNTER
.Transition of Care visit scheduled.   7/17/2018 with marcel  Patient is being discharged to home 7/10  Admitted for copd exacerbation, rr 22%

## 2018-07-10 NOTE — CARE COORDINATION
Visited with Chapis Strong in hospital today. He was recently admitted for COPD exacerbation. States he was at a family reunion and couldn't breath. Went to ED and has been admitted. States he is feeling much better since admitted. Steroids are making his blood sugars high but he understands that is because of the medication. States [facility] nurse will be coming into the home upon discharge. Will continue to follow up upon CTC completion.

## 2018-07-10 NOTE — PROGRESS NOTES
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     Priority: High    Severe malnutrition ([address] [E43] 2018     Class: Acute    COPD exacerbation ([address] [J44.1] 2018    Macrocytic anemia [D53.9]     Diastolic dysfunction without heart failure [I51.9] 2018    Acute bronchitis due to other specified organisms [J20.8] 2018    COPD with exacerbation ([address] [J44.1] 2017    Tobacco use disorder [F17.200] 10/05/2016    Acute respiratory failure with hypoxia (HCC) [J96.01]     Chronic leukopenia [D72.819] 2016    DM II (diabetes mellitus, type II), controlled ([address] [E11.9] 2015    Prostate CA ([address] Juan Coon 2014    Moderate aortic regurgitation [I35.1] 2013    BPH without urinary obstruction [N40.0]      Reason for Consult    Following for COPD   HPI   History Obtained From: Patient and electronic medical record. Delphine Beckman is a 80 y.o. male  was initially admitted under hospitalist service. Pulmonary medicine was consulted for further management of COPD/exacerbation. The patient is a 80 y.o. male who follows with my clinic for his moderate COPD presented with worsening of shortness of breath for 2days prior to hospitalization. His current illness started as gradual worsening of shortness of breath with cough and wheezing. He also noticed decline in his functional status. He started using his rescue inhalers/nebulizations more frequently at home with no significant improvement in hisshort ness of breath, cough or wheezing.  Due to worsening of his  above symptoms he presented to the Emergency room at [facility]. He was treated with IV steroids and nebs after admission to hospital    He denies any history of hemoptysis. He denies any history of orthopnea or paroxysmal nocturnal dyspnea. He denies any fever, chills or rigors at this time.     Past 24 Hours   Patient reports he is being discharged today  He feels good  No complaints  On RA     PMHx   Past Medical History      Diagnosis Date    Acute urinary retention     Arthritis     BPH without urinary obstruction     Cancer (HCC)     multiple myeloma & bone    Chronic kidney disease     COPD (chronic obstructive pulmonary disease) (HCC)     Disease of blood and blood forming organ     History of blood transfusion     Hyperlipidemia     Hypertension     Insomnia     Pneumonia     Prostate cancer ([facility]     Shingles     Atypical    Type II or unspecified type diabetes mellitus without mention of complication, not stated as uncontrolled       Meds    Current Medications    vitamin B-12  1,000 mcg Oral Daily    predniSONE  30 mg Oral Daily    Followed by   Mona Stoddard ON 7/12/2018] predniSONE  20 mg Oral Daily    Followed by   Mona Stoddard ON 7/15/2018] predniSONE  10 mg Oral Daily    levofloxacin  750 mg Intravenous Q24H    ipratropium-albuterol  1 ampule Inhalation Q4H WA    pantoprazole  40 mg Oral QAM AC    enoxaparin  40 mg Subcutaneous Daily    aspirin  81 mg Oral Daily    fluticasone  2 spray Nasal Daily    lidocaine  1 patch Transdermal Daily    insulin lispro  0-12 Units Subcutaneous TID WC    insulin lispro  0-6 Units Subcutaneous Nightly     HYDROcodone-acetaminophen, dextromethorphan-guaiFENesin, glucose, dextrose, glucagon (rDNA), dextrose, diphenhydrAMINE **AND** acetaminophen  IV Drips/Infusions   dextrose       Diet    DIET GENERAL;  Dietary Nutrition Supplements: Standard High Calorie Oral Supplement  Allergies    Shellfish-derived products and Flomax [tamsulosin hcl]  Vitals     height is 5' 7\" (1.702 m) and weight is 186 lb 9.6 oz (84.6 kg). His oral temperature is 97.6 °F (36.4 °C). His blood pressure is 153/63 (abnormal) and his pulse is 89. His respiration is 20 and oxygen saturation is 97%. Body mass index is 29.23 kg/m². SUPPLEMENTAL O2: O2 Flow Rate (L/min): 2 L/min       I/O        Intake/Output Summary (Last 24 hours) at 07/10/18 1330  Last data filed at 07/10/18 0325   Gross per 24 hour   Intake             1677 ml   Output              180 ml   Net             1497 ml     I/O last 3 completed shifts: In: 1917 [P.O.:1860; I.V.:57]  Out: 180 [Urine:180]   Patient Vitals for the past 96 hrs (Last 3 readings):   Weight   07/09/18 2300 186 lb 9.6 oz (84.6 kg)   07/08/18 0815 186 lb 9.6 oz (84.6 kg)   07/08/18 0218 188 lb (85.3 kg)       Exam   Physical Exam   Constitutional: up ad tracey in room, in no distress on room air- talkative   Head: Normocephalic and atraumatic. Right Ear: External ear normal in appearance. Left Ear: External ear normal in appearance. Mouth/Throat: Oropharynx is clear and moist.  No oral thrush. Eyes: Conjunctivae are normal  Neck: Neck supple. No tracheal deviation present. Cardiovascular: Regular rate, regular rhythm, S1 and S2 with no murmur. No peripheral edema  Pulmonary/Chest: Normal effort with clear breath sounds. No stridor. No respiratory distress. Patient exhibits no tenderness. Abdominal: Soft. Bowel sounds audible. No distension or tenderness to palp. Neurological: Patient is alert and oriented to person, place, and time. Skin: Warm and dry.      Labs  - Old records and notes have been reviewed in [facility]   ABG  Lab Results   Component Value Date    PH 7.48 07/08/2018    PO2 66 07/08/2018    PCO2 37 07/08/2018    HCO3 28 07/08/2018    O2SAT 94 07/08/2018     Lab Results   Component Value Date    IFIO2 2 07/08/2018     CBC  Recent Labs      07/08/18   0232  07/08/18   0609   WBC  1.7*  1.6*   RBC  3.20*  3.08* HGB  10.5*  10.1*   HCT  32.1*  30.9*   MCV  100.3*  100.3*   MCH  32.8  32.8   MCHC  32.7  32.7   PLT  152  144   MPV  9.9  10.0      BMP  Recent Labs      07/08/18   0232  07/08/18   0609   NA  135  136   K  3.7  3.7   CL  95*  98   CO2  27  25   BUN  14  13   CREATININE  0.9  0.8   GLUCOSE  150*  257*   CALCIUM  9.4  9.1     LFT  Recent Labs      07/08/18   0232   AST  11   ALT  9*   BILITOT  0.2*   ALKPHOS  97     TROP  Lab Results   Component Value Date    TROPONINT < 0.010 07/08/2018    TROPONINT < 0.010 07/19/2017    TROPONINT < 0.010 10/18/2016     BNP  No results for input(s): BNP in the last 72 hours. Lactic Acid  No results for input(s): LACTA in the last 72 hours. INR  No results for input(s): INR, PROTIME in the last 72 hours. PTT  No results for input(s): APTT in the last 72 hours. Glucose  Recent Labs      07/09/18   2049  07/10/18   0648  07/10/18   1116   POCGLU  172*  223*  172*     UA   Recent Labs      07/08/18   1430   SPECGRAV  1.024   PHUR  7.0   COLORU  YELLOW   PROTEINU  NEGATIVE   BLOODU  NEGATIVE   RBCUA  0-2   WBCUA  0-2   BACTERIA  NONE   NITRU  NEGATIVE   BILIRUBINUR  NEGATIVE   UROBILINOGEN  0.2   KETUA  NEGATIVE   LABCAST  NONE SEEN  NONE SEEN     PFTs   Spirometry 2017:         His FEV1 improved from previous spirometry done in the past.          Sleep studies   None    Cultures    Blood cultures- no growth  Sputum culture- Normal connie. Echocardiogram   Account #       [de-identified]       Room Number           5K-05      Accession       [id number]     Date of Study         02/10/2016   Number  Conclusions      Summary   Normal left ventricle size and normal systolic function. Ejection fraction   was estimated at 55-60%. There were no regional left ventricular wall   motion abnormalities and wall thickness was within normal limits. Doppler parameters were consistent with abnormal left ventricular   relaxation (grade 1 diastolic dysfunction). Aortic valve appears tricuspid. Leaflets exhibited mild to moderately increased thickness and mildly   reduced cuspal separation of the aortic valve. There was evidence of   increased velocity across the aortic valve. velocity was 2.7 m/sec, mean   gradient was 15 mm Hg, aortic valve area was calculated at 1.43 cm2. Mild to moderate aortic stenosis is present. Mild-to-moderate aortic regurgitation is noted. Mild mitral regurgitation is present. Mild tricuspid regurgitation visualized. Mild pulmonic regurgitation visualized. Signature      ----------------------------------------------------------------   Electronically signed by Shaan Lucero MD (Interpreting      Radiology    CXR  Jul 8, 2018  PROCEDURE: XR CHEST PORTABLE  1. No acute intrathoracic process. 2. Chronic lung disease. Chest Xray:  4/17/2018  PROCEDURE: XR CHEST (2 VW)  1. No acute intrathoracic process. 2. Chronic lung disease.            CT Scans  (See actual reports for details)  CTA of chest:  Jul 19, 2017  PROCEDURE: CTA CHEST W WO CONTRAST  NO CT EVIDENCE OF PULMONARY EMBOLISM.   INTERVAL DEVELOPMENT OF BILATERAL SUBSEGMENTAL ATELECTASIS VERSUS LESS LIKELY BILATERAL PNEUMONIA. FINDINGS SUPERIMPOSED UPON GROSSLY STABLE CHRONIC LUNG DISEASE CHARACTERISTICS, POSSIBLY REPRESENTING COPD, THOUGH POORLY DEFINED DUE TO MOTION DEGRADATION.              Assessment   Moderate COPD with exacerbation due to acute bronchitis with viral Vs bacterial etiolgy. Hx of tobacco abuse. Chronic kidney disease  Hypertension. Type II or unspecified type diabetes mellitus without mention of complication, not stated as uncontrolled    Plan    -prednisone 30mg PO daily with tapering doses   -cultures negative  -Accuchecks monitoring ACHS with low dose insulin sliding scale for coverage.  -Continue Duonebs 3ml via nebs Q6h prn.  While in house    -Will restart back on Flonase 50mcg/INH 2sprays each nostril daily PRN at the time of discharge.  -Will restart back on Symbicort 160/4.5mcg

## 2018-07-10 NOTE — DISCHARGE SUMMARY
Hospital Medicine Discharge Summary      Patient Identification:   Wayne Estevez   : 1932  MRN: [id number]   Account: [de-identified]      Patient's PCP: Teo Conway DO    Admit Date: 2018     Discharge Date:   7/10/2018     Admitting Physician: Kala Perez MD     Discharge Physician: Ying Powers MD     Discharge Diagnoses:    COPD exacerbation  Current smoker  Essential hypertension  Diabetes mellitus type 2, controlled  Chronic leukopenia  Moderate aortic regurgitation  Benign prostate hypertrophy with symptoms  Chronic kidney disease stage III  Multiple myeloma  History of prostate cancer  Moderate to severe protein calorie malnutrition  Suspicion for chronic diastolic CHF    The patient was seen and examined on day of discharge and this discharge summary is in conjunction with any daily progress note from day of discharge. Hospital Course:   Wayne Estevez is a 80 y.o. male admitted to [address] [place] [address] on 2018 for  Cough with phlegm and wheezing and shortness of breath. Patient presented to the emergency room with chief complaint of cough with yellow phlegm and wheezing and shortness of breath that has been gradually worsening since the last 2 days. Patient has been smoking almost a pack a day of cigarettes. There is no documented hypoxia in the emergency room but was placed on oxygen because of distress. He was empirically started on IV steroids, antibiotics and bronchodilators. Pulmonologist was also on board and optimize the medications. Patient's symptoms improved and he wanted to go home. He does have mild wheezing but is not hypoxic. After breathing treatment he does become little tachycardic. From the steroids patient's blood sugars were high and apparently his diabetic medications were discontinued recently 3 months ago. Counseled the patient at length about need to abstain from smoking and became emotional and was crying.   Reassured the patient 07/08/2018    BUN 13 07/08/2018    CREATININE 0.8 07/08/2018    CALCIUM 9.1 07/08/2018    PHOS 2.8 06/18/2016         Significant Diagnostic Studies    Radiology:   XR CHEST PORTABLE   Final Result   1. No acute intrathoracic process. 2. Chronic lung disease. **This report has been created using voice recognition software. It may contain minor errors which are inherent in voice recognition technology. **            No change from XR CHEST (2 VW) with report dated 4/17/2018 12:57 PM.         **This report has been created using voice recognition software. It may contain minor errors which are inherent in voice recognition technology. **      Final report electronically signed by Dr. Josias Meraz on 7/8/2018 3:39 AM             Consults:     STR ED TO IP CONSULT  IP CONSULT TO SOCIAL WORK  IP CONSULT TO PULMONOLOGY  IP CONSULT TO HOME CARE NEEDS    Disposition:    [x] Home       [] TCU       [] Rehab       [] Psych       [] SNF       [] [place]       [] Other-    Condition at Discharge: Stable    Code Status:  Prior     Patient Instructions:    Discharge lab work:    Activity: activity as tolerated  Diet: DIET GENERAL;  Dietary Nutrition Supplements: Standard High Calorie Oral Supplement      Follow-up visits:   DO Elier Velasco 5  [phone]               Discharge Medications:      Clary Hong   Home Medication Instructions [facility]:[id number]    Printed on:07/10/18 1001   Medication Information                      acetaminophen 650 MG TABS  Take 650 mg by mouth every 4 hours as needed             albuterol (PROVENTIL) (2.5 MG/3ML) 0.083% nebulizer solution  Take 3 mLs by nebulization every 6 hours as needed for Wheezing or Shortness of Breath             albuterol sulfate HFA (VENTOLIN HFA) 108 (90 Base) MCG/ACT inhaler  Inhale 2 puffs into the lungs every 6 hours as needed for Wheezing or Shortness of Breath             amoxicillin-clavulanate

## 2018-07-11 NOTE — TELEPHONE ENCOUNTER
Patient's daughter called and said patient was in hospital from Sat till yesterday with bronchitis and COPD and wanted to make sure was still ok to come in for chemo tomorrow?  Please advise

## 2018-07-11 NOTE — CARE COORDINATION
[facility] Transitions Initial Follow Up Call    Call within 2 business days of discharge: Yes    Patient: Que Gates Patient : 1932   MRN: [id number]  Reason for Admission: There are no discharge diagnoses documented for the most recent discharge. Discharge Date: 7/10/18 RARS: Readmission Risk Score: 25     Spoke with: Charlette Lesch daughter    Facility: [facility]    Non-face-to-face services provided:  Obtained and reviewed discharge summary and/or continuity of care documents  Establishment or re-establishment of referrals-Interim     Care Transitions 24 Hour Call    Do you have any ongoing symptoms?:  Yes  Patient-reported symptoms:  Shortness of Breath  Do you have a copy of your discharge instructions?:  Yes  Do you have all of your prescriptions and are they filled?:  Yes  Have you been contacted by a [employer] ([facility])?:  Yes  Have you scheduled your follow up appointment?:  Yes  How are you going to get to your appointment?:  Car - family or friend to transport  Were you discharged with any Home Care or Post Acute Services:  Yes  Post Acute Services:  Home Health (Comment: Interim )  Patient DME:  Akila akers  Patient Home Equipment:  Nebulizer  Do you have support at home?:  Alone  Do you feel like you have everything you need to keep you well at home?:  Yes  Are you an active caregiver in your home?:  No  Care Transitions Interventions  No Identified Needs     Called for the care transition initial follow up call & spoke with the daughter Charlette Lesch, pt was napping. Pt presented to the ER on 18 for cough with phlegm and wheezing and shortness of breath. Patient has been smoking almost a pack a day of cigarettes. Pt was admitted for COPD among other issues. Charlette Lesch stated the pt still has SOB, he is better than when he was admitted, not back to baseline yet. Pt has been coughing & expectorating a large amount of thick green-yellow sputum.  Charlette Lesch stated the pt has decreased his

## 2018-07-11 NOTE — TELEPHONE ENCOUNTER
CLINICAL PHARMACY NOTE  Post-Discharge Transitions of Care ([facility]ESAR)    Non-face-to-face services provided:  Assessment and support for treatment adherence and medication management-  Medication reconciliation completed 7/11/18. Subjective/Objective:  Loel Osler is a 80 y.o. male. Patient was discharged from [address] [place] [address] on 7/10/18 with a diagnosis of COPD exacerbation. Patient outreach to review discharge medications and provide medication review and management. Spoke with patient and daughters, Marleegiovany Zack and Felicia. Her and her sister manage the patient's medications. Allergies   Allergen Reactions    Shellfish-Derived Products     Flomax [Tamsulosin Hcl] Other (See Comments)     Drops b/p          Discharge Medications (as per discharging medication list found in AVS): There are NEW medications for you. START taking them after you leave the hospital  Medication Sig    vitamin B-12 1000 MCG tablet Take 1 tablet by mouth daily  Picked up from the pharmacy and started this morning.  amoxicillin-clavulanate (AUGMENTIN) 875-125 MG per tablet Take 1 tablet by mouth 2 times daily for 7 days  Started last night with yogurt to prevent diarrhea. You told us you were taking these medications at home, but the amount or how often you take this medication has CHANGED  Medication Sig    predniSONE (DELTASONE) 10 MG tablet Take 4 tablets daily for 1 days, then 3 tablets daily for 3 days, then 2 tablets daily for 3 days, then 1 tablet daily for 3 days, then stop. Takes with breakfast to prevent upset stomach. These are medications you told us you were taking at home, CONTINUE taking them after you leave the hospital   Medication Sig    lidocaine (LIDODERM) 5 % Place 1 patch onto the skin daily 12 hours on, 12 hours off. Applies at 8pm and removed at 8am the following day.     HYDROcodone-acetaminophen (NORCO) 5-325 MG per tablet Take 1 tablet by mouth every 6 hours as needed for Pain.. Has not used it in over a week because he hasn't had pain.  guaiFENesin (MUCINEX) 600 MG extended release tablet Take 600 mg by mouth 2 times daily  Uses a liquid form. Currently has a combination Mucinex with 20 mg dextromethorphan, 400 mg guaifenesin, and 10 mg phenylephrine per 20 mL dose. Daughter I spoke with will be getting the plain guaifenesin as she did not realize the patient had the combination medication.  budesonide-formoterol (SYMBICORT) 160-4.5 MCG/ACT AERO Inhale 2 puffs into the lungs 2 times daily Rinse mouth after its use. No trouble using the inhaler. Had daughter explain how the patient uses. Rinses mouth out after each use.  tiotropium (SPIRIVA HANDIHALER) 18 MCG inhalation capsule Inhale 1 capsule into the lungs daily 1 capsule via inhalation daily. No trouble using the inhaler. Had daughter explain how the patient uses.  albuterol sulfate HFA (VENTOLIN HFA) 108 (90 Base) MCG/ACT inhaler Inhale 2 puffs into the lungs every 6 hours as needed for Wheezing or Shortness of Breath  Verified that this is used as a rescue inhaler. Daughter was not aware that both the nebulizer solution and the inhaler were both for rescue treatments. Advised to only use when needed and to not use both inhaler and nebulizer at the same time.  fluticasone (FLONASE) 50 MCG/ACT nasal spray 2 sprays by Nasal route daily    diphenhydrAMINE-APAP, sleep, (TYLENOL PM EXTRA STRENGTH)  MG tablet Take 2 tablets by mouth nightly as needed for Sleep  This medication is used every night for sleep. Counseled on possibly switching from this to melatonin as it is not needed for pain and diphenhydramine is a potential falls risk for elderly patients. Daughter will obtain 5mg melatonin from pharmacy for patient to try.  leuprolide (LUPRON) 30 MG injection Inject 30 mg into the muscle once Indications: every 3 months  Not taking currently as in prostate cancer remission.     albuterol (PROVENTIL) (2.5

## 2018-07-16 NOTE — CARE COORDINATION
overcoming my barriers: N/A  Confidence: 10/10  Anticipated Goal Completion Date: ongoing              Prior to Admission medications    Medication Sig Start Date End Date Taking? Authorizing Provider   guaiFENesin (ROBITUSSIN) 100 MG/5ML syrup Take 400 mg by mouth 2 times daily as needed for Cough   Yes Historical Provider, MD   Artificial Saliva (BIOTENE MOISTURIZING MOUTH) SOLN Take by mouth as needed   Yes Historical Provider, MD   polyethylene glycol (GLYCOLAX) powder Take 17 g by mouth daily as needed   Yes Historical Provider, MD   predniSONE (DELTASONE) 10 MG tablet Take 4 tablets daily for 1 days, then 3 tablets daily for 3 days, then 2 tablets daily for 3 days, then 1 tablet daily for 3 days, then stop. 7/10/18  Yes Renea Kehr, MD   vitamin B-12 1000 MCG tablet Take 1 tablet by mouth daily 7/10/18  Yes Renea Kehr, MD   amoxicillin-clavulanate (AUGMENTIN) 875-125 MG per tablet Take 1 tablet by mouth 2 times daily for 7 days 7/10/18 7/17/18 Yes Renea Kehr, MD   lidocaine (LIDODERM) 5 % Place 1 patch onto the skin daily 12 hours on, 12 hours off. 7/2/18  Yes Rhea South PA-C   HYDROcodone-acetaminophen (NORCO) 5-325 MG per tablet Take 1 tablet by mouth every 6 hours as needed for Pain. .   Yes Historical Provider, MD   budesonide-formoterol (SYMBICORT) 160-4.5 MCG/ACT AERO Inhale 2 puffs into the lungs 2 times daily Rinse mouth after its use. 4/24/18 4/24/19 Yes Kimber Houston MD   tiotropium (SPIRIVA HANDIHALER) 18 MCG inhalation capsule Inhale 1 capsule into the lungs daily 1 capsule via inhalation daily.  4/24/18 4/24/19 Yes Kimber Houston MD   albuterol sulfate HFA (VENTOLIN HFA) 108 (90 Base) MCG/ACT inhaler Inhale 2 puffs into the lungs every 6 hours as needed for Wheezing or Shortness of Breath 4/24/18  Yes Kimber Houston MD   fluticasone (FLONASE) 50 MCG/ACT nasal spray 2 sprays by Nasal route daily 12/19/17 12/19/18 Yes Kimber Houston MD

## 2018-07-18 PROBLEM — J44.1 COPD EXACERBATION (HCC): Status: RESOLVED | Noted: 2018-01-01 | Resolved: 2018-01-01

## 2018-07-18 PROBLEM — E43 SEVERE MALNUTRITION (HCC): Chronic | Status: RESOLVED | Noted: 2018-01-01 | Resolved: 2018-01-01

## 2018-07-18 PROBLEM — J20.8 ACUTE BRONCHITIS DUE TO OTHER SPECIFIED ORGANISMS: Status: RESOLVED | Noted: 2018-01-01 | Resolved: 2018-01-01

## 2018-07-18 NOTE — PROGRESS NOTES
 albuterol sulfate HFA (VENTOLIN HFA) 108 (90 Base) MCG/ACT inhaler Inhale 2 puffs into the lungs every 6 hours as needed for Wheezing or Shortness of Breath 3 Inhaler 2    fluticasone (FLONASE) 50 MCG/ACT nasal spray 2 sprays by Nasal route daily 1 Bottle 7    diphenhydrAMINE-APAP, sleep, (TYLENOL PM EXTRA STRENGTH)  MG tablet Take 2 tablets by mouth nightly as needed for Sleep      leuprolide (LUPRON) 30 MG injection Inject 30 mg into the muscle once Indications: every 3 months      CHEMOTHERAPY       albuterol (PROVENTIL) (2.5 MG/3ML) 0.083% nebulizer solution Take 3 mLs by nebulization every 6 hours as needed for Wheezing or Shortness of Breath 360 Package 2    aspirin 81 MG EC tablet Take 81 mg by mouth daily. No current facility-administered medications for this visit.         Past Medical History:   Diagnosis Date    Acute urinary retention     Arthritis     BPH without urinary obstruction     Cancer (HCC)     multiple myeloma & bone    Chronic kidney disease     COPD (chronic obstructive pulmonary disease) (HCC)     Disease of blood and blood forming organ     History of blood transfusion     Hyperlipidemia     Hypertension     Insomnia     Pneumonia     Prostate cancer ([facility] [address]     Shingles     Atypical    Type II or unspecified type diabetes mellitus without mention of complication, not stated as uncontrolled        Past Surgical History:   Procedure Laterality Date    COLONOSCOPY      EYE SURGERY      bilateral cataracts    LEG SURGERY Right     amanda from hip to knee    SKIN BIOPSY         Social History   Substance Use Topics    Smoking status: Current Every Day Smoker     Packs/day: 1.00     Years: 65.00     Last attempt to quit: 7/19/2017    Smokeless tobacco: Never Used      Comment: pts smoking a half a pack a day    Alcohol use No      Comment: rarely       Family History   Problem Relation Age of Onset    Diabetes Mother     Cancer Father     Cancer Brother         liver         I have reviewed the patient's past medical history, past surgical history, allergies, medications, social and family history and I have made updates where appropriate. PHYSICAL EXAM:  /64   Pulse 101   Temp 97.7 °F (36.5 °C) (Oral)   Resp 12   Ht 5' 7.01\" (1.702 m)   Wt 191 lb 9.6 oz (86.9 kg)   BMI 30.00 kg/m²   General Appearance: well developed and well- nourished, in no acute distress  Head: normocephalic and atraumatic  ENT: external ear and ear canal normal bilaterally, nose without deformity, nasal mucosa and turbinates normal without polyps, oropharynx normal, dentition is normal for age, no lip or gum lesions noted  Neck: supple and non-tender without mass, no thyromegaly or thyroid nodules, no cervical lymphadenopathy  Pulmonary/Chest: clear to auscultation bilaterally- no wheezes, rales or rhonchi, normal air movement, no respiratory distress or retractions  Cardiovascular: normal rate, regular rhythm, normal S1 and S2, no murmurs, rubs, clicks, or gallops, distal pulses intact  Extremities: no cyanosis, clubbing or edema of the lower extremities  Psych:  Normal affect without evidence of depression or anxiety, insight and judgement are appropriate, memory appears intact  Skin: warm and dry, no rash or erythema      Diagnostic test results reviewed: inpatient labs    Patient risk of morbidity and mortality: moderate      ASSESSMENT & PLAN  Ivan Alvarado was seen today for follow-up from hospital.    Diagnoses and all orders for this visit:    COPD with exacerbation ([facility] [address]  -     CA DISCHARGE MEDS RECONCILED W/ CURRENT OUTPATIENT MED LIST    Moderate COPD (chronic obstructive pulmonary disease) (HCC)  -     CA DISCHARGE MEDS RECONCILED W/ CURRENT OUTPATIENT MED LIST  -     predniSONE (DELTASONE) 20 MG tablet;  Take 2 tablets by mouth daily for 5 days    Acute respiratory failure with hypoxia (HCC)    Tobacco dependence    Severe malnutrition (HCC)    Controlled type

## 2018-07-19 NOTE — CARE COORDINATION
Latonia Glasgow called to update me on this weeks appts. States they will be holding chemo for 6 weeks. States overall he is feeling fairly well. States he is going to start drinking more boost.  I have samples to give him and will get those to him. States HH is still active with him. States cough is improved with less productive cough noted. Continues to wear back brace which is helping with his compression fx. States pain is tolerable and he is able to function with it at this time.

## 2018-07-24 NOTE — TELEPHONE ENCOUNTER
Ruben Anaya is requesting a refill of the following medication(s);   Requested Prescriptions     Pending Prescriptions Disp Refills    Handicap Placard MISC 1 each 0     Si each by Does not apply route daily DX: Multiple Myeloma-C 90.00  Exp.2023       Last filled;

## 2018-07-30 NOTE — CARE COORDINATION
Dropped off samples of Boost to Art Dyers today. Was very tearful as he discussed his current health status. Talked with him for some time about his chronic issues. Offered other services including LISW which he declined. Discussed medications today as he had questions about some of the meds he is taking. Will continue to reach to support for both Art Dyers and his family.

## 2018-07-30 NOTE — CARE COORDINATION
Inhale 2 puffs into the lungs 2 times daily Rinse mouth after its use. 4/24/18 4/24/19 Yes Ellen Ortega MD   tiotropium (SPIRIVA HANDIHALER) 18 MCG inhalation capsule Inhale 1 capsule into the lungs daily 1 capsule via inhalation daily. 4/24/18 4/24/19 Yes Fatmata Mandujano MD   albuterol sulfate HFA (VENTOLIN HFA) 108 (90 Base) MCG/ACT inhaler Inhale 2 puffs into the lungs every 6 hours as needed for Wheezing or Shortness of Breath 4/24/18  Yes Fatmata Mandujano MD   fluticasone (FLONASE) 50 MCG/ACT nasal spray 2 sprays by Nasal route daily 12/19/17 12/19/18 Yes Fatmata Mandujano MD   diphenhydrAMINE-APAP, sleep, (TYLENOL PM EXTRA STRENGTH)  MG tablet Take 2 tablets by mouth nightly as needed for Sleep   Yes Historical Provider, MD   leuprolide (LUPRON) 30 MG injection Inject 30 mg into the muscle once Indications: every 3 months   Yes Historical Provider, MD   CHEMOTHERAPY    Yes Historical Provider, MD   albuterol (PROVENTIL) (2.5 MG/3ML) 0.083% nebulizer solution Take 3 mLs by nebulization every 6 hours as needed for Wheezing or Shortness of Breath 6/28/16  Yes Fatmata Mandujano MD   aspirin 81 MG EC tablet Take 81 mg by mouth daily.      Yes Historical Provider, MD       Future Appointments  Date Time Provider Sofia Mobley   8/15/2018 10:20 AM [facility] DEXA RM STR[facility] STR Radiolog   8/24/2018 8:00 AM DO Edgardo Gonsales [facility] [place]   8/30/2018 8:00 AM Flavia Mancilla MD Oncology [facility] [place]   8/30/2018 9:00 AM STR OUT PT ONC INJ RM 10 STRZ OP ONC None   10/12/2018 11:30 AM [facility] [place]   11/5/2018 1:00 PM Rachid Davila Urology [place] [place]   4/16/2019 10:30 AM STR PULMONARY FUNCTION ROOM 1 STRZ PFT None   4/24/2019 8:30 AM KIKE Pryor - CNP Pulm Med [place]

## 2018-08-01 PROBLEM — I48.91 ATRIAL FIBRILLATION WITH RVR (HCC): Status: ACTIVE | Noted: 2018-01-01

## 2018-08-01 NOTE — ED NOTES
Pt to er. Pt c/o SOB and cough. Pt states also had chills at home. Pt states really just came on today. States feels some pressure in chest. Resp rapid. Pt was given breathing tx in route and pt states feels a lot better. Family at side. Assessment completed. Call light in reach.       Swapnil Lazo RN  08/01/18 [id number]

## 2018-08-01 NOTE — ED PROVIDER NOTES
[facility]  eMERGENCY dEPARTMENT eNCOUnter          [address] [facility]       Chief Complaint   Patient presents with    Cough    Shortness of Breath       Nurses Notes reviewed and I agree except as noted in the HPI. HISTORY OF PRESENT ILLNESS    Beryl Tabares is a 80 y.o. male who presents to the Emergency Department for the evaluation of shortness of breath and cough. The patient reports worsening shortness of breath this morning. The patient denies oxygen at home. The patient does have nebulizer and inhalers at home that he uses 2x daily. The patient reports chest heaviness, fever, chills, and leg swelling for which he is on a water pill. The patient has been smoking for 70 years 1 pack a day. The patient denies chest pain, nausea, vomiting, and dizziness. The patient lives alone. The patient denies sick contact. The patient denies CHF. The HPI was provided by the patient. REVIEW OF SYSTEMS     Review of Systems   Constitutional: Positive for chills and fever. Negative for appetite change and fatigue. HENT: Negative for congestion, ear pain, rhinorrhea and sore throat. Eyes: Negative for discharge, redness and visual disturbance. Respiratory: Positive for cough, chest tightness and shortness of breath. Negative for wheezing. Cardiovascular: Positive for leg swelling. Negative for chest pain and palpitations. Gastrointestinal: Negative for abdominal pain, diarrhea, nausea and vomiting. Genitourinary: Negative for decreased urine volume, difficulty urinating, dysuria and hematuria. Musculoskeletal: Negative for arthralgias, back pain, joint swelling and neck pain. Skin: Negative for pallor and rash. Allergic/Immunologic: Negative for environmental allergies. Neurological: Negative for dizziness, syncope, weakness, light-headedness, numbness and headaches. Hematological: Negative for adenopathy. Psychiatric/Behavioral: Negative for confusion and suicidal ideas. days. Mickeal Rink LEUPROLIDE (LUPRON) 30 MG INJECTION    Inject 30 mg into the muscle once Indications: every 3 months    LIDOCAINE (LIDODERM) 5 %    Place 1 patch onto the skin daily 12 hours on, 12 hours off. POLYETHYLENE GLYCOL (MIRALAX) POWDER    Take 17 g by mouth daily    PREDNISONE (DELTASONE) 10 MG TABLET    Take 4 tablets daily for 1 days, then 3 tablets daily for 3 days, then 2 tablets daily for 3 days, then 1 tablet daily for 3 days, then stop. TIOTROPIUM (SPIRIVA HANDIHALER) 18 MCG INHALATION CAPSULE    Inhale 1 capsule into the lungs daily 1 capsule via inhalation daily. VITAMIN B-12 1000 MCG TABLET    Take 1 tablet by mouth daily       ALLERGIES     is allergic to shellfish-derived products and flomax [tamsulosin hcl]. FAMILY HISTORY     indicated that his mother is . He indicated that his father is . He indicated that his sister is alive. He indicated that his brother is . family history includes Cancer in his brother and father; Diabetes in his mother. SOCIAL HISTORY      reports that he has been smoking Cigarettes. He has a 65.00 pack-year smoking history. He has never used smokeless tobacco. He reports that he does not drink alcohol or use drugs. PHYSICAL EXAM     INITIAL VITALS:  height is 5' 7\" (1.702 m) and weight is 186 lb (84.4 kg). His rectal temperature is 102.2 °F (39 °C). His blood pressure is 108/72 and his pulse is 107. His respiration is 18 and oxygen saturation is 96%. Physical Exam   Constitutional: He is oriented to person, place, and time. He appears well-developed and well-nourished. Non-toxic appearance. HENT:   Head: Normocephalic and atraumatic. Right Ear: Tympanic membrane and external ear normal.   Left Ear: Tympanic membrane and external ear normal.   Nose: Nose normal.   Mouth/Throat: Oropharynx is clear and moist. Mucous membranes are dry.  No oropharyngeal exudate, posterior oropharyngeal edema or posterior oropharyngeal 10.2 (*)     Hematocrit 30.9 (*)     MCV 97.2 (*)     RDW-SD 51.5 (*)     MPV 8.9 (*)     Segs Absolute 0.7 (*)     Lymphocytes # 0.3 (*)     Monocytes # 0.0 (*)     All other components within normal limits   COMPREHENSIVE METABOLIC PANEL - Abnormal; Notable for the following:     Glucose 147 (*)     Chloride 94 (*)     Total Protein 8.9 (*)     Alb 3.2 (*)     ALT 7 (*)     All other components within normal limits   OSMOLALITY - Abnormal; Notable for the following:     Osmolality Calc 271.8 (*)     All other components within normal limits   GLOMERULAR FILTRATION RATE, ESTIMATED - Abnormal; Notable for the following:     Est, Glom Filt Rate 80 (*)     All other components within normal limits   URINE WITH REFLEXED MICRO - Abnormal; Notable for the following:     Ketones, Urine 15 (*)     Protein, UA TRACE (*)     Character, Urine CLOUDY (*)     All other components within normal limits   CULTURE BLOOD #1   CULTURE BLOOD #2   TROPONIN   LACTATE, SEPSIS   ANION GAP   SCAN OF BLOOD SMEAR   CBC   APTT   APTT       EMERGENCY DEPARTMENT COURSE:   Vitals:    Vitals:    08/01/18 1746 08/01/18 1839 08/01/18 1920 08/01/18 2012   BP: 135/77 (!) 132/56  108/72   Pulse: 107 119  107   Resp: 27 22  18   Temp:   102.2 °F (39 °C)    TempSrc:   Rectal    SpO2: 95% 96%  96%   Weight:       Height:           4:46 PM: The patient was seen and evaluated. MDM:  Patient was given breathing treatment in the emergency department and patient was feeling much better for his breathing. Patient was also started on heparin and Cardizem because of new onset A. fib. Patient was given antibiotics after drawing his blood cultured in the emergency department. Patient also was given Solu-Medrol. Patient's fever was treated with Tylenol in the ER. All of patient's lab the testing that reviewed discussed with Dr. Pedro Paul. He will be the admitting physician. Because of A. fib with RVR.     CRITICAL CARE:   None     CONSULTS:

## 2018-08-02 PROBLEM — E43 SEVERE MALNUTRITION (HCC): Status: ACTIVE | Noted: 2018-01-01

## 2018-08-02 NOTE — PROGRESS NOTES
Nutrition Therapies:  · Oral Diet Orders: Cardiac   · Oral Diet intake: 26-50%  · Oral Nutrition Supplement (ONS) Orders: Low Calorie, High Protein, Frozen Oral Supplement  · ONS intake:  new order  · Anthropometric Measures:  · Ht: 5' 7\" (170.2 cm)   · Current Body Wt: 183 lb 3.2 oz (83.1 kg)  · Admission Body Wt: 183 lb 13.8 oz (83.4 kg) (+ 1 RLE & LLE edema)  · Usual Body Wt:  (206# (5/10/18), per EMR)  · % Weight Change: 11%  unplanned weight loss,  2 1/2 months  · Ideal Body Wt: 148 lb (67.1 kg), % Ideal Body 124%  · BMI Classification: BMI 25.0 - 29.9 Overweight  · Comparative Standards (Estimated Nutrition Needs):  · Estimated Daily Total Kcal: ~2077  · Estimated Daily Protein (g): 67+ grams as renal function tolerates    Estimated Intake vs Estimated Needs: Intake Less Than Needs    Nutrition Risk Level: High    Nutrition Interventions:   Continue current diet, Start ONS (Consider liberalizing diet )  Continued Inpatient Monitoring, Education Initiated (Encouraged ONS intake between meals as tolerated. )    Nutrition Evaluation:   · Evaluation: Goals set   · Goals: Pt. will consume 75% or more at meals during LOS. · Monitoring: Meal Intake, Supplement Intake, Pertinent Labs, Weight, Skin Integrity, Constipation    See Adult Nutrition Doc Flowsheet for more detail.      Electronically signed by Gato Kline RD, LD on 8/2/18 at 9:53 AM    Contact Number: [phone]

## 2018-08-02 NOTE — FLOWSHEET NOTE
Gave and discussed Welcome to [facility]. Discussed safety and medications with patient and family.

## 2018-08-02 NOTE — PLAN OF CARE
Problem: Falls - Risk of:  Goal: Will remain free from falls  Will remain free from falls   Outcome: Ongoing  Patient free of falls. Call light within reach. Bed in lowest position. Nonskid socks on. Bed alarm on. Patient able to ambulate with the assist of one with his cane. Hourly rounding continues. Problem: Discharge Planning:  Goal: Discharged to appropriate level of care  Discharged to appropriate level of care  Outcome: Ongoing  Patient plans to be discharged back home with home health. Patient may need additional services as he is confused and living alone. Will continue to monitor. Problem: Cardiac Output - Decreased:  Goal: Hemodynamic stability will improve  Hemodynamic stability will improve  Outcome: Ongoing  Patient's vitals stable. Patient was in afib on admission but flipped into normal sinus with the addition of the cardizem gtt. Heparin gtt remains infusing. WBC low. Patient had fever on admission and provided tylenol. Will continue to monitor with cardiac telemetry. Problem: Fluid Volume - Imbalance:  Goal: Absence of imbalanced fluid volume signs and symptoms  Absence of imbalanced fluid volume signs and symptoms  Outcome: Ongoing  Patient has increased swelling in his lower extremities. Patient was not taking lasix for about three days. Urinating adequately. Will continue to monitor. Problem: Gas Exchange - Impaired:  Goal: Levels of oxygenation will improve  Levels of oxygenation will improve  Outcome: Ongoing  Patient's oxygen remains above 92% on 3L nasal cannula. Patient does not wear any oxygen at home. Will continue to wean as appropriate. Problem: Mental Status - Impaired:  Goal: Mental status will be restored to baseline  Mental status will be restored to baseline  Outcome: Ongoing  Daughter told nurse patient has been getting confused. Patient able to answer all of nurse's questions appropriately. Will continue to monitor.     Problem: Pain:  Goal: Pain level will

## 2018-08-02 NOTE — PROGRESS NOTES
1334:  Patients aptt returned at 43.1, it calls for a bolus and increase in the drip rate. Patients iv had come out and he was bleeding approximately 10 minutes prior to the draw by lab. Spoke with the pharmacist, Allie and explained the situation to her and she stated to go ahead and redraw the aptt at 1600.

## 2018-08-02 NOTE — PLAN OF CARE
Problem: Falls - Risk of:  Goal: Will remain free from falls  Will remain free from falls   Outcome: Ongoing  Assessment & interventions provided throughout shift. Bed locked & in low position, call light in reach, side-rails up x2, non-slip socks on when ambulating, reminded patient to use call light to call for assistance. Problem: Discharge Planning:  Goal: Discharged to appropriate level of care  Discharged to appropriate level of care   Outcome: Ongoing  Plans to return home with home health    Problem: Cardiac Output - Decreased:  Goal: Hemodynamic stability will improve  Hemodynamic stability will improve   Outcome: Ongoing  Ongoing assessment & interventions provided throughout shift. Patient on continuous telemetry monitoring, heart tones, vitals & pulses checked at least 3 times per shift & PRN. Vitals within acceptable limits. Peripheral pulses palpable. Problem: Fluid Volume - Imbalance:  Goal: Absence of imbalanced fluid volume signs and symptoms  Absence of imbalanced fluid volume signs and symptoms   Outcome: Ongoing  Ongoing assessment & interventions monitored throughout shift. Intake & output measured. Patient has adequate urine output so far this shift. Encouraged adequate fluid intake. Problem: Gas Exchange - Impaired:  Goal: Levels of oxygenation will improve  Levels of oxygenation will improve   Outcome: Ongoing  Lung sounds, pulse ox, and breathing monitored throughout shift. Discussed correct technique and importance of deep breathing & coughing exercises with patient. Patient able to demonstrate cough & deep breathing exercises to nurse. Problem: Mental Status - Impaired:  Goal: Mental status will be restored to baseline  Mental status will be restored to baseline   Outcome: Ongoing  Patient exhibiting intermittent confusion    Problem: Pain:  Goal: Pain level will decrease  Pain level will decrease   Outcome: Ongoing  Patient denies pain so far this shift.

## 2018-08-02 NOTE — FLOWSHEET NOTE
Pt had breathing issues and was about having breathing treatment at the time of the visit but wanted prayer and I prayed asking God to heal him. 08/02/18 1450   Encounter Summary   Services provided to: Patient and family together   Referral/Consult From: Mika Munoz Visiting Yes  (8/2 )   Complexity of Encounter Low   Length of Encounter 15 minutes   Spiritual/Jewish   Type Spiritual support   Assessment Approachable;Calm; Hopeful   Intervention Prayer;Nurtured hope; Active listening;Sustaining presence/ Ministry of presence   Outcome Connection/belonging;Expressed gratitude;Engaged in conversation

## 2018-08-02 NOTE — PLAN OF CARE
Problem: Nutrition  Goal: Optimal nutrition therapy  Outcome: Ongoing  Nutrition Problem: Severe malnutrition, in context of chronic illness  Intervention: Food and/or Nutrient Delivery: Continue current diet, Start ONS (Consider liberalizing diet )  Nutritional Goals: Pt. will consume 75% or more at meals during LOS.

## 2018-08-02 NOTE — CONSULTS
amanda from hip to knee    SKIN BIOPSY           MEDICATIONS PRIOR TO ADMISSION:       Scheduled Meds:   polyethylene glycol  17 g Oral Daily    sodium chloride flush  10 mL Intravenous 2 times per day    aspirin  81 mg Oral Daily    mometasone-formoterol  2 puff Inhalation BID    fluticasone  2 spray Nasal Daily    furosemide  40 mg Oral Daily    lidocaine  1 patch Transdermal Daily    tiotropium  18 mcg Inhalation Daily    cyanocobalamin  1,000 mcg Oral Daily     Continuous Infusions:   heparin (porcine) 14 Units/kg/hr (08/02/18 2219)    diltiazem (CARDIZEM) 125 mg in dextrose 5% 125 mL infusion 5 mg/hr (08/01/18 0161)     PRN Meds:acetaminophen **AND** diphenhydrAMINE, ondansetron, heparin (porcine), heparin (porcine), sodium chloride flush, potassium chloride **OR** potassium chloride **OR** potassium chloride, acetaminophen, magnesium hydroxide, nitroGLYCERIN, albuterol, albuterol sulfate HFA, biotene PBF, guaiFENesin, HYDROcodone-acetaminophen  Allergies:   ALLERGIES: Shellfish-derived products and Flomax [tamsulosin hcl]           SOCIAL HISTORY:     TOBACCO:   reports that he has been smoking Cigarettes. He has a 65.00 pack-year smoking history. He has never used smokeless tobacco.     ETOH:   reports that he does not drink alcohol. Patient currently lives alone      FAMILY HISTORY:     Family History   Problem Relation Age of Onset    Diabetes Mother     Cancer Father     Cancer Brother         liver       REVIEW OF SYSTEMS:     Constitutional: + fever, no night sweats, + fatigue. Head: no head ache , no head injury, no migranes. Eye: no blurring of vision, no double vision.   Ears: no hearing difficulty, no tinnitus  Mouth/throat: no ulceration, dental caries , dysphagia  Lungs: + cough no chest pain  CVS: no palpitation, no chest pain, + shortness of breath  GI: no abdominal pain, no nausea , no vomiting, no constipation  SHALONDA: no dysuria, frequency and urgency, no hematuria, no kidney for input(s): CKTOTAL, CKMB, TROPONINI in the last 72 hours. BNP: No results for input(s): BNP in the last 72 hours.   Lipids:   Recent Labs      08/02/18   0351   CHOL  115   TRIG  63   HDL  41   LDLCALC  61     ABGs: No results found for: PHART, PO2ART, UDY9CCC, YSJ7MFJ, BEART    Cultures:      CXR:       UA:   Recent Labs      08/01/18   1921   PHUR  6.0   COLORU  YELLOW   PROTEINU  TRACE*   BLOODU  NEGATIVE   RBCUA  0-2   WBCUA  0-2   BACTERIA  NONE   NITRU  NEGATIVE   GLUCOSEU  NEGATIVE   BILIRUBINUR  NEGATIVE   UROBILINOGEN  0.2   KETUA  15*         IMAGING:    Micro:   Lab Results   Component Value Date    BC No growth-preliminary 08/01/2018       Problem list of patient      Patient Active Problem List   Diagnosis Code    Pulmonary nodule, left R91.1    Smoldering myeloma C90.00    Leukopenia D72.819    BPH without urinary obstruction N40.0    Essential hypertension I10    Moderate COPD (chronic obstructive pulmonary disease) ([facility]) J44.9    Moderate aortic regurgitation I35.1    Osteoarthritis of both knees M17.0    Prostate CA ([address] C61    Tobacco dependence F17.200    DM II (diabetes mellitus, type II), controlled ([address] E11.9    Chemotherapy management, encounter for Z51.11    Hyperlipidemia E78.5    Anemia in neoplastic disease D63.0    Right knee pain M25.561    Chronic leukopenia D72.819    Thrombocytopenia (HCC) D69.6    Left knee pain M25.562    Popliteal cyst M71.20    Muscle strain of knee S86.919A    Immunocompromised state ([facility]) D84.9    Bone metastasis ([facility]) C79.51    Acute respiratory failure with hypoxia ([facility]) J96.01    Hx of skin cancer, basal cell Z85.828    H/O prostate cancer Z85.46    Bilateral edema of lower extremity  due to VElcade  and steroids R60.0    Hypomagnesemia E83.42    Cellulitis of right lower extremity L03.115    Controlled type 2 diabetes mellitus without complication ([facility]) H08.9    Acute urinary retention R33.8    Elevated PSA R97.20    Encounter for antineoplastic chemotherapy Z51.11    Irregular heart beat I49.9    Elevated lipase R74.8    Multiple myeloma (HCC) C90.00    Chronic anemia D64.9    Vesicular rash R23.8    Dependence on nicotine from cigarettes F17.210    COPD with exacerbation ([facility]) J44.1    SIRS (systemic inflammatory response syndrome) ([facility]) R65.10    Pathologic compression fracture of lumbar vertebra ([facility]) M48.56XA    Macrocytic anemia R51.0    Diastolic dysfunction without heart failure I51.9    Severe malnutrition ([facility]) E43    Atrial fibrillation with RVR ([facility]) I48.91    Severe malnutrition ([facility]) E43           ASSESSMENT AND PLAN   · Febrile illness in a patient with multiple myeloma: He'll be place it on IV ceftriaxone pending culture report, we'll check his immunoglobulin level. · COPD exacerbation: Currently on oxygen and respiratory treatment. · Multiple myeloma  · Diabetes mellitus  · Discussed on smoking cessation, he is up-to-date with his vaccinations. We will continue to follow patient    Thank you Alex Vasquez MD for allowing me to participate in this patient's care.     Mala Lira MD,FACP 8/2/2018 5:22 PM

## 2018-08-02 NOTE — CARE COORDINATION
8/2/18, 9:27 AM    DISCHARGE BARRIERS    KIMBERLY Laureano spoke with Lea Ashley at [facility] and patient is current with skilled nursing services. DISCHARGE BARRIERS  8/2/18, 11:20 AM    Reason for Referral: Current with HH  Mental Status: Answered questions appropriately  Decision Making: Independently  Family/Social/Home Environment: KIMBERLY Laureano completed assessment with patient and daughter. Patient is from home alone. He has a very supportive family and they visit him daily to help him with whatever he needs. He is able to drive himself wherever he needs to go. He was current with [facility] but said he does not need HH at this time. When asked if he felt comfortable returning home alone he said he would be fine. Current Services: Current with [facility]  Current Equipment: Walker, grab bars, cane  Payment Source: Medicare, [employer]  Concerns or Barriers to Discharge: None  Collabrative List of ECF/HH were provided: Patient denies needs    Teach Back Method used with Jesenia Hdez regarding care plan and discharge plan. Patient and daughter verbalized understanding of the plan of care and contributed to goal setting. Anticipated Needs/Discharge Plan: Patient plans home alone, denies needs at this time. Electronically signed by NAZARIO Wetzel on 8/2/2018 at 11:20 AM

## 2018-08-02 NOTE — ED NOTES
Reassessment of the patients Cough and Shortness of Breath   is unchanged, the patients pain reassessment is a 5/10, Side rails up times 2, call light in reach, will continue to monitor.      Baltazar Parker RN  08/01/18 2013

## 2018-08-02 NOTE — PLAN OF CARE
Problem: DISCHARGE BARRIERS  Goal: Patient's continuum of care needs are met  Outcome: Ongoing  Patient plans home alone, denies needs at this time.  See SW note 8/2

## 2018-08-03 NOTE — PLAN OF CARE
Problem: Falls - Risk of:  Goal: Will remain free from falls  Will remain free from falls   Outcome: Ongoing  Monitoring patient for falls with each shift assessment and hourly rounding. Family is present the majority of the day but the bed alarm and/or chair alarm is also activated. Problem: Discharge Planning:  Goal: Discharged to appropriate level of care  Discharged to appropriate level of care   Outcome: Ongoing  Continuing to assess for discharge needs. Problem: Cardiac Output - Decreased:  Goal: Hemodynamic stability will improve  Hemodynamic stability will improve   Outcome: Ongoing  Monitoring vitals with each shift assessment and as needed. He is on a cardizem and heparin drip. He is back in a fib and no ekg has been done yet as there is a hospice consult per Dr. Lyric Paula and waiting to see that decision. Problem: Fluid Volume - Imbalance:  Goal: Absence of imbalanced fluid volume signs and symptoms  Absence of imbalanced fluid volume signs and symptoms   Outcome: Ongoing  Monitoring intake and output    Problem: Gas Exchange - Impaired:  Goal: Levels of oxygenation will improve  Levels of oxygenation will improve   Outcome: Ongoing  He is currently on 2L via nasal cannula and we change it to 3L as needed. Problem: Mental Status - Impaired:  Goal: Mental status will be restored to baseline  Mental status will be restored to baseline   Outcome: Ongoing  He is very confused and only alert to name and . Bed alarm is activated. Problem: Pain:  Goal: Pain level will decrease  Pain level will decrease   Outcome: Ongoing  Monitoring pain with each shift assessment and as needed. meds per the mar. Problem: Nutrition  Goal: Optimal nutrition therapy  Outcome: Ongoing      Comments: Care plan reviewed with patient. Patient verbalize understanding of the plan of care and contribute to goal setting.

## 2018-08-03 NOTE — CARE COORDINATION
8/3/18, 10:59 AM    DISCHARGE BARRIERS  Met with patient and his daughter. They are agreeable to TCU at discharge. SW made Misael COLEMAN aware. [address] and his daughter Agueda Torres are agreeable to ECF if TCU bed is not available.   SW gave them ECF list.

## 2018-08-03 NOTE — PLAN OF CARE
Problem: Falls - Risk of:  Goal: Will remain free from falls  Will remain free from falls   Outcome: Ongoing  Patient free of falls. Call light within reach. Bed in lowest position. Nonskid socks on. Bed alarm on. Patient able to ambulate with the assist of one with his cane. Hourly rounding continues. Problem: Discharge Planning:  Goal: Discharged to appropriate level of care  Discharged to appropriate level of care  Outcome: Ongoing  Patient plans to be discharged back home with home health. Patient may need additional services as he is confused and living alone. Will continue to monitor. Problem: Cardiac Output - Decreased:  Goal: Hemodynamic stability will improve  Hemodynamic stability will improve  Outcome: Ongoing  Patient's vitals stable. Patient was in afib on admission but flipped into normal sinus with the addition of the cardizem gtt. Heparin gtt remains infusing. WBC low. Patient has low grade fever. Tylenol provided. Will continue to monitor with cardiac telemetry. Problem: Fluid Volume - Imbalance:  Goal: Absence of imbalanced fluid volume signs and symptoms  Absence of imbalanced fluid volume signs and symptoms  Outcome: Ongoing  Patient has increased swelling in his lower extremities. Lasix restarted. Will continue to monitor. Problem: Gas Exchange - Impaired:  Goal: Levels of oxygenation will improve  Levels of oxygenation will improve  Outcome: Ongoing  Patient's oxygen remains above 92% on 2L nasal cannula. Patient does not wear any oxygen at home. Will continue to wean as appropriate. Problem: Mental Status - Impaired:  Goal: Mental status will be restored to baseline  Mental status will be restored to baseline  Outcome: Ongoing  Daughter told nurse patient has been getting confused. Patient able to answer all of nurse's questions appropriately. Will continue to monitor.     Problem: Pain:  Goal: Pain level will decrease  Pain level will decrease  Outcome: Ongoing  Patient

## 2018-08-03 NOTE — PROGRESS NOTES
Hospice referral completed in patient room with Annelisenerissa Brandt and his 2 [facility] and Devora Rene. Hospice concepts, philosophies, and services explained. Dr. Luis Fernando Valencia has discussed hospice care with patient and family. Up until few weeks ago had been receiving weekly chemotherapy for multiple myeloma a for last years. Pt sitting up at edge of bed with back brace on for fractures of spine. Discussed qualification process as well as different levels of care. Patient states pain of around 4 but not wanting medication for at this time. Noted pleasantly confused at times. Explained hospice care vs. Skilled. Family at this time wanting to see if patient would qualify for TCU or if would be able to do therapy as pain not out of control at this time. Talked with them that he will need to participate in therapy for evaluation to see if able to meet criteria for this. Voiced understanding. Plan to see if able to get strength up and see how Annelise Brandt does with therapy if he would fail or symptoms would arise will call hospice. Updated primary nurse Courtney Calvillo RN that if something would change over weekend to call hospice. Told daughters that they could call anytime with questions/concerns, or wish for hospice services.

## 2018-08-03 NOTE — PLAN OF CARE
Problem: Impaired respiratory status  Goal: Clear lung sounds  Outcome: Ongoing  Pt has clear/diminished breath sounds.  txs to maintain open airways

## 2018-08-04 NOTE — PROGRESS NOTES
[address] [address]  INPATIENT PHYSICAL THERAPY  EVALUATION  [facility] 3B-28/028-A    Time In: 5817  Time Out: 1405  Timed Code Treatment Minutes: 10 Minutes  Minutes: 25          Date: 2018  Patient Name: Heri Chow,  Gender:  male        MRN: [id number]  : 1932  (80 y.o.)      Referring Practitioner: Dr Nish Madera  Diagnosis: Atrial fibrillation  Additional Pertinent Hx: This is a very pleasant 80 y.o. male who was admitted to the hospital with a chief complaints of Shortness of breath of 2 days duration prior to his admission. He had been getting progressively short of breath and he felt was not getting enough oxygen and was advised to come to Hospital.  He has more cough with yellowish sputum he also reported off fever of 102. Patient is currently on treatment for multiple myeloma. He follows with Dr. Felicia Trevino. He continues to smoke, he lives alone, he has no oxygen at home. He [facility] with [facility].   He is up-to-date with his vaccinations, he denies any chest pain, he has no nausea or vomiting he has no abdominal pain or urinary complaint     Past Medical History:   Diagnosis Date    Acute urinary retention     Arthritis     BPH without urinary obstruction     Cancer (HCC)     multiple myeloma & bone    Chronic kidney disease     COPD (chronic obstructive pulmonary disease) (HCC)     Disease of blood and blood forming organ     History of blood transfusion     Hyperlipidemia     Hypertension     Insomnia     Pneumonia     Prostate cancer ([facility] [address]     Shingles     Atypical    TIA (transient ischemic attack)     Type II or unspecified type diabetes mellitus without mention of complication, not stated as uncontrolled      Past Surgical History:   Procedure Laterality Date    COLONOSCOPY      EYE SURGERY      bilateral cataracts    LEG SURGERY Right     amanda from hip to knee    SKIN BIOPSY         Restrictions/Precautions:  General Precautions, Fall Risk, Isolation                    Spinal Precautions: No Bending, No Lifting, No Twisting  Other position/activity restrictions: h/o Lumbar Compression Fracture  Spinal Other: Abdominal Binder    Subjective:  Chart Reviewed: Yes  Patient assessed for rehabilitation services?: Yes  Family / Caregiver Present: No  Subjective: Pt in bed, agreeable to PT. General:  Follows Commands: Within Functional Limits    Vision: Impaired  Vision Exceptions: Wears glasses at all times    H[facility]: [facility]  Hearing Exceptions: Bilateral hearing aid         Pain:  Denies.   Pain Assessment  Pain Assessment: 0-10  Pain Level: 6  Pain Type: Chronic pain  Pain Location: Back  Pain Orientation: Lower       Social/Functional History:    Lives With: Alone  Type of Home: House  Home Layout: One level  Home Equipment: Cane     Bathroom Shower/Tub: Tub/Shower unit  Bathroom Toilet: Standard  Bathroom Accessibility: Accessible       ADL Assistance: Independent  Homemaking Assistance: Independent  Homemaking Responsibilities: Yes  Ambulation Assistance: Independent  Transfer Assistance: Independent       Occupation: Retired  Additional Comments: Used st cane at home      Objective:       RLE AROM: WFL         LLE AROM : WFL              Strength RLE: Exception  Comment: 4-/5    Strength LLE: Exception  Comment: 4-/5          Sensation  Overall Sensation Status: WFL       Balance  Sitting - Static: Good  Sitting - Dynamic: Good, -  Standing - Static: Fair  Standing - Dynamic: Fair, -    Supine to Sit: Contact guard assistance (with bedrail, HOB 30 degrees)  Sit to Supine: Minimal assistance (with LEs)  Scooting: Contact guard assistance    Transfers  Sit to Stand: Contact guard assistance (from bed, cues on hand placement to get up to stand at walker)  Stand to sit: Contact guard assistance (of 1 to bed)       Ambulation 1  Surface: level tile  Device: Rolling Walker  Assistance: Contact guard assistance  Quality of Gait: Pt forward flexed, so vc's to correct as much as possible, narrow SALVADOR, unsteady,limited heelstrike  Distance: 80 feet         Exercises:  Comments: seated marches, long arc quads and ankle pumps to imporve strength for function          Activity Tolerance:  Activity Tolerance: Patient Tolerated treatment well    Treatment Initiated: see there ex  Assessment: Body structures, Functions, Activity limitations: Decreased functional mobility , Decreased endurance, Decreased strength, Decreased balance  Assessment: Pt with generalized weakness, rest breaks required due to limited endurance ,motivated. Rec continued PT to imporve strength and steadiness with transfers and gait  Prognosis: Good    Clinical Presentation: Moderate - Evolving with Changing Characteristics: Pt with generalized weakness, rest breaks required due to limited endurance ,motivated. Rec continued PT to imporve strength and steadiness with transfers and gait    Decision Making: Moderate Complexitybased on patient assessment and decision making process of determining plan of care and establishing reasonable expectations for measurable functional outcomes    REQUIRES PT FOLLOW UP: Yes  Discharge Recommendations: Continue to assess pending progress, Patient would benefit from continued therapy after discharge    Patient Education:  Patient Education: POC    Equipment Recommendations:   Other: may need RW if he does not have one to borrow    Safety:  Type of devices: Left in bed, Nurse notified, All fall risk precautions in place, Call light within reach, Gait belt    Plan:  Times per week: 3-5 X GM  Times per day: Daily  Specific instructions for Next Treatment: ther ex, balance, mobility, gait, endurance   Current Treatment Recommendations: Strengthening, Gait Training, Stair training, Balance Training, Functional Mobility Training, Endurance Training, Transfer Training    Goals:  Patient goals : Go to TCU or somewhere for rehab prior to

## 2018-08-04 NOTE — PLAN OF CARE
Problem: Falls - Risk of:  Goal: Will remain free from falls  Will remain free from falls   Outcome: Not Met This Shift  Patient alert and oriented x4. Bed alarmed armed. Bed wheels locked. Bedside table in reach. Patient up with assistance when ambulating. Patient verbalizes and demonstrates the use of the call light. Hourly rounding being completed. Problem: Discharge Planning:  Goal: Discharged to appropriate level of care  Discharged to appropriate level of care   Outcome: Ongoing  Patient plans to go TCU or ECF at discharge and no needs voiced at this time. Patient working with social work for discharge planning. Problem: Cardiac Output - Decreased:  Goal: Hemodynamic stability will improve  Hemodynamic stability will improve   Outcome: Ongoing  Blood pressure within normal limits this shift. Patient displays no bleeding abnormalities. Monitoring labs frequently for any abnormalities. Capillary refill less than 3 seconds. Skin turgor less than 3 seconds. Pulses palpable. Problem: Fluid Volume - Imbalance:  Goal: Absence of imbalanced fluid volume signs and symptoms  Absence of imbalanced fluid volume signs and symptoms   Outcome: Ongoing  Accurate intake and output being monitored with patient's condition. Problem: Gas Exchange - Impaired:  Goal: Levels of oxygenation will improve  Levels of oxygenation will improve   Outcome: Ongoing  Patient is on 2L NC at this time. Patients oxygen saturation maintains above 92% at this time. Pt has displayed no signs or symptoms of hypoxia throughout shift. Will continue to monitor. Problem: Mental Status - Impaired:  Goal: Mental status will be restored to baseline  Mental status will be restored to baseline   Outcome: Ongoing  Patient alert and oriented x4. No numbness or tingling noted. Hand grasp strong and equal bilaterally. Pedal push and pull strong and equal bilaterally.  Patient alert to time and person but disoriented to place and

## 2018-08-04 NOTE — H&P
Patient was seen and examined face-to-face by me (Dr. Bina Suacedo MD). The chart, progress notes, labs and radiographs were reviewed. Case discussed with patient's primary nurse. Questions and concerns were addressed.   I agree with the note as created with additional comments as noted    Dr. Bina Saucedo MD   Department of Cardiology

## 2018-08-04 NOTE — PROGRESS NOTES
[address]  INPATIENT OCCUPATIONAL THERAPY  [facility] CCU-STEPDOWN 3B  EVALUATION    Time:  Time In: 801  Time Out: 347  Timed Code Treatment Minutes: 28 Minutes  Minutes: 38    Date: 2018  Patient Name: Ayla Kebede,   Gender: male      MRN: [id number]  : 1932  (80 y.o.)  Referring Practitioner: Nathaneil Cockayne, MD     Additional Pertinent Hx: This is a very pleasant 80 y.o. male who was admitted to the hospital with a chief complaints of Shortness of breath of 2 days duration prior to his admission. He had been getting progressively short of breath and he felt was not getting enough oxygen and was advised to come to Hospital.  He has more cough with yellowish sputum he also reported off fever of 102. Patient is currently on treatment for multiple myeloma. He follows with Dr. Jamila Bishop. He continues to smoke, he lives alone, he has no oxygen at home. He follows with [facility].   He is up-to-date with his vaccinations, he denies any chest pain, he has no nausea or vomiting he has no abdominal pain or urinary complaint    Restrictions/Precautions:  General Precautions, Fall Risk, Isolation (neutropenic precautions)     Spinal Precautions: No Bending, No Lifting, No Twisting  Other position/activity restrictions: h/o Lumbar Compression Fracture  Spinal Other: Abdominal Binder    Past Medical History:   Diagnosis Date    Acute urinary retention     Arthritis     BPH without urinary obstruction     Cancer (HCC)     multiple myeloma & bone    Chronic kidney disease     COPD (ch[facility] obstructive pulmonary disease) ([facility] [address]     Disease of blood and blood forming organ     History of blood transfusion     Hyperlipidemia     Hypertension     Insomnia     Pneumonia     Prostate cancer ([facility] [address]     Shingles     Atypical    TIA (transient ischemic attack)     Type II or unspecified type diabetes mellitus without mention of complication, not stated as uncontrolled )  Stand to sit: Contact guard assistance  Transfer Comments: EOB and toilet with mod vc for hand placement. Toilet Transfers  Toilet - Technique: Ambulating  Equipment Used: Standard toilet (with grab bar use. )  Toilet Transfer: Minimal assistance    Balance  Sitting Balance: Supervision (with 1-2 UE release )  Standing Balance: Contact guard assistance     Time: 45 seconds x 2 trials   Activity: prep to ambulate and self cares      Functional Mobility  Functional - Mobility Device: Other (pushed IV pole )  Activity: To/from bathroom  Assist Level: Contact guard assistance  Functional Mobility Comments: Pt ambulated to/from BR, Slow pace, Assist needed to manage O2 tubing. Activity Tolerance:  Activity Tolerance: Patient limited by fatigue, Patient limited by pain    Treatment Initiated:  OT Evaluation completed, see above for details. Assessment:  Assessment: This 80year old male arrived [facility] d/t SOB and Cough. Pt has a h/o Compression fracture in Lumbar Spine, and multiple myeloma. Pt demonstrates decreased endurance, decreased ability to complete LB dressing , and weakness. Pt requires skilled OT intervention to increase indep and safety with all self cares, transfers, and IADLs to decrease fall risk and return to OF. Pt is motivated to return home. Performance deficits / Impairments: Decreased functional mobility , Decreased ADL status, Decreased strength, Decreased endurance, Decreased high-level IADLs, Decreased safe awareness  Prognosis: Fair  Discharge Recommendations: Continue to assess pending progress, 24 hour supervision or assist    Clinical Decision Making: Clinical Decision making was of Moderate Complexity as the result of analysis of data from a detailed assessment, a consideration of several treatment options, the presence of comorbidities affecting the plan of care and the need for minimal to moderate modifications or assistance required to complete the evaluation.     Patient Education:  Patient Education: OT POC, OT Role, Transfer safety, Mobility Safety, LB dressing   Barriers to Learning: [facility]     Equipment Recommendations:  Equipment Needed: No  Other: Continue to monitor need for LHAE. Safety:  Safety Devices in place: Yes  Type of devices: All fall risk precautions in place, Call light within reach, Patient at risk for falls, Left in bed, Bed alarm in place, Nurse notified    Plan:  Times per week: 5x  Current Treatment Recommendations: Strengthening, Endurance Training, Functional Mobility Training, Safety Education & Training, Self-Care / ADL, Patient/Caregiver Education & Training, Equipment Evaluation, Education, & procurement, Home Management Training, Balance Training    Goals:  Patient goals : Go to [place] then Home     Short term goals  Time Frame for Short term goals: 2 weeks   Short term goal 1: Pt will complete BUE light resitive exercises with min vc for technique to increase indep and safety with all self cares and transfers. Short term goal 2: Pt will complete standing tolerance x 4 minutes with SBA and 2 hand release to increase indep with grooming tasks. Short term goal 3: Pt will complete functional mobility to/from BR and HH distances with SBA and min vc for safety to increase indep and safety with toileting. Short term goal 4: Pt will complete LB dressing with LHAE PRN and CGA to increase indep and safety in home environment. Long term goals  Time Frame for Long term goals : No LTGs d/t short estimated length of stay. Evaluation Complexity: Based on the findings of patient history, examination, clinical presentation, and decision making during this evaluation, this patient is of medium complexity. OT G-codes  Functional Limitation: Self care  Self Care Current Status (): At least 40 percent but less than 60 percent impaired, limited or restricted  Self Care Goal Status ():  At least 20 percent but less than 40 percent impaired, limited or

## 2018-08-05 NOTE — PROGRESS NOTES
Admit Date: 8/1/2018  Hospital day 2    Subjective:     Patient SOB . Medication side effects: none    Scheduled Meds:   polyethylene glycol  17 g Oral Daily    cefTRIAXone (ROCEPHIN) IV  1 g Intravenous Q24H    sodium chloride flush  10 mL Intravenous 2 times per day    aspirin  81 mg Oral Daily    mometasone-formoterol  2 puff Inhalation BID    fluticasone  2 spray Nasal Daily    furosemide  40 mg Oral Daily    lidocaine  1 patch Transdermal Daily    tiotropium  18 mcg Inhalation Daily    cyanocobalamin  1,000 mcg Oral Daily     Continuous Infusions:   heparin (porcine) 14 Units/kg/hr (08/04/18 1807)    diltiazem (CARDIZEM) 125 mg in dextrose 5% 125 mL infusion 2.5 mg/hr (08/03/18 2342)     PRN Meds:acetaminophen **AND** diphenhydrAMINE, ondansetron, heparin (porcine), heparin (porcine), sodium chloride flush, potassium chloride **OR** potassium chloride **OR** potassium chloride, acetaminophen, magnesium hydroxide, nitroGLYCERIN, albuterol, albuterol sulfate HFA, biotene PBF, guaiFENesin, HYDROcodone-acetaminophen    Review of Systems  Pertinent items are noted in HPI. Objective:     Patient Vitals for the past 8 hrs:   BP Temp Temp src Pulse Resp SpO2   08/04/18 1800 - - - - - 92 %   08/04/18 1721 (!) 130/58 97.5 °F (36.4 °C) Oral 103 18 93 %   08/04/18 1253 (!) 116/56 97.9 °F (36.6 °C) Oral 94 18 93 %     I/O last 3 completed shifts: In: 1336.7 [P.O.:740;  I.V.:596.7]  Out: 1125 [Urine:1125]    NO CHANGE     ECG: AF.   Data Review:   CBC:  Lab Results   Component Value Date    WBC 2.0 08/02/2018    RBC 2.78 08/02/2018    RBC 3.91 05/18/2012    HGB 8.9 08/02/2018    HCT 27.4 08/02/2018    HCT 36.1 05/18/2012    MCV 98.6 08/02/2018    MCH 32.0 08/02/2018    MCHC 32.5 08/02/2018    RDW 13.8 07/19/2018     08/03/2018     05/18/2012    MPV 9.5 08/02/2018     BMP  Lab Results   Component Value Date     08/01/2018    K 3.8 08/01/2018    K 4.2 06/22/2018    CL 94 08/01/2018    CO2

## 2018-08-05 NOTE — PLAN OF CARE
Problem: Impaired respiratory status  Goal: Clear lung sounds  Outcome: Ongoing  Breath sounds clear and diminished, pt remains on dulera bid.

## 2018-08-05 NOTE — PROGRESS NOTES
Progress note: Infectious diseases    Patient - Cele Junior,  Age - 80 y.o.    - 1932      Room Number - 3B-28/028-A   MRN -  [id number]   [facility] # - [de-identified]  Date of Admission -  2018  4:18 PM    SUBJECTIVE:   He has no new complaints,  Report of fever. Cultures are negative  OBJECTIVE   VITALS    height is 5' 7\" (1.702 m) and weight is 178 lb 9.6 oz (81 kg). His oral temperature is 97.8 °F (36.6 °C). His blood pressure is 106/56 (abnormal) and his pulse is 95. His respiration is 28 and oxygen saturation is 93%. Wt Readings from Last 3 Encounters:   18 178 lb 9.6 oz (81 kg)   18 190 lb 9.6 oz (86.5 kg)   18 191 lb 9.6 oz (86.9 kg)       I/O (24 Hours)    Intake/Output Summary (Last 24 hours) at 18 1345  Last data filed at 18 1010   Gross per 24 hour   Intake           959.26 ml   Output              500 ml   Net           459.26 ml       General Appearance  Awake, alert, oriented,  Chronically sick-looking  HEENT - normocephalic, atraumatic, slightly pale conjunctiva,  anicteric sclera  Neck - Supple, no mass  Lungs - diminished breath sounds  Cardiovascular - Heart sounds are normal.  Regular rate and rhythm without murmur, gallop or rub.   Abdomen - soft, not distended, nontender,   Neurologic oriented  Skin - + bruising or bleeding  Extremities - No edema, no cyanosis, clubbing     MEDICATIONS:      diltiazem  120 mg Oral Daily    apixaban  2.5 mg Oral BID    polyethylene glycol  17 g Oral Daily    cefTRIAXone (ROCEPHIN) IV  1 g Intravenous Q24H    sodium chloride flush  10 mL Intravenous 2 times per day    aspirin  81 mg Oral Daily    mometasone-formoterol  2 puff Inhalation BID    fluticasone  2 spray Nasal Daily    furosemide  40 mg Oral Daily    lidocaine  1 patch Transdermal Daily    tiotropium  18 mcg Inhalation Daily    cyanocobalamin  1,000 mcg Oral Daily       acetaminophen **AND** diphenhydrAMINE, ondansetron, sodium chloride flush, potassium chloride **OR** potassium chloride **OR** potassium chloride, acetaminophen, magnesium hydroxide, nitroGLYCERIN, albuterol, albuterol sulfate HFA, biotene PBF, guaiFENesin, HYDROcodone-acetaminophen      LABS:     CBC:   Recent Labs      08/03/18   0525  08/05/18   0356  08/05/18   1315   WBC   --    --   1.5*   HGB   --    --   10.6*   PLT  186  239  254     BMP:  Recent Labs      08/05/18   1315   NA  136   K  3.4*   CL  94*   CO2  29   BUN  17   CREATININE  0.9   GLUCOSE  207*     Calcium:  Recent Labs      08/05/18   1315   CALCIUM  9.8       CULTURES:   UA: No results for input(s): SPECGRAV, PHUR, COLORU, CLARITYU, MUCUS, PROTEINU, BLOODU, RBCUA, WBCUA, BACTERIA, NITRU, GLUCOSEU, BILIRUBINUR, UROBILINOGEN, KETUA, LABCAST, LABCASTTY, AMORPHOS in the last 72 hours.     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    Leukopenia D72.819    BPH without urinary obstruction N40.0    Essential hypertension I10    Moderate COPD (chronic obstructive pulmonary disease) ([facility]) J44.9    Moderate aortic regurgitation I35.1    Osteoarthritis of both knees M17.0    Prostate CA ([facility] [address] C61    Tobacco dependence F17.200    DM II (diabetes mellitus, type II), controlled ([facility] [address] E11.9    Chemotherapy management, encounter for Z51.11    Hyperlipidemia E78.5    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

## 2018-08-06 PROBLEM — I48.91 A-FIB (HCC): Status: ACTIVE | Noted: 2018-01-01

## 2018-08-06 NOTE — CARE COORDINATION
8/6/18, 11:50 AM    Discharge plan discussed by  and . Discharge plan reviewed with patient/ family. Patient/ family verbalize understanding of discharge plan and are in agreement with plan. Understanding was demonstrated using the teach back method. Diaz Crespo will be discharged to [facility].

## 2018-08-06 NOTE — PROGRESS NOTES
structures, Functions, Activity limitations: Decreased functional mobility , Decreased endurance, Decreased strength, Decreased balance  Assessment: Pt. tolerated session fairly well. Pt. pleasant and cooperative throughout session but requires cues at times for safety when performing transfers. Pt. would benefit from continued skilled PT to increase strength and endurance to enhance functional mobility. Prognosis: Good  REQUIRES PT FOLLOW UP: Yes  Discharge Recommendations: Continue to assess pending progress, Patient would benefit from continued therapy after discharge    Patient Education:  Patient Education: POC, ther ex, transfers, posture    Equipment Recommendations:   Other: may need RW if he does not have one to borrow    Safety:  Type of devices: Left in bed, Nurse notified, All fall risk precautions in place, Call light within reach, Gait belt, Bed alarm in place (Left seated EOB)    Plan:  Times per week: 3-5 X GM  Times per day: Daily  Specific instructions for Next Treatment: ther ex, balance, mobility, gait, endurance   Current Treatment Recommendations: Strengthening, Gait Training, Stair training, Balance Training, Functional Mobility Training, Endurance Training, Transfer Training    Goals:  Patient goals : Go to TCU or somewhere for rehab prior to home    Short term goals  Time Frame for Short term goals: 1 week  Short term goal 1: supine to sit and return with S to get in and out of bed   Short term goal 2: sit to stand with S to get on and off various surfaces  Short term goal 3: ambulate with  feet with SBA to walk safely household distances   Short term goal 4: ascend/descend a step if needed with AD and CGA to enter home    Long term goals  Time Frame for Long term goals : NA due to short ELOS            AM-PAC Inpatient Mobility without Stair Climbing Raw Score : 15  AM-PAC Inpatient without Stair Climbing T-Scale Score : 43.03  Mobility Inpatient CMS 0-100% Score: 47.43  Mobility Inpatient without Stair CMS G-Code Modifier : CK

## 2018-08-06 NOTE — PROGRESS NOTES
1225-Spoke with Dr. Lu Méndez. Astrid for discharge to TCU. Will see as needed. No follow up needed.      1228-Spoke with oncology office about discharge for patient. [facility] is to call back. 1430-Lexie YATES up to floor. Stated patient already has a follow up appointment for August 30. Astrid for discharge to TCU.

## 2018-08-06 NOTE — PROGRESS NOTES
Therapy   []Wound Vac   []Complex Dressing Changes    []Terminal care    []Other       Has patient had Prior Skilled care in the Last 60 days:  []Yes  [x]NO   If Yes:   Skilled Facility:    How many skilled days were used?

## 2018-08-06 NOTE — PROGRESS NOTES
Visit made to floor to check on patient plan of care and if hospice assistance needed. Daughters sitting in room with Ruben lying in bed with eyes closed. Daughters report that a bit of rough weekend but all in all good. Hawa RN to floor from [facility] and let family know that Suzette Ponce can go to TCU if this would continue to be plan. This is what plan is at this time to see if able to get Timothy's strength up. Reminded that can call [facility] hospice anytime for questions/concerns or if services would be wanted/needed.

## 2018-08-06 NOTE — PROGRESS NOTES
Admit Date: 8/1/2018  Hospital day 3    Subjective:     Patient has no complaint of chest pain . Ingrid Wang Medication side effects: none    Scheduled Meds:   diltiazem  120 mg Oral Daily    apixaban  2.5 mg Oral BID    polyethylene glycol  17 g Oral Daily    cefTRIAXone (ROCEPHIN) IV  1 g Intravenous Q24H    sodium chloride flush  10 mL Intravenous 2 times per day    aspirin  81 mg Oral Daily    mometasone-formoterol  2 puff Inhalation BID    fluticasone  2 spray Nasal Daily    furosemide  40 mg Oral Daily    lidocaine  1 patch Transdermal Daily    tiotropium  18 mcg Inhalation Daily    cyanocobalamin  1,000 mcg Oral Daily     Continuous Infusions:  PRN Meds:acetaminophen **AND** diphenhydrAMINE, ondansetron, sodium chloride flush, potassium chloride **OR** potassium chloride **OR** potassium chloride, acetaminophen, magnesium hydroxide, nitroGLYCERIN, albuterol, albuterol sulfate HFA, biotene PBF, guaiFENesin, HYDROcodone-acetaminophen    Review of Systems  Pertinent items are noted in HPI. Objective:     Patient Vitals for the past 8 hrs:   BP Temp Temp src Pulse Resp SpO2   08/05/18 1634 (!) 110/55 98.2 °F (36.8 °C) Axillary 92 18 92 %     I/O last 3 completed shifts: In: 944.3 [P.O.:840;  I.V.:104.3]  Out: 700 [Urine:700]    No change     ECG: af.   Data Review  CBC:   Lab Results   Component Value Date    WBC 1.5 08/05/2018    RBC 3.31 08/05/2018    RBC 3.91 05/18/2012    HEMOGLOBIN 12.4 05/18/2012     BMP:   Lab Results   Component Value Date    GLUCOSE 207 08/05/2018    GLUCOSE 153 05/01/2012    CO2 29 08/05/2018    BUN 17 08/05/2018    CREATININE 0.9 08/05/2018    CALCIUM 9.8 08/05/2018     Coagulation:   Lab Results   Component Value Date    INR 1.15 06/25/2018    APTT 60.4 08/04/2018     Cardiac markers:   Lab Results   Component Value Date    TROPONINT < 0.010 08/02/2018     ABG: No results found for: SUL8SSX, BEART, E7UNWSCP, PHART, THGBART, RRW7SEN, PO2ART, LJW4AFO    Assessment:     Active

## 2018-08-07 PROBLEM — R07.9 CHEST PAIN: Status: ACTIVE | Noted: 2018-01-01

## 2018-08-07 NOTE — PROGRESS NOTES
Consulted to assess patients buttocks. Primary RN in room and stated patient is not feeling well. Will recommend SPR mattress to bed, Waffle cushion to chair, Zinc cream to buttocks and turning every 2 hours. Will come back to re assess.

## 2018-08-07 NOTE — PROGRESS NOTES
Independent  Occupation: Retired  Type of occupation: [employer]\"  Leisure & Hobbies: painting  IADL Comments: Cleaning lady 2x/month  Additional Comments: Occassionally used st cane at home. Daughters checked in on pt daily. Pt responsible for intermittent cleaning, laundry and cooking. All information obtained from chart review, patient replies \"yes\" to all subjective questioning this PM    Contact/Guardian Information: Emergency Contacts  Contact 1: Name: Alfa Prieto  Contact 1: Number: [phone]  Contact 1: Relationship: daughter  Contact 2: Name: Alexandria Farrell  Contact 2: Number: [phone]  Contact 2: Relationship: daughter  Healthcare Agent Appointed: Prem Turk Agent's Name: [facility] Agent's Phone Number: [address]: [facility] for nursing visits. Anticipated Needs/Discharge Plans: Patent's family is hopeful that he will be able to return home at discharge and care for himself. Will monitor progress, maintain contact with patient/family regarding length of stay and progress, and assist with discharge plans, including referrals to helping services as identified.           Discharge Planning  Living Arrangements: Alone  Support Systems: Children, Family Members, [facility]  Potential Assistance Needed: Home Care  Potential Assistance Purchasing Medications: No  Does patient want to participate in local refill/meds to beds program?: No  Type of Home Care Services: Nursing Services  Patient expects to be discharged to[de-identified] home  Expected Discharge Date:  (Undetermined)  Follow Up Appointment: Best Day/Time : Monday AM      Kell Ríos 8/7/2018 3:22 PM

## 2018-08-07 NOTE — PROGRESS NOTES
Patient arrived at 2105, pleasant and cooperative. Patient stated no pain, no oxygen. Patient transfer one assist, daughter Altamease Her is with the patient, and helping to answer admission questions. Patient alert and oriented to x3, patient has 2 IV in left arm, that flush and capped. Patient has no complaint and appears to be resting and will continue to monitor patient.

## 2018-08-07 NOTE — PROGRESS NOTES
[address] [address]  Recreational Therapy  Evaluation  Transitional Care Unit      Time Spent with Patient: 30 minutes    Date:  8/7/2018       Patient Name: Chary Gates      MRN: [id number]       YOB: 1932 [de-identified]80 y.o.)       Gender: male  Diagnosis: COPD  Referring Practitioner: Beth Whitlock MD; Attending: Dr. Sheri Knight    RESTRICTIONS/PRECAUTIONS:  Restrictions/Precautions: General Precautions, Fall Risk, Isolation  Vision: Impaired  Hearing: Exceptions to [facility]  Hearing Exceptions: Bilateral hearing aid, Hard of hearing/hearing concerns    PAIN: 0    SUBJECTIVE:  Pt lives alone - he has 2 daughters and 2 step sons - his 2 daughters and 1 step son are nearby and supportive     VISION:  Glasses    HEARING: Hearing Aid - Left & Right    LEISURE INTERESTS:   Pt is independent with his ADL's - he does not drive -pt's daughter was present during the evaluation and she helped aid in some of the answers - 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 - he enjoys fishing and boating - he enjoys being outside - he stated that he plays euchre and poker - pt declined word puzzles to complete during his down time - pt's vet was contacted to receive the okay to bring his dogs in for pet therapy - pt was social and bright - he was pleasant - pt did show confusion and did need moderate cueing to answer some of the questions      BARRIERS TO LEISURE INTERESTS:    Confusion and Decreased endurance    Patient Education  New Education Provided: Importance of Leisure, RT Plan of Care    Plan:  Continue to follow patient through this admission  Include patient in appropriate groups  See patient individually    Electronically signed by:  Chandan Bermudez  Date: 8/7/2018

## 2018-08-07 NOTE — PROGRESS NOTES
[facility]  SPEECH THERAPY MISSED TREATMENT NOTE  [facility] TCU 8E      Date: 2018  Patient Name: Beryl Tabares        MRN: [id number]    : 1932  (80 y.o.)    REASON FOR MISSED TREATMENT:  Attempt to see pt at 1430 for speech/lang/cognitive evaluation to establish POC on TCU. Pt currently being seen by PT services d/t being on medical hold earlier in day. Will complete evaluation tomorrow on 18.      Tacy Olszewski M.S., CFY-SLP [id number]

## 2018-08-07 NOTE — PROGRESS NOTES
Exceptions  Cognition Comment: 5/15 on BIMs, decreased memory and orientation, inattention         Sensation  Overall Sensation Status: WFL                           LUE AROM (degrees)  LUE General AROM: limited shoulder flexion d/t h/o rotator cuff tear, distally WFL. RUE AROM (degrees)  RUE AROM : WFL       LUE Strength  LUE Strength Comment: 4/5                RUE Strength  RUE Strength Comment: 4/5           ADL  Grooming: Setup, Stand by assistance, Increased time to complete (seated to wash face)  UE Bathing: Stand by assistance, Setup, Increased time to complete  LE Bathing: Increased time to complete  UE Dressing: Stand by assistance, Setup  LE Dressing: Moderate assistance (assist for B socks and shoes as well as threading pants/underwear, max cues for back precautions)  Toileting: Minimal assistance     Bed mobility  Sit to Supine: Stand by assistance  Scooting: Stand by assistance    Transfers  Sit to stand: Contact guard assistance  Stand to sit: Contact guard assistance  Toilet Transfers  Toilet - Technique: Ambulating  Equipment Used: Standard toilet  Toilet Transfer: Minimal assistance    Balance  Sitting Balance: Stand by assistance  Standing Balance: Contact guard assistance     Time: x 1-3 min x 4 trials during ADLs     Functional Mobility  Functional - Mobility Device: Rolling Walker  Activity: To/from bathroom, Other  Assist Level: Contact guard assistance  Functional Mobility Comments: within unit and pt's room, min unsteadiness, fatigues quickly             Activity Tolerance:  Activity Tolerance: Patient limited by fatigue, Patient limited by pain, Patient Tolerated treatment well  Activity Tolerance: Treatment intiated: Pt completed ADL routine as detailed above. Poor adherence to back precautions during session with max cues. Extended time and RBs required d/t fatigue. Assessment:  Assessment: This 80year old male arrived to [facility] d/t SOB and Cough.  Pt has a h/o Compression

## 2018-08-07 NOTE — DISCHARGE SUMMARY
[address] [place] [address]                                 DISCHARGE SUMMARY    PATIENT NAME: Castillo Muhammad                      :        1932  MED REC NO:   [id number]                           ROOM:       0028  ACCOUNT NO:   [de-identified]                           ADMIT DATE: 2018  PROVIDER:     Dena Reis. Sterling Lucero M.D.               Beverly Mendozaber2018    FINAL DIAGNOSES:  1. Atrial fib with rapid ventricular response. 2.  Acute exacerbation of chronic congestive heart failure. 3.  Multiple myeloma. 4.  Fever. 5.  Anemia. 6.  Generalized weakness. 7.  Malnutrition due to poor calorie intake. 8.  Diabetes mellitus. 9.  Possible pneumonia. BRIEF HISTORY:  This is an 29-year-old gentleman admitted to the hospital  through the emergency room, _____ because generalized weakness, shortness  of breath. The patient was found to be in pedal edema and congestive heart  failure. He also has atrial fib. He has had a low grade fever. The  patient has multiple myeloma treated by Dr. Doretha Arevalo for this. For rest of H  and P please refer to my H and P.    HOSPITALIZATION COURSE:  Patient admitted to the telemetry bed. He was  treated with IV nitro and IV Cardizem. He also seen by Dr. Dayna Méndez,  Infectious Disease service, for his fever. The patient has multiple  myeloma. He is anemic because of that. The patient has malnutrition  because of the calorie intake, which is poor. The patient has generalized  weakness and Physical Therapy was consulted. The patient was able to  ambulate with the assistance. The patient medication adjusted. The  patient will be sent to the CCU for further OT and PT. Patient and his  family wishes for him to be a [facility]. The patient does not want any major  procedures to be done. He was discharged to the [address] [place] [address]. His prognosis is  poor.         Yg Bell M.D.    D: 2018 38:87:83

## 2018-08-07 NOTE — PROGRESS NOTES
Admission Note for TCU     Name:  Ferdinand Velazco    MR#:    [id number]  Acct: [de-identified]      :   1932    Long Term Care Facility Type: Transitional Care Unit     Dx:   COPD     Patient Active Problem List   Diagnosis    Pulmonary nodule, left    Smoldering myeloma    Leukopenia    BPH without urinary obstruction    Essential hypertension    Moderate COPD (chronic obstructive pulmonary disease) (HCC)    Moderate aortic regurgitation    Osteoarthritis of both knees    Prostate CA ([address]    Tobacco dependence    DM II (diabetes mellitus, type II), controlled ([address]    Chemotherapy management, encounter for    Hyperlipidemia    Anemia in neoplastic disease    Right knee pain    Chronic leukopenia    Thrombocytopenia (HCC)    Left knee pain    Popliteal cyst    Muscle strain of knee    Immunocompromised state ([address]    Bone metastasis ([address]    Acute respiratory failure with hypoxia ([address]    Hx of skin cancer, basal cell    H/O prostate cancer    Bilateral edema of lower extremity  due to VElcade  and steroids    Hypomagnesemia    Cellulitis of right lower extremity    Controlled type 2 diabetes mellitus without complication (HCC)    Acute urinary retention    Elevated PSA    Encounter for antineoplastic chemotherapy    Irregular heart beat    Elevated lipase    Multiple myeloma (HCC)    Chronic anemia    Vesicular rash    Dependence on nicotine from cigarettes    COPD with exacerbation (HCC)    SIRS (systemic inflammatory response syndrome) (HCC)    Pathologic compression fracture of lumbar vertebra (HCC)    Macrocytic anemia    Diastolic dysfunction without heart failure    Severe malnutrition (HCC)    Atrial fibrillation with RVR ([address]    Severe malnutrition (HCC)    A-fib (HCC)       Code Status: DNR-CCA      Rehabilitation Potential: Good     Prognosis: Good     Stability: Stable     History & Physical current: Yes   If No, note changes in H&P update     Level of Care Recommendation/Request: Skilled     Physician Certification: I certify that I have reviewed the information contained in the discharge summary and that the information is a true and accurate reflection of the individual's condition. I certify this resident is appropriate for skilled services provided in the TCU/ECF for a condition which the individual received inpatient care. Certification: I certify the orders, information, and transfer of said patient is necessary for the continuing treatment of the diagnosis listed in a skilled care setting providing skilled nursing and/or skilled rehabilitative services. The patient will require on a daily basis SNF covered care.     Electronically signed by Negro Haji MD on 8/7/2018 at 2:26 PM

## 2018-08-07 NOTE — PLAN OF CARE
Problem: Falls - Risk of:  Goal: Will remain free from falls  Will remain free from falls   Outcome: Ongoing  Pt will remain free of falls. Problem: Risk for Impaired Skin Integrity  Goal: Tissue integrity - skin and mucous membranes  Structural intactness and normal physiological function of skin and  mucous membranes. Outcome: Ongoing  Skin assessment every shift. Reposition to prevent further breakdown. Problem: Pain:  Goal: Pain level will decrease  Pain level will decrease   Outcome: Ongoing  Reposition frequently to alleviate pain. Use oral medication prior to use of iv medication. Problem: Activity:  Goal: Physical symptoms of sleep deprivation will improve  Physical symptoms of sleep deprivation will improve   Outcome: Ongoing  Offer rest periods throughout the shift    Dim lights or blinds when resting. Problem: Cardiac:  Goal: Ability to maintain an adequate cardiac output will improve  Ability to maintain an adequate cardiac output will improve   Outcome: Ongoing  Continue current medications and monitor closely     Problem: ACTIVITY INTOLERANCE/IMPAIRED MOBILITY  Goal: Mobility/activity is maintained at optimum level for patient  Outcome: Ongoing  Encourage pt to participate fully in therapy but respond to his limitations. Problem: Mobility - Impaired:  Goal: Mobility will improve  Mobility will improve   Outcome: Ongoing  Encourage pt ability to do therapy. .    Problem: Impaired respiratory status  Goal: Clear lung sounds  Outcome: Ongoing  Encourage deep breathing when awake.

## 2018-08-07 NOTE — PLAN OF CARE
Problem: Impaired respiratory status  Goal: Clear lung sounds  Outcome: Ongoing  Patient had clear and diminished breath sounds. Patient on home regiment.

## 2018-08-07 NOTE — PROGRESS NOTES
[address] [address]  INPATIENT PHYSICAL THERAPY  EVALUATION  [facility] 8E - [id number]-72/072-A    Time In: 8406  Time Out: 6184  Timed Code Treatment Minutes: 0 Minutes  Minutes: 19      Date: 2018  Patient Name: Ajit Storey,  Gender:  male        MRN: [id number]  : 1932  (80 y.o.)  Referral Date : 18   Referring Practitioner: Ordering: Cuca Rodriguez MD. Attending: PAULINA Blandon MD  Diagnosis: COPD  Additional Pertinent Hx: Ajit Storey  is a 80 y.o. male admitted to the transitional care unit on 2018. He presented to the ED here with weakness and worsened SOB with cough.  He was admitted and saw several specialties and now was felt strong enough to move to TCU for additional therapy time to hopefully have him regain some of his functioning     Past Medical History:   Diagnosis Date    Acute urinary retention     Arthritis     BPH without urinary obstruction     Cancer ([facility]     multiple myeloma & bone    Chronic kidney disease     COPD (chronic obstructive pulmonary disease) ([facility]     Disease of blood and blood forming organ     History of blood transfusion     Hyperlipidemia     Hypertension     Insomnia     Pneumonia     Prostate cancer ([facility]     Shingles     Atypical    TIA (transient ischemic attack)     Type II or unspecified type diabetes mellitus without mention of complication, not stated as uncontrolled      Past Surgical History:   Procedure Laterality Date    COLONOSCOPY      EYE SURGERY      bilateral cataracts    LEG SURGERY Right     amanda from hip to knee    SKIN BIOPSY         Restrictions/Precautions:  General Precautions, Fall Risk         Spinal Precautions: No Bending, No Lifting, No Twisting  Other position/activity restrictions: h/o Lumbar Compression Fracture  Spinal Other: Abdominal Binder    Subjective:  Chart Reviewed: Yes  Patient assessed for rehabilitation services?: Yes  Family / Caregiver Present: Yes  Subjective: RN approved eval in PM. patient in bed on arrival with daughter in law in room. patient confused throughout session and resistant to all mobility. Asks, \"Who do you even work for?!\" Unable to be re-oriented    General:  Overall Orientation Status: Impaired  Orientation Level: Disoriented to time, Disoriented to situation, Disoriented to place, Oriented to person  Follows Commands: Impaired (reluctant to therapy and to follow cues from this PT)    Vision: Impaired  Vision Exceptions: Wears glasses at all times    Hearing: Exceptions to [facility]  Hearing Exceptions: Bilateral hearing aid, Hard of hearing/hearing concerns         Pain:  Denies. Social/Functional History:    Lives With: Alone  Type of Home: House  Home Layout: Two level  Home Access: Stairs to enter with rails  Entrance Stairs - Number of Steps: 3, full flight upstairs--reports he does not use   Entrance Stairs - Rails: Both  Home Equipment: Cane     Bathroom Shower/Tub: Tub/Shower unit  Bathroom Toilet: Handicap height  Bathroom Equipment: Shower chair, Grab bars around toilet, Grab bars in shower  Bathroom Accessibility: Accessible  IADL Comments: Cleaning lady 2x/month    Receives Help From: Family, Other (comment)  ADL Assistance: Independent  Homemaking Assistance: Independent  Homemaking Responsibilities: Yes  Ambulation Assistance: Independent  Transfer Assistance: Independent       Occupation: Retired  Type of occupation: [employer] [address] [place] [address] [employer] of all trades\"  Leisure & Hobbies: painting  Additional Comments: Occassionally used st cane at home. Daughters checked in on pt daily. Pt responsible for intermittent cleaning, laundry and cooking.  All information obtained from chart review, patient replies \"yes\" to all subjective questioning this PM      Objective:       RLE AROM: WFL         LLE AROM : WFL            not able to formally assess B LE strength, >3/5    Sensation  Overall Sensation Status: WFL        Balance  Sitting - Static: Good  Sitting - Dynamic:

## 2018-08-07 NOTE — H&P
TCU History and Physical      Chief Complaint and Reason for TCU Admission:   Need for more therapy time due to his markedly deconditioned state. History of Present Illness:  Wayne Estevez  is a 80 y.o. male admitted to the transitional care unit on 8/6/2018. He presented to the ED here with weakness and worsened SOB with cough.  He was admitted and saw several specialties and now was felt strong enough to move to TCU for additional therapy time to hopefully have him regain some of his functioning    Past Medical History:      Diagnosis Date    Acute urinary retention     Arthritis     BPH without urinary obstruction     Cancer ([address]     multiple myeloma & bone    Chronic kidney disease     COPD (chronic obstructive pulmonary disease) ([address]     Disease of blood and blood forming organ     History of blood transfusion     Hyperlipidemia     Hypertension     Insomnia     Pneumonia     Prostate cancer ([address]     Shingles     Atypical    TIA (transient ischemic attack) 2015    Type II or unspecified type diabetes mellitus without mention of complication, not stated as uncontrolled        Primary care provider: Teo Conway DO     Past Surgical History:      Procedure Laterality Date    COLONOSCOPY      EYE SURGERY      bilateral cataracts    LEG SURGERY Right     amanda from hip to knee    SKIN BIOPSY         Allergies:    Shellfish-derived products and Flomax [tamsulosin hcl]    Current Medications:    Current Facility-Administered Medications: miconazole (MICOTIN) 2 % powder, , Topical, BID  morphine (PF) injection 2 mg, 2 mg, Intravenous, Q2H PRN **OR** morphine (PF) injection 4 mg, 4 mg, Intravenous, Q2H PRN  nitroGLYCERIN (NITROSTAT) SL tablet 0.4 mg, 0.4 mg, Sublingual, Q5 Min PRN  acetaminophen (TYLENOL) tablet 650 mg, 650 mg, Oral, Q4H PRN  magnesium hydroxide (MILK OF MAGNESIA) 400 MG/5ML suspension 30 mL, 30 mL, Oral, Daily PRN  sodium chloride flush 0.9 % injection 10 mL, 10 mL, Intravenous, 2 times per day  sodium chloride flush 0.9 % injection 10 mL, 10 mL, Intravenous, PRN  acetaminophen (TYLENOL) tablet 1,000 mg, 1,000 mg, Oral, Nightly PRN **AND** diphenhydrAMINE (BENADRYL) tablet 50 mg, 50 mg, Oral, Nightly PRN  albuterol (PROVENTIL) nebulizer solution 2.5 mg, 2.5 mg, Nebulization, Q6H PRN  albuterol sulfate  (90 Base) MCG/ACT inhaler 2 puff, 2 puff, Inhalation, Q6H PRN  apixaban (ELIQUIS) tablet 2.5 mg, 2.5 mg, Oral, BID  aspirin EC tablet 81 mg, 81 mg, Oral, Daily  biotene PBF oral solution, 5 mL, Mouth/Throat, PRN  diltiazem (CARDIZEM CD) extended release capsule 120 mg, 120 mg, Oral, Daily  fluticasone (FLONASE) 50 MCG/ACT nasal spray 2 spray, 2 spray, Nasal, Daily  furosemide (LASIX) tablet 40 mg, 40 mg, Oral, Daily  guaiFENesin (ROBITUSSIN) 100 MG/5ML oral solution 400 mg, 400 mg, Oral, BID PRN  HYDROcodone-acetaminophen (NORCO) 5-325 MG per tablet 1 tablet, 1 tablet, Oral, Q6H PRN  lidocaine (LIDODERM) 5 % 1 patch, 1 patch, Transdermal, Daily  mometasone-formoterol (DULERA) 200-5 MCG/ACT inhaler 2 puff, 2 puff, Inhalation, BID  ondansetron (ZOFRAN-ODT) disintegrating tablet 4 mg, 4 mg, Oral, Q6H PRN  polyethylene glycol (GLYCOLAX) packet 17 g, 17 g, Oral, Daily  [START ON 8/9/2018] ppd (tuberculin skin test) read, , Does not apply, Weekly  tiotropium (SPIRIVA) inhalation capsule 18 mcg, 18 mcg, Inhalation, Daily  tuberculin injection 5 Units, 5 Units, Intradermal, Weekly  vitamin B-12 (CYANOCOBALAMIN) tablet 1,000 mcg, 1,000 mcg, Oral, Daily     Resident is taking an antipsychotic medication? No     If yes, was Gradual Dose Reduction (GDR) attempted? NA     No- GDR is clinically contraindicated due to the fact that tapering the medication  would not achieve the desired therapeutic effects and the current dose is  necessary to maintain or improve the resident's function, well-being, safety, and  quality of life.     Social History:  Social History     Social History    admission.     · Bladder: continent  · Bowel: mom, senocot, glycolax, colace  ·  and case management consultations for coordination of care and discharge planning    Sindy Felty, MD

## 2018-08-08 PROBLEM — I48.91 ATRIAL FIBRILLATION (HCC): Status: ACTIVE | Noted: 2018-01-01

## 2018-08-08 PROBLEM — E43 SEVERE MALNUTRITION (HCC): Status: ACTIVE | Noted: 2018-01-01

## 2018-08-08 NOTE — PROGRESS NOTES
This nurse went in for morning vital signs and assessment pt was only oriented to name. Pt was confused and impulsive. Could not orient pt or redirect him in any manner. Family was concerned that pt was rapidly declining. Pt was short of breath. O2 sat was at 85% with no oxygen. Oxygen therapy was initiated at 3L and O2 sat came up to 95%. Family told this nurse that pt was having trouble drinking liquids and was choking. Pt was having chronic back pain and was not resolved with oral medications. Pt continued to be very impulsive and disoriented throughout the afternoon. Pt was incontinent throughout the day as well which was not his normal. Dr. Neftaly Sebastian was contacted with updates on this pt and ordered a blood gas. Family met with palliative care and decided they would like for him to go back to the acute side on 5K with hospice. Code status was changed to DNR.

## 2018-08-08 NOTE — PROGRESS NOTES
Patient arrived from TCU with family at bedside. Immediately upon arrival to floor patient became very agitated and restless. No medications available for symptoms. Attempted to give patient Portal and he spit it out immediately. Telesitter in room at time and multiple calls to this nurse regarding attempts to get out of bed. 3 family members in room trying to calm patient down. Family states patient has been in this state for 36 hours and they had attempted to walk him in the middle of the night on TCU. Family stated hospice was coming to see patient this evening, but upon call by this nurse to hospice staff I was informed they were not aware of need to see patient. Explained patient decline in status on TCU which required transfer to [facility]. Call to Dr. Henry Aldana for comfort med orders and to Hunter Patricio, RN with hospice. Orders received and Niki on unit to assess patient and plan for GIP admission 8/9/18.

## 2018-08-08 NOTE — DISCHARGE SUMMARY
13:15 8/7/2018 11:59 8/8/2018 01:45   Sodium Latest Ref Range: 135 - 145 meq/L 136 135    Potassium Latest Ref Range: 3.5 - 5.2 meq/L 3.4 (L) 3.8    Chloride Latest Ref Range: 98 - 111 meq/L 94 (L) 92 (L)    CO2 Latest Ref Range: 23 - 33 meq/L 29 27    BUN Latest Ref Range: 7 - 22 mg/dL 17 22    Creatinine Latest Ref Range: 0.4 - 1.2 mg/dL 0.9 1.0    Anion Gap Latest Ref Range: 8.0 - 16.0 meq/L 13.0 16.0    Est, Glom Filt Rate Latest Units: ml/min/1.73m2 80 (A) 71 (A)    Glucose Latest Ref Range: 70 - 108 mg/dL 207 (H) 217 (H)    Calcium Latest Ref Range: 8.5 - 10.5 mg/dL 9.8 10.5    Total Protein Latest Ref Range: 6.1 - 8.0 g/dL  9.0 (H)    Pro-BNP Latest Ref Range: 0.0 - 1800.0 pg/mL  2190.0 (H)    Albumin Latest Ref Range: 3.5 - 5.1 g/dL  3.1 (L)    Alk Phos Latest Ref Range: 38 - 126 U/L  104    ALT Latest Ref Range: 11 - 66 U/L  17    AST Latest Ref Range: 5 - 40 U/L  22    Bilirubin Latest Ref Range: 0.3 - 1.2 mg/dL  0.4    WBC Latest Ref Range: 4.8 - 10.8 thou/mm3 1.5 (L) 2.5 (L)    RBC Latest Ref Range: 4.70 - 6.10 mill/mm3 3.31 (L) 3.29 (L)    Hemoglobin Quant Latest Ref Range: 14.0 - 18.0 gm/dl 10.6 (L) 10.5 (L)    Hematocrit Latest Ref Range: 42.0 - 52.0 % 31.5 (L) 32.1 (L)    MCV Latest Ref Range: 80.0 - 94.0 fL 95.2 (H) 97.6 (H)    MCH Latest Ref Range: 26.0 - 33.0 pg 32.0 31.9    MCHC Latest Ref Range: 32.2 - 35.5 gm/dl 33.7 32.7    MPV Latest Ref Range: 9.4 - 12.4 fL 9.0 (L) 9.5    RDW-CV Latest Ref Range: 11.5 - 14.5 % 14.2 14.5    RDW-SD Latest Ref Range: 35.0 - 45.0 fL 49.7 (H) 51.2 (H)    Platelet Count Latest Ref Range: 130 - 400 thou/mm3 254 328    Lymphocytes # Latest Ref Range: 1.0 - 4.8 thou/mm3  0.5 (L)    Monocytes # Latest Ref Range: 0.4 - 1.3 thou/mm3  0.1 (L)    Eosinophils # Latest Ref Range: 0.0 - 0.4 thou/mm3  0.0    Basophils # Latest Ref Range: 0.0 - 0.1 thou/mm3  0.0    Seg Neutrophils Latest Units: %  76.7    Segs Absolute Latest Ref Range: 1.8 - 7.7 thou/mm3  1.9    Lymphocytes inhalation capsule  Inhale 1 capsule into the lungs daily 1 capsule via inhalation daily.              vitamin B-12 1000 MCG tablet  Take 1 tablet by mouth daily                 35 minutes spent preparing the patient for discharge    Sindy Felty, MD

## 2018-08-08 NOTE — PROGRESS NOTES
Call to Dr. Haley Ziegler regarding patient continued agitation. Order given for 1 mg IVP Ativan q4 PRN. This nurse was unable to order without SODA scale. Call to pharmacy who stated she would re order with Dr. Haley Ziegler signature in order to get medication to patient quickly. Morphine 4mg q1 IVP PRN ordered as well and pharmacy stated they would verify ASAP.

## 2018-08-08 NOTE — PROGRESS NOTES
Nutrition Assessment    Type and Reason for Visit: Initial, Consult (TCU admit)    Nutrition Recommendations: Consider swallow eval to insure swallowing safety. MD to advise pt. Continues to lose weight & unable to consume adequate po despite ONS. Consider MVI & Vitamin C supplemet as able to tolerate. Malnutrition Assessment:  · Malnutrition Status: Meets the criteria for severe malnutrition  · Context: Chronic illness  · Findings of the 6 clinical characteristics of malnutrition (Minimum of 2 out of 6 clinical characteristics is required to make the diagnosis of moderate or severe Protein Calorie Malnutrition based on AND/ASPEN Guidelines):  1. Energy Intake-Less than or equal to 75%, greater than or equal to 3 months    2. Weight Loss-10% loss or greater, in 3 months  3. Fat Loss-Moderate subcutaneous fat loss, Orbital  4. Muscle Loss-Moderate muscle mass loss, Clavicles (pectoralis and deltoids), Temples (temporalis muscle)  5. Fluid Accumulation-Unable to assess,    6.  Strength-Not measured    Nutrition Diagnosis:   · Problem: Severe malnutrition, in context of chronic illness  · Etiology: related to Cognitive or neurological impairment     Signs and symptoms:  as evidenced by Moderate muscle loss, Moderate loss of subcutaneous fat, Weight loss    Nutrition Assessment:  · Subjective Assessment: Pt. admitted to TCU for COPD. He was admitted to acute with Atrial Fibrillation with RVR, Febrille Illness in pt. with Multiple Myeloma, COPD Exacerbation, Prostate CAncer, Bone Mets noted. Pt. appears very confused & tring to get out of wheelchair. Pt. not able to answer any of my questions. Mary Davis mentions pt. only taking sips po today & coughed on thin liquids Labs include: (8/7) Glucose 217, ProBNP 2190, Hemoglobin 10.5. SLP evaluated for cognitive eval & noted \" Severe expressive & receptive language impairment\" . Pt. Allergic to shellfish noted.    ·    · Wound Type:  (unstageable coccyx

## 2018-08-08 NOTE — PROGRESS NOTES
[address] [address]  Recreational Therapy  Discharge Note  Transitional Care Unit           Date:  8/8/2018       Patient Name: Que Gates      MRN: [id number]       YOB: 1932 (80 y.o.)       Gender: male  Diagnosis: COPD  Referring Practitioner: Minna Bills MD; Attending: Dr. Cheri Leo    Patient discharged from Recreational Therapy at this time. See recreational therapy notes for details.     Electronically signed by: MARU James  Date: 8/8/2018

## 2018-08-08 NOTE — PROGRESS NOTES
1500: Received call from floor that family is present and would like to discuss goals of care for pt.   [phone]: Met with family in conference room at family request. Pt resting quietly at this time. Pt does not appear to be in any acute distress. Family state that pt does not appear to be progressing and feel that comfort should be the goal. They state they spoke with Dr. Zen Downey last week and he also confirmed with them that comfort would be an appropriate goal of care. Reviewed Comfort Care status and hospice. Family state that they want hospice consulted at this time to manage comfort for their father. We discuss minimal medical interventions and focus strictly on comfort and family again voice support of this goal.   Spoke with Dr. Claribel Ya about conversation with family and their request for comfort care and hospice consult and he gives order. Referral made to hospice and they will contact family to meet with them and further discuss pt status and plan of care. Did discuss with family that if pt does not meet inpatient hospice criteria that pt may need to go home or to an ECF. Family states they will not be able to care for pt at home. Much emotional support given.  8E staff updated on conversation with family and Dr. Claribel Ya

## 2018-08-08 NOTE — CARE COORDINATION
Visited with Lisa Marmolejo in [place]. He is very confused. Have discussed with family plans for possible discharge from TCU. Will continue to reach out to family to provide support and see what additional services and support may be needed.

## 2018-08-08 NOTE — PROGRESS NOTES
distance to RW  Distance: 130' x 1,90' x 1,10' x 1  Comments: walker ht. raised to pt's ht. which improved pt's posture. Exercises:  Exercises  Comments: with demo's and vc's pt. completed 15-20 reps each b le seated laq, marching, hip abd/add, resistance red t-band ham curls, hip abd/add and supine ap's, saq, heelslides, hip abd/add, pt. could not understand quad/glute sets. all for strengthening, endurance     Activity Tolerance:  Activity Tolerance: Patient limited by cognitive status  Activity Tolerance: alot of redirection throughout session  vc's and visual demo's needed as well for proper completion ther. ex    Assessment:  Discharge Recommendations: Continue to assess pending progress    Patient Education:  Patient Education: POC, safety, bed mobility, transfers, gait, role of PT    Equipment Recommendations:  Equipment Needed: No  Other: continue to monitor    Safety:  Type of devices:  All fall risk precautions in place, Patient at risk for falls, Call light within reach, Left in chair, Chair alarm in place, Gait belt, Telesitter in use (assisted pt. with breakfast set-up)    Plan:  Times per week: 6x  Current Treatment Recommendations: Strengthening, Gait Training, Stair training, Balance Training, Functional Mobility Training, Endurance Training, Transfer Training, Home Exercise Program, Safety Education & Training, Patient/Caregiver Education & Training, Equipment Evaluation, Education, & procurement    Goals:  Patient goals : not stated    Short term goals  Time Frame for Short term goals: 1 week  Short term goal 1: patient to perform bed mobility at A to get in and out of bed  Short term goal 2: patient to perform transfers to and from various surfaces at SBA to rise from bed or chair  Short term goal 3: patient to ambulate 100ft with RW at [facility] for household mobility  Short term goal 4: patient to negotiate 3 steps with B rails at [facility] for home access  Short term goal 5: patient to follow 100% of cues during session for proper safety during mobility    Long term goals  Time Frame for Long term goals : 3 weeks  Long term goal 1: patient to perform bed mobility at S to get in and out of bed  Long term goal 2: patient to perform transfers to and from various surfaces at S to rise from bed or chair  Long term goal 3: patient to ambulate 150ft with RW at S for household and community mobility  Long term goal 4: patient to negotiate 3 steps with B rails at S for home access  Long term goal 5: patient score on elderly mobility scale will improve from 11 to 14 to decrease fall risk and improve independence

## 2018-08-09 PROBLEM — I48.91 ATRIAL FIBRILLATION WITH RAPID VENTRICULAR RESPONSE (HCC): Status: ACTIVE | Noted: 2018-01-01

## 2018-08-09 PROBLEM — E43 SEVERE MALNUTRITION (HCC): Chronic | Status: ACTIVE | Noted: 2018-01-01

## 2018-08-09 PROBLEM — G93.41 METABOLIC ENCEPHALOPATHY: Status: ACTIVE | Noted: 2018-01-01

## 2018-08-09 NOTE — H&P
limits. No acute osseous findings. Degenerative changes both shoulders and lumbar spine. Chronic lung disease. No acute findings. **This report has been created using voice recognition software. It may contain minor errors which are inherent in voice recognition technology. ** Final report electronically signed by Dr. Hoa Rangel on 8/1/2018 6:06 PM           DVT prophylaxis: [] Lovenox                                 [] SCDs                                 [] SQ Heparin                                 [] Encourage ambulation           [] Already on Anticoagulation    Code Status: DNR-CC      PT/OT Eval Status:     Disposition:    [] Home       [] TCU       [] Rehab       [] Psych       [] SNF       [] [place]       [x] Other-hospice  ASSESSMENT:    C/Katty Roberts 1106 Problems    Diagnosis Date Noted    Atrial fibrillation with rapid ventricular response ([address] [I48.91] 54/76/9741    Metabolic encephalopathy [J65.89] 08/09/2018    Severe malnutrition ([address] [E43] 08/09/2018     Class: Chronic    Panlobular emphysema ([address] [J43.1]     Atrial fibrillation ([address] [I48.91] 08/08/2018    Multiple myeloma ([address] [C90.00]     Acute respiratory failure with hypoxia ([address] [J96.01]     DM II (diabetes mellitus, type II), controlled ([address] [E11.9] 03/12/2015       PLAN:    Ativan for agitation  Ms for comfort and air hunger      Thank you Arnold Spatz, DO for the opportunity to be involved in this patient's care.     Electronically signed by Beck Ceballos DO on 8/9/2018 at 12:17 PM

## 2018-08-09 NOTE — PLAN OF CARE
Problem: Discharge Planning:  Goal: Participates in care planning  Participates in care planning   Outcome: Ongoing  Planning ECF with hospice care.

## 2018-08-09 NOTE — TELEPHONE ENCOUNTER
JAZMINE WOULD LIKE TO SPEAK WITH WITHREGARDING TO HER DAD ABOUT HOSPICE. SHE IS [place] TOLD HE DOESN`T QUALIFY.  THANKS

## 2018-08-09 NOTE — PLAN OF CARE
Problem: Discharge Planning:  Goal: Discharged to appropriate level of care  Discharged to appropriate level of care  Outcome: Ongoing  Per pt's family, pt will be admitted to hospice in the morning. Problem: Pain:  Goal: Pain level will decrease  Pain level will decrease  Outcome: Ongoing  Comfort care measures. PRN morphine q1hr. Problem: Skin Integrity - Impaired:  Goal: Will show no infection signs and symptoms  Will show no infection signs and symptoms  Outcome: Ongoing  No new areas of skin breakdown noted. Turning and repositioning pt q 2hrs. Comments: Care plan reviewed with patient and family members. Patient and family members verbalize understanding of the plan of care and contribute to goal setting.

## 2018-08-09 NOTE — PROGRESS NOTES
Patient restless thrashing legs, pulling at lV and oxygen. Attempted to reposition, redirect and decrease stimulation without success.

## 2018-08-09 NOTE — PROGRESS NOTES
When patient asked about pain, patient says that he does have pain. Patient not agreeable to take oral pain medication at this time. Family at bedside and warm blankets applied.

## 2018-08-09 NOTE — PLAN OF CARE
Problem: DISCHARGE BARRIERS  Goal: Patient's continuum of care needs are met    Intervention: INVOLVE PATIENT/S.O. IN DISCHARGE PLANNING PROCESS  The patient experienced a change in his medical status on 8/8, necessitating an unplanned discharge from TCU. He has been admitted to [place] [address] for further care and Hospice planning with family. Discharge goals were not met.  NAZARIO Mitchell

## 2018-08-09 NOTE — PLAN OF CARE
Problem: Impaired respiratory status  Goal: Lung sounds clear or within normal limits for patient  Outcome: Ongoing  Keep breathing comfortable

## 2018-08-09 NOTE — PLAN OF CARE
Problem: Discharge Planning:  Goal: Participates in care planning  Participates in care planning   Outcome: Ongoing  Discharge plan is ongoing. Patient transitioning to hospice.  gave family list of ECF for placement if he does not qualify for GIP. Problem: Pain:  Goal: Pain level will decrease  Pain level will decrease   Outcome: Ongoing  Patient states pain relief from PRN pain medications. Pain reassessed one hour post PRN pain medication given. Problem: Skin Integrity - Impaired:  Goal: Will show no infection signs and symptoms  Will show no infection signs and symptoms   Outcome: Ongoing  No skin breakdown this shift. Patient prefers to lay on right side. Patient refuses to turn as recommended. Family educated regarding the benefits of repositioning and the risk to skin integrity associated with not repositioning as recommended. Problem: Falls - Risk of:  Goal: Will remain free from falls  Will remain free from falls   Outcome: Ongoing  Fall assessment completed. Patient not using call light appropriately. Sitter and family at bedside. Personal items within reach. Patient is also compliant with use of non-skid slippers. Goal: Absence of physical injury  Absence of physical injury   Outcome: Ongoing  Patient remains free from injury. Comments: Care plan reviewed with Family. Family verbalized understanding of the plan of care and contribute to goal setting.

## 2018-08-10 PROBLEM — C90.00 MULTIPLE MYELOMA WITHOUT REMISSION (HCC): Status: ACTIVE | Noted: 2018-01-01

## 2018-08-10 NOTE — DISCHARGE SUMMARY
Hospitalist Discharge Summary     Patient Identification:  Sabino Rios  : 1932  MRN: [id number]   Account: [de-identified]     Admit date: 2018  Discharge date: 8/10/2018    Attending provider: Sole Hilton MD        Primary care provider: Nkechi Mendes DO     Discharge Diagnoses:   1. parox afib with RVR  2. Acute hypoxic respiratory failure  3. Acute on chronic pain due to multiple bone involvement from multiple myeloma  4. Multiple myeloma with multiple bone involvement  5. Metabolic encephalopathy/Delirium -- multifactorial  6. Acute pathologic lumbar compression fracture  7. Macrocytic anemia  8. Leukopenia  9. Chronic diastolic CHF  10. COPD  6. DM type 2  12. Hx TIA  13. Prostate cancer   14. Essential HTN  15. HLP  16. Severe malnutrition  17. Deconditioning  18. Chronic pain syndrome     Hospital Course:   Sabino Rios is a 80 y.o. male admitted back to acute hospitalization from TCU on 2018 for worsening resp status, increased confusion, pain, afib. See H&P by Concetta Nunez DO on 18 for admitting details. Carolina Aguirre was transferred from [address] [place] [address]  -  after acute with worsening pain, hypoxia, afib. Due to pt's confusion, he was not able to contribute to decision making thus his family/POA's were involved and requested pt be made [facility] and to focus only on comfort care and pain control. Hospice was consulted. He was started on a fentanyl patch as he was not compliant with taking po most of the time. He would be agitated also off and on and initially given IV ativan which did not help. Thus Dr. Leonora Cox with hospice/palliative care evaluated 8/10 as pt was requiring frequent IV morphine for pain and ativan for agitation. D/w Dr. Leonora Cox and He recommended transferring pt to INpatient hospice care for uncontrolled pain. He was started on morphine PCA and ativan was changed to haldol prn. He was discharged to inpatient hospice.     Discharge Medications:   Sharon Noriega A   Home Medication Instructions VND:[id number]    Printed on:08/10/18 1247   Medication Information                      albuterol (PROVENTIL) (2.5 MG/3ML) 0.083% nebulizer solution  Take 3 mLs by nebulization every 6 hours as needed for Wheezing or Shortness of Breath             albuterol sulfate HFA (VENTOLIN HFA) 108 (90 Base) MCG/ACT inhaler  Inhale 2 puffs into the lungs every 6 hours as needed for Wheezing or Shortness of Breath             Artificial Saliva (BIOTENE MOISTURIZING MOUTH) SOLN  Take by mouth as needed             aspirin 81 MG EC tablet  Take 81 mg by mouth daily. budesonide-formoterol (SYMBICORT) 160-4.5 MCG/ACT AERO  Inhale 2 puffs into the lungs 2 times daily Rinse mouth after its use. CHEMOTHERAPY               diphenhydrAMINE-APAP, sleep, (TYLENOL PM EXTRA STRENGTH)  MG tablet  Take 2 tablets by mouth nightly as needed for Sleep             fluticasone (FLONASE) 50 MCG/ACT nasal spray  2 sprays by Nasal route daily             furosemide (LASIX) 40 MG tablet  Take 1 tablet by mouth daily for 14 doses             guaiFENesin (ROBITUSSIN) 100 MG/5ML syrup  Take 400 mg by mouth 2 times daily as needed for Cough             Handicap Placard MISC  1 each by Does not apply route daily DX: Multiple Myeloma-C 90.00  Exp.7/24/2023             HYDROcodone-acetaminophen (NORCO) 5-325 MG per tablet  Take 1 tablet by mouth every 6 hours as needed for Pain for up to 30 days. Tonie Quintanilla leuprolide (LUPRON) 30 MG injection  Inject 30 mg into the muscle once Indications: every 3 months             lidocaine (LIDODERM) 5 %  Place 1 patch onto the skin daily 12 hours on, 12 hours off.             polyethylene glycol (MIRALAX) powder  Take 17 g by mouth daily             tiotropium (SPIRIVA HANDIHALER) 18 MCG inhalation capsule  Inhale 1 capsule into the lungs daily 1 capsule via inhalation daily.              vitamin B-12 1000 MCG tablet  Take 1 tablet by mouth daily

## 2018-08-10 NOTE — PLAN OF CARE
Problem: Discharge Planning:  Goal: Participates in care planning  Participates in care planning   Outcome: Ongoing  Family updated on plan of care  Goal: Discharged to appropriate level of care  Discharged to appropriate level of care   Outcome: Ongoing  Remains on 5K. Providing comfort measures    Problem: Pain:  Goal: Pain level will decrease  Pain level will decrease   Outcome: Ongoing  Patient moaning and grimacing in pain. Medicated with morphine as needed for pain    Problem: Skin Integrity - Impaired:  Goal: Will show no infection signs and symptoms  Will show no infection signs and symptoms   Outcome: Ongoing  No signs of infection    Problem: Falls - Risk of:  Goal: Will remain free from falls  Will remain free from falls   Outcome: Met This Shift  No falls    Comments: Care plan reviewed with patient and family and they verbalize understanding of the plan of care and contribute to goal setting.

## 2018-08-10 NOTE — CARE COORDINATION
8/10/18, 1:06 PM    DISCHARGE BARRIERS    Spoke with daughters earlier today re: discharge plans. They have not selected an ECF as they do not feel that this would be a safe discharge for patient due to his behaviors and need for someone to be with him at all times. [address] offered and discussed. Their preference is for patient to remain at [facility] if at all possible. They understand that currently, he is not meeting in-patient hospice criteria and that staff will continue to monitor. Update: Jazmine Villar with [address] has advised that patient will not be admitted to in-patient hospice. Spoke with daughters and they are aware of plan and are very pleased that patient will be remaining here for the mean time.
8/10/18, 7:46 AM      Angel Guido day: 1  Location: [facility] Reason for admit: Atrial fibrillation ([facility] [I48.91]  Atrial fibrillation with rapid ventricular response ([facility] [I48.91]   Procedure: N/A  Treatment Plan of Care: Observation to Inpatient status. Hospice following, code status to [facility],   Patient has been readmitted within 3 days. Patient went to f/u appointment? N/A-Patient was discharged to [facility]  If yes, was it within 7 days? N/A  Patient was able to fill prescriptions? N/A  Patient is taking medications as prescribed? N/A  Cause for readmission? Restlessness and agitation.    PCP: Jovana Bucio, DO  Readmission Risk Score: 29%  Discharge Plan: ***
never used smokeless tobacco.   Influenza Vaccination Screening Completed: N/A  Pneumonia Vaccination Screening Completed: Patient to transition to Hospice care today. PCP: Nehemias Gallo DO  Readmission: No-Obs. Status. Readmission Risk Score: 19%  Discharge Plan: GIP vs. SNF with Hospice.     Evaluation: no

## 2018-08-10 NOTE — PLAN OF CARE
Problem: Impaired respiratory status  Goal: Lung sounds clear or within normal limits for patient  Outcome: Ongoing  Duoneb to improve breath sounds.

## 2018-08-10 NOTE — PROGRESS NOTES
Sofia Figueroa  Progress Note    Patient: Mar Bermudez  : 1932  Acct#: [de-identified]  8/10/2018 12:57 PM  ADMISSION DAY:  2018  6:04 PM   Seen with Hospice and 5K staff, reviewed with family at bedside    81 yo  male  PMHx and problems:  - Prostate cancer on hormonal therapy  - DM Type 2  - Hx basal cell skin CA  - \"COPD\"   - PFT data with mild airways obstruction, elevated sRaw, elevated RV & RV/TLC   - CTA with centrilobular emphysema (Note:NOT panlobular disease)  - Hypoxemia referable to above  - Malnutrition   - Reduced somatic protein stores, reduced body weight  - Paroxysmal atrial fibrillation with RVR  - Hx LV diastolic dysfunction CHF  - Multiple myeloma with multiple bone involvement   - Hyperglobulinemia   - Acute lumbar spine compression fracture  - Chronic pain syndrome   - Multifocal bone metastases, myeloma   - Difficult control requiring transdermal & IV opiate therapy  - Acute delirium due to medical condition   - Pain, sleep disruption, likely effects opiates + benzodiazepine    Pt admitted to acute care hospital with worsened dyspnea and hypoxemia. EKG with atrial fibrillation/RVR, most recent CTA chest  (-) for PE, current admission CXR w/o acute appearing changes. Pt & family desire only comfort measures/terminal care. He has complained of severe pain with partial but incomplete relief with 25 mcg/hr transdermal fentanyl started yesterday, still awakening with pain and variable agitated delirium through the day. Family feels agitation primarily referable to pain, but not 100% sure. On examination pt is lethargic. He will speak with slurred, poorly intelligible speech, He indicates he \"hurts all over\", especially his back. He denies SOB/dyspnea at rest on nasal O2 although has nasal and distal extremities cyanosis. No clear JVD at 30 degrees head elevation. Impaired gag reflex present and not able to reliably elicit ability to swallow.      Review of

## 2019-03-18 NOTE — PROGRESS NOTES
Patient assessed for the following post chemotherapy:    Dizziness   No  Lightheadedness  No      Acute nausea/vomiting No  Headache   No  Chest pain/pressure  No  Rash/itching   No  Shortness of breath  No    Patient kept for 20 minutes observation post injection chemotherapy. Patient tolerated chemotherapy treatment velcade sq without any complications. Last vital signs:   /67  Pulse 73  Temp 97.7 °F (36.5 °C) (Oral)   Resp 16  Ht 5' 6\" (1.676 m)  Wt 207 lb (93.9 kg)  SpO2 97%  BMI 33.41 kg/m2      Patient instructed if experience any of the above symptoms following today's infusion,he/she is to notify MD immediately or go to the emergency department. Discharge instructions given to patient. Verbalizes understanding. Ambulated off unit per self with belongings accompanied by family. Spoke with pt and his wife to verify pts appt for tomorrow at 2pm to see Sarah but that he has labs for tomorrow morning but should have been done prior to this appt. Pts wife said they could not do lab today that they would do it tomorrow. I told her I will check with Sarah to see if that is okay BC she may not have the results she needs for iron if they do it tomorrow. I checked with Sarah and explained to pts wife that she said she can see them tomorrow but that she may not have much to discuss with them other than his CBC that she will not know the iron results. Pts wife verbalized understanding. I explained to her to come for 1:30pm lab not 7:55am like scheduled. She verbalized understanding.

## 2021-10-22 NOTE — PROGRESS NOTES
cooking and cleaning. Otezla Counseling: The side effects of Otezla were discussed with the patient, including but not limited to worsening or new depression, weight loss, diarrhea, nausea, upper respiratory tract infection, and headache. Patient instructed to call the office should any adverse effect occur.  The patient verbalized understanding of the proper use and possible adverse effects of Otezla.  All the patient's questions and concerns were addressed.

## 2024-02-09 NOTE — ONCOLOGY
Chemotherapy Administration    Pre-assessment Data: Antineoplastic Agents  Other:   See toxicity flow sheet for assessment [x]     Physician Notification of Concerns Related to Chemotherapy Administration:   Physician Notified Parag Al / Time of Notification      Interventions:   Lab work assessed  [x]   Height / Weight verified for dose [x]   Current MAR reviewed [x]   Emergency drugs available as appropriate [x]   Anaphylaxis assessment completed [x]   Pre-medications administered as ordered [x]   Chemotherapy Administered as SQ injection [x]   Monitor for signs / symptoms of hypersensitivity reaction    [x]   Chemotherapy orders (drug/dose/rate) verified by 2 Chemo certified   RNs    [x]          Document chemotherapy teaching on the Patient Education tab    [x]   Document patient verbalizes understanding of medications being administered    [x]   Other: velcade  [x]    []    []      []    []
rehab/yes

## 2024-03-26 NOTE — IP AVS SNAPSHOT
Patient Information     Patient Name ROSIBEL Farnsworth 1932         This is your updated medication list to keep with you all times      ASK your doctor about these medications     Acetaminophen 650 MG Tabs   Take 650 mg by mouth every 4 hours as needed       * albuterol sulfate  (90 Base) MCG/ACT inhaler   Commonly known as:  VENTOLIN HFA   Inhale 2 puffs into the lungs every 6 hours as needed for Wheezing or Shortness of Breath       * albuterol (2.5 MG/3ML) 0.083% nebulizer solution   Commonly known as:  PROVENTIL   Take 3 mLs by nebulization every 6 hours as needed for Wheezing or Shortness of Breath       aspirin 81 MG EC tablet       CHEMOTHERAPY       dexamethasone 2 MG tablet   Commonly known as:  DECADRON       leuprolide 30 MG injection   Commonly known as:  LUPRON       nicotine 21 MG/24HR   Commonly known as:  NICODERM CQ   Place 1 patch onto the skin daily       tiotropium 18 MCG inhalation capsule   Commonly known as:  SPIRIVA HANDIHALER   Inhale 1 capsule into the lungs daily 1 capsule via inhalation daily. TYLENOL PM EXTRA STRENGTH  MG tablet   Generic drug:  diphenhydrAMINE-APAP (sleep)       * Notice: This list has 2 medication(s) that are the same as other medications prescribed for you. Read the directions carefully, and ask your doctor or other care provider to review them with you.

## 2025-04-15 NOTE — CARE COORDINATION
8/2/18, 9:17 AM  Jada Sun       Admitted from: ED 8/1/2018/ [facility] day: 1   Location: [address] [place] [address] Reason for admit: Atrial fibrillation with RVR ([facility] [I48.91] Status: inpt  Admit order signed?: yes  PMH:  has a past medical history of Acute urinary retention; Arthritis; BPH without urinary obstruction; Cancer ([facility] Chronic kidney disease; COPD (chronic obstructive pulmonary disease) ([facility] Disease of blood and blood forming organ; History of blood transfusion; Hyperlipidemia; Hypertension; Insomnia; Pneumonia; Prostate cancer ([facility] Shingles; TIA (transient ischemic attack); and Type II or unspecified type diabetes mellitus without mention of complication, not stated as uncontrolled. Medications:  Scheduled Meds:   polyethylene glycol  17 g Oral Daily    sodium chloride flush  10 mL Intravenous 2 times per day    aspirin  81 mg Oral Daily    mometasone-formoterol  2 puff Inhalation BID    fluticasone  2 spray Nasal Daily    furosemide  40 mg Oral Daily    lidocaine  1 patch Transdermal Daily    tiotropium  18 mcg Inhalation Daily    cyanocobalamin  1,000 mcg Oral Daily     Continuous Infusions:   heparin (porcine) 14 Units/kg/hr (08/02/18 2968)    diltiazem (CARDIZEM) 125 mg in dextrose 5% 125 mL infusion 5 mg/hr (08/01/18 2142)      Pertinent Info/Orders/Treatment Plan: Pt admitted with a-fib with RVR, pt is on heparin and cardizem gtts, is in neutropenic precautions, WBC 2.0 today. Diet: DIET CARDIAC;   Vital Signs: BP (!) 108/57   Pulse 77   Temp 96.7 °F (35.9 °C) (Axillary)   Resp 16   Ht 5' 7\" (1.702 m)   Wt 183 lb 1.6 oz (83.1 kg)   SpO2 98%   BMI 28.68 kg/m²   DVT Prophylaxis: heparin  Influenza Vaccination Screening Completed: yes  Pneumonia Vaccination Screening Completed: yes  PCP: Armida Farrell DO  Readmission: yes  Patient has been readmitted within 22 days. Patient went to f/u appointment? yes  If yes, was it within 7 days?  yes  Patient was able to fill Recommend weight loss, healthy diet (DASH/Mediterranean) and exercise.   Patient should continue to exercise 30 minutes at least five times weekly once recovered from surgery. Limit alcohol.  Treated with: pravastatin

## 2025-08-01 NOTE — PROGRESS NOTES
Chemotherapy Administration    Pre-assessment Data: Antineoplastic Agents  Other:   See toxicity flow sheet for assessment [x]     Physician Notification of Concerns Related to Chemotherapy Administration:   Physician Notified Abbe Inch / Time of Notification      Interventions:   Lab work assessed  [x]   Height / Weight verified for dose [x]   Current MAR reviewed [x]   Emergency drugs available as appropriate [x]   Anaphylaxis assessment completed [x]   Pre-medications administered as ordered [x]   Blood return noted upon initiation of chemotherapy [x]   Blood return noted each 1-2ml of a vesicant medication if given IV push []   Blood return noted each 2-3ml of a non-vesicant medication if given IV push []   Monitor for signs / symptoms of hypersensitivity reaction [x]   Chemotherapy orders (drug/dose/rate) verified by 2 Chemo certified RNs [x]   Monitor IV site and blood return throughout the infusion of the medication [x]   Document IV site checks on the IV assessment form [x]   Document chemotherapy teaching on the Patient Education tab [x]   Document patient verbalizes understanding of medications being administered [x]   If IV infiltration, see ONS Guidelines []   Other:      []
Patient assessed for the following post chemotherapy:    Dizziness   No  Lightheadedness  No      Acute nausea/vomiting No  Headache   No  Chest pain/pressure  No  Rash/itching   No  Shortness of breath  No    Patient kept for 20 minutes observation post infusion chemotherapy. Patient tolerated chemotherapy treatment velcade without any complications. Last vital signs:   /69  Pulse 77  Temp 97.8 °F (36.6 °C) (Oral)   Resp 18  Ht 5' 6\" (1.676 m)  Wt 210 lb (95.3 kg)  SpO2 98%  BMI 33.89 kg/m2      Patient instructed if experience any of the above symptoms following today's infusion,he/she is to notify MD immediately or go to the emergency department. Discharge instructions given to patient. Verbalizes understanding. Ambulated off unit per self with belongings.
Awake/Alert